# Patient Record
Sex: FEMALE | Race: WHITE | Employment: FULL TIME | URBAN - METROPOLITAN AREA
[De-identification: names, ages, dates, MRNs, and addresses within clinical notes are randomized per-mention and may not be internally consistent; named-entity substitution may affect disease eponyms.]

---

## 2021-12-23 ENCOUNTER — TELEPHONE (OUTPATIENT)
Dept: ONCOLOGY | Age: 21
End: 2021-12-23

## 2022-03-08 ENCOUNTER — INITIAL CONSULT (OUTPATIENT)
Dept: ONCOLOGY | Age: 22
End: 2022-03-08
Payer: COMMERCIAL

## 2022-03-08 DIAGNOSIS — Z80.3 FAMILY HISTORY OF BREAST CANCER: Primary | ICD-10-CM

## 2022-03-08 PROCEDURE — 96040 PR GENETIC COUNSELING, EACH 30 MIN: CPT | Performed by: GENETIC COUNSELOR, MS

## 2022-03-08 NOTE — PROGRESS NOTES
3 [facility] Program   Hereditary Cancer Risk Assessment     Name: Anna Anderson   YOB: 2000   Date of Consultation: 3/8/22     Ms. Moss was seen at the [facility] for genetic counseling on 3/8/22. Ms. David Whittaker was referred by Iglesia Carrasco MD due to her family history of cancer. PERSONAL HISTORY   Ms. David Whittaker is a 23 y.o.  female with no personal history of cancer. Ms. David Whittaker reports menarche at age 13, is nulliparous, and is premenopausal.     Ms. David Whittaker has never had a hysterectomy and both ovaries are intact. She has not yet required a mammogram, had a breast MRI or had a breast biopsy. FAMILY HISTORY  Ms. David Whittaker has no biological children. She has a full brother (19y). Ms. Kemi Gallagher mother was recently diagnosed with breast cancer at age 47. She underwent lumpectomy and radiation therapy. She did not have genetic testing offered to her. There are no other known cancers in her extended maternal family. Ms. Kemi Gallagher father was diagnosed with male breast cancer at age 52 in which he underwent mastectomy and chemotherapy. It is unclear if he had any genetic testing performed. She reports that her paternal grandmother also had breast cancer in her 62s. Ms. David Whittaker reports [facility] ancestry and denies any known Ashkenazi Scientologist heritage. RISK ASSESSMENT   We discussed that approximately 5-10% of cancers are due to a hereditary gene mutation which causes an increased risk for certain cancers. Hereditary cancers are typically diagnosed at younger ages (under age 46y) and occur in multiple generations of a family. Multiple individuals with the same type of cancer (example: breast or colorectal) or uncommon cancers (example: ovarian, pancreatic, male breast cancer) are also features of hereditary cancers.      In summary, Ms. David Whittaker meets the [address] [place] [address] (NCCN) guidelines for genetic testing of the BRCA1/2 genes based on having a first degree relative with male breast cancer. She relates that she is unable to confirm if her father had any genetic testing or to obtain a copy of those test results. For this reason, Ms. Jacky Cannon is an appropriate candidate for genetic testing despite not having a personal history of cancer herself. The NCCN guidelines also recognize that an individual's personal and/or family history may be explained by more than one inherited cancer syndrome. Thus, a multi-gene panel may be more efficient, more cost effecting, and increases the yield of detecting a hereditary mutation which would impact medical management. Given her personal and/or family history, we recommend testing for the following genes at minimum: VICTOR MANUEL, CHEK2, NBN, and PALB2. DISCUSSION  We discussed that the BRCA1/2 genes are the most common genes associated with hereditary breast and ovarian cancer. We also discussed that genetic testing is available for multiple other genes related to hereditary cancer. Some of these genes are known to carry a significant increased risk for several cancers including colon, breast, uterine, ovarian, stomach, and pancreatic cancer, while some of these genes are believed to have a moderate increased risk for breast and other cancers. We discussed the possibility of finding a mutation in genes with limited information to guide medical management, as well we as the possibility of identifying variants of uncertain significance (VUS). We discussed the risks, benefits, and limitations of genetic testing. Possible test results were discussed as well as potential screening and prevention strategies. Specifically, we discussed increased breast cancer surveillance by mammogram and breast MRI as well as the option of prophylactic mastectomy. We discussed the recommendation for prophylactic oophorectomy for results which suggest an increased risk for ovarian cancer. Lastly, we discussed that the results of Ms. Moss's genetic testing may be beneficial in defining her risk for cancer as well as for her family members. SUMMARY & PLAN  1) Ms. Vivek Delvalle meets the NCCN criteria for genetic testing based on her family history of breast cancer. 2) Genetic testing via a multi-gene panel was recommended and offered to Ms. Moss. 3) Ms. Vivek Delvalle elected to proceed with the [employer] Expanded + RNA Insight gene panel. 4) Ms. Vivek Delvalle is aware that she will receive a notification from Alexis Bittar if the out of pocket cost for testing exceeds $100 (based on individual insurance plan) and the option to proceed with the self pay price of $249.     5) Informed consent was obtained and a blood sample was sent to Alexis Bittar. We will call Ms. Moss with results as soon as they are available. A follow up appointment may be recommended. A summary letter with results and final medical management recommendations will be sent once available. A total of 35 minutes were spent face to face with Ms. Moss and 50% of the time was spent educating and counseling. The [address] [facility] National Program would be glad to offer our assistance should you have any questions or concerns about this information. Please feel free to contact us at [phone]. Candise Dance.  Kody Morales, MS, [facility]   Licensed Genetic Counselor

## 2022-04-06 ENCOUNTER — TELEPHONE (OUTPATIENT)
Dept: ONCOLOGY | Age: 22
End: 2022-04-06

## 2022-04-06 NOTE — TELEPHONE ENCOUNTER
3 [facility] Program   Hereditary Cancer Risk Assessment     Name: Leighton Apple  YOB: 2000  Date of Results Disclosure: 04/06/22      HISTORY   Ms. Radha Montenegro was seen for genetic counseling at the request of Brina Treviño MD due to her family history of breast cancer. At that time, Ms. Radha Montenegro chose to pursue genetic testing via the CancerNext Expanded + RNA gene panel. These results were discussed with Ms. Moss via telephone. A summary of Ms. Moss's results and recommendations are below. RESULTS  [employer] CancerNext-Expanded Panel + RNAinsight: NEGATIVE - NO CLINICALLY SIGNIFICANT MUTATIONS DETECTED   This panel included the analysis of 77 genes associated with hereditary cancer including: AIP, ALK, APC, VICTOR MANUEL, BAP1, BARD1, BLM, BMPR1A, BRCA1, BRCA2, BRIP1, CDC73, CDH1, CDK4, CDKN1B, CDKN2A, CHEK2, CTNNA1, DICER1, EGFR, EGLN1, EPCAM, FANCC, FH, FLCN, GALNT12, GREM1, HOXB13, KIF1B, KIT1, LZTR1, MAX, MEN1, MET, MITF, MLH1, MSH2, MSH3, MSH6, MUTYH, NBN, NF1, NF2, NTHL1, PALB2, PDGFRA, PHOX2B, PMS2, POLD1, POLE, POT1, UXVEI3M, PTCH1, PTEN, RAD51C, RAD51D, RB1, RECQL, RET, SDHA, SDHAF2, SDHB, SDHC, ,SDHD, SMAD4, SMARCA4, SMARCB1, SMARCE1, STK11, SUFU, XTWA915, TP53, TSC1, TSC2, VHL, and XRCC2. In addition, no clinically relevant aberrant RNA transcripts were detected in select genes. Please refer to genetic test report for technical details. We discussed that Ms. Moss's negative test result greatly reduces the likelihood that she carries a hereditary gene mutation. However, it is possible that her family history of cancer is due to a hereditary mutation which she did not inherit. It is also possible that her family history of cancer may be due to a gene for which testing was not performed or which has yet to be discovered. RECOMMENDATIONS  1) While Ms. Moss does not carry a known hereditary gene mutation, her risk for breast cancer may still be elevated due to her remaining family history of breast cancer. Based on Ms. Moss's personal risk factors and family history of breast cancer, her estimated lifetime risk for breast cancer is 33% according to the [employer] risk model. The [employer] (NCCN) recommends that women with a lifetime risk of breast cancer 20% or higher consider the following screening and risk reducing options:     NCCN Recommendation Age to Begin Frequency    Breast awareness - Women should be familiar with their breasts and promptly report changes to their healthcare provider. Periodic, consistent breast self-examination (BSE) may be beneficial  Individualized  N/A    Clinical Breast Examination  Individualized Every 6-12 months   Breast MRI with contrast  40years old  Annual   Mammogram (consider tomosynthesis)  40years old  Annual    Consider risk reducing agents (i.e. Tamoxifen)  Individualized  N/A    *age to begin screening is based on the onset of breast cancer in Ms. Moss's family    2) Ms. Shelli Hamlin should continue general population cancer screening guidelines as directed by her physicians. RECOMMENDATIONS FOR FAMILY MEMBERS   1) Genetic testing is not recommended for Ms. Moss's children based on her negative test results. However, this recommendation does not take into consideration any family history of cancer in their paternal family. 2) It is possible that the cancers in Ms. Moss's family are due to a hereditary gene mutation that she did not inherit. Therefore, her relatives (particularly those with a current or previous cancer diagnosis) may consider genetic counseling and testing to clarify this possibility. Relatives may contact the [facility] [facility] at [phone] or locate a genetic counselor at www. [employer]. [employer].     3) We encourage Ms. Moss's relatives to discuss their family history of cancer with their physicians to determine the most appropriate cancer screening recommendations. Ms. Jemma Green female relatives may benefit from a formal breast cancer risk assessment by the Tennie Anders or Nikita Cater risk models to determine if additional breast cancer screening is warranted. SUMMARY & PLAN   1) Ms. Jemma Green genetic test results are negative meaning there were no clinically significant mutations detected in the 77 genes analyzed. 2) Ms. Jemma Green lifetime risk for breast cancer is 33% according to the Nikita Cater risk model. She may consider the NCCN guidelines outlined above for breast cancer surveillance. This includes annual breast MRI screening staggered 6 months apart from annual mammograms beginning at age 40 (10 years earlier than the youngest diagnosis of breast cancer in her family). We also recommend that her lifetime risk be re-calculated at that time to account for any changes to her personal hormonal history or family history. 3) There are no other changes in medical management for Ms. Moss based on her negative genetic test results. 4) We encourage Ms. Moss to contact us every 1-2 years to determine if there are any new genetic testing or research options available. 5) We encourage Ms. Moss to contact us with updates to her personal and/or familys cancer history as this information may alter our assessment and/or recommendations. The [facility] National Program would be glad to offer our assistance should you have any questions or concerns about this information. Please feel free to contact us at [phone]. Melony Door.  Tommie Carpenter MS, [facility]   Licensed Genetic Counselor         CC:  MD Rico Riley MD

## 2022-04-18 ENCOUNTER — APPOINTMENT (OUTPATIENT)
Dept: GENERAL RADIOLOGY | Age: 22
DRG: 003 | End: 2022-04-18
Attending: INTERNAL MEDICINE
Payer: COMMERCIAL

## 2022-04-18 ENCOUNTER — HOSPITAL ENCOUNTER (INPATIENT)
Age: 22
LOS: 22 days | Discharge: HOME | DRG: 003 | End: 2022-05-10
Attending: INTERNAL MEDICINE | Admitting: INTERNAL MEDICINE
Payer: COMMERCIAL

## 2022-04-18 DIAGNOSIS — E66.01 MORBID OBESITY WITH BMI OF 50.0-59.9, ADULT (HCC): ICD-10-CM

## 2022-04-18 DIAGNOSIS — G47.33 OSA (OBSTRUCTIVE SLEEP APNEA): Primary | ICD-10-CM

## 2022-04-18 DIAGNOSIS — J45.52 SEVERE PERSISTENT ASTHMA WITH STATUS ASTHMATICUS: ICD-10-CM

## 2022-04-18 PROBLEM — I51.89 DIASTOLIC DYSFUNCTION: Status: ACTIVE | Noted: 2022-04-18

## 2022-04-18 PROBLEM — J45.901 ACUTE ASTHMA EXACERBATION: Status: ACTIVE | Noted: 2022-04-18

## 2022-04-18 PROBLEM — Z87.898 HISTORY OF SEIZURES: Status: ACTIVE | Noted: 2022-04-18

## 2022-04-18 PROBLEM — Z97.8 ENDOTRACHEALLY INTUBATED: Status: ACTIVE | Noted: 2022-04-18

## 2022-04-18 PROBLEM — J96.92 HYPERCAPNIC RESPIRATORY FAILURE (HCC): Status: ACTIVE | Noted: 2022-04-18

## 2022-04-18 PROBLEM — Z99.11 ON MECHANICALLY ASSISTED VENTILATION (HCC): Status: ACTIVE | Noted: 2022-04-18

## 2022-04-18 LAB
-: ABNORMAL
ACTION: NORMAL
ALBUMIN SERPL-MCNC: 3.3 G/DL (ref 3.5–5.2)
ALBUMIN/GLOBULIN RATIO: 1.2 (ref 1–2.5)
ALLEN TEST: ABNORMAL
ALLEN TEST: POSITIVE
ALP BLD-CCNC: 86 U/L (ref 35–104)
ALT SERPL-CCNC: 22 U/L (ref 5–33)
ANION GAP SERPL CALCULATED.3IONS-SCNC: 8 MMOL/L (ref 9–17)
AST SERPL-CCNC: 45 U/L
BACTERIA: ABNORMAL
BILIRUB SERPL-MCNC: 0.19 MG/DL (ref 0.3–1.2)
BILIRUBIN URINE: NEGATIVE
BUN BLDV-MCNC: 15 MG/DL (ref 6–20)
CALCIUM SERPL-MCNC: 7.6 MG/DL (ref 8.6–10.4)
CASTS UA: ABNORMAL /LPF (ref 0–2)
CASTS UA: ABNORMAL /LPF (ref 0–2)
CHLORIDE BLD-SCNC: 109 MMOL/L (ref 98–107)
CO2: 30 MMOL/L (ref 20–31)
COLOR: YELLOW
CREAT SERPL-MCNC: 0.97 MG/DL (ref 0.5–0.9)
DATE AND TIME: NORMAL
EPITHELIAL CELLS UA: ABNORMAL /HPF (ref 0–5)
FIO2: 60
GFR AFRICAN AMERICAN: >60 ML/MIN
GFR NON-AFRICAN AMERICAN: >60 ML/MIN
GFR SERPL CREATININE-BSD FRML MDRD: ABNORMAL ML/MIN/{1.73_M2}
GLUCOSE BLD-MCNC: 128 MG/DL (ref 65–105)
GLUCOSE BLD-MCNC: 140 MG/DL (ref 65–105)
GLUCOSE BLD-MCNC: 144 MG/DL (ref 70–99)
GLUCOSE BLD-MCNC: 149 MG/DL (ref 74–100)
GLUCOSE BLD-MCNC: 182 MG/DL (ref 74–100)
GLUCOSE BLD-MCNC: 234 MG/DL (ref 74–100)
GLUCOSE URINE: NEGATIVE
KETONES, URINE: NEGATIVE
LEUKOCYTE ESTERASE, URINE: NEGATIVE
LV EF: 50 %
LVEF MODALITY: NORMAL
NITRITE, URINE: NEGATIVE
NOTIFY: NORMAL
O2 DEVICE/FLOW/%: ABNORMAL
O2 DEVICE/FLOW/%: ABNORMAL
PARTIAL THROMBOPLASTIN TIME: 24.4 SEC (ref 20.5–30.5)
PATIENT TEMP: 37
PATIENT TEMP: 37
PH UA: 5.5 (ref 5–8)
POC HCO3: 32 MMOL/L (ref 21–28)
POC HCO3: 32.2 MMOL/L (ref 21–28)
POC HCO3: 32.7 MMOL/L (ref 21–28)
POC LACTIC ACID: 0.71 MMOL/L (ref 0.56–1.39)
POC LACTIC ACID: 0.9 MMOL/L (ref 0.56–1.39)
POC LACTIC ACID: 0.92 MMOL/L (ref 0.56–1.39)
POC O2 SATURATION: 83 % (ref 94–98)
POC O2 SATURATION: 91 % (ref 94–98)
POC O2 SATURATION: 92 % (ref 94–98)
POC PCO2: 65.8 MM HG (ref 35–48)
POC PCO2: 68.5 MM HG (ref 35–48)
POC PCO2: 87 MM HG (ref 35–48)
POC PH: 7.18 (ref 7.35–7.45)
POC PH: 7.28 (ref 7.35–7.45)
POC PH: 7.3 (ref 7.35–7.45)
POC PO2: 55.8 MM HG (ref 83–108)
POC PO2: 72.8 MM HG (ref 83–108)
POC PO2: 81.2 MM HG (ref 83–108)
POSITIVE BASE EXCESS, ART: 1 (ref 0–3)
POSITIVE BASE EXCESS, ART: 3 (ref 0–3)
POSITIVE BASE EXCESS, ART: 4 (ref 0–3)
POTASSIUM SERPL-SCNC: 4.7 MMOL/L (ref 3.7–5.3)
PROCALCITONIN: 0.94 NG/ML
PROTEIN UA: ABNORMAL
RBC UA: ABNORMAL /HPF (ref 0–2)
READ BACK: YES
SAMPLE SITE: ABNORMAL
SAMPLE SITE: ABNORMAL
SODIUM BLD-SCNC: 147 MMOL/L (ref 135–144)
SPECIFIC GRAVITY UA: 1.03 (ref 1–1.03)
TOTAL PROTEIN: 6 G/DL (ref 6.4–8.3)
TURBIDITY: ABNORMAL
URINE HGB: NEGATIVE
UROBILINOGEN, URINE: NORMAL
WBC UA: ABNORMAL /HPF (ref 0–5)

## 2022-04-18 PROCEDURE — 2500000003 HC RX 250 WO HCPCS: Performed by: STUDENT IN AN ORGANIZED HEALTH CARE EDUCATION/TRAINING PROGRAM

## 2022-04-18 PROCEDURE — 94761 N-INVAS EAR/PLS OXIMETRY MLT: CPT

## 2022-04-18 PROCEDURE — 51702 INSERT TEMP BLADDER CATH: CPT

## 2022-04-18 PROCEDURE — 85730 THROMBOPLASTIN TIME PARTIAL: CPT

## 2022-04-18 PROCEDURE — 6360000002 HC RX W HCPCS: Performed by: STUDENT IN AN ORGANIZED HEALTH CARE EDUCATION/TRAINING PROGRAM

## 2022-04-18 PROCEDURE — 2580000003 HC RX 258: Performed by: STUDENT IN AN ORGANIZED HEALTH CARE EDUCATION/TRAINING PROGRAM

## 2022-04-18 PROCEDURE — 36620 INSERTION CATHETER ARTERY: CPT

## 2022-04-18 PROCEDURE — 82803 BLOOD GASES ANY COMBINATION: CPT

## 2022-04-18 PROCEDURE — 87449 NOS EACH ORGANISM AG IA: CPT

## 2022-04-18 PROCEDURE — 85025 COMPLETE CBC W/AUTO DIFF WBC: CPT

## 2022-04-18 PROCEDURE — 93005 ELECTROCARDIOGRAM TRACING: CPT | Performed by: STUDENT IN AN ORGANIZED HEALTH CARE EDUCATION/TRAINING PROGRAM

## 2022-04-18 PROCEDURE — 99291 CRITICAL CARE FIRST HOUR: CPT | Performed by: INTERNAL MEDICINE

## 2022-04-18 PROCEDURE — 83605 ASSAY OF LACTIC ACID: CPT

## 2022-04-18 PROCEDURE — 6360000002 HC RX W HCPCS

## 2022-04-18 PROCEDURE — 99223 1ST HOSP IP/OBS HIGH 75: CPT | Performed by: INTERNAL MEDICINE

## 2022-04-18 PROCEDURE — 71045 X-RAY EXAM CHEST 1 VIEW: CPT

## 2022-04-18 PROCEDURE — 6370000000 HC RX 637 (ALT 250 FOR IP)

## 2022-04-18 PROCEDURE — 84145 PROCALCITONIN (PCT): CPT

## 2022-04-18 PROCEDURE — 2000000000 HC ICU R&B

## 2022-04-18 PROCEDURE — 94002 VENT MGMT INPAT INIT DAY: CPT

## 2022-04-18 PROCEDURE — 80053 COMPREHEN METABOLIC PANEL: CPT

## 2022-04-18 PROCEDURE — 5A1945Z RESPIRATORY VENTILATION, 24-96 CONSECUTIVE HOURS: ICD-10-PCS | Performed by: INTERNAL MEDICINE

## 2022-04-18 PROCEDURE — 93306 TTE W/DOPPLER COMPLETE: CPT

## 2022-04-18 PROCEDURE — 85652 RBC SED RATE AUTOMATED: CPT

## 2022-04-18 PROCEDURE — 81025 URINE PREGNANCY TEST: CPT

## 2022-04-18 PROCEDURE — 2700000000 HC OXYGEN THERAPY PER DAY

## 2022-04-18 PROCEDURE — 81001 URINALYSIS AUTO W/SCOPE: CPT

## 2022-04-18 PROCEDURE — 82947 ASSAY GLUCOSE BLOOD QUANT: CPT

## 2022-04-18 PROCEDURE — 87899 AGENT NOS ASSAY W/OPTIC: CPT

## 2022-04-18 RX ORDER — IPRATROPIUM BROMIDE AND ALBUTEROL SULFATE 2.5; .5 MG/3ML; MG/3ML
SOLUTION RESPIRATORY (INHALATION)
Status: COMPLETED
Start: 2022-04-18 | End: 2022-04-18

## 2022-04-18 RX ORDER — NOREPINEPHRINE BIT/0.9 % NACL 16MG/250ML
1-100 INFUSION BOTTLE (ML) INTRAVENOUS CONTINUOUS
Status: DISCONTINUED | OUTPATIENT
Start: 2022-04-18 | End: 2022-04-19

## 2022-04-18 RX ORDER — HEPARIN SODIUM 5000 [USP'U]/ML
5000 INJECTION, SOLUTION INTRAVENOUS; SUBCUTANEOUS EVERY 8 HOURS SCHEDULED
Status: DISCONTINUED | OUTPATIENT
Start: 2022-04-18 | End: 2022-04-22 | Stop reason: CLARIF

## 2022-04-18 RX ORDER — MIDODRINE HYDROCHLORIDE 5 MG/1
10 TABLET ORAL
Status: DISCONTINUED | OUTPATIENT
Start: 2022-04-18 | End: 2022-04-19

## 2022-04-18 RX ORDER — LANSOPRAZOLE
15 KIT
Status: DISCONTINUED | OUTPATIENT
Start: 2022-04-19 | End: 2022-04-19

## 2022-04-18 RX ORDER — IPRATROPIUM BROMIDE AND ALBUTEROL SULFATE 2.5; .5 MG/3ML; MG/3ML
1 SOLUTION RESPIRATORY (INHALATION) EVERY 4 HOURS PRN
Status: DISCONTINUED | OUTPATIENT
Start: 2022-04-18 | End: 2022-04-20

## 2022-04-18 RX ORDER — FUROSEMIDE 10 MG/ML
40 INJECTION INTRAMUSCULAR; INTRAVENOUS ONCE
Status: COMPLETED | OUTPATIENT
Start: 2022-04-18 | End: 2022-04-18

## 2022-04-18 RX ORDER — PROPOFOL 10 MG/ML
5-50 INJECTION, EMULSION INTRAVENOUS CONTINUOUS
Status: DISCONTINUED | OUTPATIENT
Start: 2022-04-18 | End: 2022-04-20

## 2022-04-18 RX ORDER — SODIUM CHLORIDE 0.9 % (FLUSH) 0.9 %
5-40 SYRINGE (ML) INJECTION PRN
Status: DISCONTINUED | OUTPATIENT
Start: 2022-04-18 | End: 2022-05-10 | Stop reason: HOSPADM

## 2022-04-18 RX ORDER — ALBUTEROL SULFATE 2.5 MG/3ML
5 SOLUTION RESPIRATORY (INHALATION) EVERY 6 HOURS PRN
Status: DISCONTINUED | OUTPATIENT
Start: 2022-04-18 | End: 2022-04-20

## 2022-04-18 RX ORDER — ALBUTEROL SULFATE 2.5 MG/3ML
SOLUTION RESPIRATORY (INHALATION)
Status: COMPLETED
Start: 2022-04-18 | End: 2022-04-18

## 2022-04-18 RX ORDER — SODIUM CHLORIDE 9 MG/ML
INJECTION, SOLUTION INTRAVENOUS PRN
Status: DISCONTINUED | OUTPATIENT
Start: 2022-04-18 | End: 2022-05-10 | Stop reason: HOSPADM

## 2022-04-18 RX ORDER — DEXTROSE MONOHYDRATE 50 MG/ML
100 INJECTION, SOLUTION INTRAVENOUS PRN
Status: DISCONTINUED | OUTPATIENT
Start: 2022-04-18 | End: 2022-05-10 | Stop reason: HOSPADM

## 2022-04-18 RX ORDER — DEXTROSE MONOHYDRATE 25 G/50ML
12.5 INJECTION, SOLUTION INTRAVENOUS PRN
Status: DISCONTINUED | OUTPATIENT
Start: 2022-04-18 | End: 2022-05-10 | Stop reason: HOSPADM

## 2022-04-18 RX ORDER — POLYETHYLENE GLYCOL 3350 17 G/17G
17 POWDER, FOR SOLUTION ORAL DAILY PRN
Status: DISCONTINUED | OUTPATIENT
Start: 2022-04-18 | End: 2022-04-24

## 2022-04-18 RX ORDER — SODIUM CHLORIDE 0.9 % (FLUSH) 0.9 %
5-40 SYRINGE (ML) INJECTION EVERY 12 HOURS SCHEDULED
Status: DISCONTINUED | OUTPATIENT
Start: 2022-04-18 | End: 2022-05-10 | Stop reason: HOSPADM

## 2022-04-18 RX ORDER — METHYLPREDNISOLONE SODIUM SUCCINATE 40 MG/ML
40 INJECTION, POWDER, LYOPHILIZED, FOR SOLUTION INTRAMUSCULAR; INTRAVENOUS DAILY
Status: DISCONTINUED | OUTPATIENT
Start: 2022-04-18 | End: 2022-04-19

## 2022-04-18 RX ORDER — ACETAMINOPHEN 325 MG/1
650 TABLET ORAL EVERY 6 HOURS PRN
Status: DISCONTINUED | OUTPATIENT
Start: 2022-04-18 | End: 2022-05-10 | Stop reason: HOSPADM

## 2022-04-18 RX ORDER — ONDANSETRON 4 MG/1
4 TABLET, ORALLY DISINTEGRATING ORAL EVERY 8 HOURS PRN
Status: DISCONTINUED | OUTPATIENT
Start: 2022-04-18 | End: 2022-05-10 | Stop reason: HOSPADM

## 2022-04-18 RX ORDER — ACETAMINOPHEN 650 MG/1
650 SUPPOSITORY RECTAL EVERY 6 HOURS PRN
Status: DISCONTINUED | OUTPATIENT
Start: 2022-04-18 | End: 2022-05-10 | Stop reason: HOSPADM

## 2022-04-18 RX ORDER — MAGNESIUM SULFATE IN WATER 40 MG/ML
2000 INJECTION, SOLUTION INTRAVENOUS ONCE
Status: COMPLETED | OUTPATIENT
Start: 2022-04-18 | End: 2022-04-18

## 2022-04-18 RX ORDER — ONDANSETRON 2 MG/ML
4 INJECTION INTRAMUSCULAR; INTRAVENOUS EVERY 6 HOURS PRN
Status: DISCONTINUED | OUTPATIENT
Start: 2022-04-18 | End: 2022-05-10 | Stop reason: HOSPADM

## 2022-04-18 RX ORDER — NICOTINE POLACRILEX 4 MG
15 LOZENGE BUCCAL PRN
Status: DISCONTINUED | OUTPATIENT
Start: 2022-04-18 | End: 2022-05-10 | Stop reason: HOSPADM

## 2022-04-18 RX ORDER — PROPOFOL 10 MG/ML
INJECTION, EMULSION INTRAVENOUS
Status: COMPLETED
Start: 2022-04-18 | End: 2022-04-18

## 2022-04-18 RX ADMIN — ALBUTEROL SULFATE 5 MG: 2.5 SOLUTION RESPIRATORY (INHALATION) at 14:57

## 2022-04-18 RX ADMIN — PROPOFOL 40 MCG/KG/MIN: 10 INJECTION, EMULSION INTRAVENOUS at 20:18

## 2022-04-18 RX ADMIN — Medication 150 MCG/HR: at 14:25

## 2022-04-18 RX ADMIN — PROPOFOL 40 MCG/KG/MIN: 10 INJECTION, EMULSION INTRAVENOUS at 14:49

## 2022-04-18 RX ADMIN — PROPOFOL 40 MCG/KG/MIN: 10 INJECTION, EMULSION INTRAVENOUS at 16:48

## 2022-04-18 RX ADMIN — HEPARIN SODIUM 5000 UNITS: 5000 INJECTION INTRAVENOUS; SUBCUTANEOUS at 23:58

## 2022-04-18 RX ADMIN — MAGNESIUM SULFATE 2000 MG: 2 INJECTION INTRAVENOUS at 17:11

## 2022-04-18 RX ADMIN — Medication 150 MCG/HR: at 21:00

## 2022-04-18 RX ADMIN — FUROSEMIDE 40 MG: 10 INJECTION, SOLUTION INTRAMUSCULAR; INTRAVENOUS at 20:47

## 2022-04-18 RX ADMIN — INSULIN LISPRO 2 UNITS: 100 INJECTION, SOLUTION INTRAVENOUS; SUBCUTANEOUS at 23:20

## 2022-04-18 RX ADMIN — METHYLPREDNISOLONE SODIUM SUCCINATE 40 MG: 40 INJECTION, POWDER, FOR SOLUTION INTRAMUSCULAR; INTRAVENOUS at 17:12

## 2022-04-18 RX ADMIN — PROPOFOL 40 MCG/KG/MIN: 10 INJECTION, EMULSION INTRAVENOUS at 22:43

## 2022-04-18 RX ADMIN — CISATRACURIUM BESYLATE 2 MCG/KG/MIN: 200 INJECTION, SOLUTION INTRAVENOUS at 14:30

## 2022-04-18 RX ADMIN — IPRATROPIUM BROMIDE AND ALBUTEROL SULFATE 1 AMPULE: .5; 2.5 SOLUTION RESPIRATORY (INHALATION) at 14:58

## 2022-04-18 RX ADMIN — MIDODRINE HYDROCHLORIDE 10 MG: 5 TABLET ORAL at 20:46

## 2022-04-18 RX ADMIN — IPRATROPIUM BROMIDE AND ALBUTEROL SULFATE 1 AMPULE: .5; 3 SOLUTION RESPIRATORY (INHALATION) at 14:58

## 2022-04-18 ASSESSMENT — PULMONARY FUNCTION TESTS
PIF_VALUE: 46
PIF_VALUE: 40
PIF_VALUE: 48
PIF_VALUE: 52
PIF_VALUE: 59
PIF_VALUE: 51
PIF_VALUE: 48

## 2022-04-18 NOTE — PROCEDURES
Patient Name: Jessica Gonsaels   Medical Record Number: [id number]  Date: 4/18/2022   Time: 3:50 PM   Room/Bed: [address] [place] [address]  Arterial Line Placement Procedure Note                   Indication: arterial blood gases    Consent: Unable to be obtained due to the emergent nature of this procedure. Sushil's Test: Normal    Procedure: The skin over the left radial artery was draped in a sterile fashion. Local anesthesia was not performed due to the emergent nature of this procedure. A 20 gauge arterial line catheter was then inserted, over a needle, into the vessel. The transducer set was then attached and securely fastened to the skin with an adhesive dressing. Waveforms on the monitor were observed and found to be adequate. The patient had good distal perfusion after the procedure. The site was then dressed in a sterile fashion. The patient tolerated the procedure well.      Complications: None    Electronically Signed by: @kin@

## 2022-04-18 NOTE — PLAN OF CARE
Problem: Gas Exchange - Impaired:  Goal: Levels of oxygenation will improve  Description: Levels of oxygenation will improve  Outcome: Ongoing     Problem:  Activity:  Goal: Ability to tolerate increased activity will improve  Description: Ability to tolerate increased activity will improve  Outcome: Ongoing     Problem: Cardiac:  Goal: Hemodynamic stability will improve  Description: Hemodynamic stability will improve  Outcome: Ongoing     Problem: Respiratory:  Goal: Ability to maintain a clear airway will improve  Description: Ability to maintain a clear airway will improve  Outcome: Ongoing  Goal: Ability to maintain adequate ventilation will improve  Description: Ability to maintain adequate ventilation will improve  Outcome: Ongoing  Goal: Complications related to the disease process, condition or treatment will be avoided or minimized  Description: Complications related to the disease process, condition or treatment will be avoided or minimized  Outcome: Ongoing     Problem: Skin Integrity:  Goal: Risk for impaired skin integrity will decrease  Description: Risk for impaired skin integrity will decrease  Outcome: Ongoing

## 2022-04-18 NOTE — CONSULTS
Pulmonary/Critical Care consultation    Patient's name:  Vero Roach  Medical Record Number: [id number]  Patient's account/billing number: [de-identified]  Patient's YOB: 2000  Age: 23 y. o. Date of Admission: 4/18/2022  2:00 PM  Date of Consult: 4/18/2022      Primary Care Physician: Chaya Gabriel MD    Consultation requested by Dr. Lorena Naranjo      Code Status: No Order    Reason for consult: Acute on chronic respiratory failure with hypoxemia and hypercarbia secondary to acute asthma      HISTORY OF PRESENT ILLNESS:   History was obtained from chart review and unobtainable from patient due to l sedated and being on the ventilator. Vero Roach is a 23 y. o. white woman with known history of bronchial asthma was admitted to [facility] with increasing shortness of breath and acute on chronic hypoxemic and hypercarbic respiratory failure requiring intubation mechanical ventilation and sedation and neuromuscular blockade. Patient was receiving bicarbonate secondary to acidosis though the acidosis was respiratory acidosis making the hypercarbia slightly worse and pH also slightly worse. Patient was transferred to [facility] for possibility of ECMO  Upon my evaluation patient was sedated and unresponsive  Having small tracheal secretions  Patient was hemodynamically stable and not not requiring any pressors  Oxygen saturation was 100% on 60% oxygen on the ventilator  ABG showed evidence of acute on chronic respiratory acidosis            Past Medical History: Bronchial asthma, morbid obesity    Past Surgical History:  No past surgical history on file.     Allergies:    Not on File      Home Meds:   Prior to Admission medications    Not on File       Family History:       Problem Relation Age of Onset    Cancer Mother         breast ca 47, lumpectomy, RT, no GT    Cancer Father         breast ca 52, mastectomy, chemo, GT?    Cancer Paternal Grandmother         breast ca, 60-70s    Cancer Maternal Aunt         poss colon ca, colostomy bag, passed away at age 58s    Other Paternal Aunt         cirrhosis         Social History:   TOBACCO:   has no history on file for tobacco use. ETOH:   has no history on file for alcohol use. DRUGS:  has no history on file for drug use. OCCUPATION: Noncontributory          REVIEW OF SYSTEMS:    Review of Systems -   Could not be done secondary to the patient's mentation and intubation          Physical Exam:    Vitals: Resp 24   Ht 5' 3\" (1.6 m)   Wt 293 lb 10.4 oz (133.2 kg)   BMI 52.02 kg/m²     Last Body weight:   Wt Readings from Last 3 Encounters:   04/18/22 293 lb 10.4 oz (133.2 kg)       Body Mass Index : Body mass index is 52.02 kg/m². Intake and Output summary: No intake or output data in the 24 hours ending 04/18/22 1627    Physical Examination:   PHYSICAL EXAMINATION:  Vitals:    04/18/22 1412 04/18/22 1505   Resp:  24   Weight: 293 lb 10.4 oz (133.2 kg)    Height: 5' 3\" (1.6 m)      Constitutional: This is a well developed, well nourished, Greater than 36 - Morbid Obesity / Extreme Obesity / Grade III 23y.o. year old female who is in no apparent distress. Head:normocephalic and atraumatic. EENT:   NAY. No conjunctival injections. Septum was midline, mucosa was without erythema, exudates or cobblestoning. No thrush was noted. Neck: Supple without thyromegaly. No elevated JVP. Trachea was midline. Respiratory: Chest was symmetrical without dullness to percussion. Breath sounds bilaterally were clear to auscultation. There were no wheezes, rhonchi or rales. There is no intercostal retraction or use of accessory muscles. No egophony noted. Cardiovascular: Regular without murmur, clicks, gallops or rubs. Abdomen: Slightly rounded and soft without organomegaly. No rebound tenderness, rigidity or guarding was appreciated.     Lymphatic: No lymphadenopathy. Musculoskeletal: Normal curvature of the spine. No gross muscle weakness. Extremities:  No lower extremity edema, ulcerations, tenderness, varicosities or erythema. Muscle size, tone and strength are normal.  No involuntary movements are noted. Skin:  Warm and dry. Good color, turgor and pigmentation. No lesions or scars. No cyanosis or clubbing  Neurological/Psychiatric: The patient's general behavior, level of consciousness, thought content and emotional status is normal.            Laboratory findings:-    CBC: No results for input(s): WBC, HGB, PLT in the last 72 hours. BMP:  No results for input(s): NA, K, CL, CO2, BUN, CREATININE, GLUCOSE in the last 72 hours. S. Calcium:No results for input(s): CALCIUM in the last 72 hours. S. Ionized Calcium:No results for input(s): IONCA in the last 72 hours. S. Magnesium:No results for input(s): MG in the last 72 hours. S. Phosphorus:No results for input(s): PHOS in the last 72 hours. S. Glucose:  Recent Labs     04/18/22  1419   POCGLU 234*     Glycosylated hemoglobin A1C: No results for input(s): LABA1C in the last 72 hours. INR: No results for input(s): INR in the last 72 hours. Hepatic functions: No results for input(s): ALKPHOS, ALT, AST, PROT, BILITOT, BILIDIR, LABALBU in the last 72 hours. Pancreatic functions:No results for input(s): LACTA, AMYLASE in the last 72 hours. S. Lactic Acid: No results for input(s): LACTA in the last 72 hours. Cardiac enzymes:No results for input(s): CKTOTAL, CKMB, CKMBINDEX, TROPONINI in the last 72 hours. BNP:No results for input(s): BNP in the last 72 hours. Lipid profile: No results for input(s): CHOL, TRIG, HDL, LDL, LDLCALC in the last 72 hours.   Blood Gases: No results found for: PH, PCO2, PO2, HCO3, O2SAT  Thyroid functions: No results found for: T4, TSH         Microbiology:    Cultures during this admission:     Blood cultures:      [x] None drawn      [] Negative             [] Positive (Details:  )  Urine Culture:        [x] None drawn      [] Negative             []  Positive (Details:  )  Sputum Culture:   [x] None drawn       [] Negative             []  Positive (Details:  )         Radiological reports:    XR CHEST (SINGLE VIEW FRONTAL)    Result Date: 4/18/2022  Diffuse interstitial opacities with alveolar/consolidative opacities at the bases favoring multifocal airspace disease. Assessment and Plan       IMPRESSION:   No diagnosis found. Principal Problem:    Acute asthma exacerbation  Resolved Problems:    * No resolved hospital problems. *  Patient with acute on chronic hypoxemic and hypercarbic respiratory failure secondary to acute asthma probably precipitated by right lower lobe pneumonia    PLAN:       Continue mechanical ventilatory support  Hold off on the bicarbonate infusion as it could be making the hypercarbia worse  As the patient's bronchospasm improves, we will continue to increase respiratory rate to help decrease the carbon dioxide  Chest x-ray and ABG as needed  Bronchodilators and corticosteroids along with antibiotics for community-acquired pneumonia with increased risk for pneumococcus and Legionella  Obtain urinary antigens for Legionella and pneumococcus  Sputum gram stain culture to be obtained  Recommend flu vaccination in the fall annually. Recommendations given regarding pneumococcal vaccinations. Maintain an active lifestyle. Chest x-ray was reviewed. Given the patient's body habitus, patient will need to be evaluated for sleep apnea as an outpatient  At this point, patient is not a candidate for ECMO  Discussed with Dr. Kurt Chiu  Thank you for having us involved in the care of your patient. Please call us if you have any questions or concerns.   Total critical care time caring for this patient with life threatening, unstable organ failure, including direct patient contact, management of life support systems, review of data including imaging and labs, discussions with other team members and physicians at least 27  Min so far today, excluding procedures.       Brett Calderon MD, MJATIN.            4/18/2022, 4:27 PM

## 2022-04-19 ENCOUNTER — APPOINTMENT (OUTPATIENT)
Dept: GENERAL RADIOLOGY | Age: 22
DRG: 003 | End: 2022-04-19
Attending: INTERNAL MEDICINE
Payer: COMMERCIAL

## 2022-04-19 LAB
ABSOLUTE EOS #: 0 K/UL (ref 0–0.4)
ABSOLUTE EOS #: 0 K/UL (ref 0–0.4)
ABSOLUTE IMMATURE GRANULOCYTE: 0 K/UL (ref 0–0.3)
ABSOLUTE IMMATURE GRANULOCYTE: 0 K/UL (ref 0–0.3)
ABSOLUTE LYMPH #: 0.94 K/UL (ref 1–4.8)
ABSOLUTE LYMPH #: 1.66 K/UL (ref 1–4.8)
ABSOLUTE MONO #: 0.31 K/UL (ref 0.1–0.8)
ABSOLUTE MONO #: 1.33 K/UL (ref 0.1–0.8)
ADENOVIRUS PCR: NOT DETECTED
ANION GAP SERPL CALCULATED.3IONS-SCNC: 15 MMOL/L (ref 9–17)
ANION GAP: 8 MMOL/L (ref 7–16)
BASOPHILS # BLD: 0 % (ref 0–2)
BASOPHILS # BLD: 0 % (ref 0–2)
BASOPHILS ABSOLUTE: 0 K/UL (ref 0–0.2)
BASOPHILS ABSOLUTE: 0 K/UL (ref 0–0.2)
BORDETELLA PARAPERTUSSIS: NOT DETECTED
BORDETELLA PERTUSSIS PCR: NOT DETECTED
BUN BLDV-MCNC: 19 MG/DL (ref 6–20)
C-REACTIVE PROTEIN: 79.7 MG/L (ref 0–5)
CALCIUM SERPL-MCNC: 7.8 MG/DL (ref 8.6–10.4)
CHLAMYDIA PNEUMONIAE BY PCR: NOT DETECTED
CHLORIDE BLD-SCNC: 106 MMOL/L (ref 98–107)
CO2: 24 MMOL/L (ref 20–31)
CORONAVIRUS 229E PCR: NOT DETECTED
CORONAVIRUS HKU1 PCR: NOT DETECTED
CORONAVIRUS NL63 PCR: NOT DETECTED
CORONAVIRUS OC43 PCR: NOT DETECTED
CREAT SERPL-MCNC: 0.86 MG/DL (ref 0.5–0.9)
EKG ATRIAL RATE: 96 BPM
EKG P AXIS: 74 DEGREES
EKG P-R INTERVAL: 144 MS
EKG Q-T INTERVAL: 358 MS
EKG QRS DURATION: 84 MS
EKG QTC CALCULATION (BAZETT): 452 MS
EKG R AXIS: 67 DEGREES
EKG T AXIS: 58 DEGREES
EKG VENTRICULAR RATE: 96 BPM
EOSINOPHILS RELATIVE PERCENT: 0 % (ref 1–4)
EOSINOPHILS RELATIVE PERCENT: 0 % (ref 1–4)
GFR AFRICAN AMERICAN: >60 ML/MIN
GFR NON-AFRICAN AMERICAN: 39 ML/MIN
GFR NON-AFRICAN AMERICAN: >60 ML/MIN
GFR SERPL CREATININE-BSD FRML MDRD: 48 ML/MIN
GFR SERPL CREATININE-BSD FRML MDRD: ABNORMAL ML/MIN/{1.73_M2}
GFR SERPL CREATININE-BSD FRML MDRD: ABNORMAL ML/MIN/{1.73_M2}
GLUCOSE BLD-MCNC: 135 MG/DL (ref 74–100)
GLUCOSE BLD-MCNC: 142 MG/DL (ref 70–99)
GLUCOSE BLD-MCNC: 148 MG/DL (ref 65–105)
GLUCOSE BLD-MCNC: 156 MG/DL (ref 65–105)
GLUCOSE BLD-MCNC: 168 MG/DL (ref 65–105)
GLUCOSE BLD-MCNC: 236 MG/DL (ref 65–105)
HCG QUALITATIVE: NEGATIVE
HCG(URINE) PREGNANCY TEST: NEGATIVE
HCT VFR BLD CALC: 37.5 % (ref 36.3–47.1)
HCT VFR BLD CALC: 38.1 % (ref 36.3–47.1)
HEMOGLOBIN: 11.6 G/DL (ref 11.9–15.1)
HEMOGLOBIN: 11.6 G/DL (ref 11.9–15.1)
HUMAN METAPNEUMOVIRUS PCR: NOT DETECTED
IMMATURE GRANULOCYTES: 0 %
IMMATURE GRANULOCYTES: 0 %
INFLUENZA A BY PCR: NOT DETECTED
INFLUENZA B BY PCR: NOT DETECTED
INR BLD: 1
LACTIC ACID, SEPSIS WHOLE BLOOD: 0.8 MMOL/L (ref 0.5–1.9)
LEGIONELLA PNEUMOPHILIA AG, URINE: NEGATIVE
LYMPHOCYTES # BLD: 3 % (ref 24–44)
LYMPHOCYTES # BLD: 5 % (ref 24–44)
MAGNESIUM: 3.3 MG/DL (ref 1.6–2.6)
MCH RBC QN AUTO: 27.4 PG (ref 25.2–33.5)
MCH RBC QN AUTO: 28.1 PG (ref 25.2–33.5)
MCHC RBC AUTO-ENTMCNC: 30.4 G/DL (ref 28.4–34.8)
MCHC RBC AUTO-ENTMCNC: 30.9 G/DL (ref 28.4–34.8)
MCV RBC AUTO: 90.1 FL (ref 82.6–102.9)
MCV RBC AUTO: 90.8 FL (ref 82.6–102.9)
MONOCYTES # BLD: 1 % (ref 1–7)
MONOCYTES # BLD: 4 % (ref 1–7)
MORPHOLOGY: NORMAL
MORPHOLOGY: NORMAL
MYCOPLASMA PNEUMONIAE PCR: NOT DETECTED
NRBC AUTOMATED: 0 PER 100 WBC
NRBC AUTOMATED: 0.1 PER 100 WBC
PARAINFLUENZA 1 PCR: NOT DETECTED
PARAINFLUENZA 2 PCR: NOT DETECTED
PARAINFLUENZA 3 PCR: NOT DETECTED
PARAINFLUENZA 4 PCR: NOT DETECTED
PARTIAL THROMBOPLASTIN TIME: 22.7 SEC (ref 20.5–30.5)
PDW BLD-RTO: 14 % (ref 11.8–14.4)
PDW BLD-RTO: 14.1 % (ref 11.8–14.4)
PLATELET # BLD: 257 K/UL (ref 138–453)
PLATELET # BLD: 283 K/UL (ref 138–453)
PMV BLD AUTO: 10.3 FL (ref 8.1–13.5)
PMV BLD AUTO: 10.4 FL (ref 8.1–13.5)
POC BUN: 18 MG/DL (ref 8–26)
POC CHLORIDE: 106 MMOL/L (ref 98–107)
POC CREATININE: 1.61 MG/DL (ref 0.51–1.19)
POC HCO3: 30.3 MMOL/L (ref 21–28)
POC HEMATOCRIT: 34 % (ref 36–46)
POC HEMOGLOBIN: 11.7 G/DL (ref 12–16)
POC IONIZED CALCIUM: 1.06 MMOL/L (ref 1.15–1.33)
POC LACTIC ACID: 0.87 MMOL/L (ref 0.56–1.39)
POC O2 SATURATION: 97 % (ref 94–98)
POC PCO2: 54.1 MM HG (ref 35–48)
POC PH: 7.36 (ref 7.35–7.45)
POC PO2: 92.5 MM HG (ref 83–108)
POC POTASSIUM: 4.4 MMOL/L (ref 3.5–4.5)
POC SODIUM: 144 MMOL/L (ref 138–146)
POC TCO2: 31 MMOL/L (ref 22–30)
POSITIVE BASE EXCESS, ART: 4 (ref 0–3)
POTASSIUM SERPL-SCNC: 4.5 MMOL/L (ref 3.7–5.3)
PROTHROMBIN TIME: 10.3 SEC (ref 9.1–12.3)
RBC # BLD: 4.13 M/UL (ref 3.95–5.11)
RBC # BLD: 4.23 M/UL (ref 3.95–5.11)
RESP SYNCYTIAL VIRUS PCR: NOT DETECTED
RHINO/ENTEROVIRUS PCR: DETECTED
SARS-COV-2, PCR: NOT DETECTED
SEDIMENTATION RATE, ERYTHROCYTE: 39 MM/HR (ref 0–20)
SEG NEUTROPHILS: 91 % (ref 36–66)
SEG NEUTROPHILS: 96 % (ref 36–66)
SEGMENTED NEUTROPHILS ABSOLUTE COUNT: 30.05 K/UL (ref 1.8–7.7)
SEGMENTED NEUTROPHILS ABSOLUTE COUNT: 30.21 K/UL (ref 1.8–7.7)
SODIUM BLD-SCNC: 145 MMOL/L (ref 135–144)
SOURCE: NORMAL
SPECIMEN DESCRIPTION: ABNORMAL
STREP PNEUMONIAE ANTIGEN: NEGATIVE
TROPONIN, HIGH SENSITIVITY: 142 NG/L (ref 0–14)
TROPONIN, HIGH SENSITIVITY: 148 NG/L (ref 0–14)
TROPONIN, HIGH SENSITIVITY: 182 NG/L (ref 0–14)
TROPONIN, HIGH SENSITIVITY: 183 NG/L (ref 0–14)
WBC # BLD: 31.3 K/UL (ref 3.5–11.3)
WBC # BLD: 33.2 K/UL (ref 3.5–11.3)

## 2022-04-19 PROCEDURE — 36620 INSERTION CATHETER ARTERY: CPT

## 2022-04-19 PROCEDURE — 37799 UNLISTED PX VASCULAR SURGERY: CPT

## 2022-04-19 PROCEDURE — 80051 ELECTROLYTE PANEL: CPT

## 2022-04-19 PROCEDURE — 94644 CONT INHLJ TX 1ST HOUR: CPT

## 2022-04-19 PROCEDURE — 86140 C-REACTIVE PROTEIN: CPT

## 2022-04-19 PROCEDURE — 84703 CHORIONIC GONADOTROPIN ASSAY: CPT

## 2022-04-19 PROCEDURE — 6370000000 HC RX 637 (ALT 250 FOR IP): Performed by: STUDENT IN AN ORGANIZED HEALTH CARE EDUCATION/TRAINING PROGRAM

## 2022-04-19 PROCEDURE — 2580000003 HC RX 258: Performed by: STUDENT IN AN ORGANIZED HEALTH CARE EDUCATION/TRAINING PROGRAM

## 2022-04-19 PROCEDURE — 85025 COMPLETE CBC W/AUTO DIFF WBC: CPT

## 2022-04-19 PROCEDURE — 6370000000 HC RX 637 (ALT 250 FOR IP): Performed by: INTERNAL MEDICINE

## 2022-04-19 PROCEDURE — 6360000002 HC RX W HCPCS: Performed by: STUDENT IN AN ORGANIZED HEALTH CARE EDUCATION/TRAINING PROGRAM

## 2022-04-19 PROCEDURE — 94640 AIRWAY INHALATION TREATMENT: CPT

## 2022-04-19 PROCEDURE — 2500000003 HC RX 250 WO HCPCS

## 2022-04-19 PROCEDURE — 85610 PROTHROMBIN TIME: CPT

## 2022-04-19 PROCEDURE — 82803 BLOOD GASES ANY COMBINATION: CPT

## 2022-04-19 PROCEDURE — 89220 SPUTUM SPECIMEN COLLECTION: CPT

## 2022-04-19 PROCEDURE — 6360000002 HC RX W HCPCS: Performed by: INTERNAL MEDICINE

## 2022-04-19 PROCEDURE — 83735 ASSAY OF MAGNESIUM: CPT

## 2022-04-19 PROCEDURE — 6360000002 HC RX W HCPCS

## 2022-04-19 PROCEDURE — 84520 ASSAY OF UREA NITROGEN: CPT

## 2022-04-19 PROCEDURE — 84484 ASSAY OF TROPONIN QUANT: CPT

## 2022-04-19 PROCEDURE — 2700000000 HC OXYGEN THERAPY PER DAY

## 2022-04-19 PROCEDURE — 83605 ASSAY OF LACTIC ACID: CPT

## 2022-04-19 PROCEDURE — 71045 X-RAY EXAM CHEST 1 VIEW: CPT

## 2022-04-19 PROCEDURE — 85730 THROMBOPLASTIN TIME PARTIAL: CPT

## 2022-04-19 PROCEDURE — 83036 HEMOGLOBIN GLYCOSYLATED A1C: CPT

## 2022-04-19 PROCEDURE — 82565 ASSAY OF CREATININE: CPT

## 2022-04-19 PROCEDURE — 99233 SBSQ HOSP IP/OBS HIGH 50: CPT | Performed by: INTERNAL MEDICINE

## 2022-04-19 PROCEDURE — 82330 ASSAY OF CALCIUM: CPT

## 2022-04-19 PROCEDURE — 93010 ELECTROCARDIOGRAM REPORT: CPT | Performed by: INTERNAL MEDICINE

## 2022-04-19 PROCEDURE — 2500000003 HC RX 250 WO HCPCS: Performed by: STUDENT IN AN ORGANIZED HEALTH CARE EDUCATION/TRAINING PROGRAM

## 2022-04-19 PROCEDURE — 87205 SMEAR GRAM STAIN: CPT

## 2022-04-19 PROCEDURE — 2500000003 HC RX 250 WO HCPCS: Performed by: INTERNAL MEDICINE

## 2022-04-19 PROCEDURE — 94761 N-INVAS EAR/PLS OXIMETRY MLT: CPT

## 2022-04-19 PROCEDURE — 99291 CRITICAL CARE FIRST HOUR: CPT | Performed by: INTERNAL MEDICINE

## 2022-04-19 PROCEDURE — 36415 COLL VENOUS BLD VENIPUNCTURE: CPT

## 2022-04-19 PROCEDURE — 85014 HEMATOCRIT: CPT

## 2022-04-19 PROCEDURE — 2000000000 HC ICU R&B

## 2022-04-19 PROCEDURE — 6370000000 HC RX 637 (ALT 250 FOR IP)

## 2022-04-19 PROCEDURE — 87040 BLOOD CULTURE FOR BACTERIA: CPT

## 2022-04-19 PROCEDURE — 87070 CULTURE OTHR SPECIMN AEROBIC: CPT

## 2022-04-19 PROCEDURE — 80048 BASIC METABOLIC PNL TOTAL CA: CPT

## 2022-04-19 PROCEDURE — 94003 VENT MGMT INPAT SUBQ DAY: CPT

## 2022-04-19 PROCEDURE — 0202U NFCT DS 22 TRGT SARS-COV-2: CPT

## 2022-04-19 RX ORDER — DIPHENHYDRAMINE HYDROCHLORIDE 50 MG/ML
10 INJECTION INTRAMUSCULAR; INTRAVENOUS ONCE
Status: COMPLETED | OUTPATIENT
Start: 2022-04-19 | End: 2022-04-19

## 2022-04-19 RX ORDER — MIDODRINE HYDROCHLORIDE 5 MG/1
5 TABLET ORAL 3 TIMES DAILY PRN
Status: DISCONTINUED | OUTPATIENT
Start: 2022-04-19 | End: 2022-04-19

## 2022-04-19 RX ORDER — METHYLPREDNISOLONE SODIUM SUCCINATE 40 MG/ML
40 INJECTION, POWDER, LYOPHILIZED, FOR SOLUTION INTRAMUSCULAR; INTRAVENOUS EVERY 8 HOURS
Status: DISCONTINUED | OUTPATIENT
Start: 2022-04-19 | End: 2022-04-28

## 2022-04-19 RX ORDER — IPRATROPIUM BROMIDE AND ALBUTEROL SULFATE 2.5; .5 MG/3ML; MG/3ML
1 SOLUTION RESPIRATORY (INHALATION) EVERY 4 HOURS
Status: DISCONTINUED | OUTPATIENT
Start: 2022-04-19 | End: 2022-05-05

## 2022-04-19 RX ORDER — DIPHENHYDRAMINE HYDROCHLORIDE 50 MG/ML
10 INJECTION INTRAMUSCULAR; INTRAVENOUS EVERY 6 HOURS PRN
Status: DISCONTINUED | OUTPATIENT
Start: 2022-04-19 | End: 2022-04-19

## 2022-04-19 RX ORDER — ALBUTEROL SULFATE 90 UG/1
2 AEROSOL, METERED RESPIRATORY (INHALATION) EVERY 6 HOURS PRN
Status: ON HOLD | COMMUNITY
End: 2022-04-22

## 2022-04-19 RX ORDER — LABETALOL HYDROCHLORIDE 5 MG/ML
5 INJECTION, SOLUTION INTRAVENOUS ONCE
Status: CANCELLED | OUTPATIENT
Start: 2022-04-19 | End: 2022-04-19

## 2022-04-19 RX ORDER — LANSOPRAZOLE
30 KIT
Status: DISCONTINUED | OUTPATIENT
Start: 2022-04-20 | End: 2022-04-20

## 2022-04-19 RX ORDER — MIDODRINE HYDROCHLORIDE 5 MG/1
10 TABLET ORAL 3 TIMES DAILY PRN
Status: DISCONTINUED | OUTPATIENT
Start: 2022-04-19 | End: 2022-04-19

## 2022-04-19 RX ORDER — POLYVINYL ALCOHOL 14 MG/ML
1 SOLUTION/ DROPS OPHTHALMIC EVERY 6 HOURS
Status: DISCONTINUED | OUTPATIENT
Start: 2022-04-19 | End: 2022-05-04

## 2022-04-19 RX ORDER — METOPROLOL TARTRATE 5 MG/5ML
5 INJECTION INTRAVENOUS ONCE
Status: COMPLETED | OUTPATIENT
Start: 2022-04-19 | End: 2022-04-19

## 2022-04-19 RX ORDER — METOPROLOL TARTRATE 5 MG/5ML
5 INJECTION INTRAVENOUS EVERY 6 HOURS PRN
Status: DISCONTINUED | OUTPATIENT
Start: 2022-04-19 | End: 2022-05-10 | Stop reason: HOSPADM

## 2022-04-19 RX ADMIN — METHYLPREDNISOLONE SODIUM SUCCINATE 40 MG: 40 INJECTION, POWDER, FOR SOLUTION INTRAMUSCULAR; INTRAVENOUS at 09:33

## 2022-04-19 RX ADMIN — PROPOFOL 50 MCG/KG/MIN: 10 INJECTION, EMULSION INTRAVENOUS at 16:19

## 2022-04-19 RX ADMIN — SODIUM CHLORIDE, PRESERVATIVE FREE 10 ML: 5 INJECTION INTRAVENOUS at 09:34

## 2022-04-19 RX ADMIN — IPRATROPIUM BROMIDE AND ALBUTEROL SULFATE 1 AMPULE: .5; 2.5 SOLUTION RESPIRATORY (INHALATION) at 20:03

## 2022-04-19 RX ADMIN — PROPOFOL 40 MCG/KG/MIN: 10 INJECTION, EMULSION INTRAVENOUS at 01:51

## 2022-04-19 RX ADMIN — POLYVINYL ALCOHOL 1 DROP: 14 SOLUTION/ DROPS OPHTHALMIC at 19:56

## 2022-04-19 RX ADMIN — PROPOFOL 40 MCG/KG/MIN: 10 INJECTION, EMULSION INTRAVENOUS at 05:10

## 2022-04-19 RX ADMIN — CISATRACURIUM BESYLATE 1.5 MCG/KG/MIN: 200 INJECTION, SOLUTION INTRAVENOUS at 19:54

## 2022-04-19 RX ADMIN — Medication 5 MCG/MIN: at 12:17

## 2022-04-19 RX ADMIN — Medication 15 MG: at 09:53

## 2022-04-19 RX ADMIN — AZITHROMYCIN MONOHYDRATE 500 MG: 500 INJECTION, POWDER, LYOPHILIZED, FOR SOLUTION INTRAVENOUS at 09:54

## 2022-04-19 RX ADMIN — PROPOFOL 40 MCG/KG/MIN: 10 INJECTION, EMULSION INTRAVENOUS at 10:54

## 2022-04-19 RX ADMIN — Medication 150 MCG/HR: at 17:11

## 2022-04-19 RX ADMIN — ALBUTEROL SULFATE 15 MG/HR: 5 SOLUTION RESPIRATORY (INHALATION) at 08:35

## 2022-04-19 RX ADMIN — POLYVINYL ALCOHOL 1 DROP: 14 SOLUTION/ DROPS OPHTHALMIC at 18:21

## 2022-04-19 RX ADMIN — INSULIN LISPRO 2 UNITS: 100 INJECTION, SOLUTION INTRAVENOUS; SUBCUTANEOUS at 18:56

## 2022-04-19 RX ADMIN — PROPOFOL 40 MCG/KG/MIN: 10 INJECTION, EMULSION INTRAVENOUS at 19:30

## 2022-04-19 RX ADMIN — INSULIN LISPRO 2 UNITS: 100 INJECTION, SOLUTION INTRAVENOUS; SUBCUTANEOUS at 23:42

## 2022-04-19 RX ADMIN — DIPHENHYDRAMINE HYDROCHLORIDE 10 MG: 50 INJECTION, SOLUTION INTRAMUSCULAR; INTRAVENOUS at 00:26

## 2022-04-19 RX ADMIN — PROPOFOL 40 MCG/KG/MIN: 10 INJECTION, EMULSION INTRAVENOUS at 21:55

## 2022-04-19 RX ADMIN — METOPROLOL TARTRATE 5 MG: 1 INJECTION, SOLUTION INTRAVENOUS at 09:52

## 2022-04-19 RX ADMIN — CISATRACURIUM BESYLATE 1.5 MCG/KG/MIN: 200 INJECTION, SOLUTION INTRAVENOUS at 03:39

## 2022-04-19 RX ADMIN — PROPOFOL 40 MCG/KG/MIN: 10 INJECTION, EMULSION INTRAVENOUS at 13:45

## 2022-04-19 RX ADMIN — PROPOFOL 40 MCG/KG/MIN: 10 INJECTION, EMULSION INTRAVENOUS at 08:10

## 2022-04-19 RX ADMIN — ALBUTEROL SULFATE 5 MG: 2.5 SOLUTION RESPIRATORY (INHALATION) at 07:59

## 2022-04-19 RX ADMIN — ACETAMINOPHEN 650 MG: 325 TABLET ORAL at 18:30

## 2022-04-19 RX ADMIN — DILTIAZEM HYDROCHLORIDE 30 MG: 30 TABLET ORAL at 16:51

## 2022-04-19 RX ADMIN — DILTIAZEM HYDROCHLORIDE 30 MG: 30 TABLET ORAL at 23:43

## 2022-04-19 RX ADMIN — Medication 150 MCG/HR: at 23:47

## 2022-04-19 RX ADMIN — METHYLPREDNISOLONE SODIUM SUCCINATE 40 MG: 40 INJECTION, POWDER, FOR SOLUTION INTRAMUSCULAR; INTRAVENOUS at 18:38

## 2022-04-19 RX ADMIN — Medication 10 ML: at 13:42

## 2022-04-19 RX ADMIN — CEFTRIAXONE SODIUM 1000 MG: 1 INJECTION, POWDER, FOR SOLUTION INTRAMUSCULAR; INTRAVENOUS at 01:46

## 2022-04-19 RX ADMIN — DILTIAZEM HYDROCHLORIDE 30 MG: 30 TABLET ORAL at 18:38

## 2022-04-19 RX ADMIN — INSULIN LISPRO 4 UNITS: 100 INJECTION, SOLUTION INTRAVENOUS; SUBCUTANEOUS at 12:20

## 2022-04-19 RX ADMIN — HEPARIN SODIUM 5000 UNITS: 5000 INJECTION INTRAVENOUS; SUBCUTANEOUS at 05:13

## 2022-04-19 RX ADMIN — METOPROLOL TARTRATE 5 MG: 1 INJECTION, SOLUTION INTRAVENOUS at 13:42

## 2022-04-19 RX ADMIN — IPRATROPIUM BROMIDE AND ALBUTEROL SULFATE 1 AMPULE: .5; 2.5 SOLUTION RESPIRATORY (INHALATION) at 16:50

## 2022-04-19 RX ADMIN — IPRATROPIUM BROMIDE AND ALBUTEROL SULFATE 1 AMPULE: .5; 3 SOLUTION RESPIRATORY (INHALATION) at 07:59

## 2022-04-19 RX ADMIN — Medication 150 MCG/HR: at 10:29

## 2022-04-19 RX ADMIN — HEPARIN SODIUM 5000 UNITS: 5000 INJECTION INTRAVENOUS; SUBCUTANEOUS at 21:02

## 2022-04-19 RX ADMIN — SODIUM CHLORIDE, PRESERVATIVE FREE 10 ML: 5 INJECTION INTRAVENOUS at 19:57

## 2022-04-19 RX ADMIN — HEPARIN SODIUM 5000 UNITS: 5000 INJECTION INTRAVENOUS; SUBCUTANEOUS at 15:07

## 2022-04-19 RX ADMIN — Medication 150 MCG/HR: at 03:40

## 2022-04-19 ASSESSMENT — PULMONARY FUNCTION TESTS
PIF_VALUE: 54
PIF_VALUE: 55
PIF_VALUE: 35
PIF_VALUE: 54
PIF_VALUE: 56
PIF_VALUE: 33
PIF_VALUE: 35
PIF_VALUE: 35
PIF_VALUE: 37
PIF_VALUE: 31
PIF_VALUE: 54
PIF_VALUE: 55
PIF_VALUE: 55
PIF_VALUE: 31
PIF_VALUE: 34
PIF_VALUE: 55
PIF_VALUE: 53
PIF_VALUE: 32
PIF_VALUE: 55

## 2022-04-19 ASSESSMENT — ENCOUNTER SYMPTOMS: TACHYPNEA: 1

## 2022-04-19 NOTE — PROGRESS NOTES
PULMONARY & CRITICAL CARE MEDICINE PROGRESS NOTE     Patient:  Diana Costa  MRN: [id number]  Admit date: 4/18/2022  Primary Care Physician: Kenya Byrne MD  Consulting Physician: Sherie Matos MD  CODE Status: Full Code  LOS: 1     SUBJECTIVE     CHIEF COMPLAINT/REASON FOR INITIAL CONSULT: Acute respiratory failure    BRIEF HOSPITAL COURSE:   The patient is a 23 y.o. female with past medical history of asthma was initially admitted to [facility] with acute exacerbation. She was initially put on nonrebreather, subsequently required intubation and initiation of mechanical ventilation due to worsening respiratory status. Patient was also on bicarb drip which was discontinued on arrival to [facility].  She is sedated, paralyzed and mechanically ventilated. ABG shows improvement in respiratory acidosis and oxygenation.     INTERVAL HISTORY:  04/19/22  Patient remains on sedation/mechanical ventilation  Tidal volume increased 370 mL (7 mL/kg IBW), respiratory 30, FiO2 down to 50%, PEEP is 8  She is sedated with propofol and fentanyl  Blood pressure/heart rate slightly high, ordered push dose of metoprolol  Respiratory panel positive for rhino/enterovirus  On ceftriaxone/azithromycin, white count 33.2 Tmax 98.1  On Solu-Medrol    REVIEW OF SYSTEMS:  Unobtainable from patient due to sedation/mechanical ventilation    OBJECTIVE     VENTILATOR SETTINGS:  Vent Information  $Ventilation: $Subsequent Day  Skin Assessment: Clean, dry, & intact  Suction Catheter Diameter: 14  Equipment ID: [id number]  Equipment Changed: (S) Vent Circuit (pt placed on heated circuit, RR set at 30 and pt paralyzed/s)  Vent Type: Servo i  Vent Mode: PRVC  Vt Ordered: 370 mL  Rate Set: 30 bmp  Pressure Support: 70 cmH20  FiO2 : 50 %  SpO2: 93 %  SpO2/FiO2 ratio: 188  PaO2/FiO2 ratio: (S) 79.7  Sensitivity: 5  PEEP/CPAP: 8  I Time/ I Time %: 0.7 s  Humidification Source: Heated wire  Humidification Temp: 37  Humidification Temp Measured: 36.8  Circuit Condensation: Drained  Nitric Oxide/Epoprostenol In Use?: No     PaO2/FiO2 RATIO:  Recent Labs     22  0452   POCPO2 92.5      FiO2 : 50 %     VITAL SIGNS:   LAST:  BP (!) 145/47   Pulse 141   Temp 98.1 °F (36.7 °C) (Bladder)   Resp 30   Ht 5' 3\" (1.6 m)   Wt (!) 314 lb (142.4 kg)   SpO2 93%   BMI 55.62 kg/m²   8-24 HR RANGE:  TEMP Temp  Av.1 °F (36.7 °C)  Min: 98.1 °F (36.7 °C)  Max: 98.1 °F (19.2 °C)   BP Systolic (61WCT), [facility]:453 , Min:96 , [facility]:857      Diastolic (40FQU), TJQ:93, Min:39, Max:75     PULSE Pulse  Av.2  Min: 89  Max: 145   RR Resp  Av  Min: 30  Max: 30   O2 SAT SpO2  Av.8 %  Min: 93 %  Max: 100 %   OXYGEN DELIVERY No data recorded        Physical Exam    DATA REVIEW     Medications:  Scheduled Meds:   cefTRIAXone (ROCEPHIN) IV  1,000 mg IntraVENous Q24H    azithromycin  500 mg IntraVENous Q24H    methylPREDNISolone  40 mg IntraVENous Q8H    [START ON 2022] lansoprazole  30 mg Per NG tube QAM AC    dilTIAZem  30 mg Oral 4 times per day    polyvinyl alcohol  1 drop Both Eyes Q6H    insulin lispro  0-12 Units SubCUTAneous Q6H    sodium chloride flush  5-40 mL IntraVENous 2 times per day    heparin (porcine)  5,000 Units SubCUTAneous 3 times per day     Continuous Infusions:   propofol 40 mcg/kg/min (22 1345)    cisatracurium (NIMBEX) infusion 1.5 mcg/kg/min (22 0600)    fentaNYL 150 mcg/hr (22 1029)    dextrose      sodium chloride         INPUT/OUTPUT:  In: 763.7 [I.V.:668.7; NG/GT:45]  Out: 880 [Urine:880]  Date 22 0000 - 22   Shift 6857-2795 6261-8893 6446-7701 24 Hour Total   INTAKE   I.V.(mL/kg) 343.6(2.4)   343.6(2.4)   NG/GT(mL/kg)  45(0.3)  45(0.3)   IV Piggyback(mL/kg) 0(0)   0(0)   Shift Total(mL/kg) 343.6(2.4) 45(0.3)  388.6(2.7)   OUTPUT   Urine(mL/kg/hr) 365(0.3) 295  660   Emesis/NG output(mL/kg) 0(0)   0(0)   Shift Total(mL/kg) 365(2.6) 295(2.1) 660(4.6)   Weight (kg) 142.4 142.4 142.4 142.4        LABS:  ABGs:   Recent Labs     04/18/22  1419 04/18/22  1738 04/18/22  2207 04/19/22  0452   POCPH 7.176* 7.278* 7.304* 7.356   POCPCO2 87.0* 68.5* 65.8* 54.1*   POCPO2 81.2* 55.8* 72.8* 92.5   POCHCO3 32.2* 32.0* 32.7* 30.3*   UQBB0BVV 91* 83* 92* 97     CBC:   Recent Labs     04/18/22 2311 04/19/22  1332   WBC 31.3* 33.2*   HGB 11.6* 11.6*   HCT 38.1 37.5   MCV 90.1 90.8    283   LYMPHOPCT 3* 5*   RBC 4.23 4.13   MCH 27.4 28.1   MCHC 30.4 30.9   RDW 14.0 14.1     CRP:   Recent Labs     04/19/22  0020   CRP 79.7*     LDH:   No results for input(s): LDH in the last 72 hours. BMP:   Recent Labs     04/18/22 2311 04/19/22 0452 04/19/22  0550   *  --  145*   K 4.7  --  4.5   *  --  106   CO2 30  --  24   BUN 15  --  19   CREATININE 0.97* 1.61* 0.86   GLUCOSE 144*  --  142*     Liver Function Test:   Recent Labs     04/18/22 2311   PROT 6.0*   LABALBU 3.3*   ALT 22   AST 45*   ALKPHOS 86   BILITOT 0.19*     Coagulation Profile:   Recent Labs     04/18/22 2311 04/19/22  1332   INR  --  1.0   PROTIME  --  10.3   APTT 24.4 22.7     D-Dimer:  No results for input(s): DDIMER in the last 72 hours. Lactic Acid:  No results for input(s): LACTA in the last 72 hours. Cardiac Enzymes:  No results for input(s): CKTOTAL, CKMB, CKMBINDEX, TROPONINI in the last 72 hours. Invalid input(s): TROPONIN, HSTROP  BNP/ProBNP:   No results for input(s): BNP, PROBNP in the last 72 hours. Triglycerides:  No results for input(s): TRIG in the last 72 hours. Microbiology:  Urine Culture:  No components found for: CURINE  Blood Culture:  No components found for: CBLOOD, CFUNGUSBL  Sputum Culture:  No components found for: [place] 11     04/19/22  0134 04/19/22  0134 04/19/22  0948   SPECDESC . BLOOD   < > .ENDOTRACHEAL   SPECIAL RT WRIST 2ML  --   --    CULTURE NO GROWTH 12 HOURS   < > PENDING    < > = values in this interval not displayed.      Recent Labs     04/19/22  0126 04/19/22  0134 04/19/22  0948   SPECDESC . BLOOD . BLOOD .ENDOTRACHEAL   SPECIAL RT FOREARM . 5ML RT WRIST 2ML  --    CULTURE NO GROWTH 12 HOURS NO GROWTH 12 HOURS PENDING        Pathology:    Radiology Reports:  XR CHEST PORTABLE   Final Result   1. Endotracheal and enteric tubes as above. 2. Diffuse interstitial opacities throughout both lungs, right greater than   left. No new focal airspace consolidation. Follow-up is recommended to   document resolution. XR CHEST (SINGLE VIEW FRONTAL)   Final Result   Diffuse interstitial opacities with alveolar/consolidative opacities at the   bases favoring multifocal airspace disease. Echocardiogram:   Results for orders placed during the hospital encounter of 04/18/22    ECHO Complete 2D W Doppler W Color      Summary  Left ventricle is normal in size, global left ventricular systolic function  is preserved, estimated ejection fraction is 50%. There appears to be some apical hypokinesis, in the apical views. Evidence of diastolic dysfunction. Trivial mitral regurgitation. Trivial pulmonic insufficiency. IVC dilated but unable to assess respiratory collapse due to patient on  ventilator. ASSESSMENT AND PLAN     Assessment:    // Acute hypoxic/hypercapnic respiratory failure, requiring use of mechanical ventilation  // Acute respiratory acidosis, improved  // Acute exacerbation of bronchial asthma  // Rhino/enterovirus infection  // Leukocytosis  // Hyperglycemia  // Elevated troponin  // Morbid obesity    Plan:    I personally interviewed/examined the patient; reviewed interval history, interpreted all available radiographic and laboratory data at the time of service.      Patient currently sedated with fentanyl and propofol and is paralyzed with Nimbex   Patient is hemodynamically stable   Continue lung protective mechanical ventilation   Appropriate changes made in ventilator settings   She is not a candidate for ECMO at this point  [place] as tolerated   Monitor endotracheal secretions    Obtain X-ray chest as needed    Continue pulmonary toilet, aspiration precautions and bronchodilators   Continue to monitor I/O with a goal of even/negative fluid balance   Will recommend initiation of tube feeds   Stress ulcer prophylaxis   Chemical DVT prophylaxis; heparin   Antimicrobials reviewed; continue ceftriaxone/azithromycin   Glycemic control appropriate; sliding scale insulin   Solu-Medrol increased to 40 mg 3 times daily   Skin/wound care reviewed with the nursing staff   Physical/occupational therapy    The patient remains critically ill with illness/injury that acutely impairs one or more vital organ systems, such that there is a high probability of imminent or life threatening deterioration in the patient's condition. Critical care time of 35 minutes was spent (excluding procedures), in coordination of care during bedside rounds and discussion of patient care in detail, and recommendations of the team were adopted in the plan. Necessity of all invasive devices was also confirmed. Anthony Thompson MD  Pulmonary and Critical Care Medicine           4/19/2022, 3:54 PM    Please note that this chart was generated using voice recognition Dragon dictation software. Although every effort was made to ensure the accuracy of this automated transcription, some errors in transcription may have occurred.

## 2022-04-19 NOTE — PROGRESS NOTES
RESPIRATORY assessment completed:    Patient is on vent sedated and paralyzed, so unable to score patient based on spontaneous breathing for bronchodilator protocol at this time. Initially, patient did present with faint exp. wheezes whic have not resolved. Patient has tested positive for rhinovirus and is being mechanically ventilated, Dr. Marlon Garner informed of ABG results and peak pressures and wanted no further changes per perfect serve. Writer did place patient on heated circuit due to RR of 30 and currently sedated and paralyzed. Sputum sample has not been obtained due to no secretions present, sputum trap remains at bedside with appropriate labels. Patient ABGs have shown significant improvement in PaO2 levels since initial admission.

## 2022-04-19 NOTE — PROGRESS NOTES
[facility]  Internal Medicine Teaching Residency Program  Inpatient Daily Progress Note  ______________________________________________________________________________    Patient: Peace Monique  YOB: 2000   JWS:[id number]    Acct: [de-identified]     Room: [address] [place] [address]  Admit date: 4/18/2022  Today's date: 04/19/22  Number of days in the hospital: 1    SUBJECTIVE   Admitting Diagnosis: Acute asthma exacerbation  CC: Acute asthma exacerbation  Pt examined at bedside. Chart & results reviewed. No acute episodes overnight  Patient is heme stable and afebrile  Awake and following commands while weaning sedation today  AM CBC and BMP reviewed  WBC increase to 31.3 but remains afebrile  Blood cultures x 2 pending  Started on Ceftriaxone and Azithromycin  Respiratory panel positive for rhinovirus  Trace bacteria on UA  Troponin elevated - Cardiology consulted     ROS:  Review of Systems   Unable to perform ROS: Intubated     BRIEF HISTORY     The patient is a pleasant 23 y.o. female with a past medical history of bronchial asthma who was admitted to [facility] with an acute exacerbation causing increasing shortness of breath. Patient was found to be hypoxemic with hypercarbic respiratory failure requiring intubation and mechanical ventilation. Patient was transferred to [facility] for further elevation with possibility of ECMO. On arrival patient was found to be on a bicarb drip due to presumed respiratory acidosis but this was discontinued due to to worsening of the hypercarbia. Additionally patient was started on Nimbex due to the necessity for paralyzation as she was breathing over the vent. Pulmonology and cardiothoracic surgery were consulted as well.     OBJECTIVE     Vital Signs:  BP (!) 169/75   Pulse 145   Temp 98.1 °F (36.7 °C) (Bladder)   Resp 30   Ht 5' 3\" (1.6 m)   Wt (!) 314 lb (142.4 kg)   SpO2 94%   BMI 55.62 kg/m² Temp (24hrs), Av.8 °F (36.6 °C), Min:97.3 °F (36.3 °C), Max:98.1 °F (36.7 °C)    In: 763.7   Out: 680 [Urine:680]    Physical Exam:  Constitutional: This is a well developed, well nourished, Greater than 40 - Morbid Obesity / Extreme Obesity / Grade III 23y.o. year old female    Head: normocephalic and atraumatic. EENT: Endotracheal Intubated  Neck: Supple without thyromegaly. No elevated JVP. Respiratory: Wheezing bilaterally  Cardiovascular: Regular without murmur, clicks, gallops or rubs. Abdomen: Slightly rounded and soft without organomegaly. No rebound, rigidity or guarding was appreciated. Lymphatic: No lymphadenopathy. Musculoskeletal: Normal curvature of the spine. No gross muscle weakness. Extremities:  1+ edema  Skin:  Warm and dry. Good color, turgor and pigmentation. No lesions or scars.  No cyanosis or clubbing    Medications:  Scheduled Medications:    cefTRIAXone (ROCEPHIN) IV  1,000 mg IntraVENous Q24H    azithromycin  500 mg IntraVENous Q24H    methylPREDNISolone  40 mg IntraVENous Q8H    [START ON 2022] lansoprazole  30 mg Per NG tube QAM AC    insulin lispro  0-12 Units SubCUTAneous Q6H    sodium chloride flush  5-40 mL IntraVENous 2 times per day    heparin (porcine)  5,000 Units SubCUTAneous 3 times per day     Continuous Infusions:    albuterol 15 mg/hr (22 0835)    propofol 40 mcg/kg/min (22 1054)    cisatracurium (NIMBEX) infusion 1.5 mcg/kg/min (22 0600)    fentaNYL 150 mcg/hr (22 1029)    dextrose      norepinephrine Stopped (22)    sodium chloride       PRN Medicationsalbuterol, 5 mg, Q6H PRN  ipratropium-albuterol, 1 ampule, Q4H PRN  glucose, 15 g, PRN  dextrose, 12.5 g, PRN  glucagon (rDNA), 1 mg, PRN  dextrose, 100 mL/hr, PRN  sodium chloride flush, 5-40 mL, PRN  sodium chloride, , PRN  ondansetron, 4 mg, Q8H PRN   Or  ondansetron, 4 mg, Q6H PRN  polyethylene glycol, 17 g, Daily PRN  acetaminophen, 650 mg, Q6H PRN   Or  acetaminophen, 650 mg, Q6H PRN      Diagnostic Labs:  CBC:   Recent Labs     04/18/22 2311   WBC 31.3*   RBC 4.23   HGB 11.6*   HCT 38.1   MCV 90.1   RDW 14.0        BMP:   Recent Labs     04/18/22  2311 04/19/22  0452 04/19/22  0550   *  --  145*   K 4.7  --  4.5   *  --  106   CO2 30  --  24   BUN 15  --  19   CREATININE 0.97* 1.61* 0.86     BNP: No results for input(s): BNP in the last 72 hours. PT/INR: No results for input(s): PROTIME, INR in the last 72 hours. APTT:   Recent Labs     04/18/22 2311   APTT 24.4     CARDIAC ENZYMES: No results for input(s): CKMB, CKMBINDEX, TROPONINI in the last 72 hours. Invalid input(s): CKTOTAL;3  FASTING LIPID PANEL:No results found for: CHOL, HDL, TRIG  LIVER PROFILE:   Recent Labs     04/18/22 2311   AST 45*   ALT 22   BILITOT 0.19*   ALKPHOS 86      MICROBIOLOGY:   Lab Results   Component Value Date/Time    CULTURE PENDING 04/19/2022 09:48 AM     Imaging:    XR CHEST (SINGLE VIEW FRONTAL)    Result Date: 4/18/2022  Diffuse interstitial opacities with alveolar/consolidative opacities at the bases favoring multifocal airspace disease. XR CHEST PORTABLE    Result Date: 4/19/2022  1. Endotracheal and enteric tubes as above. 2. Diffuse interstitial opacities throughout both lungs, right greater than left. No new focal airspace consolidation. Follow-up is recommended to document resolution. ASSESSMENT & PLAN     ASSESSMENT / PLAN:     Acute Hypercarbic Respiratory Failure due to Asthma Exacerbation  -Cause unknown   -Endotracheal Intubated with mechanical ventilation  -Paralyzed with Nimbex  -Sedated with Propofol at 40 mcg/kg/min  -Fentanyl 150 mcg/hr  -PRVC mode - FiO2 60 /  / PEEP 8  -ABG pH 7.356 / pHCO3 30.3 / pCO2 54.1  -Pulmonology and Cardiothoracic following. Recommendations appreciated.    -Proventil nebulizer 5 mg q6  -Duoneb nebulizer 1 ampule q4   -Received IV magnesium 2 grams  -IV Solumedrol 40 mg daily for 5 days  -Started on Ceftriaxone and Azthromycin. Will de-escalate as appropriate.      Elevated Troponin  -Troponin trending upwards - 148 to 182  -Repeat Trops x 3 ordered and pending  -Cardiology consulted. Recommendations appreciated. DVT ppx: Heparin  GI ppx: Lansoprazole suspension     PT/OT/SW: To be consulted pending improvement and extubation  Discharge Planning: Ongoing     Alvin Pardo MD  Internal Medicine Resident, PGY-2  [address], [place]  4/19/2022, 11:05 AM

## 2022-04-19 NOTE — PLAN OF CARE
Nutrition Problem #1: Inadequate oral intake  Intervention: Food and/or Nutrient Delivery: Continue NPO (Monitor need for nutrition support.)  Nutritional Goals: Meet % of estimated nutrition needs.

## 2022-04-19 NOTE — PLAN OF CARE
Problem: Activity:  Goal: Ability to tolerate increased activity will improve  Description: Ability to tolerate increased activity will improve  Outcome: Ongoing     Problem: Cardiac:  Goal: Hemodynamic stability will improve  Description: Hemodynamic stability will improve  Outcome: Ongoing     Problem: Respiratory:  Goal: Ability to maintain a clear airway will improve  Description: Ability to maintain a clear airway will improve  Outcome: Ongoing  Goal: Ability to maintain adequate ventilation will improve  Description: Ability to maintain adequate ventilation will improve  Outcome: Ongoing  Goal: Complications related to the disease process, condition or treatment will be avoided or minimized  Description: Complications related to the disease process, condition or treatment will be avoided or minimized  Outcome: Ongoing     Problem: Skin Integrity:  Goal: Risk for impaired skin integrity will decrease  Description: Risk for impaired skin integrity will decrease  Outcome: Ongoing     Problem: Nutrition  Goal: Optimal nutrition therapy  4/19/2022 1548 by Mayco Whitfield RD, LD  Outcome: Ongoing  Note: Nutrition Problem #1: Inadequate oral intake  Intervention: Food and/or Nutrient Delivery: Continue NPO (Monitor need for nutrition support.)  Nutritional Goals: Meet % of estimated nutrition needs.

## 2022-04-19 NOTE — FLOWSHEET NOTE
SPIRITUAL CARE DEPARTMENT - [address]  PROGRESS NOTE    Shift date: 4/19/2022  Shift day: Tuesday   Shift # 1    Room # 1010/1010-01   Name: Jeanie Welch            Age: 23 y.o. Gender: female          Mu-ism: [address] of [employer]: None    Referral: Routine Visit    Admit Date & Time: 4/18/2022  2:00 PM    PATIENT/EVENT DESCRIPTION:  Jeanie Welch is a 23 y.o. female   (Description of condition/event). SPIRITUAL ASSESSMENT/INTERVENTION:  Assessment: Patient's parents sitting on sofa. They engaged well in conversation. They have both experienced recent cancer diagnoses and treatments, as well as the loss of two family members in the last two years. They expressed fear around the patient's current condition. They stated that they have been praying a great deal.    Intervention:  provided a supportive presence through active listening and words of affirmation and sympathy. Outcome: Parents expressed their gratefulness for visit. SPIRITUAL CARE FOLLOW-UP PLAN:  Chaplains will remain available to offer spiritual and emotional support as needed. Electronically signed by Jaelyn Bennett on 4/19/2022 at 2:11 PM.  [facility]  [phone]           04/19/22 1410   Encounter Summary   Services provided to: Family   Referral/Consult From: [address] [facility] Parent   Place of [facility] None   Continue Visiting   (4/19/22)   Complexity of Encounter Moderate   Length of Encounter 15 minutes   Spiritual Assessment Completed Yes   Spiritual/Jain   Type Spiritual support   Assessment Calm; Approachable;Coping   Intervention Active listening;Explored feelings, thoughts, concerns; Discussed belief system/[facility] practices/анна   Outcome Expressed gratitude;Engaged in conversation;Expressed feelings/needs/concerns;Receptive

## 2022-04-19 NOTE — PROGRESS NOTES
Ventilator Bronchodilator assessment    Breath sounds: diminished, crackles, wheezes  Inspiratory Pressure: 40  Plateau Pressure: 29    Patient assessed at level 4          [x]    Bronchodilator Assessment    BRONCHODILATOR ASSESSMENT SCORE  Score 0 (Home) 1 2 3 4   Breath Sounds   []  Chronic Ventilator: Patient at baseline []  Mild Wheezes/ Clear []  Intermittent wheezes with good air entry []  Bilateral/unilateral wheezing with diminished air entry []  Insp/Exp wheeze and/or poor aeration   Ventilator Pressures   []  Chronic Ventilator []  Insp. Pressure less than 25 cm H20 []  Insp. Pressure less than 25 cm H20 []  Insp. Pressure exceeds 25 cm H20 [x]  Insp.  Pressure exceeds 30 cm H20   Plateau Pressure []  NA   []  Plateau Pressure less than 4  []  Plateau Pressure less than or equal to 5 []  Plateau Pressure greater than or equal to 6 [x]  Plateau Pressure greater than or equal to 8       NIKKI ANGEL RCP  8:18 AM

## 2022-04-19 NOTE — PROGRESS NOTES
Comprehensive Nutrition Assessment    Type and Reason for Visit:  Initial (Vent Check)    Nutrition Recommendations/Plan: Continue NPO. Monitor need for nutrition support. If Tube Feedings requested, suggest Peptide Based High Protein at goal rate 30 mL/hr + 1 Protein modular per day with current rate of propofol = 824 kcals (+propofol kcals), 89 gm pro/day. Monitor plan of care. Nutrition Assessment:  Pt currently intubated, sedated, and paralyzed. Admitted for acute asthma exacerbation. Propofol running at 32 mL/hr. Remains NPO - will monitor plans for nutrition support. Labs reviewed: Na 145 mmol/L, Glucose 142-236 mg/dL. Meds include: Solu-Medrol. Malnutrition Assessment:  Malnutrition Status:  Insufficient data    Context:  Acute Illness     Findings of the 6 clinical characteristics of malnutrition:  Energy Intake:  Mild decrease in energy intake   Weight Loss:  Unable to assess     Body Fat Loss:  Unable to assess   Muscle Mass Loss:  Unable to assess  Fluid Accumulation:  No significant fluid accumulation   Strength:  Not Performed    Estimated Daily Nutrient Needs:  Energy (kcal):  10-11 kcal/kg = 7849-8808 kcal/day; Weight Used for Energy Requirements:  Current     Protein (g):  2 gm/kg = 105 gm pro/day; Weight Used for Protein Requirements:  Ideal        Fluid (ml/day):  per MD    Nutrition Related Findings:  Labs/Meds reviewed.       Wounds:  None       Current Nutrition Therapies:    Diet NPO  Additional Calorie Sources:   Propofol running at 32 mL/hr = 845 kcals/day    Anthropometric Measures:  · Height: 5' 3\" (160 cm)  · Current Body Weight: 314 lb (142.4 kg)   · Admission Body Weight: 293 lb 10.4 oz (133.2 kg)    · Ideal Body Weight: 115 lbs; % Ideal Body Weight 273 %   · BMI: 55.6  · BMI Categories: Obese Class 3 (BMI 40.0 or greater)       Nutrition Diagnosis:   · Inadequate oral intake related to impaired respiratory function as evidenced by NPO or clear liquid status due to medical condition    Nutrition Interventions:   Food and/or Nutrient Delivery:  Continue NPO (Monitor need for nutrition support.)  Nutrition Education/Counseling:  No recommendation at this time   Coordination of Nutrition Care:  Continue to monitor while inpatient    Goals:  Meet % of estimated nutrition needs. Nutrition Monitoring and Evaluation:   Behavioral-Environmental Outcomes:  None Identified   Food/Nutrient Intake Outcomes:  Diet Advancement/Tolerance  Physical Signs/Symptoms Outcomes:  Biochemical Data,GI Status,Fluid Status or Edema,Hemodynamic Status,Nutrition Focused Physical Findings,Skin,Weight     Discharge Planning:     Too soon to determine     Electronically signed by Kimberly Patterson RD, LD on 4/19/22 at 3:40 PM EDT    Contact: [phone]

## 2022-04-19 NOTE — CARE COORDINATION
Case Management Initial Discharge Plan  Malcolm Caba call to pt's mother to discuss discharge plans. Information verified: address, contacts, phone number, , insurance Yes  Insurance Provider: [employer]    Emergency Contact/Next of Kin name & number: Jak Jacob, mother, [phone]. Meeta Perez, father, [phone]. Giovany Knight, boyfriend. Who are involved in patient's support system? Parents, boyfriend    PCP: Amol Bland MD  Date of last visit: Unknown      Discharge Planning    Living Arrangements:    Pt lives with boyfriend. Home has 2 stories  3 steep stairs to climb to get into front door, flight of stairs to climb to reach second floor  Location of bedroom/bathroom in home Upper level    Patient able to perform ADL's:Independent at baseline    Current Services (outpatient & in home) None  DME equipment: None  DME provider: n/a    Is patient receiving oral anticoagulation therapy? No    Does patient have any issues/concerns obtaining medications? No  If yes, what are patient's concerns? Is there a preferred Pharmacy after hours or on weekends? Yes    If yes, which pharmacy? [facility]. Potential Assistance Needed:   Dependent upon clinical course. Patient agreeable to home care: Unknown  [place] of choice provided:  n/a    Prior SNF/Rehab Placement and Facility: No  Agreeable to SNF/Rehab: Unknown  [place] of choice provided: n/a     Evaluation: no    Patient expects to be discharged to:   Dependent upon clinical course. Transportation provider: Parents  Transportation arrangements needed for discharge: Dependent upon clinical course. Readmission Risk              Risk of Unplanned Readmission:  11             Does patient have a readmission risk score greater than 14?: No  If yes, follow-up appointment must be made within 7 days of discharge.      Goals of Care: Breath easier    Educated pt's mother on transitional options, provided freedom of choice and are agreeable with plan      Discharge Plan: Dependent upon clinical course, follow for needs. May need SNF/LTACH.           Electronically signed by Beth Aviles RN on 4/19/22 at 5:22 PM EDT

## 2022-04-19 NOTE — PROGRESS NOTES
Ventilator Bronchodilator assessment    Breath sounds: diminished, expiratory wheezes   Inspiratory Pressure: 29  Plateau Pressure: 22      Patient assessed at level 3          [x]    Bronchodilator Assessment    BRONCHODILATOR ASSESSMENT SCORE  Score 0 (Home) 1 2 3 4   Breath Sounds   []  Chronic Ventilator: Patient at baseline []  Mild Wheezes/ Clear []  Intermittent wheezes with good air entry [x]  Bilateral/unilateral wheezing with diminished air entry []  Insp/Exp wheeze and/or poor aeration   Ventilator Pressures   []  Chronic Ventilator []  Insp. Pressure less than 25 cm H20 []  Insp. Pressure less than 25 cm H20 [x]  Insp. Pressure exceeds 25 cm H20 []  Insp.  Pressure exceeds 30 cm H20   Plateau Pressure []  NA   []  Plateau Pressure less than 4  []  Plateau Pressure less than or equal to 5 []  Plateau Pressure greater than or equal to 6 [x]  Plateau Pressure greater than or equal to 8       [employer], [facility]  4:41 PM

## 2022-04-19 NOTE — PLAN OF CARE
Problem: Gas Exchange - Impaired:  Goal: Levels of oxygenation will improve  Description: Levels of oxygenation will improve  4/19/2022 0257 by Karla Hernandez RN  Outcome: Ongoing  4/18/2022 1929 by Jessica Calderon RN  Outcome: Ongoing     Problem:  Activity:  Goal: Ability to tolerate increased activity will improve  Description: Ability to tolerate increased activity will improve  4/19/2022 0257 by Karla Hernandez RN  Outcome: Ongoing  4/18/2022 1929 by Jessica Calderon RN  Outcome: Ongoing     Problem: Cardiac:  Goal: Hemodynamic stability will improve  Description: Hemodynamic stability will improve  4/19/2022 0257 by Karla Hernandez RN  Outcome: Ongoing  4/18/2022 1929 by Jessica Calderon RN  Outcome: Ongoing     Problem: Respiratory:  Goal: Ability to maintain a clear airway will improve  Description: Ability to maintain a clear airway will improve  4/19/2022 0257 by Karla Hernandez RN  Outcome: Ongoing  4/18/2022 1929 by Jessica Calderon RN  Outcome: Ongoing  Goal: Ability to maintain adequate ventilation will improve  Description: Ability to maintain adequate ventilation will improve  4/19/2022 0257 by Karla Hernandez RN  Outcome: Ongoing  4/18/2022 1929 by Jessica Calderon RN  Outcome: Ongoing  Goal: Complications related to the disease process, condition or treatment will be avoided or minimized  Description: Complications related to the disease process, condition or treatment will be avoided or minimized  4/19/2022 0257 by Karla Hernandez RN  Outcome: Ongoing  4/18/2022 1929 by Jessica Calderon RN  Outcome: Ongoing     Problem: Skin Integrity:  Goal: Risk for impaired skin integrity will decrease  Description: Risk for impaired skin integrity will decrease  4/19/2022 0257 by Karla Hernandez RN  Outcome: Ongoing  4/18/2022 1929 by Jessica Calderon RN  Outcome: Ongoing     Problem: OXYGENATION/RESPIRATORY FUNCTION  Goal: Patient will maintain patent airway  4/19/2022 0257 by Yari Parry HALIE Palacios  Outcome: Ongoing  4/18/2022 2023 by Kayla Phan RCP  Outcome: Ongoing  Goal: Patient will achieve/maintain normal respiratory rate/effort  Description: Respiratory rate and effort will be within normal limits for the patient  4/19/2022 0257 by Erin Perez RN  Outcome: Ongoing  4/18/2022 2023 by Kayla Phan RCP  Outcome: Ongoing     Problem: MECHANICAL VENTILATION  Goal: Patient will maintain patent airway  4/19/2022 0257 by Erin Perez RN  Outcome: Ongoing  4/18/2022 2023 by Kayla Phan RCP  Outcome: Ongoing  Goal: Oral health is maintained or improved  4/19/2022 0257 by Erin Perez RN  Outcome: Ongoing  4/18/2022 2023 by Kayla Phan RCP  Outcome: Ongoing  Goal: ET tube will be managed safely  4/19/2022 0257 by Erin Perez RN  Outcome: Ongoing  4/18/2022 2023 by Kayla Phan RCP  Outcome: Ongoing

## 2022-04-19 NOTE — CONSULTS
[facility]    Inpatient Consultation Note               Today's Date: 4/19/2022  Patient Name: Chiara Ramso  Date of admission: 4/18/2022  2:00 PM  Patient's age: 23 y.o., 2000  Admission Dx: Acute asthma exacerbation [J45.901]    Reason for  Consult:  Troponin elevation    Requesting Physician: Anisa Long MD    CHIEF COMPLAINT:     No chief complaint on file. History Obtained From:  patient, electronic medical record    HISTORY OF PRESENT ILLNESS:    23year old female presented to [facility] from an outside hospital due to asthma exacerbation. Presented to [facility] due to sob and was found to be in an acute asthma exacerbation. Was initially put on a NRB but was subsequently intubated due to worsening blood gases. Patient was intubated and sedated and transferred to [facility] for possible ECMO. While at [facility] was noted to have elevated troponin but flat. EKG shows SR. Cardiology consulted in setting of elevated troponin. Past Medical History:   has no past medical history on file. Past Surgical History:   has no past surgical history on file.      Home Medications:    Prior to Admission medications    Not on File        Current Facility-Administered Medications: cefTRIAXone (ROCEPHIN) 1,000 mg in sterile water 10 mL IV syringe, 1,000 mg, IntraVENous, Q24H  albuterol (PROVENTIL) nebulizer solution, 15 mg/hr, Nebulization, Continuous  azithromycin (ZITHROMAX) 500 mg in dextrose 5% 250 mL IVPB, 500 mg, IntraVENous, Q24H  methylPREDNISolone sodium (SOLU-MEDROL) injection 40 mg, 40 mg, IntraVENous, Q8H  [START ON 4/20/2022] lansoprazole suspension SUSP 30 mg, 30 mg, Per NG tube, QAM AC  propofol injection, 5-50 mcg/kg/min, IntraVENous, Continuous  cisatracurium besylate (NIMBEX) 200 mg in sodium chloride 0.9 % 100 mL infusion, 0.5-10 mcg/kg/min, IntraVENous, Continuous  fentaNYL 20 mcg/mL Infusion,  mcg/hr, IntraVENous, Continuous  albuterol (PROVENTIL) nebulizer solution 5 mg, 5 mg, Nebulization, Q6H PRN  ipratropium-albuterol (DUONEB) nebulizer solution 1 ampule, 1 ampule, Inhalation, Q4H PRN  insulin lispro (HUMALOG) injection vial 0-12 Units, 0-12 Units, SubCUTAneous, Q6H  glucose (GLUTOSE) 40 % oral gel 15 g, 15 g, Oral, PRN  dextrose 50 % IV solution, 12.5 g, IntraVENous, PRN  glucagon (rDNA) injection 1 mg, 1 mg, IntraMUSCular, PRN  dextrose 5 % solution, 100 mL/hr, IntraVENous, PRN  norepinephrine (LEVOPHED) 16 mg in sodium chloride 0.9 % 250 mL infusion (non-weight-based), 1-100 mcg/min, IntraVENous, Continuous  sodium chloride flush 0.9 % injection 5-40 mL, 5-40 mL, IntraVENous, 2 times per day  sodium chloride flush 0.9 % injection 5-40 mL, 5-40 mL, IntraVENous, PRN  0.9 % sodium chloride infusion, , IntraVENous, PRN  ondansetron (ZOFRAN-ODT) disintegrating tablet 4 mg, 4 mg, Oral, Q8H PRN **OR** ondansetron (ZOFRAN) injection 4 mg, 4 mg, IntraVENous, Q6H PRN  polyethylene glycol (GLYCOLAX) packet 17 g, 17 g, Oral, Daily PRN  acetaminophen (TYLENOL) tablet 650 mg, 650 mg, Oral, Q6H PRN **OR** acetaminophen (TYLENOL) suppository 650 mg, 650 mg, Rectal, Q6H PRN  heparin (porcine) injection 5,000 Units, 5,000 Units, SubCUTAneous, 3 times per day    Allergies:  Patient has no known allergies. Social History:        Family History: family history includes Cancer in her father, maternal aunt, mother, and paternal grandmother; Other in her paternal aunt. REVIEW OF SYSTEMS:      · Constitutional: there has been no unanticipated weight loss. · Eyes: No visual changes or diplopia. · ENT: No Headaches  · Cardiovascular:  Remaining as above  · Respiratory: No cough  · Gastrointestinal: No abdominal pain. No change in bowel or bladder habits. · Genitourinary: No dysuria, trouble voiding, or hematuria. · Musculoskeletal: No joint complaints.   · Neurological: No headache  · Hematologic/Lymphatic: No abnormal bruising or bleeding      PHYSICAL EXAM:      BP (!) 145/47   Pulse 141   Temp 98.1 °F (36.7 °C) (Bladder)   Resp 30   Ht 5' 3\" (1.6 m)   Wt (!) 314 lb (142.4 kg)   SpO2 93%   BMI 55.62 kg/m²      Intake/Output Summary (Last 24 hours) at 2022 1240  Last data filed at 2022 1200  Gross per 24 hour   Intake 763.71 ml   Output 780 ml   Net -16.29 ml         Constitutional and General Appearance:    Intubated and sedated  Respiratory:  · No for increased work of breathing. · On auscultation: diminished breath sounds with slight wheezing  · ETT in place  Cardiovascular:  · Regular S1 and S2.   · No murmurs  Abdomen:   · No masses or tenderness  · Bowel sounds present  Extremities:  ·  No Cyanosis or Clubbing  ·  Lower extremity edema: No  Neurological:  · Intubated and sedated    DATA:    Diagnostics:    EK22   NSR     ECHO:  22  Summary  Left ventricle is normal in size, global left ventricular systolic function  is preserved, estimated ejection fraction is 50%. There appears to be some apical hypokinesis, in the apical views. Evidence of diastolic dysfunction. Trivial mitral regurgitation. Trivial pulmonic insufficiency. IVC dilated but unable to assess respiratory collapse due to patient on  ventilator. Labs:     CBC:   Recent Labs     22   WBC 31.3*   HGB 11.6*   HCT 38.1        BMP:   Recent Labs     221 22  0452 22  0550   *  --   --  145*   K 4.7  --   --  4.5   CO2 30  --   --  24   BUN 15  --   --  19   CREATININE 0.97*  --  1.61* 0.86   LABGLOM >60   < > 39* >60   GLUCOSE 144*  --   --  142*    < > = values in this interval not displayed. Pro-BNP:  No results for input(s): PROBNP in the last 72 hours. BNP: No results for input(s): BNP in the last 72 hours. PT/INR: No results for input(s): PROTIME, INR in the last 72 hours.   APTT:  Recent Labs     22   APTT 24.4     CARDIAC ENZYMES:No results for input(s): CKTOTAL, CKMB, CKMBINDEX, TROPONINI in the last 72 hours. Invalid input(s):  TROPONINT  No results for input(s): TROPONINT in the last 72 hours. FASTING LIPID PANEL:No results found for: HDL, LDLDIRECT, LDLCALC, TRIG  LIVER PROFILE:  Recent Labs     04/18/22  2311   AST 45*   ALT 22   LABALBU 3.3*         Patient's Active Problem List  Principal Problem:    Acute asthma exacerbation  Active Problems:    History of seizures    Hypercapnic respiratory failure (HCC)    Endotracheally intubated    On mechanically assisted ventilation (HCC)    Diastolic dysfunction  Resolved Problems:    * No resolved hospital problems. *        IMPRESSION:    1. Troponin elevation type II demand ischemia  2. Acute hypoxic respiratory failure s/p intubated  3. Asthma exacerbation  4. Tachycardia  5. Hx of seizures    RECOMMENDATIONS:  1. Trend HsTrop  2. 2D ECHO reviewed  3. Start diltiazem 30 mg q6hr  4. Will hold off on heparin gtt for now  5. Continue asthma exacerbation and PNA treatment per primary team  6. K>4, Mg>2    Thank you for allowing us to participate in the care of [facility]. If you have any questions or concerns, please do not hesitate to contact us. Discussed with Nurse. Tyshawn Cortez MD  Fellow, [address] [place] [address]        Please note that part of this chart were generated using voice recognition  dictation software. Although every effort was made to ensure the accuracy of this automated transcription, some errors in transcription may have occurred. Attestation signed by      Attending Physician Statement:    I have discussed the care of  [facility] , including pertinent history and exam findings, with the Cardiology fellow/resident. I have seen and examined the patient and the key elements of all parts of the encounter have been performed by me.  I agree with the assessment, plan and orders as documented by the fellow/resident, after I modified exam findings and plan of treatments, and the final version is my approved version of the assessment. Additional Comments:     Acute hypoxic hypercapnic resp failure secondary to status asthmaticus on mechnical ventilation, rhino/enetrovirus infection and sepsis, VV ECMO deffered give improvement in blood gases. Currently on 50% Fio2 and PEEP of 8. Febrile on examination. Hypertensive and tachycardic while on propofol.    Will start low dose short acting cardizem through NG  Echo reviewed, overall normal LVEF with no pericardial effusion   Sepsis management per CC

## 2022-04-19 NOTE — H&P
[facility]     Department of Internal Medicine - Staff Internal Medicine Teaching Service          ADMISSION NOTE/HISTORY AND PHYSICAL EXAMINATION   Date: 4/18/2022  Patient Name: Irene Lebron  Date of admission: 4/18/2022  2:00 PM  YOB: 2000  PCP: Miriam Hill MD  History Obtained From:  electronic medical record    CHIEF COMPLAINT     Chief complaint: Acute Asthma Exacerbation    HISTORY OF PRESENTING ILLNESS     The patient is a pleasant 23 y.o. female with a past medical history of bronchial asthma who was admitted to [facility] with an acute exacerbation causing increasing shortness of breath. Patient was found to be hypoxemic with hypercarbic respiratory failure requiring intubation and mechanical ventilation. Patient was transferred to [address] [place] [address] for further elevation with possibility of ECMO. On arrival patient was found to be on a bicarb drip due to presumed respiratory acidosis but this was discontinued due to to worsening of the hypercarbia. Additionally patient was started on Nimbex due to the necessity for paralyzation as she was breathing over the vent. Pulmonology and cardiothoracic surgery were consulted as well. Review of Systems:  Review of Systems   Unable to perform ROS: Intubated     PAST MEDICAL HISTORY     No past medical history on file. PAST SURGICAL HISTORY     No past surgical history on file. ALLERGIES     Patient has no known allergies.     MEDICATIONS PRIOR TO ADMISSION     Prior to Admission medications    Not on File     SOCIAL HISTORY     Tobacco: Unknown  Alcohol: Unknown  Illicits: Unknown    FAMILY HISTORY     Family History   Problem Relation Age of Onset    Cancer Mother         breast ca 47, lumpectomy, RT, no GT    Cancer Father         breast ca 52, mastectomy, chemo, GT?    Cancer Paternal Grandmother         breast ca, 60-70s    Cancer Maternal Aunt         poss colon ca, colostomy bag, passed away at age 62s    Other Paternal Aunt         cirrhosis     PHYSICAL EXAM     Vitals: BP (!) 118/50   Pulse 98   Temp 98.1 °F (36.7 °C) (Bladder)   Resp 30   Ht 5' 3\" (1.6 m)   Wt 293 lb 10.4 oz (133.2 kg)   SpO2 98%   BMI 52.02 kg/m²   Tmax: Temp (24hrs), Av.8 °F (36.6 °C), Min:97.3 °F (36.3 °C), Max:98.1 °F (36.7 °C)    Last Body weight:   Wt Readings from Last 3 Encounters:   22 293 lb 10.4 oz (133.2 kg)     Body Mass Index : Body mass index is 52.02 kg/m². PHYSICAL EXAMINATION:  Constitutional: This is a well developed, well nourished, Greater than 36 - Morbid Obesity / Extreme Obesity / Grade III 23y.o. year old female    Head: normocephalic and atraumatic. EENT: Endotracheal Intubated  Neck: Supple without thyromegaly. No elevated JVP. Respiratory: Wheezing bilaterally  Cardiovascular: Regular without murmur, clicks, gallops or rubs. Abdomen: Slightly rounded and soft without organomegaly. No rebound, rigidity or guarding was appreciated. Lymphatic: No lymphadenopathy. Musculoskeletal: Normal curvature of the spine. No gross muscle weakness. Extremities:  1+ edema  Skin:  Warm and dry. Good color, turgor and pigmentation. No lesions or scars.   No cyanosis or clubbing    INVESTIGATIONS     Laboratory Testing:     Recent Results (from the past 24 hour(s))   Arterial Blood Gas, POC    Collection Time: 22  2:19 PM   Result Value Ref Range    POC pH 7.176 (LL) 7.350 - 7.450    POC pCO2 87.0 (HH) 35.0 - 48.0 mm Hg    POC PO2 81.2 (L) 83.0 - 108.0 mm Hg    POC HCO3 32.2 (H) 21.0 - 28.0 mmol/L    Positive Base Excess, Art 1 0.0 - 3.0    POC O2 SAT 91 (L) 94.0 - 98.0 %    O2 Device/Flow/% Adult Ventilator     Sushil Test POSITIVE     Sample Site Right Radial Artery     Pt Temp 37.0    Lactic Acid, POC    Collection Time: 22  2:19 PM   Result Value Ref Range    POC Lactic Acid 0.90 0.56 - 1.39 mmol/L   POCT Glucose    Collection Time: 22  2:19 PM Result Value Ref Range    POC Glucose 234 (H) 74 - 100 mg/dL   Notification Panel, POC    Collection Time: 04/18/22  2:19 PM   Result Value Ref Range    Action Notifyphysician     NOTIFY gwen     READ BACK Yes     Date/Time 04/18/202214:21:00    Urinalysis with Reflex to Culture    Collection Time: 04/18/22  4:08 PM    Specimen: Urine   Result Value Ref Range    Color, UA Yellow Yellow    Turbidity UA Turbid (A) Clear    Glucose, Ur NEGATIVE NEGATIVE    Bilirubin Urine NEGATIVE NEGATIVE    Ketones, Urine NEGATIVE NEGATIVE    Specific Gravity, UA 1.026 1.005 - 1.030    Urine Hgb NEGATIVE NEGATIVE    pH, UA 5.5 5.0 - 8.0    Protein, UA 2+ (A) NEGATIVE    Urobilinogen, Urine Normal Normal    Nitrite, Urine NEGATIVE NEGATIVE    Leukocyte Esterase, Urine NEGATIVE NEGATIVE   Microscopic Urinalysis    Collection Time: 04/18/22  4:08 PM   Result Value Ref Range    -          WBC, UA 5 TO 10 0 - 5 /HPF    RBC, UA 5 TO 10 0 - 2 /HPF    Casts UA 2 TO 5 0 - 2 /LPF    Casts UA HYALINE 0 - 2 /LPF    Epithelial Cells UA 20 TO 50 0 - 5 /HPF    Bacteria, UA MODERATE (A) None   Arterial Blood Gas, POC    Collection Time: 04/18/22  5:38 PM   Result Value Ref Range    POC pH 7.278 (L) 7.350 - 7.450    POC pCO2 68.5 (H) 35.0 - 48.0 mm Hg    POC PO2 55.8 (L) 83.0 - 108.0 mm Hg    POC HCO3 32.0 (H) 21.0 - 28.0 mmol/L    Positive Base Excess, Art 3 0.0 - 3.0    POC O2 SAT 83 (L) 94.0 - 98.0 %    O2 Device/Flow/% Adult Ventilator     Sushil Test NOT APPLICABLE     Sample Site Arterial Line     FIO2 60.0     Pt Temp 37.0    Lactic Acid, POC    Collection Time: 04/18/22  5:38 PM   Result Value Ref Range    POC Lactic Acid 0.92 0.56 - 1.39 mmol/L   POCT Glucose    Collection Time: 04/18/22  5:38 PM   Result Value Ref Range    POC Glucose 182 (H) 74 - 100 mg/dL   POC Glucose Fingerstick    Collection Time: 04/18/22  5:56 PM   Result Value Ref Range    POC Glucose 140 (H) 65 - 105 mg/dL   POC Glucose Fingerstick    Collection Time: 04/18/22  8:53 PM   Result Value Ref Range    POC Glucose 128 (H) 65 - 105 mg/dL   Arterial Blood Gas, POC    Collection Time: 04/18/22 10:07 PM   Result Value Ref Range    POC pH 7.304 (L) 7.350 - 7.450    POC pCO2 65.8 (H) 35.0 - 48.0 mm Hg    POC PO2 72.8 (L) 83.0 - 108.0 mm Hg    POC HCO3 32.7 (H) 21.0 - 28.0 mmol/L    Positive Base Excess, Art 4 (H) 0.0 - 3.0    POC O2 SAT 92 (L) 94.0 - 98.0 %   Lactic Acid, POC    Collection Time: 04/18/22 10:07 PM   Result Value Ref Range    POC Lactic Acid 0.71 0.56 - 1.39 mmol/L   POCT Glucose    Collection Time: 04/18/22 10:07 PM   Result Value Ref Range    POC Glucose 149 (H) 74 - 100 mg/dL     Imaging:   XR CHEST (SINGLE VIEW FRONTAL)    Result Date: 4/18/2022  Diffuse interstitial opacities with alveolar/consolidative opacities at the bases favoring multifocal airspace disease. ASSESSMENT & PLAN     ASSESSMENT / PLAN:     IMPRESSION  This is a 23 y.o. female who presented with shortness of breath to an outlying facility requiring endotracheal intubation and transfer to [facility] for further evaluation and possible Ecmo. Acute Hypercarbic Respiratory Failure due to Asthma Exacerbation  -Cause unknown   -Endotracheal Intubated with mechanical ventilation  -Paralyzed with Nimbex  -Sedated with Propofol at 40 mcg/kg/min  -Fentanyl 150 mcg/hr  -PRVC mode - FiO2 70 /  / PEEP 10  -ABG pH 7.304 / pHCO3 32.7 / pCO2 65.8  -Pulmonology and Cardiothoracic following. Recommendations appreciated. -Proventil nebulizer 5 mg q6  -Duoneb nebulizer 1 ampule q4   -Received IV magnesium 2 grams  -IV Solumedrol 40 mg daily for 5 days  -Pro-calcitonin, Respiratory Culture, Legionella and S.  Pneumoniae ordered and pending  -EKG for QT ordered and pending  -Antibiotics to be started pending results    DVT ppx: Heparin  GI ppx: Lansoprazole suspension    PT/OT/SW: To be consulted pending improvement  Discharge Planning: Ongoing     Rox Obrien MD  Internal Medicine Resident, PGY-2  [facility];  [place], [place]  4/18/2022, 10:18 PM

## 2022-04-20 LAB
ABSOLUTE EOS #: 0 K/UL (ref 0–0.44)
ABSOLUTE IMMATURE GRANULOCYTE: 0.5 K/UL (ref 0–0.3)
ABSOLUTE LYMPH #: 0.75 K/UL (ref 1.1–3.7)
ABSOLUTE MONO #: 0.75 K/UL (ref 0.1–1.2)
ALLEN TEST: NEGATIVE
ANION GAP SERPL CALCULATED.3IONS-SCNC: 9 MMOL/L (ref 9–17)
BASOPHILS # BLD: 0 % (ref 0–2)
BASOPHILS ABSOLUTE: 0 K/UL (ref 0–0.2)
BUN BLDV-MCNC: 31 MG/DL (ref 6–20)
CALCIUM SERPL-MCNC: 8.2 MG/DL (ref 8.6–10.4)
CHLORIDE BLD-SCNC: 104 MMOL/L (ref 98–107)
CO2: 28 MMOL/L (ref 20–31)
CREAT SERPL-MCNC: 0.75 MG/DL (ref 0.5–0.9)
CULTURE: NORMAL
DIRECT EXAM: NORMAL
EOSINOPHILS RELATIVE PERCENT: 0 % (ref 1–4)
ESTIMATED AVERAGE GLUCOSE: 100 MG/DL
FIO2: 50
GFR AFRICAN AMERICAN: >60 ML/MIN
GFR NON-AFRICAN AMERICAN: >60 ML/MIN
GFR SERPL CREATININE-BSD FRML MDRD: ABNORMAL ML/MIN/{1.73_M2}
GLUCOSE BLD-MCNC: 141 MG/DL (ref 65–105)
GLUCOSE BLD-MCNC: 149 MG/DL (ref 65–105)
GLUCOSE BLD-MCNC: 161 MG/DL (ref 74–100)
GLUCOSE BLD-MCNC: 183 MG/DL (ref 70–99)
HBA1C MFR BLD: 5.1 % (ref 4–6)
HCT VFR BLD CALC: 35.2 % (ref 36.3–47.1)
HEMOGLOBIN: 11.1 G/DL (ref 11.9–15.1)
IMMATURE GRANULOCYTES: 2 %
INR BLD: 0.9
LYMPHOCYTES # BLD: 3 % (ref 24–43)
MCH RBC QN AUTO: 27.8 PG (ref 25.2–33.5)
MCHC RBC AUTO-ENTMCNC: 31.5 G/DL (ref 28.4–34.8)
MCV RBC AUTO: 88.2 FL (ref 82.6–102.9)
MONOCYTES # BLD: 3 % (ref 3–12)
MORPHOLOGY: NORMAL
NRBC AUTOMATED: 0 PER 100 WBC
O2 DEVICE/FLOW/%: ABNORMAL
PARTIAL THROMBOPLASTIN TIME: 22 SEC (ref 20.5–30.5)
PDW BLD-RTO: 14.2 % (ref 11.8–14.4)
PLATELET # BLD: 274 K/UL (ref 138–453)
PMV BLD AUTO: 10 FL (ref 8.1–13.5)
POC HCO3: 31 MMOL/L (ref 21–28)
POC O2 SATURATION: 91 % (ref 94–98)
POC PCO2: 46.4 MM HG (ref 35–48)
POC PH: 7.43 (ref 7.35–7.45)
POC PO2: 60 MM HG (ref 83–108)
POSITIVE BASE EXCESS, ART: 6 (ref 0–3)
POTASSIUM SERPL-SCNC: 4 MMOL/L (ref 3.7–5.3)
PROTHROMBIN TIME: 10.1 SEC (ref 9.1–12.3)
RBC # BLD: 3.99 M/UL (ref 3.95–5.11)
SAMPLE SITE: ABNORMAL
SEG NEUTROPHILS: 92 % (ref 36–65)
SEGMENTED NEUTROPHILS ABSOLUTE COUNT: 23.1 K/UL (ref 1.5–8.1)
SODIUM BLD-SCNC: 141 MMOL/L (ref 135–144)
SPECIMEN DESCRIPTION: NORMAL
WBC # BLD: 25.1 K/UL (ref 3.5–11.3)

## 2022-04-20 PROCEDURE — 85610 PROTHROMBIN TIME: CPT

## 2022-04-20 PROCEDURE — 6360000002 HC RX W HCPCS: Performed by: STUDENT IN AN ORGANIZED HEALTH CARE EDUCATION/TRAINING PROGRAM

## 2022-04-20 PROCEDURE — 2580000003 HC RX 258: Performed by: STUDENT IN AN ORGANIZED HEALTH CARE EDUCATION/TRAINING PROGRAM

## 2022-04-20 PROCEDURE — 6370000000 HC RX 637 (ALT 250 FOR IP): Performed by: INTERNAL MEDICINE

## 2022-04-20 PROCEDURE — 6370000000 HC RX 637 (ALT 250 FOR IP)

## 2022-04-20 PROCEDURE — C9113 INJ PANTOPRAZOLE SODIUM, VIA: HCPCS | Performed by: STUDENT IN AN ORGANIZED HEALTH CARE EDUCATION/TRAINING PROGRAM

## 2022-04-20 PROCEDURE — 94640 AIRWAY INHALATION TREATMENT: CPT

## 2022-04-20 PROCEDURE — 2500000003 HC RX 250 WO HCPCS

## 2022-04-20 PROCEDURE — 85025 COMPLETE CBC W/AUTO DIFF WBC: CPT

## 2022-04-20 PROCEDURE — 82803 BLOOD GASES ANY COMBINATION: CPT

## 2022-04-20 PROCEDURE — 2700000000 HC OXYGEN THERAPY PER DAY

## 2022-04-20 PROCEDURE — 94761 N-INVAS EAR/PLS OXIMETRY MLT: CPT

## 2022-04-20 PROCEDURE — 2000000000 HC ICU R&B

## 2022-04-20 PROCEDURE — 80048 BASIC METABOLIC PNL TOTAL CA: CPT

## 2022-04-20 PROCEDURE — 82947 ASSAY GLUCOSE BLOOD QUANT: CPT

## 2022-04-20 PROCEDURE — 85730 THROMBOPLASTIN TIME PARTIAL: CPT

## 2022-04-20 PROCEDURE — 6360000002 HC RX W HCPCS: Performed by: INTERNAL MEDICINE

## 2022-04-20 PROCEDURE — 6370000000 HC RX 637 (ALT 250 FOR IP): Performed by: STUDENT IN AN ORGANIZED HEALTH CARE EDUCATION/TRAINING PROGRAM

## 2022-04-20 PROCEDURE — 94003 VENT MGMT INPAT SUBQ DAY: CPT

## 2022-04-20 PROCEDURE — 6360000002 HC RX W HCPCS

## 2022-04-20 PROCEDURE — 99291 CRITICAL CARE FIRST HOUR: CPT | Performed by: INTERNAL MEDICINE

## 2022-04-20 PROCEDURE — 37799 UNLISTED PX VASCULAR SURGERY: CPT

## 2022-04-20 PROCEDURE — 99233 SBSQ HOSP IP/OBS HIGH 50: CPT | Performed by: INTERNAL MEDICINE

## 2022-04-20 RX ORDER — MIDAZOLAM HYDROCHLORIDE 2 MG/2ML
2 INJECTION, SOLUTION INTRAMUSCULAR; INTRAVENOUS
Status: DISCONTINUED | OUTPATIENT
Start: 2022-04-20 | End: 2022-05-10 | Stop reason: HOSPADM

## 2022-04-20 RX ORDER — FUROSEMIDE 10 MG/ML
40 INJECTION INTRAMUSCULAR; INTRAVENOUS ONCE
Status: COMPLETED | OUTPATIENT
Start: 2022-04-20 | End: 2022-04-20

## 2022-04-20 RX ORDER — PROPOFOL 10 MG/ML
5-60 INJECTION, EMULSION INTRAVENOUS CONTINUOUS
Status: DISCONTINUED | OUTPATIENT
Start: 2022-04-20 | End: 2022-04-23

## 2022-04-20 RX ORDER — LEVALBUTEROL TARTRATE 45 UG/1
1 AEROSOL, METERED ORAL EVERY 6 HOURS PRN
Status: DISCONTINUED | OUTPATIENT
Start: 2022-04-20 | End: 2022-04-20 | Stop reason: RX

## 2022-04-20 RX ORDER — LEVALBUTEROL INHALATION SOLUTION 0.63 MG/3ML
0.63 SOLUTION RESPIRATORY (INHALATION) EVERY 6 HOURS PRN
Status: DISCONTINUED | OUTPATIENT
Start: 2022-04-20 | End: 2022-04-21

## 2022-04-20 RX ADMIN — ACETAMINOPHEN 650 MG: 325 TABLET ORAL at 12:01

## 2022-04-20 RX ADMIN — PROPOFOL 50 MCG/KG/MIN: 10 INJECTION, EMULSION INTRAVENOUS at 12:32

## 2022-04-20 RX ADMIN — IPRATROPIUM BROMIDE AND ALBUTEROL SULFATE 1 AMPULE: .5; 2.5 SOLUTION RESPIRATORY (INHALATION) at 11:54

## 2022-04-20 RX ADMIN — IPRATROPIUM BROMIDE AND ALBUTEROL SULFATE 1 AMPULE: .5; 2.5 SOLUTION RESPIRATORY (INHALATION) at 04:14

## 2022-04-20 RX ADMIN — MIDAZOLAM 2 MG: 1 INJECTION INTRAMUSCULAR; INTRAVENOUS at 13:35

## 2022-04-20 RX ADMIN — PROPOFOL 40 MCG/KG/MIN: 10 INJECTION, EMULSION INTRAVENOUS at 01:07

## 2022-04-20 RX ADMIN — PROPOFOL 50 MCG/KG/MIN: 10 INJECTION, EMULSION INTRAVENOUS at 22:06

## 2022-04-20 RX ADMIN — PROPOFOL 40 MCG/KG/MIN: 10 INJECTION, EMULSION INTRAVENOUS at 07:12

## 2022-04-20 RX ADMIN — Medication 200 MCG/HR: at 12:03

## 2022-04-20 RX ADMIN — POLYVINYL ALCOHOL 1 DROP: 14 SOLUTION/ DROPS OPHTHALMIC at 15:50

## 2022-04-20 RX ADMIN — IPRATROPIUM BROMIDE AND ALBUTEROL SULFATE 1 AMPULE: .5; 2.5 SOLUTION RESPIRATORY (INHALATION) at 23:30

## 2022-04-20 RX ADMIN — Medication 150 MCG/HR: at 05:46

## 2022-04-20 RX ADMIN — METHYLPREDNISOLONE SODIUM SUCCINATE 40 MG: 40 INJECTION, POWDER, FOR SOLUTION INTRAMUSCULAR; INTRAVENOUS at 01:10

## 2022-04-20 RX ADMIN — POLYVINYL ALCOHOL 1 DROP: 14 SOLUTION/ DROPS OPHTHALMIC at 08:21

## 2022-04-20 RX ADMIN — ACETAMINOPHEN 650 MG: 325 TABLET ORAL at 05:27

## 2022-04-20 RX ADMIN — INSULIN LISPRO 2 UNITS: 100 INJECTION, SOLUTION INTRAVENOUS; SUBCUTANEOUS at 12:03

## 2022-04-20 RX ADMIN — POLYVINYL ALCOHOL 1 DROP: 14 SOLUTION/ DROPS OPHTHALMIC at 03:18

## 2022-04-20 RX ADMIN — Medication 200 MCG/HR: at 22:07

## 2022-04-20 RX ADMIN — INSULIN LISPRO 2 UNITS: 100 INJECTION, SOLUTION INTRAVENOUS; SUBCUTANEOUS at 05:07

## 2022-04-20 RX ADMIN — SODIUM CHLORIDE, PRESERVATIVE FREE 10 ML: 5 INJECTION INTRAVENOUS at 20:15

## 2022-04-20 RX ADMIN — IPRATROPIUM BROMIDE AND ALBUTEROL SULFATE 1 AMPULE: .5; 2.5 SOLUTION RESPIRATORY (INHALATION) at 00:33

## 2022-04-20 RX ADMIN — HEPARIN SODIUM 5000 UNITS: 5000 INJECTION INTRAVENOUS; SUBCUTANEOUS at 21:50

## 2022-04-20 RX ADMIN — DILTIAZEM HYDROCHLORIDE 30 MG: 30 TABLET ORAL at 12:01

## 2022-04-20 RX ADMIN — INSULIN LISPRO 2 UNITS: 100 INJECTION, SOLUTION INTRAVENOUS; SUBCUTANEOUS at 18:23

## 2022-04-20 RX ADMIN — DILTIAZEM HYDROCHLORIDE 30 MG: 30 TABLET ORAL at 18:23

## 2022-04-20 RX ADMIN — Medication 200 MCG/HR: at 17:07

## 2022-04-20 RX ADMIN — HEPARIN SODIUM 5000 UNITS: 5000 INJECTION INTRAVENOUS; SUBCUTANEOUS at 13:35

## 2022-04-20 RX ADMIN — HEPARIN SODIUM 5000 UNITS: 5000 INJECTION INTRAVENOUS; SUBCUTANEOUS at 05:28

## 2022-04-20 RX ADMIN — METHYLPREDNISOLONE SODIUM SUCCINATE 40 MG: 40 INJECTION, POWDER, FOR SOLUTION INTRAMUSCULAR; INTRAVENOUS at 08:20

## 2022-04-20 RX ADMIN — MIDAZOLAM 2 MG: 1 INJECTION INTRAMUSCULAR; INTRAVENOUS at 18:23

## 2022-04-20 RX ADMIN — CEFTRIAXONE SODIUM 1000 MG: 1 INJECTION, POWDER, FOR SOLUTION INTRAMUSCULAR; INTRAVENOUS at 00:32

## 2022-04-20 RX ADMIN — DILTIAZEM HYDROCHLORIDE 30 MG: 30 TABLET ORAL at 05:27

## 2022-04-20 RX ADMIN — AZITHROMYCIN MONOHYDRATE 500 MG: 500 INJECTION, POWDER, LYOPHILIZED, FOR SOLUTION INTRAVENOUS at 08:27

## 2022-04-20 RX ADMIN — Medication 30 MG: at 08:20

## 2022-04-20 RX ADMIN — SODIUM CHLORIDE: 9 INJECTION, SOLUTION INTRAVENOUS at 21:44

## 2022-04-20 RX ADMIN — POLYVINYL ALCOHOL 1 DROP: 14 SOLUTION/ DROPS OPHTHALMIC at 20:15

## 2022-04-20 RX ADMIN — PROPOFOL 40 MCG/KG/MIN: 10 INJECTION, EMULSION INTRAVENOUS at 09:37

## 2022-04-20 RX ADMIN — ACETAMINOPHEN 650 MG: 325 TABLET ORAL at 18:23

## 2022-04-20 RX ADMIN — PROPOFOL 50 MCG/KG/MIN: 10 INJECTION, EMULSION INTRAVENOUS at 17:25

## 2022-04-20 RX ADMIN — METHYLPREDNISOLONE SODIUM SUCCINATE 40 MG: 40 INJECTION, POWDER, FOR SOLUTION INTRAMUSCULAR; INTRAVENOUS at 19:24

## 2022-04-20 RX ADMIN — PROPOFOL 50 MCG/KG/MIN: 10 INJECTION, EMULSION INTRAVENOUS at 19:34

## 2022-04-20 RX ADMIN — PROPOFOL 40 MCG/KG/MIN: 10 INJECTION, EMULSION INTRAVENOUS at 04:02

## 2022-04-20 RX ADMIN — IPRATROPIUM BROMIDE AND ALBUTEROL SULFATE 1 AMPULE: .5; 2.5 SOLUTION RESPIRATORY (INHALATION) at 20:01

## 2022-04-20 RX ADMIN — FUROSEMIDE 40 MG: 10 INJECTION, SOLUTION INTRAMUSCULAR; INTRAVENOUS at 15:49

## 2022-04-20 RX ADMIN — SODIUM CHLORIDE, PRESERVATIVE FREE 40 MG: 5 INJECTION INTRAVENOUS at 18:22

## 2022-04-20 RX ADMIN — IPRATROPIUM BROMIDE AND ALBUTEROL SULFATE 1 AMPULE: .5; 2.5 SOLUTION RESPIRATORY (INHALATION) at 15:41

## 2022-04-20 RX ADMIN — MIDAZOLAM 2 MG: 1 INJECTION INTRAMUSCULAR; INTRAVENOUS at 15:49

## 2022-04-20 RX ADMIN — MIDAZOLAM 2 MG: 1 INJECTION INTRAMUSCULAR; INTRAVENOUS at 21:53

## 2022-04-20 RX ADMIN — IPRATROPIUM BROMIDE AND ALBUTEROL SULFATE 1 AMPULE: .5; 2.5 SOLUTION RESPIRATORY (INHALATION) at 07:31

## 2022-04-20 ASSESSMENT — PULMONARY FUNCTION TESTS
PIF_VALUE: 29
PIF_VALUE: 31
PIF_VALUE: 28
PIF_VALUE: 32
PIF_VALUE: 32
PIF_VALUE: 29
PIF_VALUE: 31
PIF_VALUE: 31
PIF_VALUE: 29
PIF_VALUE: 33
PIF_VALUE: 31
PIF_VALUE: 30
PIF_VALUE: 34
PIF_VALUE: 32
PIF_VALUE: 31

## 2022-04-20 NOTE — PROGRESS NOTES
PULMONARY & CRITICAL CARE MEDICINE PROGRESS NOTE     Patient:  Star Green  MRN: [id number]  Admit date: 2022  Primary Care Physician: Jena Mitchell MD  Consulting Physician: Pato Hernández MD  CODE Status: Full Code  LOS: 2     SUBJECTIVE     CHIEF COMPLAINT/REASON FOR INITIAL CONSULT: Acute respiratory failure    BRIEF HOSPITAL COURSE:   The patient is a 23 y.o. female with past medical history of asthma was initially admitted to [facility] with acute exacerbation. She was initially put on nonrebreather, subsequently required intubation and initiation of mechanical ventilation due to worsening respiratory status. Patient was also on bicarb drip which was discontinued on arrival to [place].  She is sedated, paralyzed and mechanically ventilated. ABG shows improvement in respiratory acidosis and oxygenation.     INTERVAL HISTORY:  22  Patient remains on sedation/mechanical ventilation  She is sedated with propofol and fentanyl and paralyzed with Bumex  Respiratory panel was positive for rhino/enterovirus  On ceftriaxone/azithromycin, white count improved  On IV Solu-Medrol    REVIEW OF SYSTEMS:  Unobtainable from patient due to sedation/mechanical ventilation    OBJECTIVE     VENTILATOR SETTINGS:  Vent Information  Ventilator ID: [id number]  Vent Mode: PRVC     PaO2/FiO2 RATIO:  Recent Labs     22  0501   POCPO2 60.0*      FiO2 : 50 %     VITAL SIGNS:   LAST:  /62   Pulse 112   Temp 98.1 °F (36.7 °C) (Bladder)   Resp 30   Ht 5' 3\" (1.6 m)   Wt (!) 309 lb (140.2 kg)   SpO2 94%   BMI 54.74 kg/m²   8-24 HR RANGE:  TEMP No data recorded   BP Systolic (07IRZ), TJE:312 , Min:103 , TDV:890      Diastolic (72EKN), HKC:07, Min:39, Max:93     PULSE Pulse  Av.9  Min: 112  Max: 136   RR Resp  Av  Min: 30  Max: 30   O2 SAT SpO2  Av %  Min: 94 %  Max: 94 %   OXYGEN DELIVERY No data recorded        Physical Exam  Constitutional:       General: She is not in acute distress. Appearance: She is morbidly obese. She is ill-appearing. Interventions: She is sedated, chemically paralyzed and intubated. HENT:      Head: Normocephalic and atraumatic. Eyes:      Conjunctiva/sclera: Conjunctivae normal.      Pupils: Pupils are equal, round, and reactive to light. Neck:      Thyroid: No thyromegaly. Vascular: No JVD. Cardiovascular:      Rate and Rhythm: Normal rate and regular rhythm. Pulses: Normal pulses. Heart sounds: Normal heart sounds, S1 normal and S2 normal. No murmur heard. Pulmonary:      Effort: Prolonged expiration present. She is intubated. Breath sounds: Rhonchi present. Abdominal:      General: Bowel sounds are normal. There is no distension. Palpations: Abdomen is soft. There is no mass. Musculoskeletal:      Cervical back: Neck supple. Right lower leg: No edema. Left lower leg: No edema. Lymphadenopathy:      Cervical: No cervical adenopathy. Skin:     Coloration: Skin is not jaundiced or pale. Findings: No rash. Nails: There is no clubbing. Neurological:      General: No focal deficit present.          DATA REVIEW     Medications:  Scheduled Meds:   cefTRIAXone (ROCEPHIN) IV  1,000 mg IntraVENous Q24H    azithromycin  500 mg IntraVENous Q24H    methylPREDNISolone  40 mg IntraVENous Q8H    lansoprazole  30 mg Per NG tube QAM AC    dilTIAZem  30 mg Oral 4 times per day    polyvinyl alcohol  1 drop Both Eyes Q6H    ipratropium-albuterol  1 ampule Inhalation Q4H    insulin lispro  0-12 Units SubCUTAneous Q6H    sodium chloride flush  5-40 mL IntraVENous 2 times per day    heparin (porcine)  5,000 Units SubCUTAneous 3 times per day     Continuous Infusions:   cisatracurium (NIMBEX) infusion 1 mcg/kg/min (04/20/22 1232)    propofol 50 mcg/kg/min (04/20/22 1232)    fentaNYL 200 mcg/hr (04/20/22 1203)    dextrose      sodium chloride         INPUT/OUTPUT:  In: 2173.4 [I.V.:1390.3; NG/GT:285]  Out: 7437 [Urine:1675]  Date 04/20/22 0000 - 04/20/22 2359   Shift 7455-5766 5221-1276 1028-3267 24 Hour Total   INTAKE   I.V.(mL/kg) 367.8(2.6) 284.4(2)  652. 3(4.7)   NG/GT(mL/kg)  100(0.7)  100(0.7)   IV Piggyback(mL/kg)  250(1.8)  250(1.8)   Shift Total(mL/kg) 367.8(2.6) 634. 4(4.5)  1002. 3(7.2)   OUTPUT   Urine(mL/kg/hr) 720(0.6) 400(0.4)  1120   Emesis/NG output(mL/kg) 150(1.1)   150(1.1)   Shift Total(mL/kg) 870(6.2) 400(2.9)  1270(9.1)   Weight (kg) 140.2 140.2 140.2 140.2        LABS:  ABGs:   Recent Labs     04/18/22  1419 04/18/22  1738 04/18/22  2207 04/19/22  0452 04/20/22  0501   POCPH 7.176* 7.278* 7.304* 7.356 7.433   POCPCO2 87.0* 68.5* 65.8* 54.1* 46.4   POCPO2 81.2* 55.8* 72.8* 92.5 60.0*   POCHCO3 32.2* 32.0* 32.7* 30.3* 31.0*   VZBC5ICO 91* 83* 92* 97 91*     CBC:   Recent Labs     04/18/22  2311 04/19/22  1332 04/20/22  0504   WBC 31.3* 33.2* 25.1*   HGB 11.6* 11.6* 11.1*   HCT 38.1 37.5 35.2*   MCV 90.1 90.8 88.2    283 274   LYMPHOPCT 3* 5* 3*   RBC 4.23 4.13 3.99   MCH 27.4 28.1 27.8   MCHC 30.4 30.9 31.5   RDW 14.0 14.1 14.2     CRP:   Recent Labs     04/19/22  0020   CRP 79.7*     LDH:   No results for input(s): LDH in the last 72 hours. BMP:   Recent Labs     04/18/22  2311 04/19/22  0452 04/19/22  0550 04/20/22  0504   *  --  145* 141   K 4.7  --  4.5 4.0   *  --  106 104   CO2 30  --  24 28   BUN 15  --  19 31*   CREATININE 0.97* 1.61* 0.86 0.75   GLUCOSE 144*  --  142* 183*     Liver Function Test:   Recent Labs     04/18/22 2311   PROT 6.0*   LABALBU 3.3*   ALT 22   AST 45*   ALKPHOS 86   BILITOT 0.19*     Coagulation Profile:   Recent Labs     04/18/22  2311 04/19/22  1332 04/20/22  0504   INR  --  1.0 0.9   PROTIME  --  10.3 10.1   APTT 24.4 22.7 22.0     D-Dimer:  No results for input(s): DDIMER in the last 72 hours. Lactic Acid:  No results for input(s): LACTA in the last 72 hours.   Cardiac Enzymes:  No results for input(s): CKTOTAL, CKMB, CKMBINDEX, TROPONINI in the last 72 hours. Invalid input(s): TROPONIN, HSTROP  BNP/ProBNP:   No results for input(s): BNP, PROBNP in the last 72 hours. Triglycerides:  No results for input(s): TRIG in the last 72 hours. Microbiology:  Urine Culture:  No components found for: CURINE  Blood Culture:  No components found for: CBLOOD, CFUNGUSBL  Sputum Culture:  No components found for: [place] 11     04/19/22  0134 04/19/22  0134 04/19/22  0948   SPECDESC . BLOOD   < > .ENDOTRACHEAL   SPECIAL RT WRIST 2ML  --   --    CULTURE NO GROWTH 1 DAY   < > NORMAL RESPIRATORY SARAN LIGHT GROWTH    < > = values in this interval not displayed. Recent Labs     04/19/22  0126 04/19/22  0134 04/19/22  0948   SPECDESC . BLOOD . BLOOD .ENDOTRACHEAL   SPECIAL RT FOREARM . 5ML RT WRIST 2ML  --    CULTURE NO GROWTH 1 DAY NO GROWTH 1 DAY NORMAL RESPIRATORY SARAN LIGHT GROWTH        Pathology:    Radiology Reports:  XR CHEST PORTABLE   Final Result   1. Endotracheal and enteric tubes as above. 2. Diffuse interstitial opacities throughout both lungs, right greater than   left. No new focal airspace consolidation. Follow-up is recommended to   document resolution. XR CHEST (SINGLE VIEW FRONTAL)   Final Result   Diffuse interstitial opacities with alveolar/consolidative opacities at the   bases favoring multifocal airspace disease. Echocardiogram:   Results for orders placed during the hospital encounter of 04/18/22    ECHO Complete 2D W Doppler W Color      Summary  Left ventricle is normal in size, global left ventricular systolic function  is preserved, estimated ejection fraction is 50%. There appears to be some apical hypokinesis, in the apical views. Evidence of diastolic dysfunction. Trivial mitral regurgitation. Trivial pulmonic insufficiency. IVC dilated but unable to assess respiratory collapse due to patient on  ventilator.        ASSESSMENT AND PLAN     Assessment:    // Acute hypoxic/hypercapnic respiratory failure, requiring use of mechanical ventilation  // Acute respiratory acidosis, improved  // Acute exacerbation of bronchial asthma  // Rhino/enterovirus infection  // Leukocytosis  // Hyperglycemia  // Elevated troponin  // Morbid obesity    Plan:    I personally interviewed/examined the patient; reviewed interval history, interpreted all available radiographic and laboratory data at the time of service.  Patient currently sedated with fentanyl and propofol   Recommended weaning off Nimbex as tolerated, okay to use IV Versed pushes if needed   Remains hemodynamically stable   Continue lung protective mechanical ventilation   Wean FiO2/PEEP as tolerated   Monitor endotracheal secretions    Obtain X-ray chest as needed    Continue pulmonary toilet, aspiration precautions and bronchodilators   Continue to monitor I/O with a goal of even/negative fluid balance   Recommended initiation of tube feeds   Stress ulcer prophylaxis   Chemical DVT prophylaxis; heparin   Antimicrobials reviewed; continue ceftriaxone/azithromycin   Glycemic control appropriate; sliding scale insulin   Continue IV Solu-Medrol at current dose   Skin/wound care reviewed with the nursing staff   Physical/occupational therapy    The patient remains critically ill with illness/injury that acutely impairs one or more vital organ systems, such that there is a high probability of imminent or life threatening deterioration in the patient's condition. Critical care time of 35 minutes was spent (excluding procedures), in coordination of care during bedside rounds and discussion of patient care in detail, and recommendations of the team were adopted in the plan. Necessity of all invasive devices was also confirmed. Himanshu Mason MD  Pulmonary and Critical Care Medicine           4/20/2022, 4:05 PM    Please note that this chart was generated using voice recognition Dragon dictation software.   Although every effort was made to ensure the accuracy of this automated transcription, some errors in transcription may have occurred.

## 2022-04-20 NOTE — PLAN OF CARE
Problem: Gas Exchange - Impaired:  Goal: Levels of oxygenation will improve  Description: Levels of oxygenation will improve  4/19/2022 2200 by Gemma Meyers, RN  Outcome: Ongoing  4/19/2022 2200 by Gemma Meyers, RN  Outcome: Ongoing     Problem:  Activity:  Goal: Ability to tolerate increased activity will improve  Description: Ability to tolerate increased activity will improve  4/19/2022 2200 by Gemma Meyers, RN  Outcome: Ongoing  4/19/2022 2200 by Gemma Meyers, RN  Outcome: Ongoing  4/19/2022 1901 by Avtar Bates RN  Outcome: Ongoing     Problem: Cardiac:  Goal: Hemodynamic stability will improve  Description: Hemodynamic stability will improve  4/19/2022 2200 by Gemma Meyers, RN  Outcome: Ongoing  4/19/2022 2200 by Gemma Meyers, RN  Outcome: Ongoing  4/19/2022 1901 by Avtar Bates RN  Outcome: Ongoing     Problem: Respiratory:  Goal: Ability to maintain a clear airway will improve  Description: Ability to maintain a clear airway will improve  4/20/2022 0222 by TYLOR LainezP  Outcome: Ongoing  4/19/2022 2200 by Gemma Meyers, RN  Outcome: Ongoing  4/19/2022 2200 by Gemma Meyers, RN  Outcome: Ongoing  4/19/2022 1901 by Avtar Bates RN  Outcome: Ongoing  Goal: Ability to maintain adequate ventilation will improve  Description: Ability to maintain adequate ventilation will improve  4/20/2022 0222 by TYLOR LainezP  Outcome: Ongoing  4/19/2022 2200 by Gemma Meyers, RN  Outcome: Ongoing  4/19/2022 2200 by Gemma Meyers, RN  Outcome: Ongoing  4/19/2022 1901 by Avtar Bates RN  Outcome: Ongoing  Goal: Complications related to the disease process, condition or treatment will be avoided or minimized  Description: Complications related to the disease process, condition or treatment will be avoided or minimized  4/20/2022 0222 by TYLOR LainezP  Outcome: Ongoing  4/19/2022 2200 by Gemma Meyers, RN  Outcome: Ongoing  4/19/2022 2200 by Terrance Gonzalez HALIE Palacios  Outcome: Ongoing  4/19/2022 1901 by Debra Ayoub RN  Outcome: Ongoing     Problem: Skin Integrity:  Goal: Risk for impaired skin integrity will decrease  Description: Risk for impaired skin integrity will decrease  4/20/2022 0222 by Jose Mejía RCP  Outcome: Ongoing  4/19/2022 2200 by Jess Barajas RN  Outcome: Ongoing  4/19/2022 2200 by Jess Barajas RN  Outcome: Ongoing  4/19/2022 1901 by Debra Ayoub RN  Outcome: Ongoing     Problem: OXYGENATION/RESPIRATORY FUNCTION  Goal: Patient will maintain patent airway  4/20/2022 0222 by Jose Mejía RCP  Outcome: Ongoing  4/19/2022 2200 by Jess Barajas RN  Outcome: Ongoing  4/19/2022 2200 by Jess Barajas RN  Outcome: Ongoing  Goal: Patient will achieve/maintain normal respiratory rate/effort  Description: Respiratory rate and effort will be within normal limits for the patient  4/19/2022 2200 by Jess Barajas RN  Outcome: Ongoing  4/19/2022 2200 by Jess Barajas RN  Outcome: Ongoing     Problem: MECHANICAL VENTILATION  Goal: Patient will maintain patent airway  4/20/2022 0222 by Jose Mejía RCP  Outcome: Ongoing  4/19/2022 2200 by Jess Barajas RN  Outcome: Ongoing  4/19/2022 2200 by Jess Barajas RN  Outcome: Ongoing  Goal: Oral health is maintained or improved  4/20/2022 0222 by Jose Mejía RCP  Outcome: Ongoing  4/19/2022 2200 by Jess Barajas RN  Outcome: Ongoing  4/19/2022 2200 by Jess Barajas RN  Outcome: Ongoing  Goal: ET tube will be managed safely  4/20/2022 0222 by Jose Mejía RCP  Outcome: Ongoing  4/19/2022 2200 by Jess Barajas RN  Outcome: Ongoing  4/19/2022 2200 by Jess Barajas RN  Outcome: Ongoing     Problem: Nutrition  Goal: Optimal nutrition therapy  4/19/2022 2200 by Jess Barajas RN  Outcome: Ongoing  4/19/2022 2200 by Jess Barajas RN  Outcome: Ongoing  4/19/2022 1548 by Lazara Link RD, LD  Outcome: Ongoing  Note: Nutrition Problem #1: Inadequate oral intake  Intervention: Food and/or Nutrient Delivery: Continue NPO (Monitor need for nutrition support.)  Nutritional Goals: Meet % of estimated nutrition needs.

## 2022-04-20 NOTE — PLAN OF CARE
Problem: Skin Integrity:  Goal: Risk for impaired skin integrity will decrease  Description: Risk for impaired skin integrity will decrease  4/19/2022 2200 by Paulette Foy RN  Outcome: Ongoing  4/19/2022 1901 by Jersey Wade RN  Outcome: Ongoing

## 2022-04-20 NOTE — PLAN OF CARE
Problem: Gas Exchange - Impaired:  Goal: Levels of oxygenation will improve  Description: Levels of oxygenation will improve  4/19/2022 2200 by Wilmer Blair RN  Outcome: Ongoing  4/19/2022 2200 by Wilmer Blair RN  Outcome: Ongoing     Problem:  Activity:  Goal: Ability to tolerate increased activity will improve  Description: Ability to tolerate increased activity will improve  4/19/2022 2200 by Wilmer Blair RN  Outcome: Ongoing  4/19/2022 2200 by Wilmer Blair RN  Outcome: Ongoing  4/19/2022 1901 by Luis Levin RN  Outcome: Ongoing     Problem: Cardiac:  Goal: Hemodynamic stability will improve  Description: Hemodynamic stability will improve  4/19/2022 2200 by Wilmer Blair RN  Outcome: Ongoing  4/19/2022 2200 by Wilmer Blair RN  Outcome: Ongoing  4/19/2022 1901 by Luis Levin RN  Outcome: Ongoing     Problem: Respiratory:  Goal: Ability to maintain a clear airway will improve  Description: Ability to maintain a clear airway will improve  4/19/2022 2200 by Wilmer Blair RN  Outcome: Ongoing  4/19/2022 2200 by Wilmer Blair RN  Outcome: Ongoing  4/19/2022 1901 by Luis Levin RN  Outcome: Ongoing  Goal: Ability to maintain adequate ventilation will improve  Description: Ability to maintain adequate ventilation will improve  4/19/2022 2200 by Wilmer Blair RN  Outcome: Ongoing  4/19/2022 2200 by Wilmer Blair RN  Outcome: Ongoing  4/19/2022 1901 by Luis Levin RN  Outcome: Ongoing  Goal: Complications related to the disease process, condition or treatment will be avoided or minimized  Description: Complications related to the disease process, condition or treatment will be avoided or minimized  4/19/2022 2200 by Wilmer Blair RN  Outcome: Ongoing  4/19/2022 2200 by Wilmer Blair RN  Outcome: Ongoing  4/19/2022 1901 by Luis Levin RN  Outcome: Ongoing     Problem: Skin Integrity:  Goal: Risk for impaired skin integrity will decrease  Description: Risk for impaired skin integrity will decrease  4/19/2022 2200 by Brandie Gómez RN  Outcome: Ongoing  4/19/2022 2200 by Brandie Gómez RN  Outcome: Ongoing  4/19/2022 1901 by James Vicente RN  Outcome: Ongoing     Problem: OXYGENATION/RESPIRATORY FUNCTION  Goal: Patient will maintain patent airway  4/19/2022 2200 by Brandie Gómez RN  Outcome: Ongoing  4/19/2022 2200 by Brandie Gómez RN  Outcome: Ongoing  Goal: Patient will achieve/maintain normal respiratory rate/effort  Description: Respiratory rate and effort will be within normal limits for the patient  4/19/2022 2200 by Brandie Gómez RN  Outcome: Ongoing  4/19/2022 2200 by Brandie Gómez RN  Outcome: Ongoing     Problem: MECHANICAL VENTILATION  Goal: Patient will maintain patent airway  4/19/2022 2200 by Brandie Gómez RN  Outcome: Ongoing  4/19/2022 2200 by Brandie Gómez RN  Outcome: Ongoing  Goal: Oral health is maintained or improved  4/19/2022 2200 by Brandie Gómez RN  Outcome: Ongoing  4/19/2022 2200 by Brandie Gómez RN  Outcome: Ongoing  Goal: ET tube will be managed safely  4/19/2022 2200 by Brandie Gómez RN  Outcome: Ongoing  4/19/2022 2200 by Brandie Gómez RN  Outcome: Ongoing     Problem: Nutrition  Goal: Optimal nutrition therapy  4/19/2022 2200 by Brandie Gómez RN  Outcome: Ongoing  4/19/2022 2200 by Brandie Gómez RN  Outcome: Ongoing  4/19/2022 1548 by Kobe Heredia RD, LD  Outcome: Ongoing  Note: Nutrition Problem #1: Inadequate oral intake  Intervention: Food and/or Nutrient Delivery: Continue NPO (Monitor need for nutrition support.)  Nutritional Goals: Meet % of estimated nutrition needs.

## 2022-04-20 NOTE — PLAN OF CARE
Problem: Respiratory:  Goal: Ability to maintain a clear airway will improve  Description: Ability to maintain a clear airway will improve  4/20/2022 1957 by Virgie Crenshaw RCP  Outcome: Progressing     Problem: Respiratory:  Goal: Ability to maintain adequate ventilation will improve  Description: Ability to maintain adequate ventilation will improve  4/20/2022 1957 by Virgie Crenshaw RCP  Outcome: Progressing     Problem: Respiratory:  Goal: Complications related to the disease process, condition or treatment will be avoided or minimized  Description: Complications related to the disease process, condition or treatment will be avoided or minimized  4/20/2022 1957 by Reji Crenshaw RCP  Outcome: Progressing     Problem: OXYGENATION/RESPIRATORY FUNCTION  Goal: Patient will maintain patent airway  4/20/2022 1956 by Virgie Crenshaw RCP  Outcome: Progressing     Problem: OXYGENATION/RESPIRATORY FUNCTION  Goal: Patient will achieve/maintain normal respiratory rate/effort  Description: Respiratory rate and effort will be within normal limits for the patient  4/20/2022 1956 by Reji Crenshaw RCP  Outcome: Progressing     Problem: MECHANICAL VENTILATION  Goal: Patient will maintain patent airway  4/20/2022 1956 by Virgie Crenshaw RCP  Outcome: Progressing     Problem: MECHANICAL VENTILATION  Goal: Oral health is maintained or improved  4/20/2022 1956 by Reji Crenshaw RCP  Outcome: Progressing     Problem: MECHANICAL VENTILATION  Goal: ET tube will be managed safely  4/20/2022 1956 by Virgie Crenshaw RCP  Outcome: Progressing

## 2022-04-20 NOTE — PROGRESS NOTES
[facility]   Progress Note                    Date:   4/20/2022  Patient name:  Jeanie De León  Date of admission:  4/18/2022  2:00 PM  MRN:   [id number]  YOB: 2000  PCP:    Lisbeth Calle MD    Reason for Admission:  Acute asthma exacerbation [J45.901]    Subjective:      Clinical Changes / Abnormalities:    Patient seen and examined at bedside. No acute events overnight. Remains intubated and sedated. Continues to be tachycardiac but HR not in 110-120 range compared to previously in 140's.     Urine output in the last 24 hours:     Intake/Output Summary (Last 24 hours) at 4/20/2022 0752  Last data filed at 4/20/2022 0600  Gross per 24 hour   Intake 1538.95 ml   Output 1300 ml   Net 238.95 ml     I/O since admission: +372.7 cc    Medications:   Scheduled Meds:   cefTRIAXone (ROCEPHIN) IV  1,000 mg IntraVENous Q24H    azithromycin  500 mg IntraVENous Q24H    methylPREDNISolone  40 mg IntraVENous Q8H    lansoprazole  30 mg Per NG tube QAM AC    dilTIAZem  30 mg Oral 4 times per day    polyvinyl alcohol  1 drop Both Eyes Q6H    ipratropium-albuterol  1 ampule Inhalation Q4H    insulin lispro  0-12 Units SubCUTAneous Q6H    sodium chloride flush  5-40 mL IntraVENous 2 times per day    heparin (porcine)  5,000 Units SubCUTAneous 3 times per day     Continuous Infusions:   propofol 40 mcg/kg/min (04/20/22 0712)    cisatracurium (NIMBEX) infusion 1.5 mcg/kg/min (04/20/22 0600)    fentaNYL 150 mcg/hr (04/20/22 0546)    dextrose      sodium chloride       CBC:   Recent Labs     04/18/22  2311 04/19/22  1332 04/20/22  0504   WBC 31.3* 33.2* 25.1*   HGB 11.6* 11.6* 11.1*    283 274     BMP:    Recent Labs     04/18/22  2311 04/19/22  0452 04/19/22  0550 04/20/22  0504   *  --  145* 141   K 4.7  --  4.5 4.0   *  --  106 104   CO2 30  --  24 28   BUN 15  --  19 31*   CREATININE 0.97* 1.61* 0.86 0.75   GLUCOSE 144*  --  142* 183*     Hepatic:   Recent Labs 22  2311   AST 45*   ALT 22   BILITOT 0.19*   ALKPHOS 86     Troponin: No results for input(s): TROPONINI in the last 72 hours. No results for input(s): TROPONINT in the last 72 hours. BNP: No results for input(s): PROBNP in the last 72 hours. No results for input(s): BNP in the last 72 hours. Lipids: No results for input(s): CHOL, HDL in the last 72 hours. Invalid input(s): LDLCALCU  INR:   Recent Labs     22  1332 22  0504   INR 1.0 0.9       Objective:   Vitals: BP (!) 142/61   Pulse 119   Temp 98.1 °F (36.7 °C) (Bladder)   Resp 30   Ht 5' 3\" (1.6 m)   Wt (!) 309 lb (140.2 kg)   SpO2 96%   BMI 54.74 kg/m²    Constitutional and General Appearance:   · Intubated and sedated  Respiratory:  · No for increased work of breathing. · On auscultation: diminished breath sounds with slight wheezing  · ETT in place  Cardiovascular:  · Regular S1 and S2.   · No murmurs  Abdomen:   · No masses or tenderness  · Bowel sounds present  Extremities:  ·  No Cyanosis or Clubbing  ·  Lower extremity edema: No  Neurological:  · Intubated and sedated       Diagnostic Studies:     EK22   NSR      ECHO:  22  Summary  Left ventricle is normal in size, global left ventricular systolic function  is preserved, estimated ejection fraction is 50%. There appears to be some apical hypokinesis, in the apical views. Evidence of diastolic dysfunction. Trivial mitral regurgitation. Trivial pulmonic insufficiency. IVC dilated but unable to assess respiratory collapse due to patient on  ventilator. Assessment / Acute Cardiac Problems:   1. Troponin elevation type II demand ischemia  2. Acute hypoxic respiratory failure s/p mechanical ventilation  3. Asthma exacerbation  4. Tachycardia  5. Hx of seizures    Plan of Treatment:   1. Continue diltiazem 30 mg q6hr  2.  Continue treatment of asthma exacerbation and PNA per primary team and Pulmonary/Critical care team  3. K>4, Mg>2    Idalia Rodriguez MD  Fellow, [id number] [facility]        Attending Physician Statement  I have discussed the care of [facility], including pertinent history and exam findings,  with the cardiology fellow/resident. I have seen and examined the patient and the key elements of all parts of the encounter have been performed by me. I agree with the assessment, plan and orders as documented by the fellow.        Stacy Wells MD, [facility]

## 2022-04-20 NOTE — PROGRESS NOTES
[facility]  Internal Medicine Teaching Residency Program  Inpatient Daily Progress Note  ______________________________________________________________________________    Patient: Shamika Last  YOB: 2000   KSR:[id number]    Acct: [de-identified]     Room: [address] [place] [address]  Admit date: 4/18/2022  Today's date: 04/20/22  Number of days in the hospital: 2    SUBJECTIVE   Admitting Diagnosis: Acute asthma exacerbation  CC: Acute asthma exacerbation  Pt examined at bedside. Chart & results reviewed. No acute episodes overnight  Patient is heme stable and afebrile  Remains tachycardic but improved with Cardizem per Cardiology  Echo reviewed by Cardiology attending  AM CBC and BMP reviewed  Increasing BUN and Leukocytosis most likely secondary to steroids  Glucose controlled on sliding scale - Hemoglobin A1c 5.1   Urine output 1.2 liters over last 24 hours    ROS:  Review of Systems   Unable to perform ROS: Intubated     BRIEF HISTORY     The patient is a pleasant 23 y.o. female with a past medical history of bronchial asthma who was admitted to [facility] with an acute exacerbation causing increasing shortness of breath. Patient was found to be hypoxemic with hypercarbic respiratory failure requiring intubation and mechanical ventilation. Patient was transferred to [facility] for further elevation with possibility of ECMO. On arrival patient was found to be on a bicarb drip due to presumed respiratory acidosis but this was discontinued due to to worsening of the hypercarbia. Additionally patient was started on Nimbex due to the necessity for paralyzation as she was breathing over the vent. Pulmonology and cardiothoracic surgery were consulted as well.     OBJECTIVE     Vital Signs:  /62   Pulse 119   Temp 98.1 °F (36.7 °C) (Bladder)   Resp 30   Ht 5' 3\" (1.6 m)   Wt (!) 309 lb (140.2 kg)   SpO2 96%   BMI 54.74 kg/m²     No data recorded. In: 8690   Out: 1610 [Urine:1460]    Physical Exam:  Constitutional: This is a well developed, well nourished, Greater than 36 - Morbid Obesity / Extreme Obesity / Grade III 23y.o. year old female    Head: normocephalic and atraumatic. EENT: Endotracheal Intubated  Neck: Supple without thyromegaly. No elevated JVP. Respiratory: Wheezing bilaterally  Cardiovascular: Regular without murmur, clicks, gallops or rubs. Abdomen: Slightly rounded and soft without organomegaly. No rebound, rigidity or guarding was appreciated. Lymphatic: No lymphadenopathy. Musculoskeletal: Normal curvature of the spine. No gross muscle weakness. Extremities:  1+ edema  Skin:  Warm and dry. Good color, turgor and pigmentation. No lesions or scars.  No cyanosis or clubbing    Medications:  Scheduled Medications:    cefTRIAXone (ROCEPHIN) IV  1,000 mg IntraVENous Q24H    azithromycin  500 mg IntraVENous Q24H    methylPREDNISolone  40 mg IntraVENous Q8H    lansoprazole  30 mg Per NG tube QAM AC    dilTIAZem  30 mg Oral 4 times per day    polyvinyl alcohol  1 drop Both Eyes Q6H    ipratropium-albuterol  1 ampule Inhalation Q4H    insulin lispro  0-12 Units SubCUTAneous Q6H    sodium chloride flush  5-40 mL IntraVENous 2 times per day    heparin (porcine)  5,000 Units SubCUTAneous 3 times per day     Continuous Infusions:    cisatracurium (NIMBEX) infusion 1.5 mcg/kg/min (04/20/22 1003)    propofol      fentaNYL 150 mcg/hr (04/20/22 0546)    dextrose      sodium chloride       PRN Medicationslevalbuterol, 0.63 mg, Q6H PRN  metoprolol, 5 mg, Q6H PRN  glucose, 15 g, PRN  dextrose, 12.5 g, PRN  glucagon (rDNA), 1 mg, PRN  dextrose, 100 mL/hr, PRN  sodium chloride flush, 5-40 mL, PRN  sodium chloride, , PRN  ondansetron, 4 mg, Q8H PRN   Or  ondansetron, 4 mg, Q6H PRN  polyethylene glycol, 17 g, Daily PRN  acetaminophen, 650 mg, Q6H PRN   Or  acetaminophen, 650 mg, Q6H PRN      Diagnostic Labs:  CBC: Recent Labs     04/18/22  2311 04/19/22  1332 04/20/22  0504   WBC 31.3* 33.2* 25.1*   RBC 4.23 4.13 3.99   HGB 11.6* 11.6* 11.1*   HCT 38.1 37.5 35.2*   MCV 90.1 90.8 88.2   RDW 14.0 14.1 14.2    283 274     BMP:   Recent Labs     04/18/22  2311 04/19/22  0452 04/19/22  0550 04/20/22  0504   *  --  145* 141   K 4.7  --  4.5 4.0   *  --  106 104   CO2 30  --  24 28   BUN 15  --  19 31*   CREATININE 0.97* 1.61* 0.86 0.75     BNP: No results for input(s): BNP in the last 72 hours. PT/INR:   Recent Labs     04/19/22  1332 04/20/22  0504   PROTIME 10.3 10.1   INR 1.0 0.9     APTT:   Recent Labs     04/18/22  2311 04/19/22  1332 04/20/22  0504   APTT 24.4 22.7 22.0     CARDIAC ENZYMES: No results for input(s): CKMB, CKMBINDEX, TROPONINI in the last 72 hours. Invalid input(s): CKTOTAL;3  FASTING LIPID PANEL:No results found for: CHOL, HDL, TRIG  LIVER PROFILE:   Recent Labs     04/18/22  2311   AST 45*   ALT 22   BILITOT 0.19*   ALKPHOS 86      MICROBIOLOGY:   Lab Results   Component Value Date/Time    CULTURE PENDING 04/19/2022 09:48 AM     Imaging:    XR CHEST (SINGLE VIEW FRONTAL)    Result Date: 4/18/2022  Diffuse interstitial opacities with alveolar/consolidative opacities at the bases favoring multifocal airspace disease. XR CHEST PORTABLE    Result Date: 4/19/2022  1. Endotracheal and enteric tubes as above. 2. Diffuse interstitial opacities throughout both lungs, right greater than left. No new focal airspace consolidation. Follow-up is recommended to document resolution.      ASSESSMENT & PLAN     ASSESSMENT / PLAN:     Acute Hypercarbic Respiratory Failure due to Asthma Exacerbation  -Cause unknown   -Endotracheal Intubated with mechanical ventilation  -Paralyzed with Nimbex and weaning down  -Propofol at 40 mcg/kg/min  -Fentanyl 150 mcg/hr  -PRVC mode - FiO2 60 /  / PEEP 8  -ABG pH 7.433 / pHCO3 31 / pCO2 46.4  -Levalbuterol nebulization q6 PRN   -Duoneb nebulization 1 ampule q 4 hours  -IV Solumedrol 40 mg three times a day. Pulmonology recommendations appreciated. -Continue Ceftriaxone and Azthromycin  -Dietitian consulted for initiation of tube feeds. NG Tube in place. DVT ppx: Heparin  GI ppx: Lansoprazole suspension     PT/OT/SW: To be consulted pending improvement and extubation  Discharge Planning: Ongoing. Possible transfer to Medical ICU today. Marco Antonio Mayer MD  Internal Medicine Resident, PGY-2  [facility];  [place], [place]  4/20/2022, 11:12 AM

## 2022-04-20 NOTE — PROGRESS NOTES
Pt having dark brownish/red output with coffee ground substance in NG tube - Shekhar Miller notified.

## 2022-04-20 NOTE — PROGRESS NOTES
Decreased Nimbex at 1135 within 10 mn pt was fighting vent - increased propofol to 50mcg/kg/mn - Increased Fentanyl to 200mcg/hr- pt still agitated- Put Nimbex back to previous and notified . Verbal orders via telephone for 2mg IVP Versed q2 PRN for agitation.

## 2022-04-20 NOTE — PLAN OF CARE
Problem: OXYGENATION/RESPIRATORY FUNCTION  Goal: Patient will maintain patent airway  4/20/2022 0745 by Vidal Kanner, RCP  Outcome: Progressing  4/20/2022 0744 by Vidal Kanner, RCP  Outcome: Not Progressing  4/20/2022 0222 by Meghan Arredondo RCP  Outcome: Ongoing  4/19/2022 2200 by Maury Macedo RN  Outcome: Ongoing  4/19/2022 2200 by Maury Macedo RN  Outcome: Ongoing  Goal: Patient will achieve/maintain normal respiratory rate/effort  Description: Respiratory rate and effort will be within normal limits for the patient  4/20/2022 0745 by Vidal Kanner, RCP  Outcome: Progressing  4/20/2022 0744 by Vidal Kanner, RCP  Outcome: Not Progressing  4/19/2022 2200 by Maury Macedo RN  Outcome: Ongoing  4/19/2022 2200 by Maury Macedo RN  Outcome: Ongoing     Problem: MECHANICAL VENTILATION  Goal: Patient will maintain patent airway  4/20/2022 0745 by Vidal Kanner, RCP  Outcome: Progressing  4/20/2022 0744 by Vidal Kanner, RCP  Outcome: Not Progressing  4/20/2022 0222 by Meghan Arredondo RCP  Outcome: Ongoing  4/19/2022 2200 by Maury Macedo RN  Outcome: Ongoing  4/19/2022 2200 by Maury Macedo RN  Outcome: Ongoing  Goal: Oral health is maintained or improved  4/20/2022 0745 by Vidal Kanner, RCP  Outcome: Progressing  4/20/2022 0744 by Vidal Kanner, RCP  Outcome: Not Progressing  4/20/2022 0222 by Meghan Arredondo RCP  Outcome: Ongoing  4/19/2022 2200 by Maury Macedo RN  Outcome: Ongoing  4/19/2022 2200 by Maury Macedo RN  Outcome: Ongoing  Goal: ET tube will be managed safely  4/20/2022 0745 by Vidal Kanner, RCP  Outcome: Progressing  4/20/2022 0744 by Vidal Kanner, RCP  Outcome: Not Progressing  4/20/2022 0222 by Meghan Arredondo RCP  Outcome: Ongoing  4/19/2022 2200 by Maury Macedo RN  Outcome: Ongoing  4/19/2022 2200 by Maury Macedo RN  Outcome: Ongoing     Problem: Skin Integrity:  Goal: Risk for impaired skin integrity will decrease  Description: Risk for impaired skin integrity will decrease  4/20/2022 0745 by Blanca Tejeda RCP  Outcome: Progressing  4/20/2022 0744 by Blanca Tejeda RCP  Outcome: Not Progressing  4/20/2022 0222 by Jaspal Jenkins RCP  Outcome: Ongoing  4/19/2022 2200 by Irvin Kenny RN  Outcome: Ongoing  4/19/2022 2200 by Irvin Kenny RN  Outcome: Ongoing  4/19/2022 1901 by Henry Lazaro RN  Outcome: Ongoing

## 2022-04-20 NOTE — PROCEDURES
Insert Arterial Line  Date/Time:  04/19/22, 10:04 PM  Performed by: Luis Phelps RCP    Patient identity confirmed: arm band and provided demographic data   Time out: Immediately prior to procedure a \"time out\" was called to verify the correct patient, procedure, equipment, support staff. Preparation: Patient was prepped and draped in the usual sterile fashion.     Location:right radial    Sushil's test normal: yes  Needle gauge: 20     Number of attempts: 2  Post-procedure: transparent dressing applied and line secured    Patient tolerance: well

## 2022-04-20 NOTE — PLAN OF CARE
Nutrition Problem #1: Inadequate oral intake  Intervention: Food and/or Nutrient Delivery: Continue NPO,Start Tube Feeding (Suggest TF of Peptide Based High Protein formula at goal 30 mL/hr + 1 protein modular per day with current rate of propofol.)  Nutritional Goals: Meet % of estimated nutrition needs.

## 2022-04-20 NOTE — PROGRESS NOTES
Comprehensive Nutrition Assessment    Type and Reason for Visit:  Consult (TF Order and Management)    Nutrition Recommendations/Plan:   1. As able, start Tube Feedings of Peptide Based High Protein at goal rate 30 mL/hr + 1 Protein modular per day with current rate of propofol = 824 kcals (+propofol kcals), 89 gm pro/day. 2. Will monitor TF tolerance/adequacy of intakes - modify as needed. 3. Monitor plan of care. Malnutrition Assessment:  Malnutrition Status:  Insufficient data    Context:  Acute Illness       Nutrition Assessment:    Consulted for Tube Feeding Order and Management. Pt remains intubated and sedated. Propofol continues at 32 mL/hr. Nutrition Related Findings:    Labs/Meds reviewed. Hypoactive bowel sounds. Wound Type: None       Current Nutrition Intake & Therapies:    Average Meal Intake: NPO  Average Supplements Intake: NPO  ADULT TUBE FEEDING; Nasogastric; Peptide Based High Protein; Continuous; 10; Yes; 10; Q 4 hours; 30; 30; Q 4 hours; Protein; Provide 1 Protein modular per day. Additional Calorie Sources:  · Propofol running at 32 mL/hr = 845 kcals/day      Anthropometric Measures:  Height: 5' 3\" (160 cm)  Ideal Body Weight (IBW): 115 lbs (52 kg)    Admission Body Weight: 293 lb 10.4 oz (133.2 kg)  Current Body Weight: 309 lb (140.2 kg), 268.7 % IBW.  Weight Source: Bed Scale  Current BMI (kg/m2): 54.8  BMI Categories: Obese Class 3 (BMI 40.0 or greater)    Estimated Daily Nutrient Needs:  Energy Requirements Based On: Kcal/kg  Weight Used for Energy Requirements: Current  Energy (kcal/day): 10-11 kcal/kg = 7467-5685 kcal/day  Weight Used for Protein Requirements: Ideal  Protein (g/day): 2 gm/kg = 105 gm pro/day  Method Used for Fluid Requirements:  Fluid (ml/day): per MD    Nutrition Diagnosis:   · Inadequate oral intake related to impaired respiratory function as evidenced by NPO or clear liquid status due to medical condition (need for enteral nutrition support)    Nutrition Interventions:   Food and/or Nutrient Delivery: Continue NPO,Start Tube Feeding (Suggest TF of Peptide Based High Protein formula at goal 30 mL/hr + 1 protein modular per day with current rate of propofol.)  Nutrition Education/Counseling: No recommendation at this time  Coordination of Nutrition Care: Continue to monitor while inpatient    Goals:  Previous Goal Met: Progressing toward Goal(s)  Goals: Meet at least 75% of estimated needs       Nutrition Monitoring and Evaluation:   Behavioral-Environmental Outcomes: None Identified  Food/Nutrient Intake Outcomes: Enteral Nutrition Intake/Tolerance,Diet Advancement/Tolerance  Physical Signs/Symptoms Outcomes: Biochemical Data,GI Status,Fluid Status or Edema,Hemodynamic Status,Nutrition Focused Physical Findings,Skin,Weight    Discharge Planning:     Too soon to determine     Afia Filter, [address], [place]  Contact: [phone]

## 2022-04-21 ENCOUNTER — APPOINTMENT (OUTPATIENT)
Dept: GENERAL RADIOLOGY | Age: 22
DRG: 003 | End: 2022-04-21
Attending: INTERNAL MEDICINE
Payer: COMMERCIAL

## 2022-04-21 ENCOUNTER — ANESTHESIA EVENT (OUTPATIENT)
Dept: OPERATING ROOM | Age: 22
DRG: 003 | End: 2022-04-21
Payer: COMMERCIAL

## 2022-04-21 LAB
ABSOLUTE EOS #: <0.03 K/UL (ref 0–0.44)
ABSOLUTE IMMATURE GRANULOCYTE: 0.16 K/UL (ref 0–0.3)
ABSOLUTE LYMPH #: 1.85 K/UL (ref 1.1–3.7)
ABSOLUTE MONO #: 0.59 K/UL (ref 0.1–1.2)
ALLEN TEST: ABNORMAL
ANION GAP SERPL CALCULATED.3IONS-SCNC: 10 MMOL/L (ref 9–17)
BASOPHILS # BLD: 0 % (ref 0–2)
BASOPHILS ABSOLUTE: <0.03 K/UL (ref 0–0.2)
BUN BLDV-MCNC: 30 MG/DL (ref 6–20)
CALCIUM SERPL-MCNC: 8.1 MG/DL (ref 8.6–10.4)
CHLORIDE BLD-SCNC: 102 MMOL/L (ref 98–107)
CO2: 29 MMOL/L (ref 20–31)
CREAT SERPL-MCNC: 0.63 MG/DL (ref 0.5–0.9)
EOSINOPHILS RELATIVE PERCENT: 0 % (ref 1–4)
FIO2: 60
GFR AFRICAN AMERICAN: >60 ML/MIN
GFR NON-AFRICAN AMERICAN: >60 ML/MIN
GFR SERPL CREATININE-BSD FRML MDRD: ABNORMAL ML/MIN/{1.73_M2}
GLUCOSE BLD-MCNC: 144 MG/DL (ref 65–105)
GLUCOSE BLD-MCNC: 149 MG/DL (ref 65–105)
GLUCOSE BLD-MCNC: 150 MG/DL (ref 65–105)
GLUCOSE BLD-MCNC: 152 MG/DL (ref 65–105)
GLUCOSE BLD-MCNC: 154 MG/DL (ref 70–99)
GLUCOSE BLD-MCNC: 155 MG/DL (ref 74–100)
HCT VFR BLD CALC: 34 % (ref 36.3–47.1)
HCT VFR BLD CALC: 34.6 % (ref 36.3–47.1)
HEMOGLOBIN: 10.6 G/DL (ref 11.9–15.1)
HEMOGLOBIN: 10.8 G/DL (ref 11.9–15.1)
IMMATURE GRANULOCYTES: 1 %
INR BLD: 1
LYMPHOCYTES # BLD: 12 % (ref 24–43)
MCH RBC QN AUTO: 26.8 PG (ref 25.2–33.5)
MCHC RBC AUTO-ENTMCNC: 30.6 G/DL (ref 28.4–34.8)
MCV RBC AUTO: 87.4 FL (ref 82.6–102.9)
MODE: ABNORMAL
MONOCYTES # BLD: 4 % (ref 3–12)
NRBC AUTOMATED: 0 PER 100 WBC
O2 DEVICE/FLOW/%: ABNORMAL
PARTIAL THROMBOPLASTIN TIME: 22.1 SEC (ref 20.5–30.5)
PDW BLD-RTO: 14 % (ref 11.8–14.4)
PLATELET # BLD: 269 K/UL (ref 138–453)
PMV BLD AUTO: 9.9 FL (ref 8.1–13.5)
POC HCO3: 31.8 MMOL/L (ref 21–28)
POC O2 SATURATION: 90 % (ref 94–98)
POC PCO2: 37.4 MM HG (ref 35–48)
POC PH: 7.54 (ref 7.35–7.45)
POC PO2: 52.2 MM HG (ref 83–108)
POSITIVE BASE EXCESS, ART: 9 (ref 0–3)
POTASSIUM SERPL-SCNC: 4.5 MMOL/L (ref 3.7–5.3)
PROTHROMBIN TIME: 10.6 SEC (ref 9.1–12.3)
RBC # BLD: 3.96 M/UL (ref 3.95–5.11)
SAMPLE SITE: ABNORMAL
SEG NEUTROPHILS: 83 % (ref 36–65)
SEGMENTED NEUTROPHILS ABSOLUTE COUNT: 13.1 K/UL (ref 1.5–8.1)
SODIUM BLD-SCNC: 141 MMOL/L (ref 135–144)
WBC # BLD: 15.7 K/UL (ref 3.5–11.3)

## 2022-04-21 PROCEDURE — 37799 UNLISTED PX VASCULAR SURGERY: CPT

## 2022-04-21 PROCEDURE — 2000000000 HC ICU R&B

## 2022-04-21 PROCEDURE — 87040 BLOOD CULTURE FOR BACTERIA: CPT

## 2022-04-21 PROCEDURE — 6360000002 HC RX W HCPCS: Performed by: STUDENT IN AN ORGANIZED HEALTH CARE EDUCATION/TRAINING PROGRAM

## 2022-04-21 PROCEDURE — 6370000000 HC RX 637 (ALT 250 FOR IP): Performed by: STUDENT IN AN ORGANIZED HEALTH CARE EDUCATION/TRAINING PROGRAM

## 2022-04-21 PROCEDURE — 82947 ASSAY GLUCOSE BLOOD QUANT: CPT

## 2022-04-21 PROCEDURE — 85730 THROMBOPLASTIN TIME PARTIAL: CPT

## 2022-04-21 PROCEDURE — 2580000003 HC RX 258: Performed by: STUDENT IN AN ORGANIZED HEALTH CARE EDUCATION/TRAINING PROGRAM

## 2022-04-21 PROCEDURE — 99291 CRITICAL CARE FIRST HOUR: CPT | Performed by: INTERNAL MEDICINE

## 2022-04-21 PROCEDURE — 6370000000 HC RX 637 (ALT 250 FOR IP)

## 2022-04-21 PROCEDURE — 94003 VENT MGMT INPAT SUBQ DAY: CPT

## 2022-04-21 PROCEDURE — 85610 PROTHROMBIN TIME: CPT

## 2022-04-21 PROCEDURE — 2500000003 HC RX 250 WO HCPCS

## 2022-04-21 PROCEDURE — 6360000002 HC RX W HCPCS

## 2022-04-21 PROCEDURE — 85018 HEMOGLOBIN: CPT

## 2022-04-21 PROCEDURE — 2500000003 HC RX 250 WO HCPCS: Performed by: STUDENT IN AN ORGANIZED HEALTH CARE EDUCATION/TRAINING PROGRAM

## 2022-04-21 PROCEDURE — 6360000002 HC RX W HCPCS: Performed by: INTERNAL MEDICINE

## 2022-04-21 PROCEDURE — 71045 X-RAY EXAM CHEST 1 VIEW: CPT

## 2022-04-21 PROCEDURE — 82803 BLOOD GASES ANY COMBINATION: CPT

## 2022-04-21 PROCEDURE — 85025 COMPLETE CBC W/AUTO DIFF WBC: CPT

## 2022-04-21 PROCEDURE — C9113 INJ PANTOPRAZOLE SODIUM, VIA: HCPCS | Performed by: STUDENT IN AN ORGANIZED HEALTH CARE EDUCATION/TRAINING PROGRAM

## 2022-04-21 PROCEDURE — 36415 COLL VENOUS BLD VENIPUNCTURE: CPT

## 2022-04-21 PROCEDURE — 80048 BASIC METABOLIC PNL TOTAL CA: CPT

## 2022-04-21 PROCEDURE — 94761 N-INVAS EAR/PLS OXIMETRY MLT: CPT

## 2022-04-21 PROCEDURE — 94640 AIRWAY INHALATION TREATMENT: CPT

## 2022-04-21 PROCEDURE — 85014 HEMATOCRIT: CPT

## 2022-04-21 PROCEDURE — 06HY33Z INSERTION OF INFUSION DEVICE INTO LOWER VEIN, PERCUTANEOUS APPROACH: ICD-10-PCS | Performed by: INTERNAL MEDICINE

## 2022-04-21 PROCEDURE — 6370000000 HC RX 637 (ALT 250 FOR IP): Performed by: INTERNAL MEDICINE

## 2022-04-21 PROCEDURE — 2700000000 HC OXYGEN THERAPY PER DAY

## 2022-04-21 RX ORDER — FENTANYL CITRATE 50 UG/ML
25 INJECTION, SOLUTION INTRAMUSCULAR; INTRAVENOUS
Status: DISCONTINUED | OUTPATIENT
Start: 2022-04-21 | End: 2022-05-01

## 2022-04-21 RX ORDER — LABETALOL HYDROCHLORIDE 5 MG/ML
10 INJECTION, SOLUTION INTRAVENOUS ONCE
Status: COMPLETED | OUTPATIENT
Start: 2022-04-21 | End: 2022-04-21

## 2022-04-21 RX ADMIN — SODIUM CHLORIDE, PRESERVATIVE FREE 10 ML: 5 INJECTION INTRAVENOUS at 20:33

## 2022-04-21 RX ADMIN — AZITHROMYCIN MONOHYDRATE 500 MG: 500 INJECTION, POWDER, LYOPHILIZED, FOR SOLUTION INTRAVENOUS at 14:31

## 2022-04-21 RX ADMIN — METOPROLOL TARTRATE 5 MG: 1 INJECTION, SOLUTION INTRAVENOUS at 08:52

## 2022-04-21 RX ADMIN — DILTIAZEM HYDROCHLORIDE 30 MG: 30 TABLET ORAL at 06:10

## 2022-04-21 RX ADMIN — Medication 200 MCG/HR: at 20:00

## 2022-04-21 RX ADMIN — Medication 10 MG: at 13:20

## 2022-04-21 RX ADMIN — IPRATROPIUM BROMIDE AND ALBUTEROL SULFATE 1 AMPULE: .5; 2.5 SOLUTION RESPIRATORY (INHALATION) at 19:52

## 2022-04-21 RX ADMIN — AZITHROMYCIN MONOHYDRATE 500 MG: 500 INJECTION, POWDER, LYOPHILIZED, FOR SOLUTION INTRAVENOUS at 08:21

## 2022-04-21 RX ADMIN — Medication 200 MCG/HR: at 06:19

## 2022-04-21 RX ADMIN — INSULIN LISPRO 2 UNITS: 100 INJECTION, SOLUTION INTRAVENOUS; SUBCUTANEOUS at 17:34

## 2022-04-21 RX ADMIN — PROPOFOL 40 MCG/KG/MIN: 10 INJECTION, EMULSION INTRAVENOUS at 20:50

## 2022-04-21 RX ADMIN — METHYLPREDNISOLONE SODIUM SUCCINATE 40 MG: 40 INJECTION, POWDER, FOR SOLUTION INTRAMUSCULAR; INTRAVENOUS at 17:21

## 2022-04-21 RX ADMIN — INSULIN LISPRO 2 UNITS: 100 INJECTION, SOLUTION INTRAVENOUS; SUBCUTANEOUS at 00:43

## 2022-04-21 RX ADMIN — POLYVINYL ALCOHOL 1 DROP: 14 SOLUTION/ DROPS OPHTHALMIC at 15:24

## 2022-04-21 RX ADMIN — METHYLPREDNISOLONE SODIUM SUCCINATE 40 MG: 40 INJECTION, POWDER, FOR SOLUTION INTRAMUSCULAR; INTRAVENOUS at 07:58

## 2022-04-21 RX ADMIN — PROPOFOL 50 MCG/KG/MIN: 10 INJECTION, EMULSION INTRAVENOUS at 00:33

## 2022-04-21 RX ADMIN — POLYVINYL ALCOHOL 1 DROP: 14 SOLUTION/ DROPS OPHTHALMIC at 20:35

## 2022-04-21 RX ADMIN — SODIUM CHLORIDE, PRESERVATIVE FREE 10 ML: 5 INJECTION INTRAVENOUS at 08:14

## 2022-04-21 RX ADMIN — PROPOFOL 25 MCG/KG/MIN: 10 INJECTION, EMULSION INTRAVENOUS at 08:16

## 2022-04-21 RX ADMIN — PROPOFOL 40 MCG/KG/MIN: 10 INJECTION, EMULSION INTRAVENOUS at 17:30

## 2022-04-21 RX ADMIN — SODIUM CHLORIDE, PRESERVATIVE FREE 40 MG: 5 INJECTION INTRAVENOUS at 07:58

## 2022-04-21 RX ADMIN — FENTANYL CITRATE 25 MCG: 50 INJECTION INTRAMUSCULAR; INTRAVENOUS at 00:31

## 2022-04-21 RX ADMIN — POLYVINYL ALCOHOL 1 DROP: 14 SOLUTION/ DROPS OPHTHALMIC at 02:59

## 2022-04-21 RX ADMIN — PROPOFOL 50 MCG/KG/MIN: 10 INJECTION, EMULSION INTRAVENOUS at 05:45

## 2022-04-21 RX ADMIN — HEPARIN SODIUM 5000 UNITS: 5000 INJECTION INTRAVENOUS; SUBCUTANEOUS at 06:11

## 2022-04-21 RX ADMIN — DILTIAZEM HYDROCHLORIDE 30 MG: 30 TABLET ORAL at 17:21

## 2022-04-21 RX ADMIN — Medication 200 MCG/HR: at 00:47

## 2022-04-21 RX ADMIN — IPRATROPIUM BROMIDE AND ALBUTEROL SULFATE 1 AMPULE: .5; 2.5 SOLUTION RESPIRATORY (INHALATION) at 11:22

## 2022-04-21 RX ADMIN — METHYLPREDNISOLONE SODIUM SUCCINATE 40 MG: 40 INJECTION, POWDER, FOR SOLUTION INTRAMUSCULAR; INTRAVENOUS at 02:04

## 2022-04-21 RX ADMIN — IPRATROPIUM BROMIDE AND ALBUTEROL SULFATE 1 AMPULE: .5; 2.5 SOLUTION RESPIRATORY (INHALATION) at 23:24

## 2022-04-21 RX ADMIN — POLYVINYL ALCOHOL 1 DROP: 14 SOLUTION/ DROPS OPHTHALMIC at 07:59

## 2022-04-21 RX ADMIN — Medication 2 MG/HR: at 00:28

## 2022-04-21 RX ADMIN — IPRATROPIUM BROMIDE AND ALBUTEROL SULFATE 1 AMPULE: .5; 2.5 SOLUTION RESPIRATORY (INHALATION) at 03:13

## 2022-04-21 RX ADMIN — INSULIN LISPRO 2 UNITS: 100 INJECTION, SOLUTION INTRAVENOUS; SUBCUTANEOUS at 11:54

## 2022-04-21 RX ADMIN — IPRATROPIUM BROMIDE AND ALBUTEROL SULFATE 1 AMPULE: .5; 2.5 SOLUTION RESPIRATORY (INHALATION) at 07:30

## 2022-04-21 RX ADMIN — HEPARIN SODIUM 5000 UNITS: 5000 INJECTION INTRAVENOUS; SUBCUTANEOUS at 23:29

## 2022-04-21 RX ADMIN — PROPOFOL 40 MCG/KG/MIN: 10 INJECTION, EMULSION INTRAVENOUS at 14:18

## 2022-04-21 RX ADMIN — DILTIAZEM HYDROCHLORIDE 30 MG: 30 TABLET ORAL at 00:52

## 2022-04-21 RX ADMIN — SODIUM CHLORIDE, PRESERVATIVE FREE 40 MG: 5 INJECTION INTRAVENOUS at 13:41

## 2022-04-21 RX ADMIN — HEPARIN SODIUM 5000 UNITS: 5000 INJECTION INTRAVENOUS; SUBCUTANEOUS at 15:24

## 2022-04-21 RX ADMIN — IPRATROPIUM BROMIDE AND ALBUTEROL SULFATE 1 AMPULE: .5; 2.5 SOLUTION RESPIRATORY (INHALATION) at 15:37

## 2022-04-21 RX ADMIN — INSULIN LISPRO 2 UNITS: 100 INJECTION, SOLUTION INTRAVENOUS; SUBCUTANEOUS at 06:10

## 2022-04-21 RX ADMIN — Medication 200 MCG/HR: at 13:19

## 2022-04-21 RX ADMIN — PROPOFOL 40 MCG/KG/MIN: 10 INJECTION, EMULSION INTRAVENOUS at 23:25

## 2022-04-21 RX ADMIN — PROPOFOL 40 MCG/KG/MIN: 10 INJECTION, EMULSION INTRAVENOUS at 11:34

## 2022-04-21 RX ADMIN — DILTIAZEM HYDROCHLORIDE 30 MG: 30 TABLET ORAL at 11:43

## 2022-04-21 RX ADMIN — PROPOFOL 50 MCG/KG/MIN: 10 INJECTION, EMULSION INTRAVENOUS at 03:00

## 2022-04-21 RX ADMIN — CEFTRIAXONE SODIUM 1000 MG: 1 INJECTION, POWDER, FOR SOLUTION INTRAMUSCULAR; INTRAVENOUS at 00:48

## 2022-04-21 ASSESSMENT — PULMONARY FUNCTION TESTS
PIF_VALUE: 33
PIF_VALUE: 31
PIF_VALUE: 32
PIF_VALUE: 33
PIF_VALUE: 34
PIF_VALUE: 32
PIF_VALUE: 30
PIF_VALUE: 36
PIF_VALUE: 35
PIF_VALUE: 36
PIF_VALUE: 38
PIF_VALUE: 0
PIF_VALUE: 36
PIF_VALUE: 34
PIF_VALUE: 33
PIF_VALUE: 40
PIF_VALUE: 32
PIF_VALUE: 36
PIF_VALUE: 35
PIF_VALUE: 32
PIF_VALUE: 38
PIF_VALUE: 32
PIF_VALUE: 39
PIF_VALUE: 34
PIF_VALUE: 38
PIF_VALUE: 37

## 2022-04-21 NOTE — PROGRESS NOTES
Ventilator Bronchodilator assessment    Breath sounds: diminished, expiratory wheezing  Inspiratory Pressure: 32  Plateau Pressure: 29    Patient assessed at level 3          []    Bronchodilator Assessment    BRONCHODILATOR ASSESSMENT SCORE  Score 0 (Home) 1 2 3 4   Breath Sounds   []  Chronic Ventilator: Patient at baseline []  Mild Wheezes/ Clear []  Intermittent wheezes with good air entry [x]  Bilateral/unilateral wheezing with diminished air entry []  Insp/Exp wheeze and/or poor aeration   Ventilator Pressures   []  Chronic Ventilator []  Insp. Pressure less than 25 cm H20 []  Insp. Pressure less than 25 cm H20 [x]  Insp. Pressure exceeds 25 cm H20 [x]  Insp.  Pressure exceeds 30 cm H20   Plateau Pressure []  NA   [x]  Plateau Pressure less than 4  []  Plateau Pressure less than or equal to 5 []  Plateau Pressure greater than or equal to 6 []  Plateau Pressure greater than or equal to 8       TYE HUANG RCP  12:29 PM

## 2022-04-21 NOTE — PROGRESS NOTES
[facility]   Progress Note                    Date:   4/21/2022  Patient name:  Peace Monique  Date of admission:  4/18/2022  2:00 PM  MRN:   [id number]  YOB: 2000  PCP:    Davie Garcia MD    Reason for Admission:  Acute asthma exacerbation [J45.901]    Subjective:      Clinical Changes / Abnormalities:    Patient seen and examined   No acute issues overnight   Intubated and sedated     Urine output in the last 24 hours:     Intake/Output Summary (Last 24 hours) at 4/21/2022 0810  Last data filed at 4/21/2022 0646  Gross per 24 hour   Intake 2489.53 ml   Output 2225 ml   Net 264.53 ml     I/O since admission: +372.7 cc    Medications:   Scheduled Meds:   pantoprazole (PROTONIX) 40 mg injection  40 mg IntraVENous Daily    cefTRIAXone (ROCEPHIN) IV  1,000 mg IntraVENous Q24H    azithromycin  500 mg IntraVENous Q24H    methylPREDNISolone  40 mg IntraVENous Q8H    dilTIAZem  30 mg Oral 4 times per day    polyvinyl alcohol  1 drop Both Eyes Q6H    ipratropium-albuterol  1 ampule Inhalation Q4H    insulin lispro  0-12 Units SubCUTAneous Q6H    sodium chloride flush  5-40 mL IntraVENous 2 times per day    heparin (porcine)  5,000 Units SubCUTAneous 3 times per day     Continuous Infusions:   midazolam Stopped (04/21/22 0755)    cisatracurium (NIMBEX) infusion Stopped (04/20/22 1330)    propofol 25 mcg/kg/min (04/21/22 0756)    fentaNYL 200 mcg/hr (04/21/22 0619)    dextrose      sodium chloride 10 mL/hr at 04/21/22 0645     CBC:   Recent Labs     04/19/22  1332 04/20/22  0504 04/21/22  0526   WBC 33.2* 25.1* 15.7*   HGB 11.6* 11.1* 10.6*    274 269     BMP:    Recent Labs     04/19/22  0550 04/20/22  0504 04/21/22  0526   * 141 141   K 4.5 4.0 4.5    104 102   CO2 24 28 29   BUN 19 31* 30*   CREATININE 0.86 0.75 0.63   GLUCOSE 142* 183* 154*     Hepatic:   Recent Labs     04/18/22  2311   AST 45*   ALT 22   BILITOT 0.19*   ALKPHOS 86 Troponin: No results for input(s): TROPONINI in the last 72 hours. No results for input(s): TROPONINT in the last 72 hours. BNP: No results for input(s): PROBNP in the last 72 hours. No results for input(s): BNP in the last 72 hours. Lipids: No results for input(s): CHOL, HDL in the last 72 hours. Invalid input(s): LDLCALCU  INR:   Recent Labs     22  1332 22  0504 22  0526   INR 1.0 0.9 1.0       Objective:   Vitals: BP (!) 154/66   Pulse 75   Temp 99.5 °F (37.5 °C)   Resp 29   Ht 5' 3\" (1.6 m)   Wt (!) 307 lb 14.4 oz (139.7 kg)   SpO2 94%   BMI 54.54 kg/m²    Constitutional and General Appearance:   · Intubated and sedated  Respiratory:  · No for increased work of breathing. · On auscultation: diminished breath sounds with slight wheezing  · ETT in place  Cardiovascular:  · Regular S1 and S2.   · No murmurs  Abdomen:   · No masses or tenderness  · Bowel sounds present  Extremities:  ·  No Cyanosis or Clubbing  ·  Lower extremity edema: No  Neurological:  · Intubated and sedated       Diagnostic Studies:     EK22   NSR      ECHO:  22  Summary  Left ventricle is normal in size, global left ventricular systolic function  is preserved, estimated ejection fraction is 50%. There appears to be some apical hypokinesis, in the apical views. Evidence of diastolic dysfunction. Trivial mitral regurgitation. Trivial pulmonic insufficiency. IVC dilated but unable to assess respiratory collapse due to patient on  ventilator. Assessment / Acute Cardiac Problems:   1. Troponin elevation type II demand ischemia  2. Acute hypoxic respiratory failure s/p mechanical ventilation  3. Asthma exacerbation  4. Tachycardia  5. Hx of seizures    Plan of Treatment:   1. Continue diltiazem 30 mg q6hr  2. We will get stress test before discharge due to abnormal echocardiogram which showed apical hypokinesis. 3. We will follow from distance.     Devora Scott MD  Cardiovascular [address] [facility]      Attending Cardiologist Addendum: I have reviewed and performed the history, physical, subjective, objective, assessment, and plan with the student/resident/fellow/APN and agree with the note. I performed the history and physical personally. I have made changes to the note above as needed. Will follow from a distance  Once extubated call us for stress test prior to d/c (due to apical hypokinesis on echo)    Thank you for allowing me to participate in the care of this patient, please do not hesitate to call if you have any questions. Bhumi Miller DO, [facility], [address], [address], [place] [address] [facility]  [email or web address]  [phone]

## 2022-04-21 NOTE — PROCEDURES
PROCEDURE NOTE - CENTRAL VENOUS LINE PLACEMENT    [facility] REC[facility] NO. [id number]  DATE: 4/21/2022  ATTENDING PHYSICIAN: Dr. Charlott Osgood DIAGNOSIS:  vascular access, poor peripheral access and centrally administered medications  POSTOPERATIVE DIAGNOSIS:  Same  PROCEDURE PERFORMED:  Left Internal Jugular Vein Central Line Insertion  PERFORMING PHYSICIAN: Daryl Jones MD  ANESTHESIA:  Local utilizing 1% lidocaine  ESTIMATED BLOOD LOSS:  Less than 25 ml  COMPLICATIONS:  None immediately appreciated. DISCUSSION:  Arabella Gilbert is a 23y.o.-year-old female who requires central IV access vascular access, poor peripheral access and centrally administered medications. The history and physical examination were reviewed and confirmed. CONSENT: The patient's mother was and patient's father was counseled regarding the procedure, its indications, risks, potential complications and alternatives, and any questions were answered. Consent was obtained to proceed. PROCEDURE:  A timeout was initiated by the bedside nurse and was confirmed by those present. The patient was placed in a supine position. The skin overlying the Left Internal Jugular Vein was prepped with chlorhexadine and draped in sterile fashion. The skin was infiltrated with local anesthetic. The vessel and surrounding anatomy was visualized using ultrasound. Through the anesthetized region, the introducer needle was inserted into the internal jugular vein returning dark red non pulsatile blood. A guidewire was placed through the center of the needle with no resistance. Ultrasound confirmed presence of wire in the vein. A small incision made in the skin with a #11 scalpel blade. The dilator was inserted into the skin and vein over guidewire using Seldinger technique. The dilator was then removed and the  catheter was placed in the vein over the guidewire using Seldinger technique.  The guidewire was then removed and all ports aspirated and flushed appropriately. The catheter then secured using silk suture and a temporary sterile dressing was applied. No immediate complication was evident. All sponge, instrument and needle counts were correct at the completion of the procedure. Postprocedural chest x-ray is still pending. The patient tolerated the procedure well with no immediate complication evident.      Jay Junior MD  5:33 PM, 4/21/22

## 2022-04-21 NOTE — PLAN OF CARE
Problem: Gas Exchange - Impaired:  Goal: Levels of oxygenation will improve  Description: Levels of oxygenation will improve  4/21/2022 1714 by Tracy Delacruz RN  Outcome: Progressing  4/21/2022 0648 by Melisa Kimble RN  Outcome: Progressing     Problem:  Activity:  Goal: Ability to tolerate increased activity will improve  Description: Ability to tolerate increased activity will improve  4/21/2022 1714 by Tracy Delacruz RN  Outcome: Progressing  4/21/2022 0648 by Melisa Kimble RN  Outcome: Progressing     Problem: Cardiac:  Goal: Hemodynamic stability will improve  Description: Hemodynamic stability will improve  4/21/2022 1714 by Tracy Delacruz RN  Outcome: Progressing  4/21/2022 0648 by Melisa Kimble RN  Outcome: Progressing     Problem: Respiratory:  Goal: Ability to maintain a clear airway will improve  Description: Ability to maintain a clear airway will improve  4/21/2022 1714 by Tracy Delacruz RN  Outcome: Progressing  4/21/2022 0648 by Melisa Kimble RN  Outcome: Progressing  Goal: Ability to maintain adequate ventilation will improve  Description: Ability to maintain adequate ventilation will improve  4/21/2022 1714 by Tracy Delacruz RN  Outcome: Progressing  4/21/2022 0648 by Melisa Kimble RN  Outcome: Progressing  Goal: Complications related to the disease process, condition or treatment will be avoided or minimized  Description: Complications related to the disease process, condition or treatment will be avoided or minimized  4/21/2022 1714 by Tracy Delacruz RN  Outcome: Progressing  4/21/2022 0648 by Melisa Kimble RN  Outcome: Progressing     Problem: Skin Integrity:  Goal: Risk for impaired skin integrity will decrease  Description: Risk for impaired skin integrity will decrease  4/21/2022 1714 by Tracy Delacruz RN  Outcome: Progressing  4/21/2022 0648 by Melisa Kimble RN  Outcome: Progressing     Problem: OXYGENATION/RESPIRATORY FUNCTION  Goal: Patient will maintain patent airway  4/21/2022 1714 by Day Sandhu RN  Outcome: Progressing  4/21/2022 0900 by Mario Alberto [place] RCP  Outcome: Progressing  4/21/2022 0648 by Melo Izaguirre RN  Outcome: Progressing  Goal: Patient will achieve/maintain normal respiratory rate/effort  Description: Respiratory rate and effort will be within normal limits for the patient  4/21/2022 1714 by Day Sandhu RN  Outcome: Progressing  4/21/2022 0900 by Mario Alberto [place] RCP  Outcome: Progressing  4/21/2022 0648 by Melo Izaguirre RN  Outcome: Progressing     Problem: MECHANICAL VENTILATION  Goal: Patient will maintain patent airway  4/21/2022 1714 by Day Sandhu RN  Outcome: Progressing  4/21/2022 0900 by Mario Alberto [place] RCP  Outcome: Progressing  4/21/2022 0648 by Melo Izaguirre RN  Outcome: Progressing  Goal: Oral health is maintained or improved  4/21/2022 1714 by Day Sandhu RN  Outcome: Progressing  4/21/2022 0900 by Mario Alberto [place] RCP  Outcome: Progressing  4/21/2022 0648 by Melo Izaguirre RN  Outcome: Progressing  Goal: ET tube will be managed safely  4/21/2022 1714 by Day Sandhu RN  Outcome: Progressing  4/21/2022 0900 by Mario Alberto [place] RCP  Outcome: Progressing  4/21/2022 0648 by Melo Izaguirre RN  Outcome: Progressing     Problem: Nutrition  Goal: Optimal nutrition therapy  4/21/2022 1714 by Day Sandhu RN  Outcome: Progressing  4/21/2022 0648 by Melo Izaguirre RN  Outcome: Progressing     Problem: Discharge Planning  Goal: Discharge to home or other facility with appropriate resources  4/21/2022 1714 by Day Sandhu RN  Outcome: Progressing  4/21/2022 0648 by Melo Izaguirre RN  Outcome: Progressing     Problem: Genitourinary - Adult  Goal: Urinary catheter remains patent  4/21/2022 1714 by Day Sandhu RN  Outcome: Progressing  4/21/2022 0648 by Melo Izaguirre RN  Outcome: Progressing     Problem: Neurosensory - Adult  Goal: Achieves stable or improved neurological status  4/21/2022 1714 by Berry Rodríguez RN  Outcome: Progressing  4/21/2022 0648 by Luke Delgado RN  Outcome: Progressing  Goal: Achieves maximal functionality and self care  4/21/2022 1714 by Berry Rodríguez RN  Outcome: Progressing  4/21/2022 0648 by Luke Delgado RN  Outcome: Progressing     Problem: Safety - Medical Restraint  Goal: Remains free of injury from restraints (Restraint for Interference with Medical Device)  Description: INTERVENTIONS:  1. Determine that other, less restrictive measures have been tried or would not be effective before applying the restraint  2. Evaluate the patient's condition at the time of restraint application  3. Inform patient/family regarding the reason for restraint  4.  Q2H: Monitor safety, psychosocial status, comfort, nutrition and hydration  4/21/2022 1714 by Berry Rodríguez RN  Outcome: Progressing  4/21/2022 0648 by Luke Delgado RN  Outcome: Progressing     Problem: Pain  Goal: Verbalizes/displays adequate comfort level or baseline comfort level  4/21/2022 1714 by Berry Rodríguez RN  Outcome: Progressing  4/21/2022 0648 by Luke Delgado RN  Outcome: Progressing

## 2022-04-21 NOTE — ANESTHESIA PRE PROCEDURE
Department of Anesthesiology  Preprocedure Note       Name:  Ashley Wasserman   Age:  23 y. o.  :  2000                                          MRN:  [id number]         Date:  2022      Surgeon: Sharon Alcaraz):  Sukhdev Chaney MD    Procedure: Procedure(s):  EXTRA CORPOREAL MEMBRANE OXYGENATION    Medications prior to admission:   Prior to Admission medications    Medication Sig Start Date End Date Taking?  Authorizing Provider   albuterol sulfate HFA (VENTOLIN HFA) 108 (90 Base) MCG/ACT inhaler Inhale 2 puffs into the lungs every 6 hours as needed for Wheezing   Yes Historical Provider, MD       Current medications:    Current Facility-Administered Medications   Medication Dose Route Frequency Provider Last Rate Last Admin    midazolam (VERSED) 1 mg/mL in D5W infusion  1-10 mg/hr IntraVENous Continuous Ban Goff MD   Paused at 22 0755    fentaNYL (SUBLIMAZE) injection 25 mcg  25 mcg IntraVENous Q1H PRN Ban Goff MD   25 mcg at 22 0031    pantoprazole (PROTONIX) 40 mg in sodium chloride (PF) 10 mL injection  40 mg IntraVENous BID Reena Taylor MD        fentaNYL 50 mcg/mL 50 mL infusion   mcg/hr IntraVENous Continuous Bernestine Duty, DO        labetalol (NORMODYNE;TRANDATE) injection 10 mg  10 mg IntraVENous Once Reena Taylor MD        cisatracurium besylate (NIMBEX) 200 mg in sodium chloride 0.9 % 100 mL infusion  0.5-10 mcg/kg/min IntraVENous Continuous Ralph Lorenzo MD   Stopped at 22 1330    propofol injection  5-50 mcg/kg/min IntraVENous Continuous Tiffanie Oscar MD 32 mL/hr at 22 1134 40 mcg/kg/min at 22 1134    midazolam PF (VERSED) injection 2 mg  2 mg IntraVENous Q2H PRN Tiffanie Oscar MD   2 mg at 22 2153    cefTRIAXone (ROCEPHIN) 1,000 mg in sterile water 10 mL IV syringe  1,000 mg IntraVENous Q24H Bibiana Munoz MD   1,000 mg at 22 0048    methylPREDNISolone sodium (SOLU-MEDROL) injection 40 mg  40 mg IntraVENous Q8H Andrew Swenson MD   40 mg at 04/21/22 0758    metoprolol (LOPRESSOR) injection 5 mg  5 mg IntraVENous Q6H PRN Keagan Aragon MD   5 mg at 04/21/22 2909    dilTIAZem (CARDIZEM) tablet 30 mg  30 mg Oral 4 times per day Fern Rea MD   30 mg at 04/21/22 1143    polyvinyl alcohol (LIQUIFILM TEARS) 1.4 % ophthalmic solution 1 drop  1 drop Both Eyes Q6H Jaidenol Scarlet Lockhart MD   1 drop at 04/21/22 0759    ipratropium-albuterol (DUONEB) nebulizer solution 1 ampule  1 ampule Inhalation Q4H Andrew Swenson MD   1 ampule at 04/21/22 1122    insulin lispro (HUMALOG) injection vial 0-12 Units  0-12 Units SubCUTAneous Q6H James Martin MD   2 Units at 04/21/22 1154    glucose (GLUTOSE) 40 % oral gel 15 g  15 g Oral PRN James Martin MD        dextrose 50 % IV solution  12.5 g IntraVENous PRN James Martin MD        glucagon (rDNA) injection 1 mg  1 mg IntraMUSCular PRN James Martin MD        dextrose 5 % solution  100 mL/hr IntraVENous PRN James Martin MD        sodium chloride flush 0.9 % injection 5-40 mL  5-40 mL IntraVENous 2 times per day Kaitlin James MD   10 mL at 04/21/22 0814    sodium chloride flush 0.9 % injection 5-40 mL  5-40 mL IntraVENous PRN Kaitlin James MD   10 mL at 04/19/22 1342    0.9 % sodium chloride infusion   IntraVENous PRN Kaitlin James MD 10 mL/hr at 04/21/22 0645 Rate Verify at 04/21/22 0645    ondansetron (ZOFRAN-ODT) disintegrating tablet 4 mg  4 mg Oral Q8H PRN Kaitlin James MD        Or    ondansetron (ZOFRAN) injection 4 mg  4 mg IntraVENous Q6H PRN Kaitlin James MD        polyethylene glycol (GLYCOLAX) packet 17 g  17 g Oral Daily PRN Kailtin James MD        acetaminophen (TYLENOL) tablet 650 mg  650 mg Oral Q6H PRN Kaitlin James MD   650 mg at 04/20/22 1823    Or    acetaminophen (TYLENOL) suppository 650 mg  650 mg Rectal Q6H PRN Kaitlin James MD        [Held by provider] heparin (porcine) injection 5,000 Units  5,000 Units SubCUTAneous 3 times per day Roseanne Montero MD   5,000 Units at 04/21/22 1725       Allergies:  No Known Allergies    Problem List:    Patient Active Problem List   Diagnosis Code    Acute asthma exacerbation J45. 0    History of seizures Z87.898    Hypercapnic respiratory failure (HCC) J96.92    Endotracheally intubated Z97.8    On mechanically assisted ventilation (HCC) F24.99    Diastolic dysfunction B59.98       Past Medical History:  No past medical history on file. Past Surgical History:  No past surgical history on file. Social History:    Social History     Tobacco Use    Smoking status: Not on file    Smokeless tobacco: Not on file   Substance Use Topics    Alcohol use: Not on file                                Counseling given: Not Answered      Vital Signs (Current):   Vitals:    04/21/22 1000 04/21/22 1100 04/21/22 1122 04/21/22 1200   BP:       Pulse: 80 76 71 100   Resp: 29 30 30 25   Temp:    99.7 °F (37.6 °C)   TempSrc:    Bladder   SpO2: 93% 93% 94% 95%   Weight:       Height:                                                  BP Readings from Last 3 Encounters:   04/21/22 (!) 154/66       NPO Status:                                                                                 BMI:   Wt Readings from Last 3 Encounters:   04/21/22 (!) 307 lb 14.4 oz (139.7 kg)     Body mass index is 54.54 kg/m².     CBC:   Lab Results   Component Value Date    WBC 15.7 04/21/2022    RBC 3.96 04/21/2022    HGB 10.6 04/21/2022    HCT 34.6 04/21/2022    MCV 87.4 04/21/2022    RDW 14.0 04/21/2022     04/21/2022       CMP:   Lab Results   Component Value Date     04/21/2022    K 4.5 04/21/2022     04/21/2022    CO2 29 04/21/2022    BUN 30 04/21/2022    CREATININE 0.63 04/21/2022    GFRAA >60 04/21/2022    LABGLOM >60 04/21/2022    GLUCOSE 154 04/21/2022    PROT 6.0 04/18/2022    CALCIUM 8.1 04/21/2022    BILITOT 0.19 04/18/2022    ALKPHOS 86 04/18/2022    AST 45 2022    ALT 22 2022       POC Tests:   Recent Labs     22  0452 22  1148 22  1151   POCGLU 135*   < > 152*   POCNA 144  --   --    POCK 4.4  --   --    POCCL 106  --   --    POCBUN 18  --   --    POCHEMO 11.7*  --   --    POCHCT 34*  --   --     < > = values in this interval not displayed. Coags:   Lab Results   Component Value Date    PROTIME 10.6 2022    INR 1.0 2022    APTT 22.1 2022       HCG (If Applicable):   Lab Results   Component Value Date    PREGTESTUR NEGATIVE 2022        ABGs: No results found for: PHART, PO2ART, GMK8QWZ, JJK8GXG, BEART, R5JKUOLU     Type & Screen (If Applicable):  No results found for: LABABO, LABRH    Drug/Infectious Status (If Applicable):  No results found for: HIV, HEPCAB    COVID-19 Screening (If Applicable):   Lab Results   Component Value Date    COVID19 Not Detected 2022           Anesthesia Evaluation  Patient summary reviewed no history of anesthetic complications:   Airway: Mallampati: Unable to assess / NA       Comment: 7.5 ETT in situ    Dental:          Pulmonary:   (+) decreased breath sounds,  asthma:                           ROS comment: Hypercapnic respiratory failure ([address]  Endotracheally intubated  On mechanically assisted ventilation        Cardiovascular:Negative CV ROS            Rhythm: regular  Rate: normal                    Neuro/Psych:   (+) seizures:,             GI/Hepatic/Renal: Neg GI/Hepatic/Renal ROS            Endo/Other: Negative Endo/Other ROS                    Abdominal:             Vascular: negative vascular ROS. Other Findings:      per Cards  EK22   NSR      ECHO:  22  Summary  Left ventricle is normal in size, global left ventricular systolic function  is preserved, estimated ejection fraction is 50%. There appears to be some apical hypokinesis, in the apical views. Evidence of diastolic dysfunction. Trivial mitral regurgitation.   Trivial pulmonic insufficiency. IVC dilated but unable to assess respiratory collapse due to patient on  ventilator.     Assessment / Acute Cardiac Problems:   1. Troponin elevation type II demand ischemia  2. Acute hypoxic respiratory failure s/p mechanical ventilation  3. Asthma exacerbation  4. Tachycardia  5. Hx of seizures     Plan of Treatment:   1. Continue diltiazem 30 mg q6hr  2. We will get stress test before discharge due to abnormal echocardiogram which showed apical hypokinesis. 3. We will follow from distance. Anesthesia Plan      general     ASA 4           MIPS: Postoperative ventilation. Plan discussed with CRNA.                 Chart review  Clare Back MD   4/21/2022

## 2022-04-21 NOTE — PLAN OF CARE
Problem: OXYGENATION/RESPIRATORY FUNCTION  Goal: Patient will maintain patent airway  4/21/2022 1950 by Winston Crenshaw RCP  Outcome: Progressing     Problem: OXYGENATION/RESPIRATORY FUNCTION  Goal: Patient will achieve/maintain normal respiratory rate/effort  Description: Respiratory rate and effort will be within normal limits for the patient  4/21/2022 1950 by Diane Crenshaw RCP  Outcome: Progressing     Problem: MECHANICAL VENTILATION  Goal: Patient will maintain patent airway  4/21/2022 1950 by Winston Crenshaw RCP  Outcome: Progressing     Problem: MECHANICAL VENTILATION  Goal: Oral health is maintained or improved  4/21/2022 1950 by Diane Crenshaw RCP  Outcome: Progressing     Problem: MECHANICAL VENTILATION  Goal: ET tube will be managed safely  4/21/2022 1950 by Yuridia Gloden RCP  Outcome: Progressing

## 2022-04-21 NOTE — PROGRESS NOTES
Critical Care Team - Daily Progress Note      Date and time: 4/21/2022 1:13 PM  Patient's name:  Peace Monique  Medical Record Number: [id number]  Patient's account/billing number: [de-identified]  Patient's YOB: 2000  Age: 23 y. o. Date of Admission: 4/18/2022  2:00 PM  Length of stay during current admission: 3      Primary Care Physician: Davie Garcia MD  ICU Attending Physician: Dr. Coleman Henriquez Status: Full Code    Reason for ICU admission: No chief complaint on file. Subjective:     OVERNIGHT EVENTS:     No acute events overnight. Patient was transferred to medical ICU, as plan for ECMO was discontinued. She continues to be on vent, PRVC mode RR 30. , PEEP 12, FiO2 70%  On propofol for sedation. Versed switched off.  responds to verbal stimuli  Hypertensive, started on IV lopressor 5 mg every 6 hrs as needed  Alkalotic on ABG, pH 7.53, PCO2 37.4, PO2 52.2, bicarb 31.8    WBC count 15.7  Plan for possible cannulation for ECMO tomorrow.   Discussed with CT surgery    Had bilious output through NG tube      AWAKE & FOLLOWING COMMANDS:  [x] No   [] Yes    CURRENT VENTILATION STATUS:     [x] Ventilator  [] BIPAP  [] Nasal Cannula [] Room Air      IF INTUBATED, ET TUBE MARKING AT LOWER LIP:       cms    SECRETIONS Amount:  [] Small [x] Moderate  [] Large  [] None  Color:     [] White [] Colored  [] Bloody    SEDATION:  RAAS Score:  [x] Propofol gtt  [] Versed gtt  [] Ativan gtt   [] No Sedation    PARALYZED:  [x] No    [] Yes    DIARRHEA:                [x] No                [] Yes  (C. Difficile status: [] positive                                                                                                                       [] negative                                                                                                                     [] pending)    VASOPRESSORS:  [x] No    [] Yes    If yes -   [] Levophed       [] Dopamine     [] Vasopressin       [] Dobutamine  [] Phenylephrine         [] Epinephrine    CENTRAL LINES:     [x] No   [] Yes   (Date of Insertion:   )           If yes -     [] Right IJ     [] Left IJ [] Right Femoral [] Left Femoral                   [] Right Subclavian [] Left Subclavian       OROZCO'S CATHETER:   [] No   [x] Yes  (Date of Insertion:   )     URINE OUTPUT:            [x] Good   [] Low              [] Anuric    REVIEW OF SYSTEMS:  · Could not be obtained as patient is intubated and sedated      OBJECTIVE:     VITAL SIGNS:  BP (!) 154/66   Pulse 100   Temp 99.7 °F (37.6 °C) (Bladder)   Resp 25   Ht 5' 3\" (1.6 m)   Wt (!) 307 lb 14.4 oz (139.7 kg)   SpO2 95%   BMI 54.54 kg/m²   Tmax over 24 hours:  Temp (24hrs), Av.5 °F (38.1 °C), Min:99.5 °F (37.5 °C), Max:103 °F (39.4 °C)      Patient Vitals for the past 8 hrs:   Temp Temp src Pulse Resp SpO2 Weight   22 1200 99.7 °F (37.6 °C) Bladder 100 25 95 % --   22 1122 -- -- 71 30 94 % --   22 1100 -- -- 76 30 93 % --   22 1000 -- -- 80 29 93 % --   22 0741 -- -- -- 29 94 % --   22 0736 -- -- 75 30 93 % --   22 0600 99.5 °F (37.5 °C) -- 80 30 93 % (!) 307 lb 14.4 oz (139.7 kg)         Intake/Output Summary (Last 24 hours) at 2022 1313  Last data filed at 2022 1200  Gross per 24 hour   Intake 1955.1 ml   Output 2170 ml   Net -214.9 ml     Date 22 0000 - 22 2359   Shift 0919-5150 3965-8291 4048-9638 24 Hour Total   INTAKE   I.V.(mL/kg) 472.1(3.4)   472.1(3.4)   NG/GT(mL/kg) 30(0.2) 100(0.7)  130(0.9)   Shift Total(mL/kg) 502. 1(3.6) 100(0.7)  602. 1(4.3)   OUTPUT   Urine(mL/kg/hr) 265(0.2) 255  520   Emesis/NG output(mL/kg) 75(0.5)   75(0.5)   Shift Total(mL/kg) 340(2.4) 255(1.8)  595(4.3)   Weight (kg) 139.7 139.7 139.7 139.7     Wt Readings from Last 3 Encounters:   22 (!) 307 lb 14.4 oz (139.7 kg)     Body mass index is 54.54 kg/m².         PHYSICAL EXAM:  Constitutional: intubated, sedated  HEENT: SULMA WAN, sclera clear, anicteric  Respiratory: clear to auscultation, no wheezes or rales and unlabored breathing. Cardiovascular: regular rate and rhythm, normal S1, S2, no murmur noted and 2+ pulses throughout  Abdomen: soft, nontender, nondistended, no masses or organomegaly  NEUROLOGIC: Responsive on sedation  Extremities:  peripheral pulses normal, no pedal edema,.       Any additional physical findings:      MEDICATIONS:  Scheduled Meds:   pantoprazole (PROTONIX) 40 mg injection  40 mg IntraVENous BID    labetalol  10 mg IntraVENous Once    cefTRIAXone (ROCEPHIN) IV  1,000 mg IntraVENous Q24H    methylPREDNISolone  40 mg IntraVENous Q8H    dilTIAZem  30 mg Oral 4 times per day    polyvinyl alcohol  1 drop Both Eyes Q6H    ipratropium-albuterol  1 ampule Inhalation Q4H    insulin lispro  0-12 Units SubCUTAneous Q6H    sodium chloride flush  5-40 mL IntraVENous 2 times per day    [Held by provider] heparin (porcine)  5,000 Units SubCUTAneous 3 times per day     Continuous Infusions:   midazolam Stopped (04/21/22 0755)    fentaNYL 50 mcg/mL      cisatracurium (NIMBEX) infusion Stopped (04/20/22 1330)    propofol 40 mcg/kg/min (04/21/22 1134)    dextrose      sodium chloride 10 mL/hr at 04/21/22 0645     PRN Meds:   fentanNYL, 25 mcg, Q1H PRN  midazolam, 2 mg, Q2H PRN  metoprolol, 5 mg, Q6H PRN  glucose, 15 g, PRN  dextrose, 12.5 g, PRN  glucagon (rDNA), 1 mg, PRN  dextrose, 100 mL/hr, PRN  sodium chloride flush, 5-40 mL, PRN  sodium chloride, , PRN  ondansetron, 4 mg, Q8H PRN   Or  ondansetron, 4 mg, Q6H PRN  polyethylene glycol, 17 g, Daily PRN  acetaminophen, 650 mg, Q6H PRN   Or  acetaminophen, 650 mg, Q6H PRN        SUPPORT DEVICES: [] Ventilator [] BIPAP  [] Nasal Cannula [] Room Air    VENT SETTINGS (Comprehensive) (if applicable):  Vent Information  Ventilator ID: [id number]  Vent Mode: PRVC  Additional Respiratory Assessments  Pulse: 100  Resp: 25  SpO2: 95 %  End Tidal CO2: 27 (%)  Position: Semi-[place]entura's  Humidification Source: Heated wire  Humidification Temp: 36.9  Circuit Condensation: Not drained  Subglottic Suction Done: Yes  Cuff Pressure (cm H2O):  (MLT)    ABGs:     Lab Results   Component Value Date    FIO2 60.0 04/21/2022     Lactic Acid: No results found for: LACTA      DATA:  Complete Blood Count:   Recent Labs     04/19/22  1332 04/20/22  0504 04/21/22  0526   WBC 33.2* 25.1* 15.7*   HGB 11.6* 11.1* 10.6*   MCV 90.8 88.2 87.4    274 269   RBC 4.13 3.99 3.96   HCT 37.5 35.2* 34.6*   MCH 28.1 27.8 26.8   MCHC 30.9 31.5 30.6   RDW 14.1 14.2 14.0   MPV 10.4 10.0 9.9        PT/INR:    Lab Results   Component Value Date    PROTIME 10.6 04/21/2022    INR 1.0 04/21/2022     PTT:    Lab Results   Component Value Date    APTT 22.1 04/21/2022       Basal Metabolic Profile:   Recent Labs     04/19/22  0550 04/20/22  0504 04/21/22  0526   * 141 141   K 4.5 4.0 4.5   BUN 19 31* 30*   CREATININE 0.86 0.75 0.63    104 102   CO2 24 28 29      Magnesium:   Lab Results   Component Value Date    MG 3.3 04/19/2022     Phosphorus: No results found for: PHOS  S. Calcium:  Recent Labs     04/21/22  0526   CALCIUM 8.1*     S. Ionized Calcium:No results for input(s): IONCA in the last 72 hours. Urinalysis:   Lab Results   Component Value Date    NITRU NEGATIVE 04/18/2022    COLORU Yellow 04/18/2022    PHUR 5.5 04/18/2022    WBCUA 5 TO 10 04/18/2022    RBCUA 5 TO 10 04/18/2022    BACTERIA MODERATE 04/18/2022    SPECGRAV 1.026 04/18/2022    LEUKOCYTESUR NEGATIVE 04/18/2022    UROBILINOGEN Normal 04/18/2022    BILIRUBINUR NEGATIVE 04/18/2022    GLUCOSEU NEGATIVE 04/18/2022    KETUA NEGATIVE 04/18/2022       CARDIAC ENZYMES: No results for input(s): CKMB, CKMBINDEX, TROPONINI in the last 72 hours. Invalid input(s): CKTOTAL;3  BNP: No results for input(s): BNP in the last 72 hours.     LFTS  Recent Labs     04/18/22  2311   ALKPHOS 86   ALT 22   AST 45*   BILITOT 0.19*   LABALBU 3.3* AMYLASE/LIPASE/AMMONIA  No results for input(s): AMYLASE, LIPASE, AMMONIA in the last 72 hours. Last 3 Blood Glucose:   Recent Labs     04/18/22  2311 04/19/22  0550 04/20/22  0504 04/21/22  0526   GLUCOSE 144* 142* 183* 154*      HgBA1c:    Lab Results   Component Value Date    LABA1C 5.1 04/19/2022         TSH:  No results found for: TSH  ANEMIA STUDIES  No results for input(s): LABIRON, TIBC, FERRITIN, [id number], FOLATE, OCCULTBLD in the last 72 hours. Cultures during this admission:     Blood cultures:                 [] None drawn      [x] Negative             []  Positive (Details:  )  Urine Culture:                   [] None drawn      [x] Negative             []  Positive (Details:  )  Sputum Culture:               [] None drawn       [x] Negative             []  Positive (Details:  )   Endotracheal aspirate:     [] None drawn       [] Negative             []  Positive (Details:  )        ASSESSMENT:     Principal Problem:    Acute asthma exacerbation  Active Problems:    History of seizures    Hypercapnic respiratory failure ([address]    Endotracheally intubated    On mechanically assisted ventilation (HCC)    Diastolic dysfunction  Resolved Problems:    * No resolved hospital problems. *        PLAN:     Acute Hypercapnic, hypoxicRespiratory Failure due to Asthma Exacerbation  -Cause unknown   -Endotracheal Intubated with mechanical ventilation  -Propofol at 40 mcg/kg/min  -PRVC mode - FiO2 70 /  / PEEP 8  -ABG pH 7.53 / pHCO3 31 / pCO2 37.4  -Duoneb nebulization 1 ampule q 4 hours  -IV Solumedrol 40 mg three times a day. -Continue Ceftriaxone and Azithromycin for 4 more days    Troponin elevation type II   - stress test before discharge due to abnormal echocardiogram which showed apical hypokinesis.     Tachycardia:  - diltiazem 30 mg q6hr      DVT ppx: Heparin  GI ppx: Lansoprazole suspension  Tube feeds ongoing    Plan for transfer to CV ICU      Zac Lang MD, M.D. Department of Internal Medicine/ Critical care  [id number] [facility])             4/21/2022, 1:13 PM

## 2022-04-21 NOTE — PLAN OF CARE
Problem: Safety - Medical Restraint  Goal: Remains free of injury from restraints (Restraint for Interference with Medical Device)  Description: INTERVENTIONS:  1. Determine that other, less restrictive measures have been tried or would not be effective before applying the restraint  2. Evaluate the patient's condition at the time of restraint application  3. Inform patient/family regarding the reason for restraint  4. Q2H: Monitor safety, psychosocial status, comfort, nutrition and hydration  Outcome: Progressing     Problem: Gas Exchange - Impaired:  Goal: Levels of oxygenation will improve  Description: Levels of oxygenation will improve  Outcome: Progressing     Problem:  Activity:  Goal: Ability to tolerate increased activity will improve  Description: Ability to tolerate increased activity will improve  Outcome: Progressing     Problem: Cardiac:  Goal: Hemodynamic stability will improve  Description: Hemodynamic stability will improve  Outcome: Progressing     Problem: Respiratory:  Goal: Ability to maintain a clear airway will improve  Description: Ability to maintain a clear airway will improve  4/21/2022 0648 by Pavithra Foreman RN  Outcome: Progressing     Problem: Respiratory:  Goal: Ability to maintain adequate ventilation will improve  Description: Ability to maintain adequate ventilation will improve  4/21/2022 0648 by Pavithra Foreman RN  Outcome: Progressing

## 2022-04-21 NOTE — PLAN OF CARE
Problem: OXYGENATION/RESPIRATORY FUNCTION  Goal: Patient will maintain patent airway  4/21/2022 0900 by Jaiden Avendano RCP  Outcome: Progressing  4/21/2022 0648 by Jena Sinha RN  Outcome: Progressing  4/20/2022 1956 by Virgie Crenshaw RCP  Outcome: Progressing  Goal: Patient will achieve/maintain normal respiratory rate/effort  Description: Respiratory rate and effort will be within normal limits for the patient  4/21/2022 0900 by Jaiden Avendano RCP  Outcome: Progressing  4/21/2022 0648 by Jena Sinha RN  Outcome: Progressing  4/20/2022 1956 by Daniel Granados RCP  Outcome: Progressing     Problem: MECHANICAL VENTILATION  Goal: Patient will maintain patent airway  4/21/2022 0900 by Jaiden Avendano RCP  Outcome: Progressing  4/21/2022 0648 by Jena Sinha RN  Outcome: Progressing  4/20/2022 1956 by Daniel Granados RCP  Outcome: Progressing  Goal: Oral health is maintained or improved  4/21/2022 0900 by Jaiden Avendano RCP  Outcome: Progressing  4/21/2022 0648 by Jena Sinha RN  Outcome: Progressing  4/20/2022 1956 by Daniel Granados RCP  Outcome: Progressing  Goal: ET tube will be managed safely  4/21/2022 0900 by Jaiden Avendano RCP  Outcome: Progressing  4/21/2022 0648 by Jena Sinha RN  Outcome: Progressing  4/20/2022 1956 by Daniel Granados RCP  Outcome: Progressing

## 2022-04-22 ENCOUNTER — APPOINTMENT (OUTPATIENT)
Dept: GENERAL RADIOLOGY | Age: 22
DRG: 003 | End: 2022-04-22
Attending: INTERNAL MEDICINE
Payer: COMMERCIAL

## 2022-04-22 ENCOUNTER — ANESTHESIA (OUTPATIENT)
Dept: OPERATING ROOM | Age: 22
DRG: 003 | End: 2022-04-22
Payer: COMMERCIAL

## 2022-04-22 VITALS — TEMPERATURE: 75.8 F | OXYGEN SATURATION: 94 %

## 2022-04-22 LAB
ABSOLUTE EOS #: 0.35 K/UL (ref 0–0.4)
ABSOLUTE IMMATURE GRANULOCYTE: 0.71 K/UL (ref 0–0.3)
ABSOLUTE LYMPH #: 0.89 K/UL (ref 1–4.8)
ABSOLUTE MONO #: 0.89 K/UL (ref 0.1–0.8)
ACTIVATED CLOTTING TIME: 195 SEC (ref 79–149)
ALBUMIN SERPL-MCNC: 3.5 G/DL (ref 3.5–5.2)
ALBUMIN/GLOBULIN RATIO: 1.5 (ref 1–2.5)
ALLEN TEST: ABNORMAL
ALP BLD-CCNC: 49 U/L (ref 35–104)
ALT SERPL-CCNC: 69 U/L (ref 5–33)
ANGLE TEG W HEPARIN: 72.7 DEG (ref 53–72)
ANGLE TEG: 1.5 DEG (ref 53–72)
ANION GAP SERPL CALCULATED.3IONS-SCNC: 10 MMOL/L (ref 9–17)
ANION GAP SERPL CALCULATED.3IONS-SCNC: 11 MMOL/L (ref 9–17)
ANION GAP SERPL CALCULATED.3IONS-SCNC: 12 MMOL/L (ref 9–17)
ANION GAP SERPL CALCULATED.3IONS-SCNC: 9 MMOL/L (ref 9–17)
ANION GAP: 9 MMOL/L (ref 7–16)
AST SERPL-CCNC: 52 U/L
AT-III ACTIVITY: 104 % (ref 83–122)
BASOPHILS # BLD: 0 % (ref 0–2)
BASOPHILS ABSOLUTE: 0 K/UL (ref 0–0.2)
BILIRUB SERPL-MCNC: 0.58 MG/DL (ref 0.3–1.2)
BILIRUBIN DIRECT: 0.28 MG/DL
BILIRUBIN, INDIRECT: 0.3 MG/DL (ref 0–1)
BUN BLDV-MCNC: 28 MG/DL (ref 6–20)
BUN BLDV-MCNC: 31 MG/DL (ref 6–20)
BUN BLDV-MCNC: 33 MG/DL (ref 6–20)
BUN BLDV-MCNC: 37 MG/DL (ref 6–20)
CALCIUM IONIZED: 1.04 MMOL/L (ref 1.13–1.33)
CALCIUM IONIZED: 1.09 MMOL/L (ref 1.13–1.33)
CALCIUM IONIZED: 1.1 MMOL/L (ref 1.13–1.33)
CALCIUM SERPL-MCNC: 8 MG/DL (ref 8.6–10.4)
CALCIUM SERPL-MCNC: 8.2 MG/DL (ref 8.6–10.4)
CHLORIDE BLD-SCNC: 100 MMOL/L (ref 98–107)
CHLORIDE BLD-SCNC: 100 MMOL/L (ref 98–107)
CHLORIDE BLD-SCNC: 101 MMOL/L (ref 98–107)
CHLORIDE BLD-SCNC: 103 MMOL/L (ref 98–107)
CO2: 26 MMOL/L (ref 20–31)
CO2: 27 MMOL/L (ref 20–31)
CO2: 28 MMOL/L (ref 20–31)
CO2: 29 MMOL/L (ref 20–31)
CREAT SERPL-MCNC: 0.5 MG/DL (ref 0.5–0.9)
CREAT SERPL-MCNC: 0.57 MG/DL (ref 0.5–0.9)
CREAT SERPL-MCNC: 0.89 MG/DL (ref 0.5–0.9)
CREAT SERPL-MCNC: 1.03 MG/DL (ref 0.5–0.9)
EOSINOPHILS RELATIVE PERCENT: 2 % (ref 1–4)
EPL TEG, W/HEP: 0 % (ref 0–15)
EPL-TEG: 0 % (ref 0–15)
FIBRINOGEN: 251 MG/DL (ref 140–420)
FIBRINOGEN: 331 MG/DL (ref 140–420)
FIBRINOGEN: 397 MG/DL (ref 140–420)
FIO2: 100
FIO2: 100
FIO2: 40
FIO2: 90
GFR AFRICAN AMERICAN: >60 ML/MIN
GFR NON-AFRICAN AMERICAN: >60 ML/MIN
GFR SERPL CREATININE-BSD FRML MDRD: >60 ML/MIN
GFR SERPL CREATININE-BSD FRML MDRD: ABNORMAL ML/MIN/{1.73_M2}
GFR SERPL CREATININE-BSD FRML MDRD: NORMAL ML/MIN/{1.73_M2}
GLUCOSE BLD-MCNC: 128 MG/DL (ref 65–105)
GLUCOSE BLD-MCNC: 137 MG/DL (ref 65–105)
GLUCOSE BLD-MCNC: 138 MG/DL (ref 74–100)
GLUCOSE BLD-MCNC: 142 MG/DL (ref 70–99)
GLUCOSE BLD-MCNC: 167 MG/DL (ref 70–99)
GLUCOSE BLD-MCNC: 168 MG/DL (ref 70–99)
GLUCOSE BLD-MCNC: 171 MG/DL (ref 74–100)
GLUCOSE BLD-MCNC: 172 MG/DL (ref 74–100)
GLUCOSE BLD-MCNC: 176 MG/DL (ref 74–100)
GLUCOSE BLD-MCNC: 178 MG/DL (ref 70–99)
HCO3 VENOUS: 30.7 MMOL/L (ref 22–29)
HCT VFR BLD CALC: 32.6 % (ref 36.3–47.1)
HCT VFR BLD CALC: 32.8 % (ref 36.3–47.1)
HCT VFR BLD CALC: 34.3 % (ref 36.3–47.1)
HCT VFR BLD CALC: 38.3 % (ref 36.3–47.1)
HEMOGLOBIN: 10 G/DL (ref 11.9–15.1)
HEMOGLOBIN: 10 G/DL (ref 11.9–15.1)
HEMOGLOBIN: 10.7 G/DL (ref 11.9–15.1)
HEMOGLOBIN: 12 G/DL (ref 11.9–15.1)
HEPARIN THERAPY: YES
HEPARIN THERAPY: YES
IMMATURE GRANULOCYTES: 4 %
INR BLD: 1
INR BLD: 1.2
INR BLD: 1.3
INR BLD: 1.7
KINETICS TEG W HEPARIN: 1.2 MIN (ref 1–3)
LACTIC ACID, WHOLE BLOOD: 0.8 MMOL/L (ref 0.7–2.1)
LACTIC ACID, WHOLE BLOOD: 1.1 MMOL/L (ref 0.7–2.1)
LACTIC ACID, WHOLE BLOOD: 1.4 MMOL/L (ref 0.7–2.1)
LY30 (LYSIS) TEG: 0 % (ref 0–8)
LY30(LYSIS) TEG W HEPARIN: 0 % (ref 0–8)
LYMPHOCYTES # BLD: 5 % (ref 24–44)
MA (MAX CLOT) TEG W HEPARIN: 71 MM (ref 50–70)
MA (MAX CLOT) TEG: 2.2 MM (ref 50–70)
MAGNESIUM: 2.8 MG/DL (ref 1.6–2.6)
MAGNESIUM: 2.8 MG/DL (ref 1.6–2.6)
MAGNESIUM: 3 MG/DL (ref 1.6–2.6)
MCH RBC QN AUTO: 27.2 PG (ref 25.2–33.5)
MCH RBC QN AUTO: 27.4 PG (ref 25.2–33.5)
MCH RBC QN AUTO: 27.4 PG (ref 25.2–33.5)
MCH RBC QN AUTO: 27.8 PG (ref 25.2–33.5)
MCHC RBC AUTO-ENTMCNC: 30.5 G/DL (ref 28.4–34.8)
MCHC RBC AUTO-ENTMCNC: 30.7 G/DL (ref 28.4–34.8)
MCHC RBC AUTO-ENTMCNC: 31.2 G/DL (ref 28.4–34.8)
MCHC RBC AUTO-ENTMCNC: 31.3 G/DL (ref 28.4–34.8)
MCV RBC AUTO: 87.4 FL (ref 82.6–102.9)
MCV RBC AUTO: 89.1 FL (ref 82.6–102.9)
MCV RBC AUTO: 89.3 FL (ref 82.6–102.9)
MCV RBC AUTO: 89.4 FL (ref 82.6–102.9)
MODE: ABNORMAL
MONOCYTES # BLD: 5 % (ref 1–7)
MORPHOLOGY: NORMAL
NRBC AUTOMATED: 0.8 PER 100 WBC
NRBC AUTOMATED: 0.9 PER 100 WBC
NRBC AUTOMATED: 1.1 PER 100 WBC
NRBC AUTOMATED: 1.2 PER 100 WBC
O2 DEVICE/FLOW/%: ABNORMAL
O2 SAT, VEN: 72 % (ref 60–85)
PARTIAL THROMBOPLASTIN TIME: 20.8 SEC (ref 20.5–30.5)
PARTIAL THROMBOPLASTIN TIME: 26.7 SEC (ref 20.5–30.5)
PARTIAL THROMBOPLASTIN TIME: 30 SEC (ref 20.5–30.5)
PARTIAL THROMBOPLASTIN TIME: 33.7 SEC (ref 20.5–30.5)
PARTIAL THROMBOPLASTIN TIME: 39.1 SEC (ref 20.5–30.5)
PARTIAL THROMBOPLASTIN TIME: >120 SEC (ref 20.5–30.5)
PCO2, VEN: 43.7 MM HG (ref 41–51)
PDW BLD-RTO: 13.2 % (ref 11.8–14.4)
PDW BLD-RTO: 13.3 % (ref 11.8–14.4)
PDW BLD-RTO: 13.4 % (ref 11.8–14.4)
PDW BLD-RTO: 13.5 % (ref 11.8–14.4)
PERFORMING LOCATION: ABNORMAL
PERFORMING LOCATION: ABNORMAL
PH VENOUS: 7.46 (ref 7.32–7.43)
PLATELET # BLD: 254 K/UL (ref 138–453)
PLATELET # BLD: 268 K/UL (ref 138–453)
PLATELET # BLD: 272 K/UL (ref 138–453)
PLATELET # BLD: 275 K/UL (ref 138–453)
PMV BLD AUTO: 10 FL (ref 8.1–13.5)
PMV BLD AUTO: 10.4 FL (ref 8.1–13.5)
PMV BLD AUTO: 9.7 FL (ref 8.1–13.5)
PMV BLD AUTO: 9.9 FL (ref 8.1–13.5)
PO2, VEN: 36.6 MM HG (ref 30–50)
POC BUN: 30 MG/DL (ref 8–26)
POC CHLORIDE: 102 MMOL/L (ref 98–107)
POC CREATININE: 0.96 MG/DL (ref 0.51–1.19)
POC HCO3: 28.8 MMOL/L (ref 21–28)
POC HCO3: 28.9 MMOL/L (ref 21–28)
POC HCO3: 28.9 MMOL/L (ref 21–28)
POC HCO3: 29.3 MMOL/L (ref 21–28)
POC HCO3: 29.8 MMOL/L (ref 21–28)
POC HCO3: 30.2 MMOL/L (ref 21–28)
POC HCO3: 30.2 MMOL/L (ref 21–28)
POC HEMATOCRIT: 33 % (ref 36–46)
POC HEMATOCRIT: 33 % (ref 36–46)
POC HEMATOCRIT: 34 % (ref 36–46)
POC HEMOGLOBIN: 11.2 G/DL (ref 12–16)
POC HEMOGLOBIN: 11.3 G/DL (ref 12–16)
POC HEMOGLOBIN: 11.5 G/DL (ref 12–16)
POC IONIZED CALCIUM: 1.02 MMOL/L (ref 1.15–1.33)
POC LACTIC ACID: 0.99 MMOL/L (ref 0.56–1.39)
POC O2 SATURATION: 100 % (ref 94–98)
POC O2 SATURATION: 84 % (ref 94–98)
POC O2 SATURATION: 84 % (ref 94–98)
POC O2 SATURATION: 86 % (ref 94–98)
POC O2 SATURATION: 88 % (ref 94–98)
POC O2 SATURATION: 88 % (ref 94–98)
POC O2 SATURATION: 91 % (ref 94–98)
POC PCO2: 35.4 MM HG (ref 35–48)
POC PCO2: 36.7 MM HG (ref 35–48)
POC PCO2: 41.7 MM HG (ref 35–48)
POC PCO2: 43.1 MM HG (ref 35–48)
POC PCO2: 44.8 MM HG (ref 35–48)
POC PCO2: 45.9 MM HG (ref 35–48)
POC PCO2: 53.5 MM HG (ref 35–48)
POC PH: 7.35 (ref 7.35–7.45)
POC PH: 7.41 (ref 7.35–7.45)
POC PH: 7.42 (ref 7.35–7.45)
POC PH: 7.45 (ref 7.35–7.45)
POC PH: 7.45 (ref 7.35–7.45)
POC PH: 7.52 (ref 7.35–7.45)
POC PH: 7.53 (ref 7.35–7.45)
POC PO2: 43.4 MM HG (ref 83–108)
POC PO2: 47.7 MM HG (ref 83–108)
POC PO2: 48.6 MM HG (ref 83–108)
POC PO2: 49.4 MM HG (ref 83–108)
POC PO2: 507.2 MM HG (ref 83–108)
POC PO2: 58 MM HG (ref 83–108)
POC PO2: 59.1 MM HG (ref 83–108)
POC POTASSIUM: 4.7 MMOL/L (ref 3.5–4.5)
POC SODIUM: 139 MMOL/L (ref 138–146)
POC TCO2: 29 MMOL/L (ref 22–30)
POSITIVE BASE EXCESS, ART: 3 (ref 0–3)
POSITIVE BASE EXCESS, ART: 4 (ref 0–3)
POSITIVE BASE EXCESS, ART: 6 (ref 0–3)
POSITIVE BASE EXCESS, ART: 6 (ref 0–3)
POSITIVE BASE EXCESS, ART: 7 (ref 0–3)
POSITIVE BASE EXCESS, VEN: 6 (ref 0–3)
POTASSIUM SERPL-SCNC: 4.6 MMOL/L (ref 3.7–5.3)
POTASSIUM SERPL-SCNC: 4.8 MMOL/L (ref 3.7–5.3)
POTASSIUM SERPL-SCNC: 5 MMOL/L (ref 3.7–5.3)
POTASSIUM SERPL-SCNC: 5.6 MMOL/L (ref 3.7–5.3)
PROTHROMBIN TIME: 11.1 SEC (ref 9.1–12.3)
PROTHROMBIN TIME: 12.3 SEC (ref 9.1–12.3)
PROTHROMBIN TIME: 13.2 SEC (ref 9.1–12.3)
PROTHROMBIN TIME: 17.3 SEC (ref 9.1–12.3)
RBC # BLD: 3.65 M/UL (ref 3.95–5.11)
RBC # BLD: 3.67 M/UL (ref 3.95–5.11)
RBC # BLD: 3.85 M/UL (ref 3.95–5.11)
RBC # BLD: 4.38 M/UL (ref 3.95–5.11)
REACTION TIME TEG W HEPARIN: 5.1 MIN (ref 5–10)
REACTION TIME TEG: 36.2 MIN (ref 5–10)
REASON FOR REJECTION: NORMAL
SAMPLE SITE: ABNORMAL
SEG NEUTROPHILS: 84 % (ref 36–66)
SEGMENTED NEUTROPHILS ABSOLUTE COUNT: 14.86 K/UL (ref 1.8–7.7)
SODIUM BLD-SCNC: 137 MMOL/L (ref 135–144)
SODIUM BLD-SCNC: 139 MMOL/L (ref 135–144)
SODIUM BLD-SCNC: 140 MMOL/L (ref 135–144)
SODIUM BLD-SCNC: 140 MMOL/L (ref 135–144)
TOTAL PROTEIN: 5.8 G/DL (ref 6.4–8.3)
WBC # BLD: 17.7 K/UL (ref 3.5–11.3)
WBC # BLD: 21.7 K/UL (ref 3.5–11.3)
WBC # BLD: 22.1 K/UL (ref 3.5–11.3)
WBC # BLD: 24.2 K/UL (ref 3.5–11.3)
ZZ NTE CLEAN UP: ORDERED TEST: NORMAL
ZZ NTE WITH NAME CLEAN UP: SPECIMEN SOURCE: NORMAL

## 2022-04-22 PROCEDURE — 6370000000 HC RX 637 (ALT 250 FOR IP)

## 2022-04-22 PROCEDURE — 80051 ELECTROLYTE PANEL: CPT

## 2022-04-22 PROCEDURE — 71045 X-RAY EXAM CHEST 1 VIEW: CPT

## 2022-04-22 PROCEDURE — 82565 ASSAY OF CREATININE: CPT

## 2022-04-22 PROCEDURE — 5A1955Z RESPIRATORY VENTILATION, GREATER THAN 96 CONSECUTIVE HOURS: ICD-10-PCS | Performed by: INTERNAL MEDICINE

## 2022-04-22 PROCEDURE — P9041 ALBUMIN (HUMAN),5%, 50ML: HCPCS | Performed by: THORACIC SURGERY (CARDIOTHORACIC VASCULAR SURGERY)

## 2022-04-22 PROCEDURE — 85027 COMPLETE CBC AUTOMATED: CPT

## 2022-04-22 PROCEDURE — 99232 SBSQ HOSP IP/OBS MODERATE 35: CPT | Performed by: INTERNAL MEDICINE

## 2022-04-22 PROCEDURE — 6370000000 HC RX 637 (ALT 250 FOR IP): Performed by: INTERNAL MEDICINE

## 2022-04-22 PROCEDURE — 86850 RBC ANTIBODY SCREEN: CPT

## 2022-04-22 PROCEDURE — 85300 ANTITHROMBIN III ACTIVITY: CPT

## 2022-04-22 PROCEDURE — C1769 GUIDE WIRE: HCPCS

## 2022-04-22 PROCEDURE — 85390 FIBRINOLYSINS SCREEN I&R: CPT

## 2022-04-22 PROCEDURE — 82803 BLOOD GASES ANY COMBINATION: CPT

## 2022-04-22 PROCEDURE — 86920 COMPATIBILITY TEST SPIN: CPT

## 2022-04-22 PROCEDURE — C9113 INJ PANTOPRAZOLE SODIUM, VIA: HCPCS | Performed by: INTERNAL MEDICINE

## 2022-04-22 PROCEDURE — 82947 ASSAY GLUCOSE BLOOD QUANT: CPT

## 2022-04-22 PROCEDURE — 6360000002 HC RX W HCPCS: Performed by: INTERNAL MEDICINE

## 2022-04-22 PROCEDURE — 06HY33Z INSERTION OF INFUSION DEVICE INTO LOWER VEIN, PERCUTANEOUS APPROACH: ICD-10-PCS | Performed by: INTERNAL MEDICINE

## 2022-04-22 PROCEDURE — 2709999900 HC NON-CHARGEABLE SUPPLY: Performed by: THORACIC SURGERY (CARDIOTHORACIC VASCULAR SURGERY)

## 2022-04-22 PROCEDURE — 86900 BLOOD TYPING SEROLOGIC ABO: CPT

## 2022-04-22 PROCEDURE — 6360000002 HC RX W HCPCS: Performed by: STUDENT IN AN ORGANIZED HEALTH CARE EDUCATION/TRAINING PROGRAM

## 2022-04-22 PROCEDURE — 85384 FIBRINOGEN ACTIVITY: CPT

## 2022-04-22 PROCEDURE — B246ZZ4 ULTRASONOGRAPHY OF RIGHT AND LEFT HEART, TRANSESOPHAGEAL: ICD-10-PCS | Performed by: THORACIC SURGERY (CARDIOTHORACIC VASCULAR SURGERY)

## 2022-04-22 PROCEDURE — 83605 ASSAY OF LACTIC ACID: CPT

## 2022-04-22 PROCEDURE — 6370000000 HC RX 637 (ALT 250 FOR IP): Performed by: STUDENT IN AN ORGANIZED HEALTH CARE EDUCATION/TRAINING PROGRAM

## 2022-04-22 PROCEDURE — 2000000000 HC ICU R&B

## 2022-04-22 PROCEDURE — 85730 THROMBOPLASTIN TIME PARTIAL: CPT

## 2022-04-22 PROCEDURE — 82330 ASSAY OF CALCIUM: CPT

## 2022-04-22 PROCEDURE — 2720000010 HC SURG SUPPLY STERILE: Performed by: THORACIC SURGERY (CARDIOTHORACIC VASCULAR SURGERY)

## 2022-04-22 PROCEDURE — 94761 N-INVAS EAR/PLS OXIMETRY MLT: CPT

## 2022-04-22 PROCEDURE — 3600000007 HC SURGERY HYBRID BASE: Performed by: THORACIC SURGERY (CARDIOTHORACIC VASCULAR SURGERY)

## 2022-04-22 PROCEDURE — A4217 STERILE WATER/SALINE, 500 ML: HCPCS | Performed by: THORACIC SURGERY (CARDIOTHORACIC VASCULAR SURGERY)

## 2022-04-22 PROCEDURE — 3600000017 HC SURGERY HYBRID ADDL 15MIN: Performed by: THORACIC SURGERY (CARDIOTHORACIC VASCULAR SURGERY)

## 2022-04-22 PROCEDURE — 2500000003 HC RX 250 WO HCPCS: Performed by: INTERNAL MEDICINE

## 2022-04-22 PROCEDURE — 94640 AIRWAY INHALATION TREATMENT: CPT

## 2022-04-22 PROCEDURE — 2700000000 HC OXYGEN THERAPY PER DAY

## 2022-04-22 PROCEDURE — 80076 HEPATIC FUNCTION PANEL: CPT

## 2022-04-22 PROCEDURE — 2580000003 HC RX 258: Performed by: THORACIC SURGERY (CARDIOTHORACIC VASCULAR SURGERY)

## 2022-04-22 PROCEDURE — 2500000003 HC RX 250 WO HCPCS: Performed by: STUDENT IN AN ORGANIZED HEALTH CARE EDUCATION/TRAINING PROGRAM

## 2022-04-22 PROCEDURE — 3700000000 HC ANESTHESIA ATTENDED CARE: Performed by: THORACIC SURGERY (CARDIOTHORACIC VASCULAR SURGERY)

## 2022-04-22 PROCEDURE — 33946 ECMO/ECLS INITIATION VENOUS: CPT | Performed by: THORACIC SURGERY (CARDIOTHORACIC VASCULAR SURGERY)

## 2022-04-22 PROCEDURE — 84520 ASSAY OF UREA NITROGEN: CPT

## 2022-04-22 PROCEDURE — 2580000003 HC RX 258: Performed by: NURSE ANESTHETIST, CERTIFIED REGISTERED

## 2022-04-22 PROCEDURE — 83051 HEMOGLOBIN PLASMA: CPT

## 2022-04-22 PROCEDURE — 6370000000 HC RX 637 (ALT 250 FOR IP): Performed by: THORACIC SURGERY (CARDIOTHORACIC VASCULAR SURGERY)

## 2022-04-22 PROCEDURE — 85025 COMPLETE CBC W/AUTO DIFF WBC: CPT

## 2022-04-22 PROCEDURE — 2500000003 HC RX 250 WO HCPCS: Performed by: NURSE ANESTHETIST, CERTIFIED REGISTERED

## 2022-04-22 PROCEDURE — 94003 VENT MGMT INPAT SUBQ DAY: CPT

## 2022-04-22 PROCEDURE — 86901 BLOOD TYPING SEROLOGIC RH(D): CPT

## 2022-04-22 PROCEDURE — 6360000002 HC RX W HCPCS: Performed by: THORACIC SURGERY (CARDIOTHORACIC VASCULAR SURGERY)

## 2022-04-22 PROCEDURE — 51702 INSERT TEMP BLADDER CATH: CPT

## 2022-04-22 PROCEDURE — 2580000003 HC RX 258: Performed by: INTERNAL MEDICINE

## 2022-04-22 PROCEDURE — 85347 COAGULATION TIME ACTIVATED: CPT

## 2022-04-22 PROCEDURE — 85014 HEMATOCRIT: CPT

## 2022-04-22 PROCEDURE — 33948 ECMO/ECLS DAILY MGMT-VENOUS: CPT

## 2022-04-22 PROCEDURE — 85576 BLOOD PLATELET AGGREGATION: CPT

## 2022-04-22 PROCEDURE — 3700000001 HC ADD 15 MINUTES (ANESTHESIA): Performed by: THORACIC SURGERY (CARDIOTHORACIC VASCULAR SURGERY)

## 2022-04-22 PROCEDURE — 10701684 HC MISC GUIDEWIRE

## 2022-04-22 PROCEDURE — 33952 ECMO/ECLS INSJ PRPH CANNULA: CPT | Performed by: THORACIC SURGERY (CARDIOTHORACIC VASCULAR SURGERY)

## 2022-04-22 PROCEDURE — 99291 CRITICAL CARE FIRST HOUR: CPT | Performed by: INTERNAL MEDICINE

## 2022-04-22 PROCEDURE — 33946 ECMO/ECLS INITIATION VENOUS: CPT | Performed by: INTERNAL MEDICINE

## 2022-04-22 PROCEDURE — C1769 GUIDE WIRE: HCPCS | Performed by: THORACIC SURGERY (CARDIOTHORACIC VASCULAR SURGERY)

## 2022-04-22 PROCEDURE — 5A1522H EXTRACORPOREAL OXYGENATION, MEMBRANE, PERIPHERAL VENO-VENOUS: ICD-10-PCS | Performed by: THORACIC SURGERY (CARDIOTHORACIC VASCULAR SURGERY)

## 2022-04-22 PROCEDURE — 83735 ASSAY OF MAGNESIUM: CPT

## 2022-04-22 PROCEDURE — 6360000002 HC RX W HCPCS: Performed by: NURSE ANESTHETIST, CERTIFIED REGISTERED

## 2022-04-22 PROCEDURE — 85610 PROTHROMBIN TIME: CPT

## 2022-04-22 PROCEDURE — 37799 UNLISTED PX VASCULAR SURGERY: CPT

## 2022-04-22 PROCEDURE — 80048 BASIC METABOLIC PNL TOTAL CA: CPT

## 2022-04-22 RX ORDER — ALBUMIN (HUMAN) 12.5 G/50ML
25 SOLUTION INTRAVENOUS ONCE
Status: DISCONTINUED | OUTPATIENT
Start: 2022-04-22 | End: 2022-05-10 | Stop reason: HOSPADM

## 2022-04-22 RX ORDER — HEPARIN SODIUM 1000 [USP'U]/ML
INJECTION, SOLUTION INTRAVENOUS; SUBCUTANEOUS PRN
Status: DISCONTINUED | OUTPATIENT
Start: 2022-04-22 | End: 2022-04-22 | Stop reason: SDUPTHER

## 2022-04-22 RX ORDER — CALCIUM GLUCONATE 20 MG/ML
1000 INJECTION, SOLUTION INTRAVENOUS AS NEEDED
Status: COMPLETED | OUTPATIENT
Start: 2022-04-22 | End: 2022-04-24

## 2022-04-22 RX ORDER — LIDOCAINE HYDROCHLORIDE 10 MG/ML
INJECTION, SOLUTION EPIDURAL; INFILTRATION; INTRACAUDAL; PERINEURAL PRN
Status: DISCONTINUED | OUTPATIENT
Start: 2022-04-22 | End: 2022-04-22 | Stop reason: ALTCHOICE

## 2022-04-22 RX ORDER — HEPARIN SODIUM,PORCINE 10 UNIT/ML
3 VIAL (ML) INTRAVENOUS EVERY 12 HOURS
Status: DISCONTINUED | OUTPATIENT
Start: 2022-04-22 | End: 2022-04-30

## 2022-04-22 RX ORDER — HEPARIN SODIUM 1000 [USP'U]/ML
INJECTION, SOLUTION INTRAVENOUS; SUBCUTANEOUS
Status: DISPENSED
Start: 2022-04-22 | End: 2022-04-22

## 2022-04-22 RX ORDER — LIDOCAINE HYDROCHLORIDE 10 MG/ML
INJECTION, SOLUTION INFILTRATION; PERINEURAL
Status: DISPENSED
Start: 2022-04-22 | End: 2022-04-22

## 2022-04-22 RX ORDER — ALBUMIN, HUMAN INJ 5% 5 %
25 SOLUTION INTRAVENOUS ONCE
Status: COMPLETED | OUTPATIENT
Start: 2022-04-22 | End: 2022-04-22

## 2022-04-22 RX ORDER — HEPARIN SODIUM,PORCINE 10 UNIT/ML
3 VIAL (ML) INTRAVENOUS PRN
Status: DISCONTINUED | OUTPATIENT
Start: 2022-04-22 | End: 2022-05-08

## 2022-04-22 RX ORDER — ARGATROBAN 1 MG/ML
.0625-1 INJECTION, SOLUTION INTRAVENOUS CONTINUOUS
Status: DISCONTINUED | OUTPATIENT
Start: 2022-04-22 | End: 2022-04-24

## 2022-04-22 RX ORDER — ALBUTEROL SULFATE 2.5 MG/3ML
2.5 SOLUTION RESPIRATORY (INHALATION) EVERY 4 HOURS PRN
Status: DISCONTINUED | OUTPATIENT
Start: 2022-04-22 | End: 2022-05-10 | Stop reason: HOSPADM

## 2022-04-22 RX ORDER — FENTANYL CITRATE 50 UG/ML
INJECTION, SOLUTION INTRAMUSCULAR; INTRAVENOUS PRN
Status: DISCONTINUED | OUTPATIENT
Start: 2022-04-22 | End: 2022-04-22 | Stop reason: SDUPTHER

## 2022-04-22 RX ORDER — ROCURONIUM BROMIDE 10 MG/ML
INJECTION, SOLUTION INTRAVENOUS PRN
Status: DISCONTINUED | OUTPATIENT
Start: 2022-04-22 | End: 2022-04-22 | Stop reason: SDUPTHER

## 2022-04-22 RX ORDER — OXYCODONE HYDROCHLORIDE 5 MG/1
10 TABLET ORAL EVERY 4 HOURS PRN
Status: DISCONTINUED | OUTPATIENT
Start: 2022-04-22 | End: 2022-04-24

## 2022-04-22 RX ORDER — SODIUM CHLORIDE 9 MG/ML
INJECTION, SOLUTION INTRAVENOUS CONTINUOUS PRN
Status: DISCONTINUED | OUTPATIENT
Start: 2022-04-22 | End: 2022-04-22 | Stop reason: SDUPTHER

## 2022-04-22 RX ADMIN — HEPARIN SODIUM 5000 UNITS: 5000 INJECTION INTRAVENOUS; SUBCUTANEOUS at 06:20

## 2022-04-22 RX ADMIN — AZITHROMYCIN MONOHYDRATE 500 MG: 500 INJECTION, POWDER, LYOPHILIZED, FOR SOLUTION INTRAVENOUS at 15:36

## 2022-04-22 RX ADMIN — PROPOFOL 45 MCG/KG/MIN: 10 INJECTION, EMULSION INTRAVENOUS at 02:27

## 2022-04-22 RX ADMIN — FENTANYL CITRATE 25 MCG: 50 INJECTION INTRAMUSCULAR; INTRAVENOUS at 11:32

## 2022-04-22 RX ADMIN — PROPOFOL 55 MCG/KG/MIN: 10 INJECTION, EMULSION INTRAVENOUS at 10:00

## 2022-04-22 RX ADMIN — HEPARIN SODIUM 10000 UNITS: 1000 INJECTION, SOLUTION INTRAVENOUS; SUBCUTANEOUS at 09:55

## 2022-04-22 RX ADMIN — ACETAMINOPHEN 650 MG: 325 TABLET ORAL at 18:30

## 2022-04-22 RX ADMIN — SODIUM CHLORIDE: 9 INJECTION, SOLUTION INTRAVENOUS at 08:33

## 2022-04-22 RX ADMIN — ROCURONIUM BROMIDE 50 MG: 10 INJECTION INTRAVENOUS at 08:53

## 2022-04-22 RX ADMIN — DILTIAZEM HYDROCHLORIDE 30 MG: 30 TABLET ORAL at 12:18

## 2022-04-22 RX ADMIN — PROPOFOL 55 MCG/KG/MIN: 10 INJECTION, EMULSION INTRAVENOUS at 07:04

## 2022-04-22 RX ADMIN — PROPOFOL 55 MCG/KG/MIN: 10 INJECTION, EMULSION INTRAVENOUS at 04:59

## 2022-04-22 RX ADMIN — OXYCODONE HYDROCHLORIDE 10 MG: 5 TABLET ORAL at 12:18

## 2022-04-22 RX ADMIN — IPRATROPIUM BROMIDE AND ALBUTEROL SULFATE 1 AMPULE: .5; 2.5 SOLUTION RESPIRATORY (INHALATION) at 19:46

## 2022-04-22 RX ADMIN — METHYLPREDNISOLONE SODIUM SUCCINATE 40 MG: 40 INJECTION, POWDER, FOR SOLUTION INTRAMUSCULAR; INTRAVENOUS at 18:57

## 2022-04-22 RX ADMIN — DILTIAZEM HYDROCHLORIDE 30 MG: 30 TABLET ORAL at 00:57

## 2022-04-22 RX ADMIN — Medication 200 MCG/HR: at 22:44

## 2022-04-22 RX ADMIN — INSULIN LISPRO 2 UNITS: 100 INJECTION, SOLUTION INTRAVENOUS; SUBCUTANEOUS at 06:21

## 2022-04-22 RX ADMIN — POLYVINYL ALCOHOL 1 DROP: 14 SOLUTION/ DROPS OPHTHALMIC at 21:11

## 2022-04-22 RX ADMIN — POLYVINYL ALCOHOL 1 DROP: 14 SOLUTION/ DROPS OPHTHALMIC at 16:40

## 2022-04-22 RX ADMIN — CALCIUM GLUCONATE 1000 MG: 20 INJECTION, SOLUTION INTRAVENOUS at 18:35

## 2022-04-22 RX ADMIN — ALBUMIN (HUMAN) 25 G: 12.5 INJECTION, SOLUTION INTRAVENOUS at 15:21

## 2022-04-22 RX ADMIN — Medication 200 MCG/HR: at 16:33

## 2022-04-22 RX ADMIN — Medication 6 MG/HR: at 16:35

## 2022-04-22 RX ADMIN — SODIUM CHLORIDE, PRESERVATIVE FREE 40 MG: 5 INJECTION INTRAVENOUS at 12:26

## 2022-04-22 RX ADMIN — ROCURONIUM BROMIDE 50 MG: 10 INJECTION INTRAVENOUS at 09:59

## 2022-04-22 RX ADMIN — OXYCODONE HYDROCHLORIDE 10 MG: 5 TABLET ORAL at 18:50

## 2022-04-22 RX ADMIN — Medication 200 MCG/HR: at 07:07

## 2022-04-22 RX ADMIN — PROPOFOL 35 MCG/KG/MIN: 10 INJECTION, EMULSION INTRAVENOUS at 15:09

## 2022-04-22 RX ADMIN — OXYCODONE HYDROCHLORIDE 10 MG: 5 TABLET ORAL at 15:13

## 2022-04-22 RX ADMIN — DILTIAZEM HYDROCHLORIDE 30 MG: 30 TABLET ORAL at 06:20

## 2022-04-22 RX ADMIN — FENTANYL CITRATE 25 MCG: 50 INJECTION INTRAMUSCULAR; INTRAVENOUS at 16:28

## 2022-04-22 RX ADMIN — INSULIN LISPRO 2 UNITS: 100 INJECTION, SOLUTION INTRAVENOUS; SUBCUTANEOUS at 11:58

## 2022-04-22 RX ADMIN — METHYLPREDNISOLONE SODIUM SUCCINATE 40 MG: 40 INJECTION, POWDER, FOR SOLUTION INTRAMUSCULAR; INTRAVENOUS at 01:50

## 2022-04-22 RX ADMIN — ROCURONIUM BROMIDE 50 MG: 10 INJECTION INTRAVENOUS at 10:24

## 2022-04-22 RX ADMIN — Medication 30 UNITS: at 18:52

## 2022-04-22 RX ADMIN — IPRATROPIUM BROMIDE AND ALBUTEROL SULFATE 1 AMPULE: .5; 2.5 SOLUTION RESPIRATORY (INHALATION) at 03:08

## 2022-04-22 RX ADMIN — Medication 30 UNITS: at 13:44

## 2022-04-22 RX ADMIN — SODIUM CHLORIDE, PRESERVATIVE FREE 10 ML: 5 INJECTION INTRAVENOUS at 20:34

## 2022-04-22 RX ADMIN — DILTIAZEM HYDROCHLORIDE 30 MG: 30 TABLET ORAL at 23:41

## 2022-04-22 RX ADMIN — ROCURONIUM BROMIDE 50 MG: 10 INJECTION INTRAVENOUS at 08:42

## 2022-04-22 RX ADMIN — ARGATROBAN 0.84 MCG/KG/MIN: 50 INJECTION INTRAVENOUS at 20:29

## 2022-04-22 RX ADMIN — FENTANYL CITRATE 100 MCG: 50 INJECTION, SOLUTION INTRAMUSCULAR; INTRAVENOUS at 08:50

## 2022-04-22 RX ADMIN — IPRATROPIUM BROMIDE AND ALBUTEROL SULFATE 1 AMPULE: .5; 2.5 SOLUTION RESPIRATORY (INHALATION) at 07:48

## 2022-04-22 RX ADMIN — METHYLPREDNISOLONE SODIUM SUCCINATE 40 MG: 40 INJECTION, POWDER, FOR SOLUTION INTRAMUSCULAR; INTRAVENOUS at 12:25

## 2022-04-22 RX ADMIN — DILTIAZEM HYDROCHLORIDE 30 MG: 30 TABLET ORAL at 18:30

## 2022-04-22 RX ADMIN — ARGATROBAN 0.5 MCG/KG/MIN: 50 INJECTION INTRAVENOUS at 15:03

## 2022-04-22 RX ADMIN — PROPOFOL 5 MCG/KG/MIN: 10 INJECTION, EMULSION INTRAVENOUS at 23:47

## 2022-04-22 RX ADMIN — IPRATROPIUM BROMIDE AND ALBUTEROL SULFATE 1 AMPULE: .5; 2.5 SOLUTION RESPIRATORY (INHALATION) at 17:34

## 2022-04-22 RX ADMIN — OXYCODONE HYDROCHLORIDE 10 MG: 5 TABLET ORAL at 23:41

## 2022-04-22 RX ADMIN — CALCIUM GLUCONATE 1000 MG: 20 INJECTION, SOLUTION INTRAVENOUS at 13:35

## 2022-04-22 RX ADMIN — IPRATROPIUM BROMIDE AND ALBUTEROL SULFATE 1 AMPULE: .5; 2.5 SOLUTION RESPIRATORY (INHALATION) at 23:21

## 2022-04-22 RX ADMIN — CEFTRIAXONE SODIUM 1000 MG: 1 INJECTION, POWDER, FOR SOLUTION INTRAMUSCULAR; INTRAVENOUS at 00:57

## 2022-04-22 RX ADMIN — Medication 30 UNITS: at 18:51

## 2022-04-22 RX ADMIN — PROPOFOL 40 MCG/KG/MIN: 10 INJECTION, EMULSION INTRAVENOUS at 11:44

## 2022-04-22 RX ADMIN — ALBUTEROL SULFATE 2.5 MG: 2.5 SOLUTION RESPIRATORY (INHALATION) at 02:21

## 2022-04-22 ASSESSMENT — PULMONARY FUNCTION TESTS
PIF_VALUE: 40
PIF_VALUE: 39
PIF_VALUE: 35
PIF_VALUE: 42
PIF_VALUE: 35
PIF_VALUE: 17
PIF_VALUE: 35
PIF_VALUE: 26
PIF_VALUE: 35
PIF_VALUE: 36
PIF_VALUE: 24
PIF_VALUE: 42
PIF_VALUE: 43
PIF_VALUE: 35
PIF_VALUE: 41
PIF_VALUE: 35
PIF_VALUE: 36
PIF_VALUE: 10
PIF_VALUE: 27
PIF_VALUE: 33
PIF_VALUE: 35
PIF_VALUE: 34
PIF_VALUE: 28
PIF_VALUE: 35
PIF_VALUE: 28
PIF_VALUE: 35
PIF_VALUE: 35
PIF_VALUE: 36
PIF_VALUE: 19
PIF_VALUE: 23
PIF_VALUE: 42
PIF_VALUE: 35
PIF_VALUE: 40
PIF_VALUE: 42
PIF_VALUE: 27
PIF_VALUE: 35
PIF_VALUE: 27
PIF_VALUE: 23
PIF_VALUE: 41
PIF_VALUE: 33
PIF_VALUE: 40
PIF_VALUE: 25
PIF_VALUE: 35
PIF_VALUE: 27
PIF_VALUE: 35
PIF_VALUE: 2
PIF_VALUE: 34
PIF_VALUE: 41
PIF_VALUE: 35
PIF_VALUE: 41
PIF_VALUE: 2
PIF_VALUE: 41
PIF_VALUE: 36
PIF_VALUE: 35
PIF_VALUE: 37
PIF_VALUE: 24
PIF_VALUE: 24
PIF_VALUE: 23
PIF_VALUE: 35
PIF_VALUE: 42
PIF_VALUE: 35
PIF_VALUE: 36
PIF_VALUE: 36
PIF_VALUE: 27
PIF_VALUE: 42
PIF_VALUE: 23
PIF_VALUE: 36
PIF_VALUE: 38
PIF_VALUE: 4
PIF_VALUE: 35
PIF_VALUE: 41
PIF_VALUE: 35
PIF_VALUE: 24
PIF_VALUE: 37
PIF_VALUE: 42
PIF_VALUE: 35
PIF_VALUE: 23
PIF_VALUE: 35
PIF_VALUE: 40
PIF_VALUE: 23
PIF_VALUE: 42
PIF_VALUE: 43
PIF_VALUE: 42
PIF_VALUE: 35
PIF_VALUE: 42
PIF_VALUE: 23
PIF_VALUE: 46
PIF_VALUE: 38
PIF_VALUE: 36
PIF_VALUE: 19
PIF_VALUE: 42
PIF_VALUE: 42
PIF_VALUE: 43
PIF_VALUE: 35
PIF_VALUE: 42
PIF_VALUE: 33
PIF_VALUE: 40
PIF_VALUE: 23
PIF_VALUE: 42
PIF_VALUE: 35
PIF_VALUE: 43
PIF_VALUE: 23
PIF_VALUE: 40
PIF_VALUE: 41
PIF_VALUE: 35
PIF_VALUE: 35
PIF_VALUE: 40
PIF_VALUE: 23
PIF_VALUE: 28
PIF_VALUE: 22
PIF_VALUE: 27
PIF_VALUE: 37
PIF_VALUE: 41
PIF_VALUE: 42
PIF_VALUE: 36
PIF_VALUE: 36
PIF_VALUE: 35
PIF_VALUE: 39
PIF_VALUE: 35
PIF_VALUE: 41
PIF_VALUE: 28
PIF_VALUE: 28
PIF_VALUE: 35
PIF_VALUE: 35
PIF_VALUE: 41
PIF_VALUE: 43
PIF_VALUE: 19
PIF_VALUE: 46
PIF_VALUE: 43
PIF_VALUE: 25
PIF_VALUE: 24
PIF_VALUE: 43
PIF_VALUE: 35
PIF_VALUE: 43
PIF_VALUE: 27
PIF_VALUE: 37
PIF_VALUE: 27
PIF_VALUE: 42
PIF_VALUE: 38
PIF_VALUE: 36

## 2022-04-22 ASSESSMENT — PAIN SCALES - GENERAL
PAINLEVEL_OUTOF10: 0
PAINLEVEL_OUTOF10: 4

## 2022-04-22 NOTE — PROGRESS NOTES
Abbi to Dr. Danish Fields at bedside, updated physician and asked for calcium prn coverage. Also informed physician patient has still not made any urine.

## 2022-04-22 NOTE — PROGRESS NOTES
[facility]  Internal Medicine Teaching Residency Program  Inpatient Daily Progress Note  ______________________________________________________________________________    Patient: Peace Monique  YOB: 2000   YCV:[id number]    Acct: [de-identified]     Room: [address] [place] [address]  Admit date: 4/18/2022  Today's date: 04/22/22  Number of days in the hospital: 4    SUBJECTIVE   Admitting Diagnosis: Acute asthma exacerbation     CC: Shortness of breath    Pt examined at bedside. Chart & results reviewed. - Patient was in MICU yesterday and transferred to Cardiac ICU today after ECMO cannulation.   - Patient was desaturating on mechanical ventilation while in MICU overnight with increasing FiO2 requirement 90%. She also had bilateral wheezing on exam and was given Solumedrol, nebulizer treatment and CXR was done that showed stable bibasilar infiltrates, right side> left concerning for atelectasis Vs. Pleural effusion. Superimposed PNA cannot be excluded. - Cardiology following from periphery.   - She received ECMO cannulation today on 4/22/22. ECMO management per Cardiothoracic surgery. - Ventilator management per critical care/pulmonology. - Vent setting: AC/PC/ RR 20//PEEP 10/FiO2 40%  - BP stable- 150/66;   - Sedation: Fentanyl @ 200 mcg/hr; Propofol 40 mcg/kg/min; Versed @ 4 mg/hr  - Pressors: off  - Paralytics: off  - Blood sugars: 140s-170s  - Labs reviewed: BUN 28<--31; calcium 1.04<--replaced;   - Blood gas pH 7.45/pCo2 43.1/pO2 43      ROS:  Unable to assess due to patient being intubated, sedated on mechanical ventilation and ECMO    BRIEF HISTORY     23 y. o. female with a past medical history of bronchial asthma who was admitted to [facility] with an acute exacerbation causing increasing shortness of breath.  Patient was found to be hypoxemic with hypercarbic respiratory failure requiring intubation and mechanical ventilation.  Patient was transferred to [facility] for further elevation with possibility of ECMO. On arrival patient was found to be on a bicarb drip due to presumed respiratory acidosis but this was discontinued due to to worsening of the hypercarbia.  Additionally patient was started on Nimbex due to the necessity for paralyzation as she was breathing over the vent. Pulmonology and cardiothoracic surgery were consulted- plan is for ECMO. In the MICU, patient remained stable, had an episode of dark emesis which turned out to be bilious output, Hb remained stable. She was hypertensive, started on lopressor IV 5 mg PRN. Had left IJ placed which was malpositioned, removed and then CVC in left femoral was placed overnight. She was hypoxic, requiring higher FiO2 of 80%, PEEP 16, RR 32, , blood gas showed pH 7.449, pCO2 41.7, pCO2 59. 1. treated with solumedrol, bronchodilators, CXR showed no pneumothorax or pleural effusion. On  overnight, patient became more hypoxic with increasing oxygen requirement, increasing wheezing on exam. It was determined to go with ECMO canulation this morning per     OBJECTIVE     Vital Signs:  /60   Pulse 76   Temp 99 °F (37.2 °C) (Bladder)   Resp 9   Ht 5' 3\" (1.6 m)   Wt (!) 307 lb 14.4 oz (139.7 kg)   SpO2 (!) 85%   BMI 54.54 kg/m²     Temp (24hrs), Av.8 °F (36.6 °C), Min:75.8 °F (24.3 °C), Max:100.2 °F (37.9 °C)    In: .6   Out: 1140 [Urine:1140]    Physical Exam:  Constitutional: This is a well developed, well nourished, Greater than 40 - Morbid Obesity / Extreme Obesity / Grade III 23y.o. year old female who is on ECMO and mechanically ventilator. EENT:  PERRLA. No conjunctival injections. Septum was midline, mucosa was without erythema, exudates or cobblestoning. No thrush was noted. Neck: Supple without thyromegaly. No elevated JVP. Trachea was midline. Respiratory: Chest was symmetrical without dullness to percussion.   Breath sounds bilaterally were clear to auscultation. There were no wheezes, rhonchi or rales. There is no intercostal retraction or use of accessory muscles. No egophony noted. Cardiovascular: Regular without murmur, clicks, gallops or rubs. Abdomen: Slightly rounded and soft without organomegaly. No rebound, rigidity or guarding was appreciated. Lymphatic: No lymphadenopathy. Musculoskeletal: Normal curvature of the spine. No gross muscle weakness. Extremities:  No lower extremity edema, ulcerations, tenderness, varicosities or erythema. Muscle size, tone and strength are normal.  No involuntary movements are noted. Skin:  Warm and dry. Good color, turgor and pigmentation. No lesions or scars.   No cyanosis or clubbing  Neurological/Psychiatric: On sedation, unable to assess neuro function         Medications:  Scheduled Medications:    heparin (porcine)        lidocaine        heparin flush (PF)  3 mL IntraVENous Q12H    heparin flush (PF)  3 mL IntraVENous Q12H    heparin flush (PF)  3 mL IntraVENous Q12H    heparin flush (PF)  3 mL IntraVENous Q12H    pantoprazole (PROTONIX) 40 mg injection  40 mg IntraVENous Daily    cefTRIAXone (ROCEPHIN) IV  1,000 mg IntraVENous Q24H    azithromycin  500 mg IntraVENous Q24H    methylPREDNISolone  40 mg IntraVENous Q8H    dilTIAZem  30 mg Oral 4 times per day    polyvinyl alcohol  1 drop Both Eyes Q6H    ipratropium-albuterol  1 ampule Inhalation Q4H    insulin lispro  0-12 Units SubCUTAneous Q6H    sodium chloride flush  5-40 mL IntraVENous 2 times per day     Continuous Infusions:    argatroban infusion      midazolam 4 mg/hr (04/22/22 0829)    fentaNYL 50 mcg/mL 200 mcg/hr (04/22/22 0829)    cisatracurium (NIMBEX) infusion Stopped (04/20/22 1330)    propofol 40 mcg/kg/min (04/22/22 1144)    dextrose      sodium chloride Stopped (04/21/22 1431)     PRN Medicationsalbuterol, 2.5 mg, Q4H PRN  oxyCODONE, 10 mg, Q4H PRN  heparin flush (PF), 3 mL, PRN  fentanNYL, 25 mcg, Q1H PRN  midazolam, 2 mg, Q2H PRN  metoprolol, 5 mg, Q6H PRN  glucose, 15 g, PRN  dextrose, 12.5 g, PRN  glucagon (rDNA), 1 mg, PRN  dextrose, 100 mL/hr, PRN  sodium chloride flush, 5-40 mL, PRN  sodium chloride, , PRN  ondansetron, 4 mg, Q8H PRN   Or  ondansetron, 4 mg, Q6H PRN  polyethylene glycol, 17 g, Daily PRN  acetaminophen, 650 mg, Q6H PRN   Or  acetaminophen, 650 mg, Q6H PRN        Diagnostic Labs:  CBC:   Recent Labs     04/21/22  0526 04/21/22  0526 04/21/22  1741 04/22/22  0511 04/22/22  1151   WBC 15.7*  --   --  17.7* 21.7*   RBC 3.96  --   --  4.38 3.85*   HGB 10.6*   < > 10.8* 12.0 10.7*   HCT 34.6*   < > 34.0* 38.3 34.3*   MCV 87.4  --   --  87.4 89.1   RDW 14.0  --   --  13.4 13.2     --   --  254 275    < > = values in this interval not displayed. BMP:   Recent Labs     04/21/22  0526 04/21/22  0526 04/22/22  0545 04/22/22  1146 04/22/22  1151     --  140  --  139   K 4.5  --  4.8  --  4.6     --  100  --  101   CO2 29  --  29  --  26   BUN 30*  --  31*  --  28*   CREATININE 0.63   < > 0.57 0.96 0.50    < > = values in this interval not displayed. BNP: No results for input(s): BNP in the last 72 hours. PT/INR:   Recent Labs     04/21/22  0526 04/22/22  0511 04/22/22  1151   PROTIME 10.6 11.1 12.3   INR 1.0 1.0 1.2     APTT:   Recent Labs     04/21/22  0526 04/22/22  0511 04/22/22  1151   APTT 22.1 20.8 >120.0*     CARDIAC ENZYMES: No results for input(s): CKMB, CKMBINDEX, TROPONINI in the last 72 hours. Invalid input(s): CKTOTAL;3  FASTING LIPID PANEL:No results found for: CHOL, HDL, TRIG  LIVER PROFILE: No results for input(s): AST, ALT, ALB, BILIDIR, BILITOT, ALKPHOS in the last 72 hours. MICROBIOLOGY:   Lab Results   Component Value Date/Time    CULTURE NO GROWTH 12 HOURS 04/21/2022 03:07 PM    CULTURE NO GROWTH 12 HOURS 04/21/2022 03:07 PM       Imaging:    XR CHEST (SINGLE VIEW FRONTAL)    Result Date: 4/22/2022  1. Support tubes and lines are unchanged.  2. Stable bibasilar lung infiltrates, right side greater than left. This may be related to atelectasis and/or pleural effusion. Superimposed pneumonia cannot be excluded. XR CHEST (SINGLE VIEW FRONTAL)    Result Date: 4/21/2022  Suboptimal position left IJ catheter. Consider removal and replacement or repositioning. XR CHEST (SINGLE VIEW FRONTAL)    Result Date: 4/18/2022  Diffuse interstitial opacities with alveolar/consolidative opacities at the bases favoring multifocal airspace disease. XR CHEST PORTABLE    Result Date: 4/21/2022  1. On today's exam the endotracheal tube tip is positioned 1.5 cm above the soto. Recommend retraction by an additional 2 cm for optimal positioning. 2.  Interval development of right lower lobe airspace disease. This could represent atelectasis     XR CHEST PORTABLE    Result Date: 4/19/2022  1. Endotracheal and enteric tubes as above. 2. Diffuse interstitial opacities throughout both lungs, right greater than left. No new focal airspace consolidation. Follow-up is recommended to document resolution. ASSESSMENT & PLAN     ASSESSMENT / PLAN:     Principal Problem:    Acute asthma exacerbation  Active Problems:    History of seizures    Hypercapnic respiratory failure (HCC)    Endotracheally intubated    On mechanically assisted ventilation (HCC)    Diastolic dysfunction  Resolved Problems:    * No resolved hospital problems. *    1. Acute hypoxic hypercapnic respiratory failure on mechanical ventilation and s/p ECMO cannulation on 4/22/22 secondary asthma exacerbation: Continue SoluMedrol 40 mg IV q8 hours, Continue IV Rocephin and IV Azithromycin until 4/24. Duoneb q4 hours and Albuterol as needed. Pulm. Critical care following for ventilator management. 2. Troponin elevation type II demand ischemia: Continue Cardizem 30 mg q6 hours per cardiology.  Cardiology to follow from a distance, once extubated, will contact cardiology for stress test prior to discharge due to apical hypokinesis on echo. Troponin flat trend. 3. Sinus tachycardia: Continue Cardizem 30 mg q6 hours per Cardiology. Diet: On tube feeds  DVT ppx : On Argatroban infusion  GI ppx: Protonix     PT/OT: Consulted  Discharge Planning / SW:  consulted, will follow up once patient is extubated and ECMo cannulation removed and stable     Maria Del Rosario Lambert MD  Internal Medicine Resident, PGY-1  [facility];  [place], [place]  4/22/2022, 1:02 PM

## 2022-04-22 NOTE — PROGRESS NOTES
Notified by nurse that patient acutely desaturated requiring increased FiO2 to 90. Bilateral breath sounds but significant wheezing and tight. Stat CXR ordered. Given scheduled solumedrol. Will give nebulized breathing treatment.      Moon Booth, PG3

## 2022-04-22 NOTE — PLAN OF CARE
Problem: OXYGENATION/RESPIRATORY FUNCTION  Goal: Patient will maintain patent airway  4/22/2022 1133 by Mally Jaquez RCP  Outcome: Progressing     Problem: OXYGENATION/RESPIRATORY FUNCTION  Goal: Patient will achieve/maintain normal respiratory rate/effort  Description: Respiratory rate and effort will be within normal limits for the patient  4/22/2022 1133 by Mally Jaquez RCP  Outcome: Progressing     Problem: MECHANICAL VENTILATION  Goal: Patient will maintain patent airway  4/22/2022 1133 by Mally Jaquez RCP  Outcome: Progressing     Problem: MECHANICAL VENTILATION  Goal: Oral health is maintained or improved  4/22/2022 1133 by Mally Jaquez RCP  Outcome: Progressing     Problem: MECHANICAL VENTILATION  Goal: ET tube will be managed safely  4/22/2022 1133 by Mally Jaquez RCP  Outcome: Progressing

## 2022-04-22 NOTE — PROGRESS NOTES
Comprehensive Nutrition Assessment    Type and Reason for Visit:  Reassess    Nutrition Recommendations/Plan:   Start TF as able/warranted; suggest Peptide Based High Protein Formula goal 55 mL/hr once off propofol. This will proide 1320 kcal and 116 g pro/day. Will monitor TF tolerance/adequacy-modify TF formula/rate as needed. Malnutrition Assessment:  Malnutrition Status: At risk for malnutrition  Context:  Acute Illness     Findings of the 6 clinical characteristics of malnutrition:  Energy Intake:  Mild decrease in energy intake   Weight Loss:  Unable to assess     Body Fat Loss:  No significant body fat loss     Muscle Mass Loss:  No significant muscle mass loss    Fluid Accumulation:  Mild to moderate, Extremities,Generalized   Strength:  Not Performed    Nutrition Assessment:    Chart reviewed. Pt remains on vent, now on ECMO. Propofol currently at 32 mL/hr, but plan to discontinue per RN. TF order for Peptide Based High Protein formula at 30 mL/hr + 1 protein modular/day; no tube feeding running at present. Plan to start TF soon per RN; new TF recommendations requested as propofol will be discontinued. Meds/labs reviewed. Nutrition Related Findings:    Meds/labs reviewed Wound Type: None       Current Nutrition Intake & Therapies:    Average Meal Intake: NPO  Average Supplements Intake: NPO  ADULT TUBE FEEDING; Nasogastric; Peptide Based High Protein; Continuous; 10; Yes; 10; Q 4 hours; 30; 30; Q 4 hours; Protein; Provide 1 Protein modular per day. Current Tube Feeding (TF) Orders:  · Feeding Route: Nasogastric  · Formula: Peptide Based High Protein  · Schedule: Continuous  · Feeding Regimen: goal 30 mL/hr ordered- off at present  · Additives/Modulars: Protein (once daily ordered)  · Current TF & Flush Orders Provides: 0  · Goal TF & Flush Orders Provides: Goal of 30 mL/hr + 1 Proteinex protein modular/day (while on propofol) =824 kcal and 89 g pro/day.  Goal of 55 mL/hr (once off propofol) will provide 1320 kcal and 115 g pro/day. Additional Calorie Sources:  · Propofol currently at 32 mL/hr =845 kcal/day. To discontinue propofol today per RN. Anthropometric Measures:  Height: 5' 3\" (160 cm)  Ideal Body Weight (IBW): 115 lbs (52 kg)    Admission Body Weight: 293 lb 10.4 oz (133.2 kg)  Current Body Weight: 307 lb 14.4 oz (139.7 kg), 267.7 % IBW. Weight Source: Bed Scale  Current BMI (kg/m2): 54.6                          BMI Categories: Obese Class 3 (BMI 40.0 or greater)    Estimated Daily Nutrient Needs:  Energy Requirements Based On: Kcal/kg  Weight Used for Energy Requirements: Ideal  Energy (kcal/day): 1300 kcal/day  Weight Used for Protein Requirements: Ideal  Protein (g/day): 100 g pro/day  Method Used for Fluid Requirements: ml/Kg  Fluid (ml/day): per MD    Nutrition Diagnosis:   · Inadequate oral intake related to impaired respiratory function as evidenced by NPO or clear liquid status due to medical condition, need for enteral nutrition support      Nutrition Interventions:   Food and/or Nutrient Delivery:  Start TF as able/warranted; suggest Peptide Based High Protein Formula goal 55 mL/hr once off propofol. This will proide 1320 kcal and 116 g pro/day  Nutrition Education/Counseling: No recommendation at this time  Coordination of Nutrition Care: Continue to monitor while inpatient       Goals:  Previous Goal Met: Progressing toward Goal(s)  Goals: Meet at least 75% of estimated needs       Nutrition Monitoring and Evaluation:   Behavioral-Environmental Outcomes: None Identified  Food/Nutrient Intake Outcomes: Enteral Nutrition Intake/Tolerance  Physical Signs/Symptoms Outcomes: Biochemical Data,Fluid Status or Edema,Nutrition Focused Physical Findings,Skin,Weight    Discharge Planning:     Too soon to determine     [address], LD  Contact: [phone]

## 2022-04-22 NOTE — BRIEF OP NOTE
Brief Postoperative Note      Patient: Chiara Ramos  YOB: 2000  MRN: [id number]    Date of Procedure: 4/22/2022    Pre-Op Diagnosis: ECMO    Post-Op Diagnosis: Same       Procedure(s):  EXTRA CORPOREAL MEMBRANE OXYGENATION 23 ON THE RIGHT 25 LEFT FEMORALS    Surgeon(s):  Stuart Espinal MD    Assistant:  * No surgical staff found *    Anesthesia: General    Estimated Blood Loss (mL): N/A    Complications: None    Specimens:   * No specimens in log *    Implants:  * No implants in log *      Drains:   NG/OG/NJ/NE Tube Nasogastric Right nostril (Active)   Surrounding Skin Clean, dry & intact 04/22/22 1100   Securement device Adhesive based morrison 04/22/22 1100   Status Clamped 04/22/22 1100   Placement Verified X-Ray (repeat); Respiratory Status; External Catheter Length;Gastric Contents 04/22/22 1100   NG/OG/NJ/NE External Measurement (cm) 60 cm 04/22/22 1100   Drainage Appearance Green 04/22/22 1100   Free Water/Flush (mL) 60 mL 04/22/22 1100   Output (mL) 75 ml 04/21/22 0430       Urethral Catheter Temperature probe 16 fr (Active)   $ Urethral catheter insertion $ Not inserted for procedure 04/18/22 2000   Catheter Indications Need for fluid volume management of the critically ill patient in a critical care setting 04/22/22 1100   Site Assessment No urethral drainage 04/22/22 1100   Urine Color Unable to Assess 04/22/22 1100   Urine Appearance Sediment 04/22/22 0400   Urine Odor Malodorous 04/21/22 0800   Collection Container Standard 04/22/22 1100   Securement Method Leg strap 04/22/22 1100   Catheter Care Completed Yes 04/22/22 1100   Catheter Best Practices  Drainage tube clipped to bed; Bag below bladder;Bag not on floor; Lack of dependent loop in tubing 04/22/22 1100   Status Draining 04/22/22 1100   Output (mL) 0 mL 04/22/22 1400       Findings: successful cannulation and initiation of ECMO    Electronically signed by Stuart Espinal MD on 4/22/2022 at 2:40 PM

## 2022-04-22 NOTE — PROCEDURES
Left IJ CVC not in adequate positioning and requiring removal. Discussed with CT surgery team and recommending left fem as next site for CVC as patient will require ecmo tomorrow. PREOPERATIVE DIAGNOSIS:  Acute asthma exacerbation and need for ecmo  POSTOPERATIVE DIAGNOSIS:  Same  PROCEDURE PERFORMED:  Left Femoral Vein Central Line Insertion  ANESTHESIA:  Local utilizing 1% lidocaine  ESTIMATED BLOOD LOSS:  Less than 25 ml  COMPLICATIONS:  None immediately appreciated. DISCUSSION:  Claritza Horner is a 23y.o.-year-old female who requires additional IV access and central line for ecmo. The history and physical examination were reviewed and confirmed. The diagnoses, proposed procedure, risks, possible complications, benefits and alternatives were discussed with the patient or family. She was given the opportunity to ask questions, and once answered, informed consent was obtained verbally. The patient was then prepared for the procedure. PROCEDURE:  A timeout was initiated and was confirmed by those present. The patient was placed in a supine position. Ultrasound guidance was utilized. The skin overlying the Left Femoral Vein was prepped with chlorhexadine and draped in sterile fashion. The skin was infiltrated with local anesthetic. Through the anesthetized region, the introducer needle was inserted into the femoral vein returning dark red non pulsatile blood. A guidewire was placed through the center of the needle with no resistance. A small incision made in the skin with a #11 scalpel blade. The dilator was inserted into the skin and vein over guidewire using Seldinger technique. The dilator was then removed and the catheter was placed in the vein over the guidewire using Seldinger technique. The guidewire was then removed and all ports aspirated and flushed appropriately. The catheter then secured using silk suture and a temporary sterile dressing was applied.   No immediate complication was evident. All sponge, instrument and needle counts were correct at the completion of the procedure. The patient tolerated the procedure well with no immediate complication evident.      Erlinda Rivera DO  4/22/2022, 12:01 AM

## 2022-04-22 NOTE — PLAN OF CARE
Problem: Gas Exchange - Impaired:  Goal: Levels of oxygenation will improve  Description: Levels of oxygenation will improve  4/21/2022 2157 by Sienna Sterling RN  Outcome: Progressing     Problem:  Activity:  Goal: Ability to tolerate increased activity will improve  Description: Ability to tolerate increased activity will improve  4/21/2022 2157 by Sienna Sterling RN  Outcome: Progressing     Problem: Cardiac:  Goal: Hemodynamic stability will improve  Description: Hemodynamic stability will improve  4/21/2022 2157 by Sienna Sterling RN  Outcome: Progressing     Problem: Respiratory:  Goal: Ability to maintain a clear airway will improve  Description: Ability to maintain a clear airway will improve  4/21/2022 2157 by Sienna Sterling RN  Outcome: Progressing     Problem: Respiratory:  Goal: Ability to maintain adequate ventilation will improve  Description: Ability to maintain adequate ventilation will improve  4/21/2022 2157 by Sienna Sterling RN  Outcome: Progressing     Problem: Respiratory:  Goal: Complications related to the disease process, condition or treatment will be avoided or minimized  Description: Complications related to the disease process, condition or treatment will be avoided or minimized  4/21/2022 2157 by Sienna Sterling RN  Outcome: Progressing     Problem: Skin Integrity:  Goal: Risk for impaired skin integrity will decrease  Description: Risk for impaired skin integrity will decrease  4/21/2022 2157 by Sienna Sterling RN  Outcome: Progressing     Problem: OXYGENATION/RESPIRATORY FUNCTION  Goal: Patient will maintain patent airway  4/21/2022 2157 by Sienna Sterling RN  Outcome: Progressing     Problem: OXYGENATION/RESPIRATORY FUNCTION  Goal: Patient will achieve/maintain normal respiratory rate/effort  Description: Respiratory rate and effort will be within normal limits for the patient  4/21/2022 2157 by Sienna Sterling RN  Outcome: Progressing     Problem: MECHANICAL VENTILATION  Goal: Patient will maintain patent airway  4/21/2022 2157 by Yuly Purcell RN  Outcome: Progressing     Problem: MECHANICAL VENTILATION  Goal: Oral health is maintained or improved  4/21/2022 2157 by Yuly Purcell RN  Outcome: Progressing     Problem: MECHANICAL VENTILATION  Goal: ET tube will be managed safely  4/21/2022 2157 by Yuly Purcell RN  Outcome: Progressing     Problem: Nutrition  Goal: Optimal nutrition therapy  4/21/2022 2157 by Yuly Purcell RN  Outcome: Progressing     Problem: Discharge Planning  Goal: Discharge to home or other facility with appropriate resources  4/21/2022 2157 by Yuly Purcell RN  Outcome: Progressing     Problem: Genitourinary - Adult  Goal: Urinary catheter remains patent  4/21/2022 2157 by Yuly Purcell RN  Outcome: Progressing     Problem: Neurosensory - Adult  Goal: Achieves stable or improved neurological status  4/21/2022 2157 by Yuly Purcell RN  Outcome: Progressing     Problem: Neurosensory - Adult  Goal: Achieves maximal functionality and self care  4/21/2022 2157 by Yuly Purcell RN  Outcome: Progressing     Problem: Safety - Medical Restraint  Goal: Remains free of injury from restraints (Restraint for Interference with Medical Device)  Description: INTERVENTIONS:  1. Determine that other, less restrictive measures have been tried or would not be effective before applying the restraint  2. Evaluate the patient's condition at the time of restraint application  3. Inform patient/family regarding the reason for restraint  4.  Q2H: Monitor safety, psychosocial status, comfort, nutrition and hydration  4/21/2022 2157 by Yuly Purcell RN  Outcome: Progressing     Problem: Pain  Goal: Verbalizes/displays adequate comfort level or baseline comfort level  4/21/2022 2157 by Yuly Purcell RN  Outcome: Progressing

## 2022-04-22 NOTE — PROGRESS NOTES
ECMO DAILY ROUNDING NOTE     Patient:  Shamika Last  MRN: [id number]  Admit date: 4/18/2022  Primary Care Physician: Emil Norris MD  Consulting Physician: Vladimir Fox MD  CODE Status: Full Code  LOS: 4    [x] Team present at bedside   [x] Family updated    INDICATION:   Respiratory Failure     CANNULATION:  [employer] ECMO cannulation on 4/22/22    TREATMENT GOALS:  PUMP  FLOW: 3-5L/min  pH: 7.35-7.45   SvO2: >70%  SaO2:>97%  Sedation : RASS -1 to+1    CURRENT ECMO PARAMETERS:  PUMP     SWEEP GAS     CIRCUIT PRESSURES     CIRCUIT CHECK       CURRENT VENTILATOR SETTINGS:  Vent Information  Ventilator ID: [id number]  Vent Mode: AC/PC     RECENT LABS:  ABG Recent Labs     04/21/22  0409 04/22/22  0456 04/22/22  1146 04/22/22  1306 04/22/22  1409   POCPH 7.537* 7.449 7.526* 7.523* 7.454*   POCPCO2 37.4 41.7 35.4 36.7 43.1   POCPO2 52.2* 59.1* 43.4* 47.7* 48.6*   POCHCO3 31.8* 28.9* 29.3* 30.2* 30.2*   IMON7EWM 90* 91* 84* 88* 86*      VBG Recent Labs     04/22/22  1350   PHVEN 7.456*   UVH0DRP 43.7   VWC4VYI 30.7*   R8KSAVEP 72      CBC Recent Labs     04/20/22  0504 04/21/22  0526 04/21/22  1741 04/22/22  0511 04/22/22  1151   WBC 25.1* 15.7*  --  17.7* 21.7*   HGB 11.1* 10.6* 10.8* 12.0 10.7*   HCT 35.2* 34.6* 34.0* 38.3 34.3*    269  --  254 275      COAGS Recent Labs     04/20/22  0504 04/21/22  0526 04/22/22  0511 04/22/22  1151 04/22/22  1415   INR 0.9 1.0 1.0 1.2  --    PROTIME 10.1 10.6 11.1 12.3  --    APTT 22.0 22.1 20.8 >120.0* 26.7      TEG Recent Labs     04/22/22  1151 04/22/22  1153   HEPTHERAPY YES YES   REACTTIMETEG 36.2*  --    ANGLETEG 1.5*  --    MAXCLOTTEG 2.2*  --    VR29GOM 0.0  --    EPLTEG 0.0  --       BMP Recent Labs     04/20/22  0504 04/21/22  0526 04/22/22  0545 04/22/22  1146 04/22/22  1151    141 140  --  139   K 4.0 4.5 4.8  --  4.6    102 100  --  101   CO2 28 29 29  --  26   BUN 31* 30* 31*  --  28*   CREATININE 0.75 0.63 0.57 0.96 0.50   GLUCOSE 183* 154* 178*  --  167*   MG  --   --   --   --  2.8*      LFT No results for input(s): PROT, LABALBU, ALT, AST, GGT, ALKPHOS, BILITOT in the last 72 hours. INFLAMMATORY MARKERS No results for input(s): CRP, FERRITIN, LDH in the last 72 hours. Invalid input(s):  ESR   CARDIAC No results for input(s): CKTOTAL, CKMB, CKMBINDEX, TROPONINI, BNP, PROBNP in the last 72 hours. Invalid input(s): TROPONIN, HSTROP   LACTIC ACID No results for input(s): LACTA in the last 72 hours. TRIGLYCERIDE No results for input(s): TRIG in the last 72 hours.      ASSESSMENT AND SYSTEM BASED PLAN (MEDICAL DECISION MAKING):    Neurologic                                                  [x] Sedation/paralytic requirement reviewed                              [x] Neurological assessment; not done, patient sedated with propofol and fentanyl currently     Cardiovascular                               [x] Cannulas secured, dsg dry and intact, no drainage or hematoma noted                             [x] ECMO Pump flow/pressures reviewed                             [x] Telemetry reviewed                             [x] Hemodynamic parameters stable    Pulmonary                             [x] Blood gases (Patient/Circuit) reviewed                             [x] Sweep/ settings reviewed                             [x] Vent Settings reviewed                             [x] Chest X-ray reviewed    Genitourinary                               [x] Intake/Output reviewed                             [x] Need for continuous IV fluids assessed, recommended diuresis with Lasix                             [x] Need for diuresis/renal replacement therapy reviewed    Gastrointestinal                              [x] GI Prophylaxis reviewed                              [x] Enteral nutrition reviewed    Hematology                             [x] Anticoagulation: Argatroban                            [x] Reviewed CBC, coagulation profile, ACT, TEG and platelet count                            [x] Transfusion needs reviewed                    Infectious disease                           [x] Reviewed T-max, white count and cultures results                           [x] Antimicrobials reviewed; continue ceftriaxone    Endocrine                            [x] Reviewed glycemic control                           [x] Reviewed need for steroids; continue Solu-Medrol    Others                           [x] Reviewed need for restrains                           [x] Reviewed need for lines/drains/invasive devices                           [x] Skin/Wound care                           [x] Reviewed current Medications list/orders                           [x] Reviewed goals of care                   OVERALL CONDITION:   Stable    Manda Chowdhury MD  Pulmonary & Critical care Medicine  4/22/2022

## 2022-04-22 NOTE — PROGRESS NOTES
PULMONARY & CRITICAL CARE MEDICINE PROGRESS NOTE     Patient:  Brie Lopez  MRN: [id number]  Admit date: 2022  Primary Care Physician: Noble Fernandez MD  Consulting Physician: Soumya Chauhan MD  CODE Status: Full Code  LOS: 4     SUBJECTIVE     CHIEF COMPLAINT/REASON FOR INITIAL CONSULT: Acute respiratory failure    BRIEF HOSPITAL COURSE:   The patient is a 23 y.o. female with past medical history of asthma was initially admitted to [facility] with acute exacerbation. She was initially put on nonrebreather, subsequently required intubation and initiation of mechanical ventilation due to worsening respiratory status. Patient was also on bicarb drip which was discontinued on arrival to [facility].  She is sedated, paralyzed and mechanically ventilated. ABG shows improvement in respiratory acidosis and oxygenation.     INTERVAL HISTORY:  22  Patient had worsening of oxygenation and increased vent requirement yesterday  Decision was made to put the patient on ECMO  Patient underwent cannulation this morning  She is sedated with propofol and fentanyl, plan to switch to Versed and discontinue propofol  Her respiratory panel was positive for rhino/enterovirus  Remains on ceftriaxone and IV Solu-Medrol  Urine output low, will give a dose of Lasix    REVIEW OF SYSTEMS:  Unobtainable from patient due to sedation/mechanical ventilation    OBJECTIVE     VENTILATOR SETTINGS:  Vent Information  Ventilator ID: [id number]  Vent Mode: AC/PC     PaO2/FiO2 RATIO:  Recent Labs     22  1409   POCPO2 48.6*      FiO2 : 40 %     VITAL SIGNS:   LAST:  /67   Pulse 81   Temp 99 °F (37.2 °C) (Bladder)   Resp 10   Ht 5' 3\" (1.6 m)   Wt (!) 307 lb 14.4 oz (139.7 kg)   SpO2 (!) 86%   BMI 54.54 kg/m²   8-24 HR RANGE:  TEMP Temp  Av.8 °F (36.6 °C)  Min: 75.8 °F (24.3 °C)  Max: 100.2 °F (11.7 °C)   BP Systolic (69RBM), HPZ:573 , Min:121 , LMD:006      Diastolic (91FKJ), GMV:87, Min:60, Max:82     PULSE Pulse  Av.9  Min: 67  Max: 125   RR Resp  Av.8  Min: 0  Max: 32   O2 SAT SpO2  Av.5 %  Min: 82 %  Max: 100 %   OXYGEN DELIVERY No data recorded        Physical Exam  Constitutional:       General: She is not in acute distress. Appearance: She is morbidly obese. She is ill-appearing. Interventions: She is sedated, chemically paralyzed and intubated. HENT:      Head: Normocephalic and atraumatic. Eyes:      Conjunctiva/sclera: Conjunctivae normal.      Pupils: Pupils are equal, round, and reactive to light. Neck:      Thyroid: No thyromegaly. Vascular: No JVD. Cardiovascular:      Rate and Rhythm: Normal rate and regular rhythm. Pulses: Normal pulses. Heart sounds: Normal heart sounds, S1 normal and S2 normal. No murmur heard. Pulmonary:      Effort: Prolonged expiration present. She is intubated. Abdominal:      General: Bowel sounds are normal. There is no distension. Palpations: Abdomen is soft. There is no mass. Musculoskeletal:      Cervical back: Neck supple. Right lower leg: No edema. Left lower leg: No edema. Lymphadenopathy:      Cervical: No cervical adenopathy. Skin:     Coloration: Skin is not jaundiced or pale. Findings: No rash. Nails: There is no clubbing. Neurological:      General: No focal deficit present.        DATA REVIEW     Medications:  Scheduled Meds:   heparin (porcine)        lidocaine        heparin flush (PF)  3 mL IntraVENous Q12H    heparin flush (PF)  3 mL IntraVENous Q12H    heparin flush (PF)  3 mL IntraVENous Q12H    heparin flush (PF)  3 mL IntraVENous Q12H    albumin human  25 g IntraVENous Once    pantoprazole (PROTONIX) 40 mg injection  40 mg IntraVENous Daily    cefTRIAXone (ROCEPHIN) IV  1,000 mg IntraVENous Q24H    azithromycin  500 mg IntraVENous Q24H    methylPREDNISolone  40 mg IntraVENous Q8H    dilTIAZem  30 mg Oral 4 times per day    polyvinyl alcohol  1 drop Both Eyes Q6H    ipratropium-albuterol  1 ampule Inhalation Q4H    insulin lispro  0-12 Units SubCUTAneous Q6H    sodium chloride flush  5-40 mL IntraVENous 2 times per day     Continuous Infusions:   argatroban infusion 0.5 mcg/kg/min (04/22/22 1503)    midazolam 4 mg/hr (04/22/22 0829)    fentaNYL 50 mcg/mL 200 mcg/hr (04/22/22 0829)    cisatracurium (NIMBEX) infusion Stopped (04/20/22 1330)    propofol 35 mcg/kg/min (04/22/22 1509)    dextrose      sodium chloride Stopped (04/21/22 1431)       INPUT/OUTPUT:  In: 2112.6 [I.V.:1476.5; NG/GT:220]  Out: 1140 [Urine:1140]  Date 04/22/22 0000 - 04/22/22 2359   Shift 8275-6007 4583-4745 1285-0111 24 Hour Total   INTAKE   I.V.(mL/kg) 814.6(5.8) 661.9(4.7)  1476.5(10.6)   NG/GT(mL/kg)  60(0.4)  60(0.4)   IV Piggyback(mL/kg) 416(3)   416(3)   Shift Total(mL/kg) 1230.6(8.8) 721. 9(5.2)  1952.6(14)   OUTPUT   Urine(mL/kg/hr) 430(0.4) 0  430   Shift Total(mL/kg) 430(3.1) 0(0)  430(3.1)   Weight (kg) 139.7 139.7 139.7 139.7        LABS:  ABGs:   Recent Labs     04/21/22  0409 04/22/22  0456 04/22/22  1146 04/22/22  1306 04/22/22  1409   POCPH 7.537* 7.449 7.526* 7.523* 7.454*   POCPCO2 37.4 41.7 35.4 36.7 43.1   POCPO2 52.2* 59.1* 43.4* 47.7* 48.6*   POCHCO3 31.8* 28.9* 29.3* 30.2* 30.2*   SMAD0HHX 90* 91* 84* 88* 86*     CBC:   Recent Labs     04/20/22  0504 04/21/22  0526 04/21/22  1741 04/22/22  0511 04/22/22  1151   WBC 25.1* 15.7*  --  17.7* 21.7*   HGB 11.1* 10.6* 10.8* 12.0 10.7*   HCT 35.2* 34.6* 34.0* 38.3 34.3*   MCV 88.2 87.4  --  87.4 89.1    269  --  254 275   LYMPHOPCT 3* 12*  --  5*  --    RBC 3.99 3.96  --  4.38 3. 85*   MCH 27.8 26.8  --  27.4 27.8   MCHC 31.5 30.6  --  31.3 31.2   RDW 14.2 14.0  --  13.4 13.2     CRP:   No results for input(s): CRP in the last 72 hours. LDH:   No results for input(s): LDH in the last 72 hours.   BMP:   Recent Labs     04/20/22  0504 04/21/22  0526 04/22/22  0545 04/22/22  1146 04/22/22  1151  141 140  --  139   K 4.0 4.5 4.8  --  4.6    102 100  --  101   CO2 28 29 29  --  26   BUN 31* 30* 31*  --  28*   CREATININE 0.75 0.63 0.57 0.96 0.50   GLUCOSE 183* 154* 178*  --  167*     Liver Function Test:   No results for input(s): PROT, LABALBU, ALT, AST, GGT, ALKPHOS, BILITOT in the last 72 hours. Coagulation Profile:   Recent Labs     04/20/22  0504 04/21/22  0526 04/22/22  0511 04/22/22  1151 04/22/22  1415   INR 0.9 1.0 1.0 1.2  --    PROTIME 10.1 10.6 11.1 12.3  --    APTT 22.0 22.1 20.8 >120.0* 26.7     D-Dimer:  No results for input(s): DDIMER in the last 72 hours. Lactic Acid:  No results for input(s): LACTA in the last 72 hours. Cardiac Enzymes:  No results for input(s): CKTOTAL, CKMB, CKMBINDEX, TROPONINI in the last 72 hours. Invalid input(s): TROPONIN, HSTROP  BNP/ProBNP:   No results for input(s): BNP, PROBNP in the last 72 hours. Triglycerides:  No results for input(s): TRIG in the last 72 hours. Microbiology:  Urine Culture:  No components found for: CURINE  Blood Culture:  No components found for: CBLOOD, CFUNGUSBL  Sputum Culture:  No components found for: CSPUTUM  Recent Labs     04/21/22  1507   [facility] . BLOOD  . BLOOD   SPECIAL 6ML  R HAND 20ML   CULTURE NO GROWTH 12 HOURS  NO GROWTH 12 HOURS     Recent Labs     04/21/22  1507   SPECDESC . BLOOD  . BLOOD   SPECIAL 6ML  R HAND 20ML   CULTURE NO GROWTH 12 HOURS  NO GROWTH 12 HOURS        Pathology:    Radiology Reports:  XR CHEST PORTABLE   Preliminary Result   Endotracheal tube tip is in the right main bronchus and should be retracted   approximately 4-5 cm. Tips of ECMO cannulas project in the expected location of the IVC. Findings were discussed with Gracie Webb RN (the patient's ICU nurse)   at 2:26 pm on 4/22/2022. XR CHEST (SINGLE VIEW FRONTAL)   Final Result   1. Support tubes and lines are unchanged. 2. Stable bibasilar lung infiltrates, right side greater than left.   This may be related to atelectasis and/or pleural effusion. Superimposed pneumonia   cannot be excluded. XR CHEST (SINGLE VIEW FRONTAL)   Final Result   Suboptimal position left IJ catheter. Consider removal and replacement or   repositioning. XR CHEST PORTABLE   Final Result   1. On today's exam the endotracheal tube tip is positioned 1.5 cm above the   soto. Recommend retraction by an additional 2 cm for optimal positioning. 2.  Interval development of right lower lobe airspace disease. This could   represent atelectasis         XR CHEST PORTABLE   Final Result   1. Endotracheal and enteric tubes as above. 2. Diffuse interstitial opacities throughout both lungs, right greater than   left. No new focal airspace consolidation. Follow-up is recommended to   document resolution. XR CHEST (SINGLE VIEW FRONTAL)   Final Result   Diffuse interstitial opacities with alveolar/consolidative opacities at the   bases favoring multifocal airspace disease. XR CHEST PORTABLE    (Results Pending)   XR CHEST PORTABLE    (Results Pending)        Echocardiogram:   Results for orders placed during the hospital encounter of 04/18/22    ECHO Complete 2D W Doppler W Color      Summary  Left ventricle is normal in size, global left ventricular systolic function  is preserved, estimated ejection fraction is 50%. There appears to be some apical hypokinesis, in the apical views. Evidence of diastolic dysfunction. Trivial mitral regurgitation. Trivial pulmonic insufficiency. IVC dilated but unable to assess respiratory collapse due to patient on  ventilator.        ASSESSMENT AND PLAN     Assessment:    // Acute hypoxic/hypercapnic respiratory failure, on VV ECMO/mechanical ventilation  // Acute respiratory acidosis, improved  // Acute exacerbation of bronchial asthma  // Rhino/enterovirus infection  // Leukocytosis  // Hyperglycemia  // Elevated troponin  // Morbid obesity    Plan:    I personally interviewed/examined the patient; reviewed interval history, interpreted all available radiographic and laboratory data at the time of service.  Patient currently sedated with fentanyl and propofol   Plan to switch to Versed and fentanyl   Patient cannulated this morning and started on on VV-ECMO   She hemodynamically stable   Continue lung protective mechanical ventilation at rest settings   Wean FiO2/PEEP as tolerated   Monitor endotracheal secretions    Obtain X-ray chest as needed    Continue pulmonary toilet, aspiration precautions and bronchodilators   Continue to monitor I/O with a goal of even/negative fluid balance   Recommended IV Lasix follow urine output   Recommended re-initiation of tube feeds   Stress ulcer prophylaxis   Chemical DVT prophylaxis; not indicated patient on systemic anticoagulation   Antimicrobials reviewed; continue ceftriaxone/azithromycin   Glycemic control appropriate; sliding scale insulin   Continue IV Solu-Medrol at current dose   Skin/wound care reviewed with the nursing staff   Physical/occupational therapy    The patient remains critically ill with illness/injury that acutely impairs one or more vital organ systems, such that there is a high probability of imminent or life threatening deterioration in the patient's condition. Critical care time of 40 minutes was spent (excluding procedures), in coordination of care during bedside rounds and discussion of patient care in detail, and recommendations of the team were adopted in the plan. Necessity of all invasive devices was also confirmed. Latasha Peterson MD  Pulmonary and Critical Care Medicine           4/22/2022, 3:20 PM    Please note that this chart was generated using voice recognition Dragon dictation software. Although every effort was made to ensure the accuracy of this automated transcription, some errors in transcription may have occurred.

## 2022-04-22 NOTE — PROGRESS NOTES
Radiology called to inform writer ET tube was in right main bronch and needs pulled back 4-5 cm. Writer informed respiratory whom adjusted ET tube. Repeat xray was done to verify placement. Writer updated Dr. Anna Grady and Dr. Darlene Tristan.

## 2022-04-22 NOTE — PROGRESS NOTES
Cardiology to follow peripherally. Please contact with any questions or concerns.     Milton Mccullough MD  [facility]  [address] [facility]

## 2022-04-22 NOTE — PROGRESS NOTES
1600 Dr. Marcell Navarro at bedside updated physician patient still has no urine output. No further orders at current time.

## 2022-04-22 NOTE — PLAN OF CARE
Problem: Cardiac:  Goal: Hemodynamic stability will improve  Description: Hemodynamic stability will improve  Outcome: Progressing     Problem: Respiratory:  Goal: Ability to maintain a clear airway will improve  Description: Ability to maintain a clear airway will improve  Outcome: Progressing  Goal: Ability to maintain adequate ventilation will improve  Description: Ability to maintain adequate ventilation will improve  Outcome: Progressing  Goal: Complications related to the disease process, condition or treatment will be avoided or minimized  Description: Complications related to the disease process, condition or treatment will be avoided or minimized  Outcome: Progressing     Problem: Skin Integrity:  Goal: Risk for impaired skin integrity will decrease  Description: Risk for impaired skin integrity will decrease  Outcome: Progressing     Problem: MECHANICAL VENTILATION  Goal: Patient will maintain patent airway  4/22/2022 1133 by Angel Zhang RCP  Outcome: Progressing  Goal: Oral health is maintained or improved  4/22/2022 1133 by Angel Zhang RCP  Outcome: Progressing  Goal: ET tube will be managed safely  4/22/2022 1133 by Angel Zhang RCP  Outcome: Progressing     Problem: Safety - Medical Restraint  Goal: Remains free of injury from restraints (Restraint for Interference with Medical Device)  Description: INTERVENTIONS:  1. Determine that other, less restrictive measures have been tried or would not be effective before applying the restraint  2. Evaluate the patient's condition at the time of restraint application  3. Inform patient/family regarding the reason for restraint  4.  Q2H: Monitor safety, psychosocial status, comfort, nutrition and hydration  Outcome: Progressing

## 2022-04-22 NOTE — BRIEF OP NOTE
Brief Postoperative Note      Patient: Betsey Guthrie  YOB: 2000  MRN: [id number]    Date of Procedure: 4/22/2022    Pre-Op Diagnosis:  Status asthmaticus with refractory hypoxia on conventional ventilation     Post-Op Diagnosis: Same        Procedure(s):  EXTRA CORPOREAL MEMBRANE OXYGENATION 23 ON THE RIGHT 25 LEFT FEMORALS     - Ultrasound guided Right common femoral venous access  - Placement of VV ECMO cannula (23 F return in the right atrium through right CFV and 25 F Drainage in intra-hepatic IVC through left CFV) under fluoroscopic and SUMAYA guidance   - Intraop SUMAYA     Operators: Rhonda Correa/DEAN Haas     Anesthesia: General    Estimated Blood Loss (mL):   cc     Complications: None    Electronically signed by Tristen Cuellar MD on 4/22/2022 at 10:32 AM

## 2022-04-22 NOTE — PROGRESS NOTES
1005 Patient was placed on ECMO  1031: patient landed in Room from [address] [place] [address].

## 2022-04-22 NOTE — PROGRESS NOTES
Critical care team - Resident sign-out to medicine service      Date and time: 4/22/2022 8:30 AM  Patient's name:  Gricel Jo  Medical Record Number: [id number]  Patient's account/billing number: [de-identified]  Patient's YOB: 2000  Age: 23 y. o. Date of Admission: 4/18/2022  2:00 PM  Length of stay during current admission: 4    Primary Care Physician: Channing Gray MD    Code Status: Full Code    Mode of physician to physician communication:        [] Via telephone   [] In person     Date and time of sign-out: 4/22/2022 8:30 AM    Accepting Internal Medicine resident: Dr. Diana Franco    Accepting Medicine team: IM Team Med 2    Accepting team's attending: Dr. Bessie Coppola    Patient's current ICU Bed:  8390    Patient's assigned bed on floor:  1004      [] Med-Surg Monitored [x] Step-down       [] Psychiatry ICU       [] Psych floor     Reason for ICU admission:     Acute hypoxic resp failure on vent support    ICU course summary:     23 y.o. female with a past medical history of bronchial asthma who was admitted to [facility] with an acute exacerbation causing increasing shortness of breath. Patient was found to be hypoxemic with hypercarbic respiratory failure requiring intubation and mechanical ventilation. Patient was transferred to [facility] of [facility] for further elevation with possibility of ECMO. On arrival patient was found to be on a bicarb drip due to presumed respiratory acidosis but this was discontinued due to to worsening of the hypercarbia. Additionally patient was started on Nimbex due to the necessity for paralyzation as she was breathing over the vent. Pulmonology and cardiothoracic surgery were consulted- plan is for ECMO. In the MICU, patient remained stable, had an episode of dark emesis which turned out to be bilious output, Hb remained stable. She was hypertensive, started on lopressor IV 5 mg PRN.  Had left IJ placed which was malpositioned, removed and then CVC in left femoral was placed overnight. She was hypoxic, requiring higher FiO2 of 80%, PEEP 16, RR 32, , blood gas showed pH 7.449, pCO2 41.7, pCO2 59. 1. treated with solumedrol, bronchodilators, CXR showed no pneumothorax or pleural effusion. Procedures during patient's ICU stay:     Left fem CVC    Current Vitals:     BP (!) 152/82   Pulse 74   Temp 99 °F (37.2 °C) (Bladder)   Resp (!) 32   Ht 5' 3\" (1.6 m)   Wt (!) 307 lb 14.4 oz (139.7 kg)   SpO2 94%   BMI 54.54 kg/m²       Cultures:     Blood cultures:                 [] None drawn      [x] Negative             []  Positive (Details:  )  Urine Culture:                   [] None drawn      [x] Negative             []  Positive (Details:  )  Sputum Culture:               [] None drawn       [] Negative             []  Positive (Details:  )   Endotracheal aspirate:     [] None drawn       [] Negative             []  Positive (Details:  )       Consults:     1. CTS  2. PUlm    Assessment:     Patient Active Problem List    Diagnosis Date Noted    Acute asthma exacerbation 04/18/2022    History of seizures 04/18/2022    Hypercapnic respiratory failure ([facility] [address] 04/18/2022    Endotracheally intubated 04/18/2022    On mechanically assisted ventilation ([facility] [address] 33/31/6206    Diastolic dysfunction 44/77/6231       Additional assessment:    Acute Hypercapnic, Hypoxic Respiratory Failure due to Asthma Exacerbation    Recommended Follow-up:     Acute Hypercapnic, hypoxicRespiratory Failure due to Asthma Exacerbation  Endotracheal Intubated with mechanical ventilation  -Propofol, fent for sedation  -PRVC mode - FiO2 80 /  / PEEP 16  -ABG pH 7.449 / pCO2 41 / pO2 51.9  -Duoneb nebulization 1 ampule q 4 hours  -IV Solumedrol 40 mg three times a day.  -Continue Ceftriaxone and Azithromycin for 4 more days     Troponin elevation type II   - stress test before discharge due to abnormal echocardiogram which showed apical hypokinesis.     Tachycardia:  - diltiazem 30 mg q6hr        DVT ppx: Heparin  GI ppx: Lansoprazole suspension  Tube feeds ongoing       Plan for ECMO    Above mentioned assessment and plan was discussed by me with the admitting medicine resident. The medicine team assigned to the patient by medicine admitting resident will be following up the patient from now onwards on the floor. Fany James MD, M.D.   Internal Medicine PGY2  4/22/2022, 8:30 AM

## 2022-04-22 NOTE — PROGRESS NOTES
ECMO CANNULATION and INITIATION NOTE:    Pt taken to the [facility] and cannulated for ECMO by Dr. Bernice Hillman and Dr. Joycelyn Whittington with a Bilateral Femoral approach under floroscopy and SUMAYA guidance using a 23 FR. Return cannula in the Right common Femoral vein and a 25 FR. Drainage cannula in the Left common Femoral vein.      ON ECMO TIME: 1005

## 2022-04-23 ENCOUNTER — APPOINTMENT (OUTPATIENT)
Dept: GENERAL RADIOLOGY | Age: 22
DRG: 003 | End: 2022-04-23
Attending: INTERNAL MEDICINE
Payer: COMMERCIAL

## 2022-04-23 LAB
ABO/RH: NORMAL
ACTIVATED CLOTTING TIME: 195 SEC (ref 79–149)
ACTIVATED CLOTTING TIME: 241 SEC (ref 79–149)
ALBUMIN SERPL-MCNC: 3.3 G/DL (ref 3.5–5.2)
ALBUMIN SERPL-MCNC: 3.4 G/DL (ref 3.5–5.2)
ALBUMIN/GLOBULIN RATIO: 1.3 (ref 1–2.5)
ALBUMIN/GLOBULIN RATIO: 1.4 (ref 1–2.5)
ALLEN TEST: ABNORMAL
ALLEN TEST: ABNORMAL
ALP BLD-CCNC: 50 U/L (ref 35–104)
ALP BLD-CCNC: 51 U/L (ref 35–104)
ALT SERPL-CCNC: 92 U/L (ref 5–33)
ALT SERPL-CCNC: 98 U/L (ref 5–33)
ANGLE TEG: 66.5 DEG (ref 53–72)
ANGLE TEG: 73.6 DEG (ref 53–72)
ANION GAP SERPL CALCULATED.3IONS-SCNC: 10 MMOL/L (ref 9–17)
ANION GAP SERPL CALCULATED.3IONS-SCNC: 6 MMOL/L (ref 9–17)
ANION GAP SERPL CALCULATED.3IONS-SCNC: 6 MMOL/L (ref 9–17)
ANION GAP SERPL CALCULATED.3IONS-SCNC: 7 MMOL/L (ref 9–17)
ANTIBODY SCREEN: NEGATIVE
ARM BAND NUMBER: NORMAL
AST SERPL-CCNC: 39 U/L
AST SERPL-CCNC: 64 U/L
AT-III ACTIVITY: 103 % (ref 83–122)
BILIRUB SERPL-MCNC: 0.46 MG/DL (ref 0.3–1.2)
BILIRUB SERPL-MCNC: 0.55 MG/DL (ref 0.3–1.2)
BILIRUBIN DIRECT: 0.24 MG/DL
BILIRUBIN DIRECT: 0.24 MG/DL
BILIRUBIN, INDIRECT: 0.22 MG/DL (ref 0–1)
BILIRUBIN, INDIRECT: 0.31 MG/DL (ref 0–1)
BLD PROD TYP BPU: NORMAL
BLD PROD TYP BPU: NORMAL
BPU ID: NORMAL
BPU ID: NORMAL
BUN BLDV-MCNC: 30 MG/DL (ref 6–20)
BUN BLDV-MCNC: 31 MG/DL (ref 6–20)
BUN BLDV-MCNC: 33 MG/DL (ref 6–20)
BUN BLDV-MCNC: 36 MG/DL (ref 6–20)
CALCIUM IONIZED: 1.08 MMOL/L (ref 1.13–1.33)
CALCIUM IONIZED: 1.08 MMOL/L (ref 1.13–1.33)
CALCIUM IONIZED: 1.09 MMOL/L (ref 1.13–1.33)
CALCIUM IONIZED: 1.13 MMOL/L (ref 1.13–1.33)
CALCIUM SERPL-MCNC: 8.1 MG/DL (ref 8.6–10.4)
CALCIUM SERPL-MCNC: 8.2 MG/DL (ref 8.6–10.4)
CHLORIDE BLD-SCNC: 102 MMOL/L (ref 98–107)
CHLORIDE BLD-SCNC: 103 MMOL/L (ref 98–107)
CHLORIDE BLD-SCNC: 104 MMOL/L (ref 98–107)
CHLORIDE BLD-SCNC: 107 MMOL/L (ref 98–107)
CO2: 27 MMOL/L (ref 20–31)
CO2: 30 MMOL/L (ref 20–31)
CO2: 30 MMOL/L (ref 20–31)
CO2: 31 MMOL/L (ref 20–31)
CREAT SERPL-MCNC: 0.58 MG/DL (ref 0.5–0.9)
CREAT SERPL-MCNC: 0.63 MG/DL (ref 0.5–0.9)
CREAT SERPL-MCNC: 0.64 MG/DL (ref 0.5–0.9)
CREAT SERPL-MCNC: 0.86 MG/DL (ref 0.5–0.9)
CROSSMATCH RESULT: NORMAL
CROSSMATCH RESULT: NORMAL
DISPENSE STATUS BLOOD BANK: NORMAL
DISPENSE STATUS BLOOD BANK: NORMAL
EPL-TEG: 0 % (ref 0–15)
EPL-TEG: 0 % (ref 0–15)
EXPIRATION DATE: NORMAL
FIBRINOGEN: 120 MG/DL (ref 140–420)
FIBRINOGEN: 162 MG/DL (ref 140–420)
FIBRINOGEN: 222 MG/DL (ref 140–420)
FIBRINOGEN: <80 MG/DL (ref 140–420)
FIO2: 100
GFR AFRICAN AMERICAN: >60 ML/MIN
GFR NON-AFRICAN AMERICAN: >60 ML/MIN
GFR SERPL CREATININE-BSD FRML MDRD: >60 ML/MIN
GFR SERPL CREATININE-BSD FRML MDRD: ABNORMAL ML/MIN/{1.73_M2}
GFR SERPL CREATININE-BSD FRML MDRD: NORMAL ML/MIN/{1.73_M2}
GLUCOSE BLD-MCNC: 141 MG/DL (ref 70–99)
GLUCOSE BLD-MCNC: 142 MG/DL (ref 74–100)
GLUCOSE BLD-MCNC: 145 MG/DL (ref 70–99)
GLUCOSE BLD-MCNC: 149 MG/DL (ref 74–100)
GLUCOSE BLD-MCNC: 150 MG/DL (ref 70–99)
GLUCOSE BLD-MCNC: 154 MG/DL (ref 74–100)
GLUCOSE BLD-MCNC: 155 MG/DL (ref 70–99)
GLUCOSE BLD-MCNC: 168 MG/DL (ref 74–100)
GLUCOSE BLD-MCNC: 168 MG/DL (ref 74–100)
HCO3 VENOUS: 31.9 MMOL/L (ref 22–29)
HCT VFR BLD CALC: 32.7 % (ref 36.3–47.1)
HCT VFR BLD CALC: 33.2 % (ref 36.3–47.1)
HCT VFR BLD CALC: 33.8 % (ref 36.3–47.1)
HCT VFR BLD CALC: 33.8 % (ref 36.3–47.1)
HEMOGLOBIN: 10.1 G/DL (ref 11.9–15.1)
HEMOGLOBIN: 10.1 G/DL (ref 11.9–15.1)
HEMOGLOBIN: 10.3 G/DL (ref 11.9–15.1)
HEMOGLOBIN: 10.5 G/DL (ref 11.9–15.1)
HEPARIN THERAPY: ABNORMAL
HEPARIN THERAPY: ABNORMAL
INR BLD: 1.9
INR BLD: 2.7
INR BLD: 3.4
INR BLD: 3.5
KINETICS TEG: 1.1 MIN (ref 1–3)
KINETICS TEG: 1.8 MIN (ref 1–3)
LACTIC ACID, WHOLE BLOOD: 0.8 MMOL/L (ref 0.7–2.1)
LACTIC ACID, WHOLE BLOOD: 0.9 MMOL/L (ref 0.7–2.1)
LACTIC ACID, WHOLE BLOOD: 0.9 MMOL/L (ref 0.7–2.1)
LACTIC ACID, WHOLE BLOOD: 1.3 MMOL/L (ref 0.7–2.1)
LY30 (LYSIS) TEG: 0 % (ref 0–8)
LY30 (LYSIS) TEG: 0 % (ref 0–8)
MA (MAX CLOT) TEG: 68.9 MM (ref 50–70)
MA (MAX CLOT) TEG: 70.7 MM (ref 50–70)
MAGNESIUM: 2.8 MG/DL (ref 1.6–2.6)
MAGNESIUM: 2.9 MG/DL (ref 1.6–2.6)
MAGNESIUM: 2.9 MG/DL (ref 1.6–2.6)
MAGNESIUM: 3 MG/DL (ref 1.6–2.6)
MCH RBC QN AUTO: 27.4 PG (ref 25.2–33.5)
MCH RBC QN AUTO: 27.7 PG (ref 25.2–33.5)
MCH RBC QN AUTO: 27.8 PG (ref 25.2–33.5)
MCH RBC QN AUTO: 27.9 PG (ref 25.2–33.5)
MCHC RBC AUTO-ENTMCNC: 29.9 G/DL (ref 28.4–34.8)
MCHC RBC AUTO-ENTMCNC: 30.9 G/DL (ref 28.4–34.8)
MCHC RBC AUTO-ENTMCNC: 31 G/DL (ref 28.4–34.8)
MCHC RBC AUTO-ENTMCNC: 31.1 G/DL (ref 28.4–34.8)
MCV RBC AUTO: 89.7 FL (ref 82.6–102.9)
MCV RBC AUTO: 89.7 FL (ref 82.6–102.9)
MCV RBC AUTO: 89.8 FL (ref 82.6–102.9)
MCV RBC AUTO: 91.6 FL (ref 82.6–102.9)
NRBC AUTOMATED: 0.4 PER 100 WBC
NRBC AUTOMATED: 0.5 PER 100 WBC
NRBC AUTOMATED: 0.5 PER 100 WBC
NRBC AUTOMATED: 0.8 PER 100 WBC
O2 DEVICE/FLOW/%: ABNORMAL
O2 DEVICE/FLOW/%: ABNORMAL
O2 SAT, VEN: 72 % (ref 60–85)
PARTIAL THROMBOPLASTIN TIME: 38.1 SEC (ref 20.5–30.5)
PARTIAL THROMBOPLASTIN TIME: 39.4 SEC (ref 20.5–30.5)
PARTIAL THROMBOPLASTIN TIME: 39.5 SEC (ref 20.5–30.5)
PARTIAL THROMBOPLASTIN TIME: 44.6 SEC (ref 20.5–30.5)
PARTIAL THROMBOPLASTIN TIME: 46 SEC (ref 20.5–30.5)
PARTIAL THROMBOPLASTIN TIME: 48.4 SEC (ref 20.5–30.5)
PARTIAL THROMBOPLASTIN TIME: 53.4 SEC (ref 20.5–30.5)
PARTIAL THROMBOPLASTIN TIME: 55.3 SEC (ref 20.5–30.5)
PCO2, VEN: 56.3 MM HG (ref 41–51)
PDW BLD-RTO: 13.5 % (ref 11.8–14.4)
PDW BLD-RTO: 13.5 % (ref 11.8–14.4)
PDW BLD-RTO: 13.6 % (ref 11.8–14.4)
PDW BLD-RTO: 13.6 % (ref 11.8–14.4)
PERFORMING LOCATION: ABNORMAL
PERFORMING LOCATION: ABNORMAL
PH VENOUS: 7.36 (ref 7.32–7.43)
PLATELET # BLD: 243 K/UL (ref 138–453)
PLATELET # BLD: 254 K/UL (ref 138–453)
PLATELET # BLD: 269 K/UL (ref 138–453)
PLATELET # BLD: 273 K/UL (ref 138–453)
PMV BLD AUTO: 9.6 FL (ref 8.1–13.5)
PMV BLD AUTO: 9.6 FL (ref 8.1–13.5)
PMV BLD AUTO: 9.7 FL (ref 8.1–13.5)
PMV BLD AUTO: 9.8 FL (ref 8.1–13.5)
PO2, VEN: 40.3 MM HG (ref 30–50)
POC CREATININE: 1.06 MG/DL (ref 0.51–1.19)
POC HCO3: 30 MMOL/L (ref 21–28)
POC HCO3: 30.5 MMOL/L (ref 21–28)
POC HCO3: 31.1 MMOL/L (ref 21–28)
POC HCO3: 31.3 MMOL/L (ref 21–28)
POC HCO3: 31.3 MMOL/L (ref 21–28)
POC HCO3: 31.4 MMOL/L (ref 21–28)
POC HCO3: 31.9 MMOL/L (ref 21–28)
POC HCO3: 32.1 MMOL/L (ref 21–28)
POC HCO3: 32.3 MMOL/L (ref 21–28)
POC HCO3: 33 MMOL/L (ref 21–28)
POC HEMATOCRIT: 30 % (ref 36–46)
POC HEMATOCRIT: 34 % (ref 36–46)
POC HEMOGLOBIN: 10.3 G/DL (ref 12–16)
POC HEMOGLOBIN: 11.4 G/DL (ref 12–16)
POC O2 SATURATION: 100 % (ref 94–98)
POC O2 SATURATION: 100 % (ref 94–98)
POC O2 SATURATION: 87 % (ref 94–98)
POC O2 SATURATION: 88 % (ref 94–98)
POC O2 SATURATION: 89 % (ref 94–98)
POC O2 SATURATION: 89 % (ref 94–98)
POC O2 SATURATION: 90 % (ref 94–98)
POC O2 SATURATION: 94 % (ref 94–98)
POC PCO2: 46.3 MM HG (ref 35–48)
POC PCO2: 48.3 MM HG (ref 35–48)
POC PCO2: 51.1 MM HG (ref 35–48)
POC PCO2: 51.6 MM HG (ref 35–48)
POC PCO2: 53.3 MM HG (ref 35–48)
POC PCO2: 53.5 MM HG (ref 35–48)
POC PCO2: 53.6 MM HG (ref 35–48)
POC PCO2: 53.7 MM HG (ref 35–48)
POC PCO2: 54.5 MM HG (ref 35–48)
POC PCO2: 54.8 MM HG (ref 35–48)
POC PH: 7.37 (ref 7.35–7.45)
POC PH: 7.38 (ref 7.35–7.45)
POC PH: 7.39 (ref 7.35–7.45)
POC PH: 7.4 (ref 7.35–7.45)
POC PH: 7.41 (ref 7.35–7.45)
POC PH: 7.42 (ref 7.35–7.45)
POC PO2: 471.3 MM HG (ref 83–108)
POC PO2: 486.9 MM HG (ref 83–108)
POC PO2: 53.6 MM HG (ref 83–108)
POC PO2: 55.7 MM HG (ref 83–108)
POC PO2: 56 MM HG (ref 83–108)
POC PO2: 58 MM HG (ref 83–108)
POC PO2: 58.8 MM HG (ref 83–108)
POC PO2: 58.9 MM HG (ref 83–108)
POC PO2: 61.5 MM HG (ref 83–108)
POC PO2: 73.2 MM HG (ref 83–108)
POC POTASSIUM: 5.4 MMOL/L (ref 3.5–4.5)
POC POTASSIUM: 5.5 MMOL/L (ref 3.5–4.5)
POSITIVE BASE EXCESS, ART: 5 (ref 0–3)
POSITIVE BASE EXCESS, ART: 6 (ref 0–3)
POSITIVE BASE EXCESS, ART: 7 (ref 0–3)
POSITIVE BASE EXCESS, VEN: 5 (ref 0–3)
POTASSIUM SERPL-SCNC: 5.2 MMOL/L (ref 3.7–5.3)
POTASSIUM SERPL-SCNC: 5.4 MMOL/L (ref 3.7–5.3)
POTASSIUM SERPL-SCNC: 5.4 MMOL/L (ref 3.7–5.3)
POTASSIUM SERPL-SCNC: 5.5 MMOL/L (ref 3.7–5.3)
PROCALCITONIN: 0.11 NG/ML
PROTHROMBIN TIME: 19.7 SEC (ref 9.1–12.3)
PROTHROMBIN TIME: 26.9 SEC (ref 9.1–12.3)
PROTHROMBIN TIME: 32.8 SEC (ref 9.1–12.3)
PROTHROMBIN TIME: 33.5 SEC (ref 9.1–12.3)
RBC # BLD: 3.64 M/UL (ref 3.95–5.11)
RBC # BLD: 3.69 M/UL (ref 3.95–5.11)
RBC # BLD: 3.7 M/UL (ref 3.95–5.11)
RBC # BLD: 3.77 M/UL (ref 3.95–5.11)
REACTION TIME TEG: 4.4 MIN (ref 5–10)
REACTION TIME TEG: 8.8 MIN (ref 5–10)
SAMPLE SITE: ABNORMAL
SODIUM BLD-SCNC: 139 MMOL/L (ref 135–144)
SODIUM BLD-SCNC: 140 MMOL/L (ref 135–144)
SODIUM BLD-SCNC: 140 MMOL/L (ref 135–144)
SODIUM BLD-SCNC: 144 MMOL/L (ref 135–144)
TOTAL PROTEIN: 5.8 G/DL (ref 6.4–8.3)
TOTAL PROTEIN: 5.9 G/DL (ref 6.4–8.3)
TRANSFUSION STATUS: NORMAL
TRANSFUSION STATUS: NORMAL
UNIT DIVISION: 0
UNIT DIVISION: 0
WBC # BLD: 23.9 K/UL (ref 3.5–11.3)
WBC # BLD: 24.3 K/UL (ref 3.5–11.3)
WBC # BLD: 25.8 K/UL (ref 3.5–11.3)
WBC # BLD: 27.3 K/UL (ref 3.5–11.3)

## 2022-04-23 PROCEDURE — 82330 ASSAY OF CALCIUM: CPT

## 2022-04-23 PROCEDURE — 2700000000 HC OXYGEN THERAPY PER DAY

## 2022-04-23 PROCEDURE — 33948 ECMO/ECLS DAILY MGMT-VENOUS: CPT

## 2022-04-23 PROCEDURE — 6360000002 HC RX W HCPCS: Performed by: INTERNAL MEDICINE

## 2022-04-23 PROCEDURE — 2500000003 HC RX 250 WO HCPCS: Performed by: STUDENT IN AN ORGANIZED HEALTH CARE EDUCATION/TRAINING PROGRAM

## 2022-04-23 PROCEDURE — 6370000000 HC RX 637 (ALT 250 FOR IP): Performed by: INTERNAL MEDICINE

## 2022-04-23 PROCEDURE — 85014 HEMATOCRIT: CPT

## 2022-04-23 PROCEDURE — 82803 BLOOD GASES ANY COMBINATION: CPT

## 2022-04-23 PROCEDURE — 2500000003 HC RX 250 WO HCPCS: Performed by: INTERNAL MEDICINE

## 2022-04-23 PROCEDURE — 99291 CRITICAL CARE FIRST HOUR: CPT | Performed by: INTERNAL MEDICINE

## 2022-04-23 PROCEDURE — 85347 COAGULATION TIME ACTIVATED: CPT

## 2022-04-23 PROCEDURE — 83735 ASSAY OF MAGNESIUM: CPT

## 2022-04-23 PROCEDURE — 6370000000 HC RX 637 (ALT 250 FOR IP): Performed by: STUDENT IN AN ORGANIZED HEALTH CARE EDUCATION/TRAINING PROGRAM

## 2022-04-23 PROCEDURE — 85390 FIBRINOLYSINS SCREEN I&R: CPT

## 2022-04-23 PROCEDURE — 80048 BASIC METABOLIC PNL TOTAL CA: CPT

## 2022-04-23 PROCEDURE — 85610 PROTHROMBIN TIME: CPT

## 2022-04-23 PROCEDURE — 33948 ECMO/ECLS DAILY MGMT-VENOUS: CPT | Performed by: INTERNAL MEDICINE

## 2022-04-23 PROCEDURE — 84132 ASSAY OF SERUM POTASSIUM: CPT

## 2022-04-23 PROCEDURE — 94003 VENT MGMT INPAT SUBQ DAY: CPT

## 2022-04-23 PROCEDURE — 85576 BLOOD PLATELET AGGREGATION: CPT

## 2022-04-23 PROCEDURE — 6360000002 HC RX W HCPCS: Performed by: THORACIC SURGERY (CARDIOTHORACIC VASCULAR SURGERY)

## 2022-04-23 PROCEDURE — 94640 AIRWAY INHALATION TREATMENT: CPT

## 2022-04-23 PROCEDURE — 85300 ANTITHROMBIN III ACTIVITY: CPT

## 2022-04-23 PROCEDURE — 2580000003 HC RX 258: Performed by: INTERNAL MEDICINE

## 2022-04-23 PROCEDURE — 82947 ASSAY GLUCOSE BLOOD QUANT: CPT

## 2022-04-23 PROCEDURE — 6370000000 HC RX 637 (ALT 250 FOR IP): Performed by: THORACIC SURGERY (CARDIOTHORACIC VASCULAR SURGERY)

## 2022-04-23 PROCEDURE — 2000000000 HC ICU R&B

## 2022-04-23 PROCEDURE — 99233 SBSQ HOSP IP/OBS HIGH 50: CPT | Performed by: INTERNAL MEDICINE

## 2022-04-23 PROCEDURE — 83605 ASSAY OF LACTIC ACID: CPT

## 2022-04-23 PROCEDURE — 84145 PROCALCITONIN (PCT): CPT

## 2022-04-23 PROCEDURE — C9113 INJ PANTOPRAZOLE SODIUM, VIA: HCPCS | Performed by: INTERNAL MEDICINE

## 2022-04-23 PROCEDURE — 2100000001 HC CVICU R&B

## 2022-04-23 PROCEDURE — 85027 COMPLETE CBC AUTOMATED: CPT

## 2022-04-23 PROCEDURE — 82565 ASSAY OF CREATININE: CPT

## 2022-04-23 PROCEDURE — 85730 THROMBOPLASTIN TIME PARTIAL: CPT

## 2022-04-23 PROCEDURE — 80076 HEPATIC FUNCTION PANEL: CPT

## 2022-04-23 PROCEDURE — 71045 X-RAY EXAM CHEST 1 VIEW: CPT

## 2022-04-23 PROCEDURE — 83051 HEMOGLOBIN PLASMA: CPT

## 2022-04-23 PROCEDURE — 85384 FIBRINOGEN ACTIVITY: CPT

## 2022-04-23 PROCEDURE — 94761 N-INVAS EAR/PLS OXIMETRY MLT: CPT

## 2022-04-23 RX ORDER — DEXTROSE MONOHYDRATE 25 G/50ML
25 INJECTION, SOLUTION INTRAVENOUS ONCE
Status: COMPLETED | OUTPATIENT
Start: 2022-04-23 | End: 2022-04-23

## 2022-04-23 RX ADMIN — DILTIAZEM HYDROCHLORIDE 30 MG: 30 TABLET ORAL at 05:58

## 2022-04-23 RX ADMIN — ARGATROBAN 2.9 MCG/KG/MIN: 50 INJECTION INTRAVENOUS at 11:11

## 2022-04-23 RX ADMIN — Medication 200 MCG/HR: at 08:47

## 2022-04-23 RX ADMIN — IPRATROPIUM BROMIDE AND ALBUTEROL SULFATE 1 AMPULE: .5; 2.5 SOLUTION RESPIRATORY (INHALATION) at 23:13

## 2022-04-23 RX ADMIN — ARGATROBAN 4.2 MCG/KG/MIN: 50 INJECTION INTRAVENOUS at 18:09

## 2022-04-23 RX ADMIN — OXYCODONE HYDROCHLORIDE 10 MG: 5 TABLET ORAL at 06:13

## 2022-04-23 RX ADMIN — IPRATROPIUM BROMIDE AND ALBUTEROL SULFATE 1 AMPULE: .5; 2.5 SOLUTION RESPIRATORY (INHALATION) at 03:10

## 2022-04-23 RX ADMIN — DILTIAZEM HYDROCHLORIDE 30 MG: 30 TABLET ORAL at 17:35

## 2022-04-23 RX ADMIN — Medication 200 MCG/HR: at 21:35

## 2022-04-23 RX ADMIN — Medication 30 UNITS: at 19:17

## 2022-04-23 RX ADMIN — KETAMINE HYDROCHLORIDE 0.2 MG/KG/HR: 50 INJECTION INTRAMUSCULAR; INTRAVENOUS at 12:54

## 2022-04-23 RX ADMIN — FENTANYL CITRATE 25 MCG: 50 INJECTION INTRAMUSCULAR; INTRAVENOUS at 09:37

## 2022-04-23 RX ADMIN — Medication 30 UNITS: at 06:40

## 2022-04-23 RX ADMIN — SODIUM CHLORIDE, PRESERVATIVE FREE 40 MG: 5 INJECTION INTRAVENOUS at 09:26

## 2022-04-23 RX ADMIN — Medication 25 G: at 01:25

## 2022-04-23 RX ADMIN — ARGATROBAN 1.43 MCG/KG/MIN: 50 INJECTION INTRAVENOUS at 02:19

## 2022-04-23 RX ADMIN — POLYVINYL ALCOHOL 1 DROP: 14 SOLUTION/ DROPS OPHTHALMIC at 03:20

## 2022-04-23 RX ADMIN — ARGATROBAN 4.2 MCG/KG/MIN: 50 INJECTION INTRAVENOUS at 15:05

## 2022-04-23 RX ADMIN — ARGATROBAN 4.2 MCG/KG/MIN: 50 INJECTION INTRAVENOUS at 19:38

## 2022-04-23 RX ADMIN — METHYLPREDNISOLONE SODIUM SUCCINATE 40 MG: 40 INJECTION, POWDER, FOR SOLUTION INTRAMUSCULAR; INTRAVENOUS at 09:26

## 2022-04-23 RX ADMIN — METOPROLOL TARTRATE 5 MG: 1 INJECTION, SOLUTION INTRAVENOUS at 16:11

## 2022-04-23 RX ADMIN — IPRATROPIUM BROMIDE AND ALBUTEROL SULFATE 1 AMPULE: .5; 2.5 SOLUTION RESPIRATORY (INHALATION) at 11:32

## 2022-04-23 RX ADMIN — POLYVINYL ALCOHOL 1 DROP: 14 SOLUTION/ DROPS OPHTHALMIC at 09:26

## 2022-04-23 RX ADMIN — ARGATROBAN 4.2 MCG/KG/MIN: 50 INJECTION INTRAVENOUS at 21:11

## 2022-04-23 RX ADMIN — IPRATROPIUM BROMIDE AND ALBUTEROL SULFATE 1 AMPULE: .5; 2.5 SOLUTION RESPIRATORY (INHALATION) at 19:42

## 2022-04-23 RX ADMIN — Medication 30 UNITS: at 06:42

## 2022-04-23 RX ADMIN — METHYLPREDNISOLONE SODIUM SUCCINATE 40 MG: 40 INJECTION, POWDER, FOR SOLUTION INTRAMUSCULAR; INTRAVENOUS at 01:59

## 2022-04-23 RX ADMIN — ARGATROBAN 3.5 MCG/KG/MIN: 50 INJECTION INTRAVENOUS at 13:24

## 2022-04-23 RX ADMIN — DILTIAZEM HYDROCHLORIDE 30 MG: 30 TABLET ORAL at 12:04

## 2022-04-23 RX ADMIN — POLYVINYL ALCOHOL 1 DROP: 14 SOLUTION/ DROPS OPHTHALMIC at 14:37

## 2022-04-23 RX ADMIN — AZITHROMYCIN MONOHYDRATE 500 MG: 500 INJECTION, POWDER, LYOPHILIZED, FOR SOLUTION INTRAVENOUS at 16:14

## 2022-04-23 RX ADMIN — FENTANYL CITRATE 25 MCG: 50 INJECTION INTRAMUSCULAR; INTRAVENOUS at 01:55

## 2022-04-23 RX ADMIN — POLYVINYL ALCOHOL 1 DROP: 14 SOLUTION/ DROPS OPHTHALMIC at 21:02

## 2022-04-23 RX ADMIN — ARGATROBAN 4.2 MCG/KG/MIN: 50 INJECTION INTRAVENOUS at 22:37

## 2022-04-23 RX ADMIN — ARGATROBAN 2.23 MCG/KG/MIN: 50 INJECTION INTRAVENOUS at 06:22

## 2022-04-23 RX ADMIN — Medication 30 UNITS: at 19:16

## 2022-04-23 RX ADMIN — METHYLPREDNISOLONE SODIUM SUCCINATE 40 MG: 40 INJECTION, POWDER, FOR SOLUTION INTRAMUSCULAR; INTRAVENOUS at 17:35

## 2022-04-23 RX ADMIN — Medication 4 MG/HR: at 09:34

## 2022-04-23 RX ADMIN — INSULIN HUMAN 10 UNITS: 100 INJECTION, SOLUTION PARENTERAL at 01:26

## 2022-04-23 RX ADMIN — Medication 30 UNITS: at 06:41

## 2022-04-23 RX ADMIN — OXYCODONE HYDROCHLORIDE 10 MG: 5 TABLET ORAL at 20:59

## 2022-04-23 RX ADMIN — ARGATROBAN 2.9 MCG/KG/MIN: 50 INJECTION INTRAVENOUS at 08:56

## 2022-04-23 RX ADMIN — SODIUM ZIRCONIUM CYCLOSILICATE 5 G: 5 POWDER, FOR SUSPENSION ORAL at 14:44

## 2022-04-23 RX ADMIN — FENTANYL CITRATE 25 MCG: 50 INJECTION INTRAMUSCULAR; INTRAVENOUS at 21:26

## 2022-04-23 RX ADMIN — CEFTRIAXONE SODIUM 1000 MG: 1 INJECTION, POWDER, FOR SOLUTION INTRAMUSCULAR; INTRAVENOUS at 00:39

## 2022-04-23 RX ADMIN — CALCIUM GLUCONATE 1000 MG: 20 INJECTION, SOLUTION INTRAVENOUS at 22:21

## 2022-04-23 RX ADMIN — Medication 30 UNITS: at 05:57

## 2022-04-23 RX ADMIN — Medication 30 UNITS: at 19:15

## 2022-04-23 RX ADMIN — ARGATROBAN 4.2 MCG/KG/MIN: 50 INJECTION INTRAVENOUS at 16:42

## 2022-04-23 RX ADMIN — IPRATROPIUM BROMIDE AND ALBUTEROL SULFATE 1 AMPULE: .5; 2.5 SOLUTION RESPIRATORY (INHALATION) at 07:53

## 2022-04-23 RX ADMIN — IPRATROPIUM BROMIDE AND ALBUTEROL SULFATE 1 AMPULE: .5; 2.5 SOLUTION RESPIRATORY (INHALATION) at 15:25

## 2022-04-23 RX ADMIN — SODIUM CHLORIDE, PRESERVATIVE FREE 10 ML: 5 INJECTION INTRAVENOUS at 09:29

## 2022-04-23 ASSESSMENT — PAIN SCALES - GENERAL
PAINLEVEL_OUTOF10: 0
PAINLEVEL_OUTOF10: 4

## 2022-04-23 ASSESSMENT — PULMONARY FUNCTION TESTS
PIF_VALUE: 23
PIF_VALUE: 24
PIF_VALUE: 23
PIF_VALUE: 24
PIF_VALUE: 23
PIF_VALUE: 24
PIF_VALUE: 23
PIF_VALUE: 24
PIF_VALUE: 23
PIF_VALUE: 24
PIF_VALUE: 23
PIF_VALUE: 23
PIF_VALUE: 25
PIF_VALUE: 24

## 2022-04-23 ASSESSMENT — PAIN SCALES - WONG BAKER: WONGBAKER_NUMERICALRESPONSE: 0

## 2022-04-23 NOTE — PROGRESS NOTES
ECMO DAILY ROUNDING NOTE     Patient:  Brenda Csome  MRN: [id number]  Admit date: 4/18/2022  Primary Care Physician: Mumtaz Kearns MD  Consulting Physician: Zoya Mills MD  CODE Status: Full Code  LOS: 5    [x] Team present at bedside   [x] Family updated    INDICATION:   Respiratory Failure, status asthmaticus    CANNULATION:  VenoVeno ECMO cannulation on 4/22/22  Bilateral femoral cannulas  TREATMENT GOALS:  PUMP  FLOW: 3-5L/min  pH: 7.35-7.45   SvO2: >70%  SaO2:>97%  Sedation : RASS -1 to+1    CURRENT ECMO PARAMETERS:  PUMP Patient on ECMO: On  Hours on ECMO: 25  Pump Flow (L/min): 4.29 LPM  Pump Speed (Rotations/min): 3000 RPM  ECMO Mode: V/V  Pump Mode: Cardiohelp   SWEEP GAS Sweep Flow (L/min): 4 LPM   FiO2 (%): 100 %   CIRCUIT PRESSURES Inlet Pressure (Bladder): -82 mmHg  Pre-Membrane Pressure: 143 mmHg  Post-Membrane Pressure: 120 mmHg  Delta P: 23 mmHg  SVO2 (%): 75.9 %  ECMO blood flow temperature: 98.4 °F (36.9 °C)  CVP (mmHg): 9 mmHg   CIRCUIT CHECK Heat Exchg.  Water Temp Set: 36.9  Heat Exchanger Water Temp Actual: 36.8  Heat Exchanger Water Level: Full  Unused Pigtails Cleared: Done  System Plugged to Red Outlet: Done  Emergency Backup in Use: No  Hand Crank Available: Yes  Backup Unit Blood Avail: Done  Cart Checked and Stocked: Done  Pump Battery Charged: 100 Volts  Pressure Monitors Zeroed: Done (Done on initiation of ECMO)  Pressure Limits Checked: Done  Circuit Number: 1  Back Up Circuit: Done  Back Up Console/Motor: Done (one unit with cardiohelp)  Emergency O2 Tank Available: Done  Circuits and Cannulation Sites: Done  CMAG Flow Probe Repositioned: Done  High/Low flow alarm set?: Yes  High/Low temp alarm set?: Yes  High/Low venous alarm set?: Yes  Pre-MO alarm limit: 300  Post-MO alarm limit: 300     CURRENT VENTILATOR SETTINGS:  Vent Information  Ventilator ID: [id number]  Vent Mode: AC/PC (PC set at 14)     RECENT LABS:  ABG Recent Labs     04/23/22  0035 04/23/22  0334 04/23/22  0553 04/23/22  0810 04/23/22  1014   POCPH 7.389 7.395 7.419 7.374 7.374   POCPCO2 53.5* 51.1* 46.3 53.3* 53.7*   POCPO2 58.8* 73.2* 471.3* 58.0* 58.9*   POCHCO3 32.3* 31.3* 30.0* 31.1* 31.3*   LHVM4NUV 89* 94 100* 88* 89*      VBG Recent Labs     04/22/22  1350   PHVEN 7.456*   KQQ6WUF 43.7   JYT0SBK 30.7*   O7YDQHFN 72      CBC Recent Labs     04/22/22  1151 04/22/22  1636 04/22/22 2219 04/23/22  0338 04/23/22  1008   WBC 21.7* 22.1* 24.2* 25.8* 27.3*   HGB 10.7* 10.0* 10.0* 10.1* 10.5*   HCT 34.3* 32.8* 32.6* 32.7* 33.8*    272 268 269 273      COAGS Recent Labs     04/22/22  1151 04/22/22  1415 04/22/22  1636 04/22/22  1945 04/22/22 2219 04/22/22 2219 04/23/22  0042 04/23/22  0338 04/23/22  0553 04/23/22  0830 04/23/22  1008 04/23/22  1111   INR 1.2  --  1.3  --  1.7  --   --  1.9  --   --  2.7  --    PROTIME 12.3  --  13.2*  --  17.3*  --   --  19.7*  --   --  26.9*  --    APTT >120.0*   < > 30.0   < > 39.1*   < > 38.1* 44.6* 39.5* 39.4*  --  46.0*    < > = values in this interval not displayed. TEG Recent Labs     04/22/22  1151 04/22/22  1153 04/22/22 2219   HEPTHERAPY YES YES None   REACTTIMETEG 36.2*  --  4.4*   KINETICSTEG  --   --  1.1   ANGLETEG 1.5*  --  73.6*   MAXCLOTTEG 2.2*  --  68.9   OH45EOY 0.0  --  0.0   EPLTEG 0.0  --  0.0      BMP Recent Labs     04/22/22  1151 04/22/22  1151 04/22/22  1636 04/22/22  2219 04/23/22  0338 04/23/22  0810 04/23/22  1008     --  137 140 139  --  140   K 4.6  --  5.0 5.6* 5.4*  --  5.4*     --  100 103 102  --  104   CO2 26  --  28 27 27  --  30   BUN 28*  --  33* 37* 36*  --  33*   CREATININE 0.50   < > 0.89 1.03* 0.86 1.06 0.64   GLUCOSE 167*  --  168* 142* 155*  --  145*   MG 2.8*  --  2.8* 3.0* 3.0*  --  2.8*    < > = values in this interval not displayed.       LFT Recent Labs     04/22/22  1636 04/23/22  0338   PROT 5.8* 5.8*   LABALBU 3.5 3.3*   ALT 69* 98*   AST 52* 64*   ALKPHOS 49 50   BILITOT 0.58 0.46 INFLAMMATORY MARKERS No results for input(s): CRP, FERRITIN, LDH in the last 72 hours. Invalid input(s):  ESR   CARDIAC No results for input(s): CKTOTAL, CKMB, CKMBINDEX, TROPONINI, BNP, PROBNP in the last 72 hours. Invalid input(s): TROPONIN, HSTROP   LACTIC ACID No results for input(s): LACTA in the last 72 hours. TRIGLYCERIDE No results for input(s): TRIG in the last 72 hours.      ASSESSMENT AND SYSTEM BASED PLAN (MEDICAL DECISION MAKING):    Neurologic                                                  [x] Sedation/paralytic requirement reviewed                              [x] Neurological assessment; not done, patient sedated with propofol and fentanyl currently     Cardiovascular                               [x] Cannulas secured, dsg dry and intact, no drainage or hematoma noted                             [x] ECMO Pump flow/pressures reviewed                             [x] Telemetry reviewed                             [x] Hemodynamic parameters stable    Pulmonary                             [x] Blood gases (Patient/Circuit) reviewed                             [x] Sweep/ settings reviewed                             [x] Vent Settings reviewed                             [x] Chest X-ray reviewed    Genitourinary                               [x] Intake/Output reviewed                             [x] Need for continuous IV fluids assessed, recommended diuresis with Lasix                             [x] Need for diuresis/renal replacement therapy reviewed    Gastrointestinal                              [x] GI Prophylaxis reviewed                              [x] Enteral nutrition reviewed    Hematology                             [x] Anticoagulation: Argatroban                            [x] Reviewed CBC, coagulation profile, ACT, TEG and platelet count                            [x] Transfusion needs reviewed                    Infectious disease                           [x] Reviewed T-max, white count and cultures results                           [x] Antimicrobials reviewed; continue ceftriaxone    Endocrine                            [x] Reviewed glycemic control                           [x] Reviewed need for steroids; continue Solu-Medrol    Others                           [x] Reviewed need for restrains                           [x] Reviewed need for lines/drains/invasive devices                           [x] Skin/Wound care                           [x] Reviewed current Medications list/orders                           [x] Reviewed goals of care                   OVERALL CONDITION:   Stable    Zane Menjivar MD  Pulmonary & Critical care Medicine  4/23/2022

## 2022-04-23 NOTE — PROGRESS NOTES
PULMONARY & CRITICAL CARE MEDICINE PROGRESS NOTE     Patient:  Natali Parnell  MRN: [id number]  Admit date: 2022  Primary Care Physician: Yao Patel MD  Consulting Physician: Tierra Elias MD  CODE Status: Full Code  LOS: 5     SUBJECTIVE     CHIEF COMPLAINT/REASON FOR INITIAL CONSULT: Acute respiratory failure    BRIEF HOSPITAL COURSE:   The patient is a 23 y.o. female with past medical history of asthma was initially admitted to [facility] with acute exacerbation. She was initially put on nonrebreather, subsequently required intubation and initiation of mechanical ventilation due to worsening respiratory status. Patient was also on bicarb drip which was discontinued on arrival to [place].  She is sedated, paralyzed and mechanically ventilated. ABG shows improvement in respiratory acidosis and oxygenation. INTERVAL HISTORY:  22  Currently on VV ECMO. Rest settings on the ventilator  Off Nimbex  Opening eyes  Moving extremities  Sedation with Versed and fentanyl  Urine output 100 mL an hour, even I's and O's. X-ray chest reviewed endotracheal tube in place. She had right mainstem intubation which was corrected. Left lung atelectasis is better but still left lower lobe atelectasis and right basal atelectasis is present  REVIEW OF SYSTEMS:  Unobtainable from patient due to sedation/mechanical ventilation    OBJECTIVE     VENTILATOR SETTINGS:  Vent Information  Ventilator ID: [id number]  Vent Mode: AC/PC (PC set at 14)     PaO2/FiO2 RATIO:  Recent Labs     22  1014   POCPO2 58.9*      FiO2 : 40 %     VITAL SIGNS:   LAST:  /67   Pulse 83   Temp 98.4 °F (36.9 °C) (Bladder)   Resp 18   Ht 5' 3\" (1.6 m)   Wt (!) 302 lb 1.6 oz (137 kg)   SpO2 92%   BMI 53.51 kg/m²   8-24 HR RANGE:  TEMP Temp  Av.5 °F (36.9 °C)  Min: 98.2 °F (36.8 °C)  Max: 98.6 °F (37 °C)   BP No data recorded. No data recorded.      PULSE Pulse  Av.6  Min: 75  Max: 95   RR Resp  Avg: 15.5  Min: 11  Max: 18   O2 SAT SpO2  Av.8 %  Min: 91 %  Max: 93 %   OXYGEN DELIVERY No data recorded      Exam  Morbid obesity  Endotracheal tube in place  Poor air entry bilaterally with rhonchi and wheezing. Abdomen soft obese nontender  Right NG right nasal passage  Lower extremity edema  Bilateral femoral cannulas  Upper extremity noted    DATA REVIEW     Medications:  Scheduled Meds:   heparin flush (PF)  3 mL IntraVENous Q12H    heparin flush (PF)  3 mL IntraVENous Q12H    heparin flush (PF)  3 mL IntraVENous Q12H    heparin flush (PF)  3 mL IntraVENous Q12H    albumin human  25 g IntraVENous Once    pantoprazole (PROTONIX) 40 mg injection  40 mg IntraVENous Daily    cefTRIAXone (ROCEPHIN) IV  1,000 mg IntraVENous Q24H    azithromycin  500 mg IntraVENous Q24H    methylPREDNISolone  40 mg IntraVENous Q8H    dilTIAZem  30 mg Oral 4 times per day    polyvinyl alcohol  1 drop Both Eyes Q6H    ipratropium-albuterol  1 ampule Inhalation Q4H    insulin lispro  0-12 Units SubCUTAneous Q6H    sodium chloride flush  5-40 mL IntraVENous 2 times per day     Continuous Infusions:   ketamine (KETALAR) 2 mg/mL infusion for analgosedation      argatroban infusion 2.9 mcg/kg/min (22 1111)    midazolam 4 mg/hr (22 0934)    fentaNYL 50 mcg/mL 200 mcg/hr (22 0847)    cisatracurium (NIMBEX) infusion Stopped (22 1330)    dextrose      sodium chloride 6 mL/hr at 22 1528       INPUT/OUTPUT:  In: 2543.5 [I.V.:1439.4;  Other:20; NG/GT:290]  Out:  [Urine:2895]  Date 22 0000 - 22   Shift 0801-0116 4967-9369 3276-1157 24 Hour Total   INTAKE   I.V.(mL/kg) 309.1(2.3) 51.3(0.4)  360.4(2.6)   NG/GT(mL/kg) 150(1.1)   150(1.1)   IV Piggyback(mL/kg) 50(0.4)   50(0.4)   Shift Total(mL/kg) 509.1(3.7) 51.3(0.4)  560.4(4.1)   OUTPUT   Urine(mL/kg/hr) 825(0.8) 435  1260   Emesis/NG output(mL/kg) 300(2.2)   300(2.2)   Shift Total(mL/kg) 1125(8.2) 435(3.2) 3646(42.8)   Weight (kg) 137 137 137 137        LABS:  ABGs:   Recent Labs     04/23/22  0035 04/23/22  0334 04/23/22  0553 04/23/22  0810 04/23/22  1014   POCPH 7.389 7.395 7.419 7.374 7.374   POCPCO2 53.5* 51.1* 46.3 53.3* 53.7*   POCPO2 58.8* 73.2* 471.3* 58.0* 58.9*   POCHCO3 32.3* 31.3* 30.0* 31.1* 31.3*   WIBM3DFS 89* 94 100* 88* 89*     CBC:   Recent Labs     04/21/22  0526 04/21/22  1741 04/22/22  0511 04/22/22  0511 04/22/22  1151 04/22/22  1636 04/22/22  2219 04/23/22  0338 04/23/22  1008   WBC 15.7*  --  17.7*   < > 21.7* 22.1* 24.2* 25.8* 27.3*   HGB 10.6*   < > 12.0   < > 10.7* 10.0* 10.0* 10.1* 10.5*   HCT 34.6*   < > 38.3   < > 34.3* 32.8* 32.6* 32.7* 33.8*   MCV 87.4  --  87.4   < > 89.1 89.4 89.3 89.8 89.7     --  254   < > 275 272 268 269 273   LYMPHOPCT 12*  --  5*  --   --   --   --   --   --    RBC 3.96  --  4.38   < > 3.85* 3.67* 3.65* 3.64* 3.77*   MCH 26.8  --  27.4   < > 27.8 27.2 27.4 27.7 27.9   MCHC 30.6  --  31.3   < > 31.2 30.5 30.7 30.9 31.1   RDW 14.0  --  13.4   < > 13.2 13.3 13.5 13.5 13.6    < > = values in this interval not displayed. CRP:   No results for input(s): CRP in the last 72 hours. LDH:   No results for input(s): LDH in the last 72 hours. BMP:   Recent Labs     04/22/22  1151 04/22/22  1151 04/22/22  1636 04/22/22  2219 04/23/22  0338 04/23/22  0810 04/23/22  1008     --  137 140 139  --  140   K 4.6  --  5.0 5.6* 5.4*  --  5.4*     --  100 103 102  --  104   CO2 26  --  28 27 27  --  30   BUN 28*  --  33* 37* 36*  --  33*   CREATININE 0.50   < > 0.89 1.03* 0.86 1.06 0.64   GLUCOSE 167*  --  168* 142* 155*  --  145*    < > = values in this interval not displayed.      Liver Function Test:   Recent Labs     04/22/22  1636 04/23/22  0338   PROT 5.8* 5.8*   LABALBU 3.5 3.3*   ALT 69* 98*   AST 52* 64*   ALKPHOS 49 50   BILITOT 0.58 0.46     Coagulation Profile:   Recent Labs     04/22/22  1151 04/22/22  1415 04/22/22  1636 04/22/22  1945 04/22/22  2219 04/22/22  2219 04/23/22  0042 04/23/22  0338 04/23/22  0553 04/23/22  0830 04/23/22  1008 04/23/22  1111   INR 1.2  --  1.3  --  1.7  --   --  1.9  --   --  2.7  --    PROTIME 12.3  --  13.2*  --  17.3*  --   --  19.7*  --   --  26.9*  --    APTT >120.0*   < > 30.0   < > 39.1*   < > 38.1* 44.6* 39.5* 39.4*  --  46.0*    < > = values in this interval not displayed. D-Dimer:  No results for input(s): DDIMER in the last 72 hours. Lactic Acid:  No results for input(s): LACTA in the last 72 hours. Cardiac Enzymes:  No results for input(s): CKTOTAL, CKMB, CKMBINDEX, TROPONINI in the last 72 hours. Invalid input(s): TROPONIN, HSTROP  BNP/ProBNP:   No results for input(s): BNP, PROBNP in the last 72 hours. Triglycerides:  No results for input(s): TRIG in the last 72 hours. Microbiology:  Urine Culture:  No components found for: CURINE  Blood Culture:  No components found for: CBLOOD, CFUNGUSBL  Sputum Culture:  No components found for: CSPUTUM  Recent Labs     04/21/22  1507   [facility] . BLOOD  . BLOOD   SPECIAL 6ML  R HAND 20ML   CULTURE NO GROWTH 1 DAY  NO GROWTH 1 DAY     Recent Labs     04/21/22  1507   SPECDESC . BLOOD  . BLOOD   SPECIAL 6ML  R HAND 20ML   CULTURE NO GROWTH 1 DAY  NO GROWTH 1 DAY        Pathology:    Radiology Reports:  XR CHEST PORTABLE   Preliminary Result   Lines and tubes are unchanged. Low lung volumes. Cardiomegaly. XR CHEST PORTABLE   Final Result   1. Endotracheal tube tip is now 1 cm above the soto. Recommend retraction   by an additional 2.5 cm for optimal positioning. 2.  Improved aeration in the left lung with continued atelectatic/airspace   infiltrates. XR CHEST PORTABLE   Final Result   Endotracheal tube tip is in the right main bronchus and should be retracted   approximately 4-5 cm. Tips of ECMO cannulas project in the expected location of the IVC.       Findings were discussed with Soraida Guillermo RN (the patient's ICU nurse)   at 2:26 pm on 4/22/2022. XR CHEST (SINGLE VIEW FRONTAL)   Final Result   1. Support tubes and lines are unchanged. 2. Stable bibasilar lung infiltrates, right side greater than left. This may   be related to atelectasis and/or pleural effusion. Superimposed pneumonia   cannot be excluded. XR CHEST (SINGLE VIEW FRONTAL)   Final Result   Suboptimal position left IJ catheter. Consider removal and replacement or   repositioning. XR CHEST PORTABLE   Final Result   1. On today's exam the endotracheal tube tip is positioned 1.5 cm above the   soto. Recommend retraction by an additional 2 cm for optimal positioning. 2.  Interval development of right lower lobe airspace disease. This could   represent atelectasis         XR CHEST PORTABLE   Final Result   1. Endotracheal and enteric tubes as above. 2. Diffuse interstitial opacities throughout both lungs, right greater than   left. No new focal airspace consolidation. Follow-up is recommended to   document resolution. XR CHEST (SINGLE VIEW FRONTAL)   Final Result   Diffuse interstitial opacities with alveolar/consolidative opacities at the   bases favoring multifocal airspace disease. XR CHEST PORTABLE    (Results Pending)        Echocardiogram:   Results for orders placed during the hospital encounter of 04/18/22    ECHO Complete 2D W Doppler W Color      Summary  Left ventricle is normal in size, global left ventricular systolic function  is preserved, estimated ejection fraction is 50%. There appears to be some apical hypokinesis, in the apical views. Evidence of diastolic dysfunction. Trivial mitral regurgitation. Trivial pulmonic insufficiency. IVC dilated but unable to assess respiratory collapse due to patient on  ventilator.        ASSESSMENT AND PLAN     Assessment:    // Acute hypoxic/hypercapnic respiratory failure due to status asthmaticus, on VV ECMO/mechanical ventilation  // Acute respiratory acidosis,  // Rhino/enterovirus infection  // Leukocytosis  // Hyperglycemia  // Elevated troponin  // Morbid obesity    Plan:    I personally interviewed/examined the patient; reviewed interval history, interpreted all available radiographic and laboratory data at the time of service.  Continue sedation with Versed and fentanyl. Can add ketamine if needed.  Restraints renewed.  Continue IV steroids.  Continue bronchodilator therapy.  Patient continues to be in bronchospasm.  Continue VV ECMO.  Blood pressure is stable.  Give 20 mg IV Lasix if urine output less than 50 mL/h for 2 consecutive hours.  Start tube feeds.  Will evaluate for bronchoscopy tomorrow, but do not want to worsen her bronchospasm.  Continue lung protective mechanical ventilation at rest settings   Monitor endotracheal secretions    Obtain X-ray chest daily   Continue to monitor I/O with a goal of even/negative fluid balance   Stress ulcer prophylaxis   Chemical DVT prophylaxis; not indicated patient on systemic anticoagulation-with argatroban   Antimicrobials reviewed; continue ceftriaxone/azithromycin   Glycemic control appropriate; sliding scale insulin   Continue IV Solu-Medrol at current dose   Skin/wound care reviewed with the nursing staff   Physical/occupational therapy    Discussed with ECMO staff, nursing staff. The patient remains critically ill with illness/injury that acutely impairs one or more vital organ systems, such that there is a high probability of imminent or life threatening deterioration in the patient's condition. Critical care time of 40 minutes was spent (excluding procedures), in coordination of care during bedside rounds and discussion of patient care in detail, and recommendations of the team were adopted in the plan. Necessity of all invasive devices was also confirmed.      Magdi Pinzon MD  Pulmonary and Critical Care Medicine           4/23/2022, 11:33 AM    Please note that this chart was generated using voice recognition Dragon dictation software. Although every effort was made to ensure the accuracy of this automated transcription, some errors in transcription may have occurred.

## 2022-04-23 NOTE — PROGRESS NOTES
[facility]  Internal Medicine Teaching Residency Program  Inpatient Daily Progress Note  ______________________________________________________________________________    Patient: Brenda Cosme  YOB: 2000   ZBY:[id number]    Acct: [de-identified]     Room: [address] [place] [address]  Admit date: 4/18/2022  Today's date: 04/23/22  Number of days in the hospital: 5    SUBJECTIVE   Admitting Diagnosis: Acute asthma exacerbation  CC: Shortness of breath    Pt examined at bedside. Chart & results reviewed. -Patient had concern with low urine output yesterday, 1 dose of Lasix IV was ordered however Osullivan was exchanged and urine output increased to 1.2 L since change of Osullivan. Lasix was not given  - Vent settings addressed with ECMO  -Sedation: Fentanyl at 200 MCG per hour, propofol at 5 MCG per KG per minute, Versed at 4 mg/h  -Not requiring any pressors, hemodynamically stable with heart rate in 80s  -Off paralytics  -On argatroban infusion with ECMO   -Patient had worsening creatinine yesterday with 1.03, after Osullivan exchange, her creatinine improved 0.64. Patient's potassium has been high 5.6-->5.4-->5.4. We will repeat afternoon labs, will follow up  -Currently on IV Solu-Medrol 40 every 8 hours  -Pulmonology/critical care following for vent management  -Labs reviewed: K5.4, BUN 33, ionized calcium 1.08<--replaced; WBC 27.3<--25.8 (likely from steroid use),   -ECMO management per CT surgery    ROS:  Unable to assess due to patient's being on ventilator and sedation. BRIEF HISTORY     22 y. o. female with a past medical history of bronchial asthma who was admitted to [facility] with an acute exacerbation causing increasing shortness of breath.  Patient was found to be hypoxemic with hypercarbic respiratory failure requiring intubation and mechanical ventilation.  Patient was transferred to [facility] for further elevation with possibility of ECMO.  On arrival patient was found to be on a bicarb drip due to presumed respiratory acidosis but this was discontinued due to to worsening of the hypercarbia.  Additionally patient was started on Nimbex due to the necessity for paralyzation as she was breathing over the vent.  Pulmonology and cardiothoracic surgery were consulted- plan is for ECMO. In the MICU, patient remained stable, had an episode of dark emesis which turned out to be bilious output, Hb remained stable. She was hypertensive, started on lopressor IV 5 mg PRN. Had left IJ placed which was malpositioned, removed and then CVC in left femoral was placed overnight. She was hypoxic, requiring higher FiO2 of 80%, PEEP 16, RR 32, , blood gas showed pH 7.449, pCO2 41.7, pCO2 59. 1. treated with solumedrol, bronchodilators, CXR showed no pneumothorax or pleural effusion. On  overnight, patient became more hypoxic with increasing oxygen requirement, increasing wheezing on exam. It was determined to go with ECMO canulation on . OBJECTIVE     Vital Signs:  /67   Pulse 83   Temp 98.4 °F (36.9 °C) (Bladder)   Resp 16   Ht 5' 3\" (1.6 m)   Wt (!) 302 lb 1.6 oz (137 kg)   SpO2 92%   BMI 53.51 kg/m²     Temp (24hrs), Av.7 °F (36.5 °C), Min:75.8 °F (24.3 °C), Max:98.6 °F (37 °C)    In: 2492.2   Out: 2760 [Urine:2460]    Physical Exam:  Constitutional: This is a well developed, well nourished, Greater than 40 - Morbid Obesity / Extreme Obesity / Grade III 23y.o. year old female who is on ECMO and mechanically ventilator. EENT:  PERRLA. No conjunctival injections. Septum was midline, mucosa was without erythema, exudates or cobblestoning. No thrush was noted. Neck: Supple without thyromegaly. No elevated JVP. Trachea was midline. Respiratory: Chest was symmetrical without dullness to percussion. Breath sounds bilaterally were clear to auscultation. There were no wheezes, rhonchi or rales.  There is no intercostal retraction or use of accessory muscles. No egophony noted. Cardiovascular: Regular without murmur, clicks, gallops or rubs. Abdomen: Slightly rounded and soft without organomegaly. No rebound, rigidity or guarding was appreciated. Lymphatic: No lymphadenopathy. Musculoskeletal: Normal curvature of the spine. No gross muscle weakness. Extremities:  No lower extremity edema, ulcerations, tenderness, varicosities or erythema. Muscle size, tone and strength are normal.  No involuntary movements are noted. Skin:  Warm and dry. Good color, turgor and pigmentation. No lesions or scars.   No cyanosis or clubbing  Neurological/Psychiatric: On sedation, unable to assess neuro function       Medications:  Scheduled Medications:    heparin flush (PF)  3 mL IntraVENous Q12H    heparin flush (PF)  3 mL IntraVENous Q12H    heparin flush (PF)  3 mL IntraVENous Q12H    heparin flush (PF)  3 mL IntraVENous Q12H    albumin human  25 g IntraVENous Once    pantoprazole (PROTONIX) 40 mg injection  40 mg IntraVENous Daily    cefTRIAXone (ROCEPHIN) IV  1,000 mg IntraVENous Q24H    azithromycin  500 mg IntraVENous Q24H    methylPREDNISolone  40 mg IntraVENous Q8H    dilTIAZem  30 mg Oral 4 times per day    polyvinyl alcohol  1 drop Both Eyes Q6H    ipratropium-albuterol  1 ampule Inhalation Q4H    insulin lispro  0-12 Units SubCUTAneous Q6H    sodium chloride flush  5-40 mL IntraVENous 2 times per day     Continuous Infusions:    argatroban infusion 2.23 mcg/kg/min (04/23/22 0622)    midazolam 5 mg/hr (04/23/22 0622)    fentaNYL 50 mcg/mL 200 mcg/hr (04/23/22 0622)    cisatracurium (NIMBEX) infusion Stopped (04/20/22 1330)    propofol 5 mcg/kg/min (04/23/22 0622)    dextrose      sodium chloride 6 mL/hr at 04/22/22 1528     PRN Medicationsalbuterol, 2.5 mg, Q4H PRN  oxyCODONE, 10 mg, Q4H PRN  heparin flush (PF), 3 mL, PRN  calcium gluconate-NaCl, 1,000 mg, PRN  fentanNYL, 25 mcg, Q1H PRN  midazolam, 2 mg, Q2H PRN  metoprolol, 5 mg, Q6H PRN  glucose, 15 g, PRN  dextrose, 12.5 g, PRN  glucagon (rDNA), 1 mg, PRN  dextrose, 100 mL/hr, PRN  sodium chloride flush, 5-40 mL, PRN  sodium chloride, , PRN  ondansetron, 4 mg, Q8H PRN   Or  ondansetron, 4 mg, Q6H PRN  polyethylene glycol, 17 g, Daily PRN  acetaminophen, 650 mg, Q6H PRN   Or  acetaminophen, 650 mg, Q6H PRN        Diagnostic Labs:  CBC:   Recent Labs     04/22/22 1636 04/22/22 2219 04/23/22 0338   WBC 22.1* 24.2* 25.8*   RBC 3.67* 3.65* 3.64*   HGB 10.0* 10.0* 10.1*   HCT 32.8* 32.6* 32.7*   MCV 89.4 89.3 89.8   RDW 13.3 13.5 13.5    268 269     BMP:   Recent Labs     04/22/22 1636 04/22/22 2219 04/23/22 0338    140 139   K 5.0 5.6* 5.4*    103 102   CO2 28 27 27   BUN 33* 37* 36*   CREATININE 0.89 1.03* 0.86     BNP: No results for input(s): BNP in the last 72 hours. PT/INR:   Recent Labs     04/22/22 1636 04/22/22 2219 04/23/22 0338   PROTIME 13.2* 17.3* 19.7*   INR 1.3 1.7 1.9     APTT:   Recent Labs     04/23/22  0042 04/23/22  0338 04/23/22  0553   APTT 38.1* 44.6* 39.5*     CARDIAC ENZYMES: No results for input(s): CKMB, CKMBINDEX, TROPONINI in the last 72 hours. Invalid input(s): CKTOTAL;3  FASTING LIPID PANEL:No results found for: CHOL, HDL, TRIG  LIVER PROFILE:   Recent Labs     04/22/22 1636 04/23/22  0338   AST 52* 64*   ALT 69* 98*   BILIDIR 0.28 0.24   BILITOT 0.58 0.46   ALKPHOS 49 50      MICROBIOLOGY:   Lab Results   Component Value Date/Time    CULTURE NO GROWTH 1 DAY 04/21/2022 03:07 PM    CULTURE NO GROWTH 1 DAY 04/21/2022 03:07 PM       Imaging:    XR CHEST (SINGLE VIEW FRONTAL)    Result Date: 4/22/2022  1. Support tubes and lines are unchanged. 2. Stable bibasilar lung infiltrates, right side greater than left. This may be related to atelectasis and/or pleural effusion. Superimposed pneumonia cannot be excluded. XR CHEST (SINGLE VIEW FRONTAL)    Result Date: 4/21/2022  Suboptimal position left IJ catheter. Consider removal and replacement or repositioning. XR CHEST (SINGLE VIEW FRONTAL)    Result Date: 4/18/2022  Diffuse interstitial opacities with alveolar/consolidative opacities at the bases favoring multifocal airspace disease. XR CHEST PORTABLE    Result Date: 4/22/2022  Endotracheal tube tip is in the right main bronchus and should be retracted approximately 4-5 cm. Tips of ECMO cannulas project in the expected location of the IVC. Findings were discussed with Quynh Lorenzo RN (the patient's ICU nurse) at 2:26 pm on 4/22/2022. XR CHEST PORTABLE    Result Date: 4/22/2022  1. Endotracheal tube tip is now 1 cm above the soto. Recommend retraction by an additional 2.5 cm for optimal positioning. 2.  Improved aeration in the left lung with continued atelectatic/airspace infiltrates. XR CHEST PORTABLE    Result Date: 4/21/2022  1. On today's exam the endotracheal tube tip is positioned 1.5 cm above the soto. Recommend retraction by an additional 2 cm for optimal positioning. 2.  Interval development of right lower lobe airspace disease. This could represent atelectasis     XR CHEST PORTABLE    Result Date: 4/19/2022  1. Endotracheal and enteric tubes as above. 2. Diffuse interstitial opacities throughout both lungs, right greater than left. No new focal airspace consolidation. Follow-up is recommended to document resolution. ASSESSMENT & PLAN     ASSESSMENT / PLAN:     Principal Problem:    Acute asthma exacerbation  Active Problems:    History of seizures    Hypercapnic respiratory failure (HCC)    Endotracheally intubated    On mechanically assisted ventilation (HCC)    Diastolic dysfunction  Resolved Problems:    * No resolved hospital problems. *    1.  Acute hypoxic hypercapnic respiratory failure on mechanical ventilation and s/p ECMO cannulation on 4/22/22 secondary asthma exacerbation: Continue SoluMedrol 40 mg IV q8 hours, Continue IV Rocephin and IV Azithromycin until 4/24. Duoneb q4 hours and Albuterol as needed. Pulm. Critical care following for ventilator management.     2. Troponin elevation type II demand ischemia: Continue Cardizem 30 mg q6 hours per cardiology. Cardiology to follow from a distance, once extubated, will contact cardiology for stress test prior to discharge due to apical hypokinesis on echo. Troponin flat trend.      3. Sinus tachycardia:- Controlled Continue Cardizem 30 mg q6 hours per Cardiology.         Diet: On tube feeds  DVT ppx : On Argatroban infusion  GI ppx: Protonix      PT/OT: Consulted  Discharge Planning / SW:  consulted, will follow up once patient is extubated and ECMo cannulation removed and stable     Lor Alfonso MD  Internal Medicine Resident, PGY-1  [address], [place]  4/23/2022, 7:22 AM

## 2022-04-23 NOTE — PLAN OF CARE
Problem: OXYGENATION/RESPIRATORY FUNCTION  Goal: Patient will maintain patent airway  4/23/2022 0801 by El Lee RCP  Outcome: Progressing     Problem: OXYGENATION/RESPIRATORY FUNCTION  Goal: Patient will achieve/maintain normal respiratory rate/effort  Description: Respiratory rate and effort will be within normal limits for the patient  4/23/2022 0801 by El Lee RCP  Outcome: Progressing     Problem: MECHANICAL VENTILATION  Goal: Patient will maintain patent airway  4/23/2022 0801 by El Lee RCP  Outcome: Progressing     Problem: MECHANICAL VENTILATION  Goal: Oral health is maintained or improved  4/23/2022 0801 by El Lee RCP  Outcome: Progressing     Problem: MECHANICAL VENTILATION  Goal: ET tube will be managed safely  4/23/2022 0801 by El Lee RCP  Outcome: Progressing

## 2022-04-23 NOTE — PLAN OF CARE
Problem: Gas Exchange - Impaired:  Goal: Levels of oxygenation will improve  Description: Levels of oxygenation will improve  Outcome: Progressing     Problem:  Activity:  Goal: Ability to tolerate increased activity will improve  Description: Ability to tolerate increased activity will improve  Outcome: Progressing     Problem: Cardiac:  Goal: Hemodynamic stability will improve  Description: Hemodynamic stability will improve  4/23/2022 0211 by Wilian Rahman RN  Outcome: Progressing  4/22/2022 1812 by Zuleyka Butler RN  Outcome: Progressing     Problem: Respiratory:  Goal: Ability to maintain a clear airway will improve  Description: Ability to maintain a clear airway will improve  4/23/2022 0211 by Wilian Rahman RN  Outcome: Progressing  4/22/2022 1812 by Zuleyka Butler RN  Outcome: Progressing  Goal: Ability to maintain adequate ventilation will improve  Description: Ability to maintain adequate ventilation will improve  4/23/2022 0211 by Wilian Rahman RN  Outcome: Progressing  4/22/2022 1812 by Zuleyka Butler RN  Outcome: Progressing  Goal: Complications related to the disease process, condition or treatment will be avoided or minimized  Description: Complications related to the disease process, condition or treatment will be avoided or minimized  4/23/2022 0211 by Wilian Rahman RN  Outcome: Progressing  4/22/2022 1812 by Zuleyka Butler RN  Outcome: Progressing     Problem: Skin Integrity:  Goal: Risk for impaired skin integrity will decrease  Description: Risk for impaired skin integrity will decrease  4/23/2022 0211 by Wilian Rahman RN  Outcome: Progressing  4/22/2022 1812 by Zuleyka Butler RN  Outcome: Progressing     Problem: OXYGENATION/RESPIRATORY FUNCTION  Goal: Patient will maintain patent airway  Outcome: Progressing  Goal: Patient will achieve/maintain normal respiratory rate/effort  Description: Respiratory rate and effort will be within normal limits for the patient  Outcome: Progressing     Problem: MECHANICAL VENTILATION  Goal: Patient will maintain patent airway  Outcome: Progressing  Goal: Oral health is maintained or improved  Outcome: Progressing  Goal: ET tube will be managed safely  Outcome: Progressing     Problem: Nutrition  Goal: Optimal nutrition therapy  4/23/2022 0211 by Jaime Guadalupe RN  Outcome: Progressing  4/22/2022 1812 by Kinga Parsons RN  Outcome: Progressing     Problem: Discharge Planning  Goal: Discharge to home or other facility with appropriate resources  Outcome: Progressing     Problem: Genitourinary - Adult  Goal: Urinary catheter remains patent  Outcome: Progressing     Problem: Neurosensory - Adult  Goal: Achieves stable or improved neurological status  Outcome: Progressing  Goal: Achieves maximal functionality and self care  Outcome: Progressing     Problem: Safety - Medical Restraint  Goal: Remains free of injury from restraints (Restraint for Interference with Medical Device)  Description: INTERVENTIONS:  1. Determine that other, less restrictive measures have been tried or would not be effective before applying the restraint  2. Evaluate the patient's condition at the time of restraint application  3. Inform patient/family regarding the reason for restraint  4. Q2H: Monitor safety, psychosocial status, comfort, nutrition and hydration  4/23/2022 0211 by Jaime Guadalupe RN  Outcome: Progressing  4/22/2022 1812 by Kinga Parsons RN  Outcome: Progressing     Problem: Pain  Goal: Verbalizes/displays adequate comfort level or baseline comfort level  4/23/2022 0211 by Jaime Guadalupe RN  Outcome: Progressing  4/22/2022 1812 by Kinga Parsons RN  Outcome: Progressing     Problem: Safety - Adult  Goal: Free from fall injury  Outcome: Progressing     Problem: ABCDS Injury Assessment  Goal: Absence of physical injury  Outcome: Progressing     Problem: Skin/Tissue Integrity  Goal: Absence of new skin breakdown  Description: 1.   Monitor for areas of redness and/or skin breakdown  2. Assess vascular access sites hourly  3. Every 4-6 hours minimum:  Change oxygen saturation probe site  4. Every 4-6 hours:  If on nasal continuous positive airway pressure, respiratory therapy assess nares and determine need for appliance change or resting period.   Outcome: Progressing

## 2022-04-23 NOTE — PROGRESS NOTES
Ecmo End of Shift Note:       Cannulation date/time: 4/22/22  ECMO type:V/V  Cannula sizes/locations: 25fr LFV / 23Fr RFV  Flow ordered at:4-6 LPM       Currently flowing at:4.4       FIO2 at:100       Sweep at:4  Delta pressure:25  Clot burden:        Oxygenator: small clots at 12, 3 & 9 o'clock on MO       Circuit: None  Anticoagulation: Argatroban @ 2.23 Mcg/kg/min  Products given:        PRBC's: 0       PLT's: 0       FFP: 0       Cryo: 0       Albumin: 0    No issues this shift.  Titrated argatroban through out shift, next PTT @ 0800

## 2022-04-23 NOTE — PROGRESS NOTES
Ecmo End of Shift Note:       Cannulation date/time: 4/22/2022 at 1005  ECMO type: VV  Cannula sizes/locations: 25Fr LFV, 23Fr RFV  Flow ordered at: 4-6 lpm       Currently flowing at: 4.22       FIO2 at: 100       Sweep at: 4  Delta pressure: 23  Clot burden:       Oxygenator: None       Circuit: None  Anticoagulation: Argatroban at 0.65 mcg/kg/min  Products given:        PRBC's: 0       PLT's: 0       FFP: 0       Cryo: 0       Albumin: 1- 5% 25g  Cannulation went without incident. Gave 1 500ml bottle albumin due to high venous pressures and CVP 5 after lasix given for decreased UO ([place] was clogged). Next PTT at 2000, q2hr until therap x 2.

## 2022-04-24 ENCOUNTER — APPOINTMENT (OUTPATIENT)
Dept: GENERAL RADIOLOGY | Age: 22
DRG: 003 | End: 2022-04-24
Attending: INTERNAL MEDICINE
Payer: COMMERCIAL

## 2022-04-24 LAB
ACTIVATED CLOTTING TIME: 203 SEC (ref 79–149)
ACTIVATED CLOTTING TIME: 271 SEC (ref 79–149)
ALBUMIN SERPL-MCNC: 3.2 G/DL (ref 3.5–5.2)
ALBUMIN SERPL-MCNC: 3.2 G/DL (ref 3.5–5.2)
ALBUMIN/GLOBULIN RATIO: 1.3 (ref 1–2.5)
ALBUMIN/GLOBULIN RATIO: 1.4 (ref 1–2.5)
ALLEN TEST: ABNORMAL
ALP BLD-CCNC: 47 U/L (ref 35–104)
ALP BLD-CCNC: 48 U/L (ref 35–104)
ALT SERPL-CCNC: 87 U/L (ref 5–33)
ALT SERPL-CCNC: 95 U/L (ref 5–33)
ANGLE TEG: 64.7 DEG (ref 53–72)
ANGLE TEG: 66 DEG (ref 53–72)
ANION GAP SERPL CALCULATED.3IONS-SCNC: 5 MMOL/L (ref 9–17)
ANION GAP SERPL CALCULATED.3IONS-SCNC: 6 MMOL/L (ref 9–17)
ANION GAP SERPL CALCULATED.3IONS-SCNC: 7 MMOL/L (ref 9–17)
ANION GAP SERPL CALCULATED.3IONS-SCNC: 7 MMOL/L (ref 9–17)
AST SERPL-CCNC: 36 U/L
AST SERPL-CCNC: 37 U/L
BILIRUB SERPL-MCNC: 0.41 MG/DL (ref 0.3–1.2)
BILIRUB SERPL-MCNC: 0.44 MG/DL (ref 0.3–1.2)
BILIRUBIN DIRECT: 0.16 MG/DL
BILIRUBIN DIRECT: 0.17 MG/DL
BILIRUBIN, INDIRECT: 0.25 MG/DL (ref 0–1)
BILIRUBIN, INDIRECT: 0.27 MG/DL (ref 0–1)
BUN BLDV-MCNC: 28 MG/DL (ref 6–20)
BUN BLDV-MCNC: 28 MG/DL (ref 6–20)
BUN BLDV-MCNC: 30 MG/DL (ref 6–20)
BUN BLDV-MCNC: 30 MG/DL (ref 6–20)
CALCIUM IONIZED: 1.01 MMOL/L (ref 1.13–1.33)
CALCIUM IONIZED: 1.13 MMOL/L (ref 1.13–1.33)
CALCIUM IONIZED: 1.14 MMOL/L (ref 1.13–1.33)
CALCIUM IONIZED: 1.15 MMOL/L (ref 1.13–1.33)
CALCIUM SERPL-MCNC: 8.1 MG/DL (ref 8.6–10.4)
CALCIUM SERPL-MCNC: 8.1 MG/DL (ref 8.6–10.4)
CALCIUM SERPL-MCNC: 8.2 MG/DL (ref 8.6–10.4)
CALCIUM SERPL-MCNC: 8.2 MG/DL (ref 8.6–10.4)
CHLORIDE BLD-SCNC: 103 MMOL/L (ref 98–107)
CHLORIDE BLD-SCNC: 104 MMOL/L (ref 98–107)
CHLORIDE BLD-SCNC: 104 MMOL/L (ref 98–107)
CHLORIDE BLD-SCNC: 105 MMOL/L (ref 98–107)
CO2: 29 MMOL/L (ref 20–31)
CO2: 30 MMOL/L (ref 20–31)
CREAT SERPL-MCNC: 0.49 MG/DL (ref 0.5–0.9)
CREAT SERPL-MCNC: 0.5 MG/DL (ref 0.5–0.9)
CREAT SERPL-MCNC: 0.54 MG/DL (ref 0.5–0.9)
CREAT SERPL-MCNC: 0.57 MG/DL (ref 0.5–0.9)
CULTURE: NORMAL
CULTURE: NORMAL
EPL-TEG: 0 % (ref 0–15)
EPL-TEG: 0 % (ref 0–15)
FIBRINOGEN: 110 MG/DL (ref 140–420)
FIBRINOGEN: 111 MG/DL (ref 140–420)
FIBRINOGEN: 111 MG/DL (ref 140–420)
FIBRINOGEN: 113 MG/DL (ref 140–420)
FIO2: 100
FIO2: 40
GFR AFRICAN AMERICAN: >60 ML/MIN
GFR NON-AFRICAN AMERICAN: >60 ML/MIN
GFR SERPL CREATININE-BSD FRML MDRD: ABNORMAL ML/MIN/{1.73_M2}
GLUCOSE BLD-MCNC: 133 MG/DL (ref 65–105)
GLUCOSE BLD-MCNC: 138 MG/DL (ref 74–100)
GLUCOSE BLD-MCNC: 143 MG/DL (ref 70–99)
GLUCOSE BLD-MCNC: 151 MG/DL (ref 70–99)
GLUCOSE BLD-MCNC: 151 MG/DL (ref 74–100)
GLUCOSE BLD-MCNC: 155 MG/DL (ref 74–100)
GLUCOSE BLD-MCNC: 162 MG/DL (ref 74–100)
GLUCOSE BLD-MCNC: 168 MG/DL (ref 70–99)
GLUCOSE BLD-MCNC: 187 MG/DL (ref 70–99)
GLUCOSE BLD-MCNC: 191 MG/DL (ref 74–100)
HCO3 VENOUS: 29.1 MMOL/L (ref 22–29)
HCO3 VENOUS: 33.4 MMOL/L (ref 22–29)
HCT VFR BLD CALC: 32.1 % (ref 36.3–47.1)
HCT VFR BLD CALC: 32.7 % (ref 36.3–47.1)
HCT VFR BLD CALC: 33.2 % (ref 36.3–47.1)
HCT VFR BLD CALC: 34.9 % (ref 36.3–47.1)
HEMOGLOBIN: 10.1 G/DL (ref 11.9–15.1)
HEMOGLOBIN: 9.6 G/DL (ref 11.9–15.1)
HEMOGLOBIN: 9.7 G/DL (ref 11.9–15.1)
HEMOGLOBIN: 9.8 G/DL (ref 11.9–15.1)
HEPARIN THERAPY: ABNORMAL
HEPARIN THERAPY: NORMAL
INR BLD: 3.1
INR BLD: 3.3
INR BLD: 3.6
INR BLD: 3.7
KINETICS TEG: 1.8 MIN (ref 1–3)
KINETICS TEG: 2 MIN (ref 1–3)
LACTIC ACID, WHOLE BLOOD: 0.7 MMOL/L (ref 0.7–2.1)
LACTIC ACID, WHOLE BLOOD: 0.8 MMOL/L (ref 0.7–2.1)
LACTIC ACID, WHOLE BLOOD: 0.9 MMOL/L (ref 0.7–2.1)
LACTIC ACID, WHOLE BLOOD: 1.1 MMOL/L (ref 0.7–2.1)
LY30 (LYSIS) TEG: 0 % (ref 0–8)
LY30 (LYSIS) TEG: 0 % (ref 0–8)
Lab: NORMAL
Lab: NORMAL
MA (MAX CLOT) TEG: 69 MM (ref 50–70)
MA (MAX CLOT) TEG: 69.8 MM (ref 50–70)
MAGNESIUM: 2.6 MG/DL (ref 1.6–2.6)
MAGNESIUM: 2.6 MG/DL (ref 1.6–2.6)
MAGNESIUM: 2.7 MG/DL (ref 1.6–2.6)
MAGNESIUM: 2.8 MG/DL (ref 1.6–2.6)
MCH RBC QN AUTO: 27.2 PG (ref 25.2–33.5)
MCH RBC QN AUTO: 27.3 PG (ref 25.2–33.5)
MCH RBC QN AUTO: 27.4 PG (ref 25.2–33.5)
MCH RBC QN AUTO: 27.6 PG (ref 25.2–33.5)
MCHC RBC AUTO-ENTMCNC: 28.9 G/DL (ref 28.4–34.8)
MCHC RBC AUTO-ENTMCNC: 29.5 G/DL (ref 28.4–34.8)
MCHC RBC AUTO-ENTMCNC: 29.7 G/DL (ref 28.4–34.8)
MCHC RBC AUTO-ENTMCNC: 29.9 G/DL (ref 28.4–34.8)
MCV RBC AUTO: 92.1 FL (ref 82.6–102.9)
MCV RBC AUTO: 92.2 FL (ref 82.6–102.9)
MCV RBC AUTO: 92.7 FL (ref 82.6–102.9)
MCV RBC AUTO: 93.8 FL (ref 82.6–102.9)
MODE: ABNORMAL
NRBC AUTOMATED: 0.3 PER 100 WBC
NRBC AUTOMATED: 0.4 PER 100 WBC
O2 DEVICE/FLOW/%: ABNORMAL
O2 SAT, VEN: 74 % (ref 60–85)
O2 SAT, VEN: 76 % (ref 60–85)
PARTIAL THROMBOPLASTIN TIME: 58.7 SEC (ref 20.5–30.5)
PARTIAL THROMBOPLASTIN TIME: 59.1 SEC (ref 20.5–30.5)
PCO2, VEN: 42.9 MM HG (ref 41–51)
PCO2, VEN: 61.1 MM HG (ref 41–51)
PDW BLD-RTO: 13.6 % (ref 11.8–14.4)
PDW BLD-RTO: 13.7 % (ref 11.8–14.4)
PERFORMING LOCATION: ABNORMAL
PERFORMING LOCATION: NORMAL
PH VENOUS: 7.34 (ref 7.32–7.43)
PH VENOUS: 7.44 (ref 7.32–7.43)
PLATELET # BLD: 223 K/UL (ref 138–453)
PLATELET # BLD: 226 K/UL (ref 138–453)
PLATELET # BLD: 232 K/UL (ref 138–453)
PLATELET # BLD: 244 K/UL (ref 138–453)
PMV BLD AUTO: 10 FL (ref 8.1–13.5)
PMV BLD AUTO: 9.8 FL (ref 8.1–13.5)
PMV BLD AUTO: 9.9 FL (ref 8.1–13.5)
PMV BLD AUTO: 9.9 FL (ref 8.1–13.5)
PO2, VEN: 37.8 MM HG (ref 30–50)
PO2, VEN: 44.2 MM HG (ref 30–50)
POC HCO3: 29.3 MMOL/L (ref 21–28)
POC HCO3: 31 MMOL/L (ref 21–28)
POC HCO3: 32.1 MMOL/L (ref 21–28)
POC HCO3: 32.3 MMOL/L (ref 21–28)
POC HCO3: 32.4 MMOL/L (ref 21–28)
POC HCO3: 32.4 MMOL/L (ref 21–28)
POC HCO3: 32.5 MMOL/L (ref 21–28)
POC HCO3: 32.9 MMOL/L (ref 21–28)
POC HCO3: 33.3 MMOL/L (ref 21–28)
POC HCO3: 33.8 MMOL/L (ref 21–28)
POC HEMATOCRIT: 28 % (ref 36–46)
POC HEMATOCRIT: 33 % (ref 36–46)
POC HEMOGLOBIN: 11.1 G/DL (ref 12–16)
POC HEMOGLOBIN: 9.5 G/DL (ref 12–16)
POC O2 SATURATION: 100 % (ref 94–98)
POC O2 SATURATION: 100 % (ref 94–98)
POC O2 SATURATION: 86 % (ref 94–98)
POC O2 SATURATION: 89 % (ref 94–98)
POC O2 SATURATION: 90 % (ref 94–98)
POC O2 SATURATION: 90 % (ref 94–98)
POC O2 SATURATION: 91 % (ref 94–98)
POC O2 SATURATION: 91 % (ref 94–98)
POC O2 SATURATION: 92 % (ref 94–98)
POC O2 SATURATION: 94 % (ref 94–98)
POC PCO2: 37.9 MM HG (ref 35–48)
POC PCO2: 44.3 MM HG (ref 35–48)
POC PCO2: 49.4 MM HG (ref 35–48)
POC PCO2: 54.8 MM HG (ref 35–48)
POC PCO2: 56.5 MM HG (ref 35–48)
POC PCO2: 57 MM HG (ref 35–48)
POC PCO2: 59.2 MM HG (ref 35–48)
POC PCO2: 61.7 MM HG (ref 35–48)
POC PH: 7.35 (ref 7.35–7.45)
POC PH: 7.35 (ref 7.35–7.45)
POC PH: 7.37 (ref 7.35–7.45)
POC PH: 7.38 (ref 7.35–7.45)
POC PH: 7.39 (ref 7.35–7.45)
POC PH: 7.42 (ref 7.35–7.45)
POC PH: 7.45 (ref 7.35–7.45)
POC PH: 7.5 (ref 7.35–7.45)
POC PO2: 390.2 MM HG (ref 83–108)
POC PO2: 489.9 MM HG (ref 83–108)
POC PO2: 49.2 MM HG (ref 83–108)
POC PO2: 58.5 MM HG (ref 83–108)
POC PO2: 58.9 MM HG (ref 83–108)
POC PO2: 62.2 MM HG (ref 83–108)
POC PO2: 64.4 MM HG (ref 83–108)
POC PO2: 64.9 MM HG (ref 83–108)
POC PO2: 69.9 MM HG (ref 83–108)
POC PO2: 76.7 MM HG (ref 83–108)
POSITIVE BASE EXCESS, ART: 5 (ref 0–3)
POSITIVE BASE EXCESS, ART: 6 (ref 0–3)
POSITIVE BASE EXCESS, ART: 7 (ref 0–3)
POSITIVE BASE EXCESS, VEN: 4 (ref 0–3)
POSITIVE BASE EXCESS, VEN: 6 (ref 0–3)
POTASSIUM SERPL-SCNC: 4.9 MMOL/L (ref 3.7–5.3)
POTASSIUM SERPL-SCNC: 4.9 MMOL/L (ref 3.7–5.3)
POTASSIUM SERPL-SCNC: 5.1 MMOL/L (ref 3.7–5.3)
POTASSIUM SERPL-SCNC: 5.3 MMOL/L (ref 3.7–5.3)
POTASSIUM SERPL-SCNC: 5.6 MMOL/L (ref 3.7–5.3)
PROTHROMBIN TIME: 30.1 SEC (ref 9.1–12.3)
PROTHROMBIN TIME: 32.3 SEC (ref 9.1–12.3)
PROTHROMBIN TIME: 35.1 SEC (ref 9.1–12.3)
PROTHROMBIN TIME: 35.3 SEC (ref 9.1–12.3)
RBC # BLD: 3.48 M/UL (ref 3.95–5.11)
RBC # BLD: 3.55 M/UL (ref 3.95–5.11)
RBC # BLD: 3.58 M/UL (ref 3.95–5.11)
RBC # BLD: 3.72 M/UL (ref 3.95–5.11)
REACTION TIME TEG: 12.2 MIN (ref 5–10)
REACTION TIME TEG: 9.8 MIN (ref 5–10)
SAMPLE SITE: ABNORMAL
SODIUM BLD-SCNC: 139 MMOL/L (ref 135–144)
SODIUM BLD-SCNC: 140 MMOL/L (ref 135–144)
SODIUM BLD-SCNC: 140 MMOL/L (ref 135–144)
SODIUM BLD-SCNC: 141 MMOL/L (ref 135–144)
SPECIMEN DESCRIPTION: NORMAL
SPECIMEN DESCRIPTION: NORMAL
TOTAL PROTEIN: 5.5 G/DL (ref 6.4–8.3)
TOTAL PROTEIN: 5.7 G/DL (ref 6.4–8.3)
WBC # BLD: 21.9 K/UL (ref 3.5–11.3)
WBC # BLD: 23.5 K/UL (ref 3.5–11.3)
WBC # BLD: 23.6 K/UL (ref 3.5–11.3)
WBC # BLD: 26.9 K/UL (ref 3.5–11.3)

## 2022-04-24 PROCEDURE — 71045 X-RAY EXAM CHEST 1 VIEW: CPT

## 2022-04-24 PROCEDURE — 0BJ08ZZ INSPECTION OF TRACHEOBRONCHIAL TREE, VIA NATURAL OR ARTIFICIAL OPENING ENDOSCOPIC: ICD-10-PCS | Performed by: INTERNAL MEDICINE

## 2022-04-24 PROCEDURE — 2580000003 HC RX 258: Performed by: INTERNAL MEDICINE

## 2022-04-24 PROCEDURE — 94003 VENT MGMT INPAT SUBQ DAY: CPT

## 2022-04-24 PROCEDURE — 6370000000 HC RX 637 (ALT 250 FOR IP): Performed by: INTERNAL MEDICINE

## 2022-04-24 PROCEDURE — 94761 N-INVAS EAR/PLS OXIMETRY MLT: CPT

## 2022-04-24 PROCEDURE — 87205 SMEAR GRAM STAIN: CPT

## 2022-04-24 PROCEDURE — 6360000002 HC RX W HCPCS: Performed by: INTERNAL MEDICINE

## 2022-04-24 PROCEDURE — 87070 CULTURE OTHR SPECIMN AEROBIC: CPT

## 2022-04-24 PROCEDURE — 2500000003 HC RX 250 WO HCPCS: Performed by: INTERNAL MEDICINE

## 2022-04-24 PROCEDURE — 80076 HEPATIC FUNCTION PANEL: CPT

## 2022-04-24 PROCEDURE — 33948 ECMO/ECLS DAILY MGMT-VENOUS: CPT | Performed by: INTERNAL MEDICINE

## 2022-04-24 PROCEDURE — 87077 CULTURE AEROBIC IDENTIFY: CPT

## 2022-04-24 PROCEDURE — 83051 HEMOGLOBIN PLASMA: CPT

## 2022-04-24 PROCEDURE — C9113 INJ PANTOPRAZOLE SODIUM, VIA: HCPCS | Performed by: INTERNAL MEDICINE

## 2022-04-24 PROCEDURE — 99233 SBSQ HOSP IP/OBS HIGH 50: CPT | Performed by: INTERNAL MEDICINE

## 2022-04-24 PROCEDURE — 82330 ASSAY OF CALCIUM: CPT

## 2022-04-24 PROCEDURE — 85610 PROTHROMBIN TIME: CPT

## 2022-04-24 PROCEDURE — 85384 FIBRINOGEN ACTIVITY: CPT

## 2022-04-24 PROCEDURE — 80048 BASIC METABOLIC PNL TOTAL CA: CPT

## 2022-04-24 PROCEDURE — 84132 ASSAY OF SERUM POTASSIUM: CPT

## 2022-04-24 PROCEDURE — 2580000003 HC RX 258: Performed by: THORACIC SURGERY (CARDIOTHORACIC VASCULAR SURGERY)

## 2022-04-24 PROCEDURE — 2100000001 HC CVICU R&B

## 2022-04-24 PROCEDURE — 83605 ASSAY OF LACTIC ACID: CPT

## 2022-04-24 PROCEDURE — 83735 ASSAY OF MAGNESIUM: CPT

## 2022-04-24 PROCEDURE — 6370000000 HC RX 637 (ALT 250 FOR IP): Performed by: THORACIC SURGERY (CARDIOTHORACIC VASCULAR SURGERY)

## 2022-04-24 PROCEDURE — 85730 THROMBOPLASTIN TIME PARTIAL: CPT

## 2022-04-24 PROCEDURE — 87186 SC STD MICRODIL/AGAR DIL: CPT

## 2022-04-24 PROCEDURE — 85027 COMPLETE CBC AUTOMATED: CPT

## 2022-04-24 PROCEDURE — 2700000000 HC OXYGEN THERAPY PER DAY

## 2022-04-24 PROCEDURE — 99291 CRITICAL CARE FIRST HOUR: CPT | Performed by: INTERNAL MEDICINE

## 2022-04-24 PROCEDURE — 85014 HEMATOCRIT: CPT

## 2022-04-24 PROCEDURE — 2500000003 HC RX 250 WO HCPCS

## 2022-04-24 PROCEDURE — 85300 ANTITHROMBIN III ACTIVITY: CPT

## 2022-04-24 PROCEDURE — 31622 DX BRONCHOSCOPE/WASH: CPT | Performed by: INTERNAL MEDICINE

## 2022-04-24 PROCEDURE — 94640 AIRWAY INHALATION TREATMENT: CPT

## 2022-04-24 PROCEDURE — 85347 COAGULATION TIME ACTIVATED: CPT

## 2022-04-24 PROCEDURE — 6360000002 HC RX W HCPCS: Performed by: THORACIC SURGERY (CARDIOTHORACIC VASCULAR SURGERY)

## 2022-04-24 PROCEDURE — 87184 SC STD DISK METHOD PER PLATE: CPT

## 2022-04-24 PROCEDURE — 82947 ASSAY GLUCOSE BLOOD QUANT: CPT

## 2022-04-24 PROCEDURE — 85390 FIBRINOLYSINS SCREEN I&R: CPT

## 2022-04-24 PROCEDURE — 2709999900 HC NON-CHARGEABLE SUPPLY

## 2022-04-24 PROCEDURE — 85576 BLOOD PLATELET AGGREGATION: CPT

## 2022-04-24 PROCEDURE — 33948 ECMO/ECLS DAILY MGMT-VENOUS: CPT

## 2022-04-24 PROCEDURE — 82803 BLOOD GASES ANY COMBINATION: CPT

## 2022-04-24 RX ORDER — ROCURONIUM BROMIDE 10 MG/ML
50 INJECTION, SOLUTION INTRAVENOUS ONCE
Status: COMPLETED | OUTPATIENT
Start: 2022-04-24 | End: 2022-04-24

## 2022-04-24 RX ORDER — FUROSEMIDE 10 MG/ML
20 INJECTION INTRAMUSCULAR; INTRAVENOUS ONCE
Status: COMPLETED | OUTPATIENT
Start: 2022-04-24 | End: 2022-04-24

## 2022-04-24 RX ORDER — FUROSEMIDE 10 MG/ML
20 INJECTION INTRAMUSCULAR; INTRAVENOUS DAILY
Status: DISCONTINUED | OUTPATIENT
Start: 2022-04-25 | End: 2022-05-02

## 2022-04-24 RX ORDER — OXYCODONE HYDROCHLORIDE 5 MG/1
10 TABLET ORAL EVERY 4 HOURS
Status: DISCONTINUED | OUTPATIENT
Start: 2022-04-24 | End: 2022-04-30

## 2022-04-24 RX ORDER — POLYETHYLENE GLYCOL 3350 17 G/17G
17 POWDER, FOR SOLUTION ORAL DAILY
Status: DISCONTINUED | OUTPATIENT
Start: 2022-04-24 | End: 2022-05-10 | Stop reason: HOSPADM

## 2022-04-24 RX ORDER — OXYCODONE HYDROCHLORIDE 5 MG/1
10 TABLET ORAL EVERY 4 HOURS
Status: CANCELLED | OUTPATIENT
Start: 2022-04-24

## 2022-04-24 RX ORDER — LIDOCAINE HYDROCHLORIDE 10 MG/ML
5 INJECTION, SOLUTION EPIDURAL; INFILTRATION; INTRACAUDAL; PERINEURAL ONCE
Status: DISCONTINUED | OUTPATIENT
Start: 2022-04-24 | End: 2022-05-10 | Stop reason: HOSPADM

## 2022-04-24 RX ORDER — LIDOCAINE HYDROCHLORIDE 10 MG/ML
INJECTION, SOLUTION INFILTRATION; PERINEURAL
Status: COMPLETED
Start: 2022-04-24 | End: 2022-04-24

## 2022-04-24 RX ADMIN — INSULIN LISPRO 2 UNITS: 100 INJECTION, SOLUTION INTRAVENOUS; SUBCUTANEOUS at 17:38

## 2022-04-24 RX ADMIN — Medication 30 UNITS: at 05:30

## 2022-04-24 RX ADMIN — OXYCODONE HYDROCHLORIDE 10 MG: 5 TABLET ORAL at 07:38

## 2022-04-24 RX ADMIN — Medication 6 MG/HR: at 04:17

## 2022-04-24 RX ADMIN — POLYVINYL ALCOHOL 1 DROP: 14 SOLUTION/ DROPS OPHTHALMIC at 21:37

## 2022-04-24 RX ADMIN — POLYVINYL ALCOHOL 1 DROP: 14 SOLUTION/ DROPS OPHTHALMIC at 15:30

## 2022-04-24 RX ADMIN — METHYLPREDNISOLONE SODIUM SUCCINATE 40 MG: 40 INJECTION, POWDER, FOR SOLUTION INTRAMUSCULAR; INTRAVENOUS at 17:29

## 2022-04-24 RX ADMIN — Medication 200 MCG/HR: at 10:07

## 2022-04-24 RX ADMIN — CALCIUM GLUCONATE 1000 MG: 20 INJECTION, SOLUTION INTRAVENOUS at 22:38

## 2022-04-24 RX ADMIN — POLYETHYLENE GLYCOL 3350 17 G: 17 POWDER, FOR SOLUTION ORAL at 11:34

## 2022-04-24 RX ADMIN — INSULIN LISPRO 2 UNITS: 100 INJECTION, SOLUTION INTRAVENOUS; SUBCUTANEOUS at 06:21

## 2022-04-24 RX ADMIN — CEFTRIAXONE SODIUM 1000 MG: 1 INJECTION, POWDER, FOR SOLUTION INTRAMUSCULAR; INTRAVENOUS at 00:25

## 2022-04-24 RX ADMIN — POLYVINYL ALCOHOL 1 DROP: 14 SOLUTION/ DROPS OPHTHALMIC at 04:48

## 2022-04-24 RX ADMIN — DILTIAZEM HYDROCHLORIDE 30 MG: 30 TABLET ORAL at 17:29

## 2022-04-24 RX ADMIN — POLYVINYL ALCOHOL 1 DROP: 14 SOLUTION/ DROPS OPHTHALMIC at 08:55

## 2022-04-24 RX ADMIN — ARGATROBAN 4.2 MCG/KG/MIN: 250 INJECTION INTRAVENOUS at 04:48

## 2022-04-24 RX ADMIN — OXYCODONE HYDROCHLORIDE 10 MG: 5 TABLET ORAL at 15:30

## 2022-04-24 RX ADMIN — DILTIAZEM HYDROCHLORIDE 30 MG: 30 TABLET ORAL at 11:34

## 2022-04-24 RX ADMIN — Medication 30 UNITS: at 05:40

## 2022-04-24 RX ADMIN — FENTANYL CITRATE 25 MCG: 50 INJECTION INTRAMUSCULAR; INTRAVENOUS at 05:49

## 2022-04-24 RX ADMIN — FUROSEMIDE 20 MG: 10 INJECTION, SOLUTION INTRAMUSCULAR; INTRAVENOUS at 10:38

## 2022-04-24 RX ADMIN — Medication 30 UNITS: at 05:15

## 2022-04-24 RX ADMIN — IPRATROPIUM BROMIDE AND ALBUTEROL SULFATE 1 AMPULE: .5; 2.5 SOLUTION RESPIRATORY (INHALATION) at 08:18

## 2022-04-24 RX ADMIN — METOPROLOL TARTRATE 5 MG: 1 INJECTION, SOLUTION INTRAVENOUS at 12:40

## 2022-04-24 RX ADMIN — IPRATROPIUM BROMIDE AND ALBUTEROL SULFATE 1 AMPULE: .5; 2.5 SOLUTION RESPIRATORY (INHALATION) at 15:30

## 2022-04-24 RX ADMIN — AZITHROMYCIN MONOHYDRATE 500 MG: 500 INJECTION, POWDER, LYOPHILIZED, FOR SOLUTION INTRAVENOUS at 13:39

## 2022-04-24 RX ADMIN — MIDAZOLAM 2 MG: 1 INJECTION INTRAMUSCULAR; INTRAVENOUS at 10:35

## 2022-04-24 RX ADMIN — OXYCODONE HYDROCHLORIDE 10 MG: 5 TABLET ORAL at 23:55

## 2022-04-24 RX ADMIN — IPRATROPIUM BROMIDE AND ALBUTEROL SULFATE 1 AMPULE: .5; 2.5 SOLUTION RESPIRATORY (INHALATION) at 19:43

## 2022-04-24 RX ADMIN — IPRATROPIUM BROMIDE AND ALBUTEROL SULFATE 1 AMPULE: .5; 2.5 SOLUTION RESPIRATORY (INHALATION) at 03:24

## 2022-04-24 RX ADMIN — METHYLPREDNISOLONE SODIUM SUCCINATE 40 MG: 40 INJECTION, POWDER, FOR SOLUTION INTRAMUSCULAR; INTRAVENOUS at 01:42

## 2022-04-24 RX ADMIN — DOCUSATE SODIUM 100 MG: 50 LIQUID ORAL at 11:30

## 2022-04-24 RX ADMIN — DOCUSATE SODIUM 100 MG: 50 LIQUID ORAL at 21:37

## 2022-04-24 RX ADMIN — IPRATROPIUM BROMIDE AND ALBUTEROL SULFATE 1 AMPULE: .5; 2.5 SOLUTION RESPIRATORY (INHALATION) at 22:49

## 2022-04-24 RX ADMIN — IPRATROPIUM BROMIDE AND ALBUTEROL SULFATE 1 AMPULE: .5; 2.5 SOLUTION RESPIRATORY (INHALATION) at 11:40

## 2022-04-24 RX ADMIN — ROCURONIUM BROMIDE 50 MG: 10 INJECTION INTRAVENOUS at 10:23

## 2022-04-24 RX ADMIN — INSULIN LISPRO 2 UNITS: 100 INJECTION, SOLUTION INTRAVENOUS; SUBCUTANEOUS at 00:14

## 2022-04-24 RX ADMIN — LIDOCAINE HYDROCHLORIDE 8 ML: 10 INJECTION, SOLUTION INFILTRATION; PERINEURAL at 10:26

## 2022-04-24 RX ADMIN — OXYCODONE HYDROCHLORIDE 10 MG: 5 TABLET ORAL at 10:37

## 2022-04-24 RX ADMIN — ARGATROBAN 4.2 MCG/KG/MIN: 250 INJECTION INTRAVENOUS at 12:15

## 2022-04-24 RX ADMIN — ARGATROBAN 4.2 MCG/KG/MIN: 250 INJECTION INTRAVENOUS at 20:42

## 2022-04-24 RX ADMIN — ARGATROBAN 4.2 MCG/KG/MIN: 50 INJECTION INTRAVENOUS at 00:12

## 2022-04-24 RX ADMIN — OXYCODONE HYDROCHLORIDE 10 MG: 5 TABLET ORAL at 19:44

## 2022-04-24 RX ADMIN — FENTANYL CITRATE 25 MCG: 50 INJECTION INTRAMUSCULAR; INTRAVENOUS at 08:06

## 2022-04-24 RX ADMIN — ARGATROBAN 4.2 MCG/KG/MIN: 50 INJECTION INTRAVENOUS at 01:41

## 2022-04-24 RX ADMIN — ARGATROBAN 4.2 MCG/KG/MIN: 250 INJECTION INTRAVENOUS at 04:44

## 2022-04-24 RX ADMIN — Medication 30 UNITS: at 05:20

## 2022-04-24 RX ADMIN — Medication 10 MG/HR: at 15:51

## 2022-04-24 RX ADMIN — SODIUM CHLORIDE, PRESERVATIVE FREE 40 MG: 5 INJECTION INTRAVENOUS at 08:54

## 2022-04-24 RX ADMIN — DILTIAZEM HYDROCHLORIDE 30 MG: 30 TABLET ORAL at 06:41

## 2022-04-24 RX ADMIN — DILTIAZEM HYDROCHLORIDE 30 MG: 30 TABLET ORAL at 00:25

## 2022-04-24 RX ADMIN — METHYLPREDNISOLONE SODIUM SUCCINATE 40 MG: 40 INJECTION, POWDER, FOR SOLUTION INTRAMUSCULAR; INTRAVENOUS at 08:55

## 2022-04-24 RX ADMIN — ARGATROBAN 4.2 MCG/KG/MIN: 50 INJECTION INTRAVENOUS at 03:08

## 2022-04-24 ASSESSMENT — PULMONARY FUNCTION TESTS
PIF_VALUE: 23
PIF_VALUE: 22

## 2022-04-24 NOTE — PROGRESS NOTES
Ecmo End of Shift Note:       Cannulation date/time: 4/22/2022 at 1005  ECMO type: VV  Cannula sizes/locations: 23 Fr Return RFV, 25 Fr Drainage LFV  Flow ordered at: 4-6lpm       Currently flowing at: 4.4       FIO2 at: 100       Sweep at: 2.5  Delta pressure: 23-24  Clot burden:       Oxygenator: 3 o'clock, 9 o'clock, 12 o'clck       Circuit: None  Anticoagulation: Argatroban Infusion at 4.2 mcg/kg/min; therapeutic-next PTT 2200  Products given:        PRBC's: 0       PLT's: 0       FFP: 0       Cryo: 0       Albumin: 0  Bronched bedside per Dr Tamiko Fry today-no plugs, just sputum-culture sent and already growing bacteria, 20mg IV Lasix daily started, micah changed to sched q4hr, no need to wean sedation more at this time at pt follows commands on-also concern for worsening bronchospasm

## 2022-04-24 NOTE — OP NOTE
[address]                  [facility] [address]                                OPERATIVE REPORT    PATIENT NAME: Monica Or                    :        2000  MED REC NO:   [id number]                             ROOM:       1  ACCOUNT NO:   [de-identified]                           ADMIT DATE: 2022  PROVIDER:     Keli Yang MD    DATE OF PROCEDURE:  2022    PRIMARY ATTENDING SURGEON:  Keli Yang MD    OTHER ASSISTANT:  Included Phu Salazar MD    PREOPERATIVE DIAGNOSES:  Severe ARDS and acute hypoxemia, hypercarbia,  acute-on-chronic respiratory failure, status asthmaticus, and rhinovirus  infection. POSTOPERATIVE DIAGNOSES:  Severe ARDS and acute hypoxemia, hypercarbia,  acute-on-chronic respiratory failure, status asthmaticus, and rhinovirus  infection. PROCEDURES:  Ultrasound-guided bilateral femoral venous access,  placement of right femoral 23-American VV-ECMO return cannula, placement  of left femoral vein 25-American VV-ECMO drainage cannula, connection to  ECMO circuit, initiation of ECMO support, and SUMAYA. COMPLICATIONS:  None. CONDITION:  Stable. DISPOSITION:  To CVICU. ESTIMATED BLOOD LOSS:  Not applicable. ANESTHESIA:  General endotracheal with Dr. Michael Trevino. INDICATIONS FOR SURGERY:  The patient is a young patient from [facility] originally on whom we were consulted for possible ECMO  support. The initial diagnosis was believed to be status asthmaticus,  but she was later found to be positive for rhinovirus. I initially  accepted her with the intention of placing her on ECMO for the inability  to oxygenate and ventilate.   Upon arrival, she was assessed by Dr. Asa Bautista and myself and was found to be improving and her blood gas at  that time was essentially normal.  She recovered over the course of the  next 24 hours and then in the subsequent 24 hours completely fell apart. Her gases were terrible and her x-ray was concerning. Her P/F ratio  fell to less than 100 and she again had the inability to ventilate and  oxygenate. We therefore regrouped with Dr. Paty Zimmerman and the Pulmonary  team and a decision was made in a group fashion for the initiation of  VV-ECMO support. She was taken to the hybrid operating room with the  consent of she and her family for urgent ECMO cannulation on the morning  of Friday, 04/22/2022. FINDINGS AT SURGERY:  Using SUMAYA guidance and ultrasound guidance, we  successfully identified and uneventfully cannulated the bilateral  femoral venous systems with the placement of the above-mentioned  [employer] drainage and ECMO return cannulas. The ECMO drainage cannula  was placed through the left femoral vein and parked in the retrohepatic  inferior vena cava below the diaphragm. The return cannula was placed  in the body of the right atrium and this was confirmed by both  fluoroscopy and by SUMAYA guidance. The tips of both cannulas were placed  8 cm apart as described by 's recommendation. We connected  to the ECMO circuit uneventfully and ECMO was initiated uneventfully. There was no recirculation and the cannulas remained in good position  and were secured in place. She was transported to the CVICU in stable  condition. SURGERY IN DETAIL:  The patient was identified in her bed in the CVICU  and was transported to the hybrid operating room, where she was induced  for general endotracheal anesthesia without difficulty. After the  appropriate placement of lines and access, the time-out was performed  and documented. The operation began with ultrasound-guided access of  the right femoral artery by Dr. Micheal Vazquez while I STARR wired her already  present left central line in the left femoral vein to a 7-Sammarinese working  sheath.   Dr. Micheal Vazquez likewise placed a 7-Sammarinese sheath to the right  femoral vein and using an Amplatz catheter as a working catheter  accessed the right atrium and using SUMAYA and fluoroscopy guidance  successfully placed the return 23-Serbian cannula. I used the same  Amplatz wire on the left side and working through the 7-Serbian sheath up  sized, dilated and placed a 25-Serbian drainage cannula in the  retrohepatic IVC as described above. With the confirmation confirmed, I  de-aired the ECMO circuit and I bled back both cannulas and connected to  the ECMO circuit successfully. At this time, an ECMO support was  initiated and I was pleased with the support. We then secured the  cannulas in place and confirmed confirmation again. She was then  successfully transported to the CVICU in stable condition. JENNY Oliveros MD    D: 04/23/2022 13:13:33       T: 04/23/2022 21:32:06     [email or web address]  Job#: [id number]     Doc#: [id number]    CC:

## 2022-04-24 NOTE — PLAN OF CARE
Problem: Gas Exchange - Impaired:  Goal: Levels of oxygenation will improve  Description: Levels of oxygenation will improve  4/24/2022 0917 by Collette Ricker, RN  Outcome: Progressing  4/24/2022 0659 by Carolynn Gu RN  Outcome: Progressing     Problem:  Activity:  Goal: Ability to tolerate increased activity will improve  Description: Ability to tolerate increased activity will improve  4/24/2022 0917 by Collette Ricker, RN  Outcome: Progressing  4/24/2022 0659 by Carolynn Gu RN  Outcome: Progressing     Problem: Cardiac:  Goal: Hemodynamic stability will improve  Description: Hemodynamic stability will improve  4/24/2022 0917 by Collette Ricker, RN  Outcome: Progressing  4/24/2022 0659 by Carolynn Gu RN  Outcome: Progressing     Problem: Respiratory:  Goal: Ability to maintain a clear airway will improve  Description: Ability to maintain a clear airway will improve  4/24/2022 0917 by Collette Ricker, RN  Outcome: Progressing  4/24/2022 0659 by Carolynn Gu RN  Outcome: Progressing  Goal: Ability to maintain adequate ventilation will improve  Description: Ability to maintain adequate ventilation will improve  4/24/2022 0917 by Collette Ricker, RN  Outcome: Progressing  4/24/2022 0659 by Carolynn Gu RN  Outcome: Progressing  Goal: Complications related to the disease process, condition or treatment will be avoided or minimized  Description: Complications related to the disease process, condition or treatment will be avoided or minimized  4/24/2022 0917 by Collette Ricker, RN  Outcome: Progressing  4/24/2022 0659 by Carolynn Gu RN  Outcome: Progressing     Problem: Skin Integrity:  Goal: Risk for impaired skin integrity will decrease  Description: Risk for impaired skin integrity will decrease  4/24/2022 0917 by Collette Ricker, RN  Outcome: Progressing  4/24/2022 0659 by Carolynn Gu RN  Outcome: Progressing     Problem: OXYGENATION/RESPIRATORY FUNCTION  Goal: Patient will maintain patent airway  4/24/2022 0917 by Marylene Dimes, RN  Outcome: Progressing  4/24/2022 0659 by Job Lawrence RN  Outcome: Progressing  Goal: Patient will achieve/maintain normal respiratory rate/effort  Description: Respiratory rate and effort will be within normal limits for the patient  4/24/2022 0917 by Marylene Dimes, RN  Outcome: Progressing  4/24/2022 0659 by Job Lawrence RN  Outcome: Progressing     Problem: MECHANICAL VENTILATION  Goal: Patient will maintain patent airway  4/24/2022 0917 by Marylene Dimes, RN  Outcome: Progressing  4/24/2022 0659 by Job Lawrence RN  Outcome: Progressing  Goal: Oral health is maintained or improved  4/24/2022 0917 by Marylene Dimes, RN  Outcome: Progressing  4/24/2022 0659 by Job Lawrence RN  Outcome: Progressing  Goal: ET tube will be managed safely  4/24/2022 0917 by Marylene Dimes, RN  Outcome: Progressing  4/24/2022 0659 by Job Lawrence RN  Outcome: Progressing     Problem: Nutrition  Goal: Optimal nutrition therapy  4/24/2022 0917 by Marylene Dimes, RN  Outcome: Progressing  4/24/2022 0659 by Job Lawrence RN  Outcome: Progressing     Problem: Discharge Planning  Goal: Discharge to home or other facility with appropriate resources  4/24/2022 0917 by Marylene Dimes, RN  Outcome: Progressing  4/24/2022 0659 by Job Lawrence RN  Outcome: Progressing     Problem: Genitourinary - Adult  Goal: Urinary catheter remains patent  4/24/2022 0917 by Marylene Dimes, RN  Outcome: Progressing  4/24/2022 0659 by Job Lawrence RN  Outcome: Progressing     Problem: Neurosensory - Adult  Goal: Achieves stable or improved neurological status  4/24/2022 0917 by Marylene Dimes, RN  Outcome: Progressing  4/24/2022 0659 by Job Lawrence RN  Outcome: Progressing  Goal: Achieves maximal functionality and self care  4/24/2022 0917 by Marylene Dimes, RN  Outcome: Progressing  4/24/2022 0659 by Job Lawrence RN  Outcome: Progressing     Problem: Safety - Medical Restraint  Goal: Remains free of injury from restraints (Restraint for Interference with Medical Device)  Description: INTERVENTIONS:  1. Determine that other, less restrictive measures have been tried or would not be effective before applying the restraint  2. Evaluate the patient's condition at the time of restraint application  3. Inform patient/family regarding the reason for restraint  4. Q2H: Monitor safety, psychosocial status, comfort, nutrition and hydration  4/24/2022 0917 by Joanne Umana RN  Outcome: Progressing  4/24/2022 0659 by Lilo Stafford RN  Outcome: Progressing     Problem: Pain  Goal: Verbalizes/displays adequate comfort level or baseline comfort level  4/24/2022 0917 by Joanne Umana RN  Outcome: Progressing  4/24/2022 0659 by Lilo Stafford RN  Outcome: Progressing     Problem: Safety - Adult  Goal: Free from fall injury  4/24/2022 0917 by Joanne Umana RN  Outcome: Progressing  4/24/2022 0659 by Lilo Stafford RN  Outcome: Progressing     Problem: ABCDS Injury Assessment  Goal: Absence of physical injury  4/24/2022 0917 by Joanne Umana RN  Outcome: Progressing  4/24/2022 0659 by Lilo Stafford RN  Outcome: Progressing     Problem: Skin/Tissue Integrity  Goal: Absence of new skin breakdown  Description: 1. Monitor for areas of redness and/or skin breakdown  2. Assess vascular access sites hourly  3. Every 4-6 hours minimum:  Change oxygen saturation probe site  4. Every 4-6 hours:  If on nasal continuous positive airway pressure, respiratory therapy assess nares and determine need for appliance change or resting period.   4/24/2022 0917 by Joanne Umana RN  Outcome: Progressing  4/24/2022 0659 by Lilo Stafford RN  Outcome: Progressing

## 2022-04-24 NOTE — PROGRESS NOTES
ECMO DAILY ROUNDING NOTE     Patient:  Shelli Fulton  MRN: [id number]  Admit date: 4/18/2022  Primary Care Physician: Alex Rizvi MD  Consulting Physician: Bernie Crawford MD  CODE Status: Full Code  LOS: 6    [x] Team present at bedside   [x] Family updated    INDICATION:   Respiratory Failure, status asthmaticus    CANNULATION:  VenoVeno ECMO cannulation on 4/22/22  Bilateral femoral cannulas  TREATMENT GOALS:  PUMP  FLOW: 3-5L/min  pH: 7.35-7.45   SvO2: >70%  SaO2:>97%  Sedation : RASS -1 to+1    CURRENT ECMO PARAMETERS:  PUMP Patient on ECMO: On  Hours on ECMO: 45  Pump Flow (L/min): 4.51 LPM  Pump Speed (Rotations/min): 3000 RPM  ECMO Mode: V/V  Pump Mode: Cardiohelp   SWEEP GAS Sweep Flow (L/min): 4 LPM   FiO2 (%): 100 %   CIRCUIT PRESSURES Inlet Pressure (Bladder): -85 mmHg  Pre-Membrane Pressure: 145 mmHg  Post-Membrane Pressure: 122 mmHg  Delta P: 23 mmHg  SVO2 (%): 71.2 %  ECMO blood flow temperature: 98.4 °F (36.9 °C)  CVP (mmHg): 9 mmHg   CIRCUIT CHECK Heat Exchg.  Water Temp Set: 36.9  Heat Exchanger Water Temp Actual: 36.8  Heat Exchanger Water Level: Full  Unused Pigtails Cleared: Done  System Plugged to Red Outlet: Done  Emergency Backup in Use: No  Hand Crank Available: Yes  Backup Unit Blood Avail: Done  Cart Checked and Stocked: Done  Pump Battery Charged: 100 Volts  Pressure Monitors Zeroed: Done (on initiation of ecmo)  Pressure Limits Checked: Done  Circuit Number: 1  Back Up Circuit: Done  Back Up Console/Motor: Done  Emergency O2 Tank Available: Done  Circuits and Cannulation Sites: Done  CMAG Flow Probe Repositioned: Done  High/Low flow alarm set?: Yes  High/Low temp alarm set?: Yes  High/Low venous alarm set?: Yes  Pre-MO alarm limit: 300  Post-MO alarm limit: 300     CURRENT VENTILATOR SETTINGS:  Vent Information  Ventilator ID: [id number]  Vent Mode: AC/PC     RECENT LABS:  ABG Recent Labs     04/23/22  2340 04/24/22  0347 04/24/22  0516 04/24/22  0527 04/24/22  9227 POCPH 7.387 7.375 7.497* 7.453* 7.421   POCPCO2 54.8* 57.0* 37.9 44.3 49.4*   POCPO2 61.5* 62.2* 390.2* 49.2* 58.5*   POCHCO3 33.0* 33.3* 29.3* 31.0* 32.1*   EYAU9QDY 90* 90* 100* 86* 90*      VBG Recent Labs     04/22/22  1350 04/23/22  1857 04/24/22  0503   PHVEN 7.456* 7.361 7.439*   IYZ9KFE 43.7 56.3* 42.9   QIK9MOA 30.7* 31.9* 29.1*   E6VMBJLQ 72 72 74      CBC Recent Labs     04/23/22  0338 04/23/22  1008 04/23/22  1627 04/23/22 2203 04/24/22  0344   WBC 25.8* 27.3* 24.3* 23.9* 23.5*   HGB 10.1* 10.5* 10.3* 10.1* 9.8*   HCT 32.7* 33.8* 33.2* 33.8* 33.2*    273 254 243 244      COAGS Recent Labs     04/23/22  0338 04/23/22  0553 04/23/22  0830 04/23/22  1008 04/23/22  1111 04/23/22  1352 04/23/22  1627 04/23/22  2203 04/24/22  0344   INR 1.9  --   --  2.7  --   --  3.5 3.4 3.6   PROTIME 19.7*  --   --  26.9*  --   --  33.5* 32.8* 35.1*   APTT 44.6*   < >   < >  --  46.0* 48.4* 53.4* 55.3* 58.7*    < > = values in this interval not displayed. TEG Recent Labs     04/22/22 2219 04/23/22  1008 04/23/22 2203   HEPTHERAPY None UNKNOWN UNKNOWN   REACTTIMETEG 4.4* 8.8 9.8   KINETICSTEG 1.1 1.8 1.8   ANGLETEG 73.6* 66.5 66.0   MAXCLOTTEG 68.9 70.7* 69.8   MM39XRL 0.0 0.0 0.0   EPLTEG 0.0 0.0 0.0      BMP Recent Labs     04/22/22  1146 04/23/22  0338 04/23/22  0810 04/23/22  1008 04/23/22  1627 04/23/22  2203 04/24/22  0344   NA  --  139  --  140 140 144 140   K  --  5.4*  --  5.4* 5.2 5.5* 5.6*   CL  --  102  --  104 103 107 104   CO2  --  27  --  30 30 31 30   BUN  --  36*  --  33* 31* 30* 28*   CREATININE   < > 0.86 1.06 0.64 0.63 0.58 0.50   GLUCOSE  --  155*  --  145* 150* 141* 143*   MG  --  3.0*  --  2.8* 2.9* 2.9* 2.8*    < > = values in this interval not displayed.       LFT Recent Labs     04/22/22  1636 04/23/22  0338 04/23/22  1627 04/24/22  0344   PROT 5.8* 5.8* 5.9* 5.7*   LABALBU 3.5 3.3* 3.4* 3.2*   ALT 69* 98* 92* 87*   AST 52* 64* 39* 36*   ALKPHOS 49 50 51 47   BILITOT 0.58 0.46 0.55 0. 44      INFLAMMATORY MARKERS No results for input(s): CRP, FERRITIN, LDH in the last 72 hours. Invalid input(s):  ESR   CARDIAC No results for input(s): CKTOTAL, CKMB, CKMBINDEX, TROPONINI, BNP, PROBNP in the last 72 hours. Invalid input(s): TROPONIN, HSTROP   LACTIC ACID No results for input(s): LACTA in the last 72 hours. TRIGLYCERIDE No results for input(s): TRIG in the last 72 hours.      ASSESSMENT AND SYSTEM BASED PLAN (MEDICAL DECISION MAKING):    Neurologic                                                  [x] Sedation/paralytic requirement reviewed                              [x] Neurological assessment; not done, patient sedated with propofol and fentanyl currently     Cardiovascular                               [x] Cannulas secured, dsg dry and intact, no drainage or hematoma noted                             [x] ECMO Pump flow/pressures reviewed                             [x] Telemetry reviewed                             [x] Hemodynamic parameters stable    Pulmonary                             [x] Blood gases (Patient/Circuit) reviewed                             [x] Sweep/ settings reviewed                             [x] Vent Settings reviewed                             [x] Chest X-ray reviewed    Genitourinary                               [x] Intake/Output reviewed                             [x] Need for continuous IV fluids assessed, recommended diuresis with Lasix                             [x] Need for diuresis/renal replacement therapy reviewed    Gastrointestinal                              [x] GI Prophylaxis reviewed                              [x] Enteral nutrition reviewed    Hematology                             [x] Anticoagulation: Argatroban                            [x] Reviewed CBC, coagulation profile, ACT, TEG and platelet count                            [x] Transfusion needs reviewed                    Infectious disease                           [x] Reviewed T-max, white count and cultures results                           [x] Antimicrobials reviewed; continue ceftriaxone    Endocrine                            [x] Reviewed glycemic control                           [x] Reviewed need for steroids; continue Solu-Medrol    Others                           [x] Reviewed need for restrains                           [x] Reviewed need for lines/drains/invasive devices                           [x] Skin/Wound care                           [x] Reviewed current Medications list/orders                           [x] Reviewed goals of care                   OVERALL CONDITION:   Stable.   Wean sweep as elvin   TV is improved     Lilo Das MD  Pulmonary & Critical care Medicine  4/24/2022

## 2022-04-24 NOTE — PROCEDURES
Sherry Rose is a 23 y.o. female patient. 1. CHIDI (obstructive sleep apnea)      No past medical history on file. Blood pressure (!) 154/70, pulse 104, temperature 98.2 °F (36.8 °C), temperature source Bladder, resp. rate 10, height 5' 3\" (1.6 m), weight (!) 302 lb 1.6 oz (137 kg), SpO2 91 %. Procedures  Bronchoscopy Inpatient Procedure Note    Date of Procedure: 4/24/2022    Pre-op Diagnosis: atelectasis     Post-op Diagnosis: no mucous plugs     Bronchoscopist: Raiza Herrera MD      Anesthesia: See MAr    Procedure: Flexible fiberoptic bronchoscopy    Estimated Blood Loss: none     Complications: None    Indications and History      (Please see today's progress notes for the latest issues,  physical exam and lab data)    Consent to Procedure  The risks, benefits, complications, treatment options and expected outcomes were discussed with the patient and/or POA  The possibilities of reaction to medication, pulmonary aspiration, perforation of a viscus, bleeding, failure to diagnose a condition and creating a complication requiring transfusion or operation were discussed with the patient who freely signed the consent. Description of Procedure  The patient was intubated on mechanical ventilation and placed on 100% oxygen. Yokasta Mendoza was monitored by the Critical Nursing and Respiratory therapy staff and the standard ICU monitoring devices. Sherry Rose and the procedure were verified as Flexible Fiberoptic Bronchoscopy. A Time Out was held and the above information confirmed. The patient was placed in the appropriate position. The patient was sedated using see mar and 50mg rocuronium given . The bronchoscope was then passed into the trachea via the ET tube. Topical Lidocaine 1% solution 6 ml  Left lung and 3 ml right lung .  After careful inspection of the tracheal, the bronchoscope was sequentially passed into all segments of the left and right endobronchial trees to the second and/or third divisions. Endobronchial findings    Trachea: distal  tracheal no stenosis. Normal mucosa and Sharp  Sabra  Normal mucosa and Sharp    Right Main Stem Bronchus  Normal mucosa, Sharp and  Thin small sec clear  , no mucous plugs . No endobronchial mass   Right Upper Lobe Bronchi Normal mucosa, Sharp and  Thin small sec clear  , no mucous plugs . No endobronchial mass   Right Middle Lobe Bronchi  Normal mucosa, Sharp and  Thin small sec clear  , no mucous plugs . No endobronchial mass   Right Lower Lobe Bronchi (including the Superior segment)  Normal mucosa, Sharp and  Thin small sec clear  , no mucous plugs . No endobronchial mass     Left Main Stem Bronchus Normal mucosa, Sharp and  Thin small sec clear  , no mucous plugs . No endobronchial mass   Left Upper Lobe Bronchus, Upper Division Normal mucosa, Sharp and  Thin small sec clear  , no mucous plugs . No endobronchial mass   Left Upper Lobe Bronchus, Lingula  Normal mucosa, Sharp and  Thin small sec clear  , no mucous plugs . No endobronchial mass   Left Lower Lobe Bronchus (including the Superior segment)  Normal mucosa, Sharp and  Thin small sec clear  , no mucous plugs .  No endobronchial mass     Specimens Taken    [place] -  Amount - 40 ml    Location - bronchial - bacterial culture     Electronically signed by Bismark Ureña MD on 4/24/2022 at 1:32 PM  Bismark Ureña MD  4/24/2022

## 2022-04-24 NOTE — PROGRESS NOTES
[facility]  Internal Medicine Teaching Residency Program  Inpatient Daily Progress Note  ______________________________________________________________________________    Patient: Vero Precise  YOB: 2000   RKA:[id number]    Acct: [de-identified]     Room: [address] [place] [address]  Admit date: 4/18/2022  Today's date: 04/24/22  Number of days in the hospital: 6    SUBJECTIVE   Admitting Diagnosis: Acute asthma exacerbation  CC: shortness of breath     Pt examined at bedside. Chart & results reviewed. No acute events overnight. Patient remains hemodynamically stable on ECMO along with mechanical ventilation.   - Patient follows commands off sedation.   -Urine output 1.9 L in 24hours   - potassium 5.6 this morning, repeat potassium 4.9  -Ventilator setting at rest with ECMO  -Sedation: Versed, ketamine and fentanyl   -analgesic fentanyl   -  no pressors required;  heart rate in 90s  on Toprol-XL per cardiology   -Pulm clinic following for vent management Dr. Justina Ervin performed: Bronchoscopy today at bedside, no mucous plugging were found.   -Sinew IV steroids, Rocephin and azithromycin.   -Continue argatroban infusion with ECMO  -ECMO management per CT surgery  -Labs reviewed: BMP and CBC within normal limits; WBC 21.9<--23.5 likely from steroid IV use)    ROS:  Unable to assess due to patient being on ventilator and sedation. BRIEF HISTORY     22 y. o. female with a past medical history of bronchial asthma who was admitted to [facility] with an acute exacerbation causing increasing shortness of breath.  Patient was found to be hypoxemic with hypercarbic respiratory failure requiring intubation and mechanical ventilation.  Patient was transferred to [facility] for further elevation with possibility of ECMO.  On arrival patient was found to be on a bicarb drip due to presumed respiratory acidosis but this was discontinued due to to worsening of the hypercarbia.  Additionally patient was started on Nimbex due to the necessity for paralyzation as she was breathing over the vent.  Pulmonology and cardiothoracic surgery were consulted- plan is for ECMO. In the MICU, patient remained stable, had an episode of dark emesis which turned out to be bilious output, Hb remained stable. She was hypertensive, started on lopressor IV 5 mg PRN. Had left IJ placed which was malpositioned, removed and then CVC in left femoral was placed overnight. She was hypoxic, requiring higher FiO2 of 80%, PEEP 16, RR 32, , blood gas showed pH 7.449, pCO2 41.7, pCO2 59. 1. treated with solumedrol, bronchodilators, CXR showed no pneumothorax or pleural effusion.   On  overnight, patient became more hypoxic with increasing oxygen requirement, increasing wheezing on exam. It was determined to go with ECMO canulation on . OBJECTIVE     Vital Signs:  BP (!) 148/66   Pulse 90   Temp 98.2 °F (36.8 °C) (Bladder)   Resp 13   Ht 5' 3\" (1.6 m)   Wt (!) 302 lb 1.6 oz (137 kg)   SpO2 92%   BMI 53.51 kg/m²     Temp (24hrs), Av.4 °F (36.9 °C), Min:98.2 °F (36.8 °C), Max:98.4 °F (36.9 °C)    In: 1684.2   Out:  [Urine:]    Physical Exam:  Constitutional: This is a well developed, well nourished, Greater than 40 - Morbid Obesity / Extreme Obesity / Grade III 23y.o. year old female who is on ECMO. EENT:  PERRLA. No conjunctival injections. Septum was midline, mucosa was without erythema, exudates or cobblestoning. No thrush was noted. Neck: Supple without thyromegaly. No elevated JVP. Trachea was midline. Respiratory: decreased breath sounds bilaterally   Cardiovascular: Regular without murmur, clicks, gallops or rubs. Abdomen: Slightly rounded and soft without organomegaly. No rebound, rigidity or guarding was appreciated. Lymphatic: No lymphadenopathy. Musculoskeletal: Normal curvature of the spine. No gross muscle weakness.     Extremities:  No lower extremity edema, ulcerations, tenderness, varicosities or erythema. Muscle size, tone and strength are normal.  No involuntary movements are noted. Skin:  Warm and dry. Good color, turgor and pigmentation. No lesions or scars.   No cyanosis or clubbing  Neurological/Psychiatric: The patient's general behavior, level of consciousness, thought content and emotional status is normal.        Medications:  Scheduled Medications:    heparin flush (PF)  3 mL IntraVENous Q12H    heparin flush (PF)  3 mL IntraVENous Q12H    heparin flush (PF)  3 mL IntraVENous Q12H    heparin flush (PF)  3 mL IntraVENous Q12H    albumin human  25 g IntraVENous Once    pantoprazole (PROTONIX) 40 mg injection  40 mg IntraVENous Daily    cefTRIAXone (ROCEPHIN) IV  1,000 mg IntraVENous Q24H    azithromycin  500 mg IntraVENous Q24H    methylPREDNISolone  40 mg IntraVENous Q8H    dilTIAZem  30 mg Oral 4 times per day    polyvinyl alcohol  1 drop Both Eyes Q6H    ipratropium-albuterol  1 ampule Inhalation Q4H    insulin lispro  0-12 Units SubCUTAneous Q6H    sodium chloride flush  5-40 mL IntraVENous 2 times per day     Continuous Infusions:    argatroban infusion 4.2 mcg/kg/min (04/24/22 0448)    ketamine (KETALAR) 2 mg/mL infusion for analgosedation 0.2 mg/kg/hr (04/24/22 0400)    midazolam 6 mg/hr (04/24/22 0417)    fentaNYL 50 mcg/mL 200 mcg/hr (04/24/22 0400)    cisatracurium (NIMBEX) infusion Stopped (04/20/22 1330)    dextrose      sodium chloride 6 mL/hr at 04/22/22 1528     PRN Medicationsalbuterol, 2.5 mg, Q4H PRN  oxyCODONE, 10 mg, Q4H PRN  heparin flush (PF), 3 mL, PRN  calcium gluconate-NaCl, 1,000 mg, PRN  fentanNYL, 25 mcg, Q1H PRN  midazolam, 2 mg, Q2H PRN  metoprolol, 5 mg, Q6H PRN  glucose, 15 g, PRN  dextrose, 12.5 g, PRN  glucagon (rDNA), 1 mg, PRN  dextrose, 100 mL/hr, PRN  sodium chloride flush, 5-40 mL, PRN  sodium chloride, , PRN  ondansetron, 4 mg, Q8H PRN   Or  ondansetron, 4 mg, Q6H PRN  polyethylene glycol, 17 g, Daily PRN  acetaminophen, 650 mg, Q6H PRN   Or  acetaminophen, 650 mg, Q6H PRN        Diagnostic Labs:  CBC:   Recent Labs     04/23/22 1627 04/23/22 2203 04/24/22  0344   WBC 24.3* 23.9* 23.5*   RBC 3.70* 3.69* 3.58*   HGB 10.3* 10.1* 9.8*   HCT 33.2* 33.8* 33.2*   MCV 89.7 91.6 92.7   RDW 13.5 13.6 13.6    243 244     BMP:   Recent Labs     04/23/22 1627 04/23/22 2203 04/24/22  0344    144 140   K 5.2 5.5* 5.6*    107 104   CO2 30 31 30   BUN 31* 30* 28*   CREATININE 0.63 0.58 0.50     BNP: No results for input(s): BNP in the last 72 hours. PT/INR:   Recent Labs     04/23/22 1627 04/23/22 2203 04/24/22  0344   PROTIME 33.5* 32.8* 35.1*   INR 3.5 3.4 3.6     APTT:   Recent Labs     04/23/22 1627 04/23/22 2203 04/24/22  0344   APTT 53.4* 55.3* 58.7*     CARDIAC ENZYMES: No results for input(s): CKMB, CKMBINDEX, TROPONINI in the last 72 hours. Invalid input(s): CKTOTAL;3  FASTING LIPID PANEL:No results found for: CHOL, HDL, TRIG  LIVER PROFILE:   Recent Labs     04/23/22 0338 04/23/22 1627 04/24/22  0344   AST 64* 39* 36*   ALT 98* 92* 87*   BILIDIR 0.24 0.24 0.17   BILITOT 0.46 0.55 0.44   ALKPHOS 50 51 47      MICROBIOLOGY:   Lab Results   Component Value Date/Time    CULTURE NO GROWTH 2 DAYS 04/21/2022 03:07 PM    CULTURE NO GROWTH 2 DAYS 04/21/2022 03:07 PM       Imaging:    XR CHEST (SINGLE VIEW FRONTAL)    Result Date: 4/22/2022  1. Support tubes and lines are unchanged. 2. Stable bibasilar lung infiltrates, right side greater than left. This may be related to atelectasis and/or pleural effusion. Superimposed pneumonia cannot be excluded. XR CHEST (SINGLE VIEW FRONTAL)    Result Date: 4/21/2022  Suboptimal position left IJ catheter. Consider removal and replacement or repositioning.      XR CHEST (SINGLE VIEW FRONTAL)    Result Date: 4/18/2022  Diffuse interstitial opacities with alveolar/consolidative opacities at the bases favoring multifocal airspace disease. XR CHEST PORTABLE    Result Date: 4/23/2022  Lines and tubes are unchanged. Low lung volumes. Cardiomegaly. XR CHEST PORTABLE    Result Date: 4/22/2022  Endotracheal tube tip is in the right main bronchus and should be retracted approximately 4-5 cm. Tips of ECMO cannulas project in the expected location of the IVC. Findings were discussed with Zohra Gomez RN (the patient's ICU nurse) at 2:26 pm on 4/22/2022. XR CHEST PORTABLE    Result Date: 4/22/2022  1. Endotracheal tube tip is now 1 cm above the soto. Recommend retraction by an additional 2.5 cm for optimal positioning. 2.  Improved aeration in the left lung with continued atelectatic/airspace infiltrates. XR CHEST PORTABLE    Result Date: 4/21/2022  1. On today's exam the endotracheal tube tip is positioned 1.5 cm above the soto. Recommend retraction by an additional 2 cm for optimal positioning. 2.  Interval development of right lower lobe airspace disease. This could represent atelectasis     XR CHEST PORTABLE    Result Date: 4/19/2022  1. Endotracheal and enteric tubes as above. 2. Diffuse interstitial opacities throughout both lungs, right greater than left. No new focal airspace consolidation. Follow-up is recommended to document resolution. ASSESSMENT & PLAN     ASSESSMENT / PLAN:     Principal Problem:    Acute asthma exacerbation  Active Problems:    Severe persistent asthma with status asthmaticus    History of seizures    Hypercapnic respiratory failure (HCC)    Endotracheally intubated    On mechanically assisted ventilation (HCC)    Diastolic dysfunction  Resolved Problems:    * No resolved hospital problems. *      1. Acute hypoxic hypercapnic respiratory failure on mechanical ventilation and s/p ECMO cannulation on 4/22/22 secondary asthma exacerbation: Continue SoluMedrol 40 mg IV q8 hours, Continue IV Rocephin and IV Azithromycin until 4/24.  Duoneb q4 hours and Albuterol as needed. Pulm. Critical care following for ventilator management.     2. Troponin elevation type II demand ischemia: Continue Cardizem 30 mg q6 hours per cardiology. Cardiology to follow from a distance, once extubated, will contact cardiology for stress test prior to discharge due to apical hypokinesis on echo. Troponin flat trend.      3. Sinus tachycardia:- Controlled Continue Cardizem 30 mg q6 hours per Cardiology.         Diet: On tube feeds  DVT ppx : On Argatroban infusion  GI ppx: Protonix      PT/OT: Consulted  Discharge Planning / SW:  consulted, will follow up once patient is extubated and ECMo cannulation removed and Yana Leyva MD  Internal Medicine Resident, PGY-1  [address], [place]  4/24/2022, 7:37 AM

## 2022-04-24 NOTE — PROGRESS NOTES
PULMONARY & CRITICAL CARE MEDICINE PROGRESS NOTE     Patient:  Jeanie De León  MRN: [id number]  Admit date: 2022  Primary Care Physician: Lisbeth Calle MD  Consulting Physician: Zahraa Almeida MD  CODE Status: Full Code  LOS: 6     SUBJECTIVE     CHIEF COMPLAINT/REASON FOR INITIAL CONSULT: Acute respiratory failure    BRIEF HOSPITAL COURSE:   The patient is a 23 y.o. female with past medical history of asthma was initially admitted to [facility] with acute exacerbation. She was initially put on nonrebreather, subsequently required intubation and initiation of mechanical ventilation due to worsening respiratory status. Patient was also on bicarb drip which was discontinued on arrival to [address] [place] [address].  She is sedated, paralyzed and mechanically ventilated. ABG shows improvement in respiratory acidosis and oxygenation. INTERVAL HISTORY:  22  Currently on VV ECMO.   Rest settings on the ventilator- tv better   Opening eyes  Moving extremities  Sedation with Versed , ketamine and fentanyl  Urine output noted   X-ray chest reviewed left lower lobe atelectasis , right basal atelectasis   Unobtainable from patient due to sedation/mechanical ventilation    OBJECTIVE     VENTILATOR SETTINGS:  Vent Information  Ventilator ID: [id number]  Vent Mode: AC/PC     PaO2/FiO2 RATIO:  Recent Labs     22  0743   POCPO2 58.5*      FiO2 : 40 %     VITAL SIGNS:   LAST:  BP (!) 148/96   Pulse 87   Temp 98.2 °F (36.8 °C) (Bladder)   Resp 10   Ht 5' 3\" (1.6 m)   Wt (!) 302 lb 1.6 oz (137 kg)   SpO2 93%   BMI 53.51 kg/m²   8-24 HR RANGE:  TEMP Temp  Av.3 °F (36.8 °C)  Min: 98.2 °F (36.8 °C)  Max: 98.4 °F (83.1 °C)   BP Systolic (34NVE), OLI:390 , Min:135 , DQJ:030      Diastolic (15HTC), NSB:14, Min:56, Max:96     PULSE Pulse  Av.2  Min: 71  Max: 91   RR Resp  Avg: 10.7  Min: 10  Max: 13   O2 SAT SpO2  Av.9 %  Min: 91 %  Max: 94 %   OXYGEN DELIVERY No data recorded Exam  Morbid obesity  Endotracheal tube in place  Poor air entry bilaterally with rhonchi and wheezing.   Abdomen soft obese nontender  Right NG right nasal passage  Lower extremity edema  Bilateral femoral cannulas  Upper extremity noted    DATA REVIEW     Medications:  Scheduled Meds:   heparin flush (PF)  3 mL IntraVENous Q12H    heparin flush (PF)  3 mL IntraVENous Q12H    heparin flush (PF)  3 mL IntraVENous Q12H    heparin flush (PF)  3 mL IntraVENous Q12H    albumin human  25 g IntraVENous Once    pantoprazole (PROTONIX) 40 mg injection  40 mg IntraVENous Daily    cefTRIAXone (ROCEPHIN) IV  1,000 mg IntraVENous Q24H    azithromycin  500 mg IntraVENous Q24H    methylPREDNISolone  40 mg IntraVENous Q8H    dilTIAZem  30 mg Oral 4 times per day    polyvinyl alcohol  1 drop Both Eyes Q6H    ipratropium-albuterol  1 ampule Inhalation Q4H    insulin lispro  0-12 Units SubCUTAneous Q6H    sodium chloride flush  5-40 mL IntraVENous 2 times per day     Continuous Infusions:   argatroban infusion 4.2 mcg/kg/min (04/24/22 0448)    ketamine (KETALAR) 2 mg/mL infusion for analgosedation 0.2 mg/kg/hr (04/24/22 0400)    midazolam 6 mg/hr (04/24/22 0417)    fentaNYL 50 mcg/mL 200 mcg/hr (04/24/22 0400)    cisatracurium (NIMBEX) infusion Stopped (04/20/22 1330)    dextrose      sodium chloride 6 mL/hr at 04/22/22 1528       INPUT/OUTPUT:  In: 1684.2 [I.V.:958.2; NG/GT:426]  Out: 1990 [NXQHD:7893]  Date 04/24/22 0000 - 04/24/22 2359   Shift 8918-2801 1357-0972 0949-7223 24 Hour Total   INTAKE   I.V.(mL/kg) 401.2(2.9)   401.2(2.9)   NG/GT(mL/kg) 222(1.6)   222(1.6)   IV Piggyback(mL/kg) 50(0.4)   50(0.4)   Shift Total(mL/kg) 673.2(4.9)   673.2(4.9)   OUTPUT   Urine(mL/kg/hr) 530(0.5)   530   Shift Total(mL/kg) 530(3.9)   530(3.9)   Weight (kg) 137 137 137 137        LABS:  ABGs:   Recent Labs     04/23/22  2340 04/24/22  0347 04/24/22  0516 04/24/22  0527 04/24/22  0743   POCPH 7.387 7.375 7.497* 7.453* 7.421   POCPCO2 54.8* 57.0* 37.9 44.3 49.4*   POCPO2 61.5* 62.2* 390.2* 49.2* 58.5*   POCHCO3 33.0* 33.3* 29.3* 31.0* 32.1*   ZQTU5LGU 90* 90* 100* 86* 90*     CBC:   Recent Labs     04/22/22  0511 04/22/22  1151 04/23/22  0338 04/23/22  1008 04/23/22 1627 04/23/22 2203 04/24/22  0344   WBC 17.7*   < > 25.8* 27.3* 24.3* 23.9* 23.5*   HGB 12.0   < > 10.1* 10.5* 10.3* 10.1* 9.8*   HCT 38.3   < > 32.7* 33.8* 33.2* 33.8* 33.2*   MCV 87.4   < > 89.8 89.7 89.7 91.6 92.7      < > 269 273 254 243 244   LYMPHOPCT 5*  --   --   --   --   --   --    RBC 4.38   < > 3.64* 3.77* 3.70* 3.69* 3.58*   MCH 27.4   < > 27.7 27.9 27.8 27.4 27.4   MCHC 31.3   < > 30.9 31.1 31.0 29.9 29.5   RDW 13.4   < > 13.5 13.6 13.5 13.6 13.6    < > = values in this interval not displayed. CRP:   No results for input(s): CRP in the last 72 hours. LDH:   No results for input(s): LDH in the last 72 hours. BMP:   Recent Labs     04/22/22  1146 04/23/22  0338 04/23/22  0810 04/23/22  1008 04/23/22 1627 04/23/22 2203 04/24/22  0344   NA  --  139  --  140 140 144 140   K  --  5.4*  --  5.4* 5.2 5.5* 5.6*   CL  --  102  --  104 103 107 104   CO2  --  27  --  30 30 31 30   BUN  --  36*  --  33* 31* 30* 28*   CREATININE   < > 0.86 1.06 0.64 0.63 0.58 0.50   GLUCOSE  --  155*  --  145* 150* 141* 143*    < > = values in this interval not displayed.      Liver Function Test:   Recent Labs     04/22/22  1636 04/23/22  0338 04/23/22  1627 04/24/22  0344   PROT 5.8* 5.8* 5.9* 5.7*   LABALBU 3.5 3.3* 3.4* 3.2*   ALT 69* 98* 92* 87*   AST 52* 64* 39* 36*   ALKPHOS 49 50 51 47   BILITOT 0.58 0.46 0.55 0.44     Coagulation Profile:   Recent Labs     04/23/22  0338 04/23/22  0553 04/23/22  0830 04/23/22  1008 04/23/22  1111 04/23/22  1352 04/23/22  1627 04/23/22  2203 04/24/22  0344   INR 1.9  --   --  2.7  --   --  3.5 3.4 3.6   PROTIME 19.7*  --   --  26.9*  --   --  33.5* 32.8* 35.1*   APTT 44.6*   < >   < >  --  46.0* 48.4* 53.4* 55.3* 58.7*    < > = values in this interval not displayed. D-Dimer:  No results for input(s): DDIMER in the last 72 hours. Lactic Acid:  No results for input(s): LACTA in the last 72 hours. Cardiac Enzymes:  No results for input(s): CKTOTAL, CKMB, CKMBINDEX, TROPONINI in the last 72 hours. Invalid input(s): TROPONIN, HSTROP  BNP/ProBNP:   No results for input(s): BNP, PROBNP in the last 72 hours. Triglycerides:  No results for input(s): TRIG in the last 72 hours. Microbiology:  Urine Culture:  No components found for: CURINE  Blood Culture:  No components found for: CBLOOD, CFUNGUSBL  Sputum Culture:  No components found for: CSPUTUM  Recent Labs     04/21/22  1507   [facility] . BLOOD  . BLOOD   SPECIAL 6ML  R HAND 20ML   CULTURE NO GROWTH 2 DAYS  NO GROWTH 2 DAYS     Recent Labs     04/21/22  1507   SPECDESC . BLOOD  . BLOOD   SPECIAL 6ML  R HAND 20ML   CULTURE NO GROWTH 2 DAYS  NO GROWTH 2 DAYS        Pathology:    Radiology Reports:  XR CHEST PORTABLE   Final Result   Lines and tubes are unchanged. Low lung volumes. Cardiomegaly. XR CHEST PORTABLE   Final Result   1. Endotracheal tube tip is now 1 cm above the soto. Recommend retraction   by an additional 2.5 cm for optimal positioning. 2.  Improved aeration in the left lung with continued atelectatic/airspace   infiltrates. XR CHEST PORTABLE   Final Result   Endotracheal tube tip is in the right main bronchus and should be retracted   approximately 4-5 cm. Tips of ECMO cannulas project in the expected location of the IVC. Findings were discussed with Zohra Gomez RN (the patient's ICU nurse)   at 2:26 pm on 4/22/2022. XR CHEST (SINGLE VIEW FRONTAL)   Final Result   1. Support tubes and lines are unchanged. 2. Stable bibasilar lung infiltrates, right side greater than left. This may   be related to atelectasis and/or pleural effusion. Superimposed pneumonia   cannot be excluded.          XR CHEST (SINGLE VIEW FRONTAL)   Final Result   Suboptimal position left IJ catheter. Consider removal and replacement or   repositioning. XR CHEST PORTABLE   Final Result   1. On today's exam the endotracheal tube tip is positioned 1.5 cm above the   soto. Recommend retraction by an additional 2 cm for optimal positioning. 2.  Interval development of right lower lobe airspace disease. This could   represent atelectasis         XR CHEST PORTABLE   Final Result   1. Endotracheal and enteric tubes as above. 2. Diffuse interstitial opacities throughout both lungs, right greater than   left. No new focal airspace consolidation. Follow-up is recommended to   document resolution. XR CHEST (SINGLE VIEW FRONTAL)   Final Result   Diffuse interstitial opacities with alveolar/consolidative opacities at the   bases favoring multifocal airspace disease. XR CHEST PORTABLE    (Results Pending)   XR CHEST PORTABLE    (Results Pending)        Echocardiogram:   Results for orders placed during the hospital encounter of 04/18/22    ECHO Complete 2D W Doppler W Color      Summary  Left ventricle is normal in size, global left ventricular systolic function  is preserved, estimated ejection fraction is 50%. There appears to be some apical hypokinesis, in the apical views. Evidence of diastolic dysfunction. Trivial mitral regurgitation. Trivial pulmonic insufficiency. IVC dilated but unable to assess respiratory collapse due to patient on  ventilator.        ASSESSMENT AND PLAN     Assessment:    // Acute hypoxic/hypercapnic respiratory failure due to status asthmaticus, on VV ECMO/mechanical ventilation  // Acute respiratory acidosis,  // Rhino/enterovirus infection  // Leukocytosis  // Hyperglycemia  // Elevated troponin  // Morbid obesity    Plan:    I personally interviewed/examined the patient; reviewed interval history, interpreted all available radiographic and laboratory data at the time of service.  Continue sedation with Versed , fentanyl and   ketamine if needed.  Restraints renewed.  Continue IV steroids.  Continue bronchodilator therapy.  Patient continues to be in bronchospasm but TV is better 140 ml    Bronchoscopy done today - no mucous plugging , thin clear sec    Continue VV ECMO.  Blood pressure is stable.   20 mg IV Lasix daily    Start tube feeds.  Monitor endotracheal secretions    Obtain X-ray chest daily   Continue to monitor I/O with a goal of even/negative fluid balance   Stress ulcer prophylaxis   Chemical DVT prophylaxis; not indicated patient on systemic anticoagulation-with argatroban   Antimicrobials reviewed; continue ceftriaxone/azithromycin   Glycemic control appropriate; sliding scale insulin   Skin/wound care reviewed with the nursing staff   Physical/occupational therapy    Discussed with ECMO staff, nursing staff. D/w primary team   Family updated   The patient remains critically ill with illness/injury that acutely impairs one or more vital organ systems, such that there is a high probability of imminent or life threatening deterioration in the patient's condition. Critical care time of 40 minutes was spent (excluding procedures), in coordination of care during bedside rounds and discussion of patient care in detail, and recommendations of the team were adopted in the plan. Necessity of all invasive devices was also confirmed. Yesy Ayers MD  Pulmonary and Critical Care Medicine           4/24/2022, 8:25 AM    Please note that this chart was generated using voice recognition Dragon dictation software. Although every effort was made to ensure the accuracy of this automated transcription, some errors in transcription may have occurred.

## 2022-04-24 NOTE — PLAN OF CARE
Problem: Safety - Medical Restraint  Goal: Remains free of injury from restraints (Restraint for Interference with Medical Device)  Description: INTERVENTIONS:  1. Determine that other, less restrictive measures have been tried or would not be effective before applying the restraint  2. Evaluate the patient's condition at the time of restraint application  3. Inform patient/family regarding the reason for restraint  4.  Q2H: Monitor safety, psychosocial status, comfort, nutrition and hydration  Outcome: Progressing     Problem: ABCDS Injury Assessment  Goal: Absence of physical injury  Outcome: Progressing     Problem: MECHANICAL VENTILATION  Goal: Patient will maintain patent airway  Outcome: Progressing  Goal: Oral health is maintained or improved  Outcome: Progressing  Goal: ET tube will be managed safely  Outcome: Progressing     Problem: Respiratory:  Goal: Ability to maintain a clear airway will improve  Description: Ability to maintain a clear airway will improve  Outcome: Progressing  Goal: Ability to maintain adequate ventilation will improve  Description: Ability to maintain adequate ventilation will improve  Outcome: Progressing  Goal: Complications related to the disease process, condition or treatment will be avoided or minimized  Description: Complications related to the disease process, condition or treatment will be avoided or minimized  Outcome: Progressing

## 2022-04-24 NOTE — PROGRESS NOTES
BRONCHOSCOPE RIGID INTUB 4 REG 5/2.2 MM 23.6 IN [facility] DISP      Bronchoscopy was performed at bedside by Dr. Jeanmarie Key.

## 2022-04-24 NOTE — PROGRESS NOTES
[facility] End of Shift Note:       Cannulation date/time: Friday April 22nd, 2022 at 1005 in the [facility] OR        w/Dr. Correa and Dr. Ester Lemon. ECMO type: V-V  Cannula sizes/locations: 23 FR. Return-RT. Femoral Vein, 25 FR. Drainage-LT. Femoral Vein.   Flow ordered at:       Currently flowing at: 4.4       FIO2 at: 100       Sweep at: 4  Delta pressure: 23  Clot burden:        Oxygenator: 3, 9, and 12 O'clock       Circuit: None  Anticoagulation: Argatroban gtt at 4.2 mcg/kg/min (35.2 ml/hr)  Products given:        PRBC's: None       PLT's: None       FFP: None       Cryo: None       Albumin: None

## 2022-04-25 ENCOUNTER — APPOINTMENT (OUTPATIENT)
Dept: GENERAL RADIOLOGY | Age: 22
DRG: 003 | End: 2022-04-25
Attending: INTERNAL MEDICINE
Payer: COMMERCIAL

## 2022-04-25 LAB
ACTIVATED CLOTTING TIME: 246 SEC (ref 79–149)
ACTIVATED CLOTTING TIME: 280 SEC (ref 79–149)
ALBUMIN SERPL-MCNC: 3.1 G/DL (ref 3.5–5.2)
ALBUMIN SERPL-MCNC: 3.1 G/DL (ref 3.5–5.2)
ALBUMIN/GLOBULIN RATIO: 1.3 (ref 1–2.5)
ALBUMIN/GLOBULIN RATIO: 1.3 (ref 1–2.5)
ALLEN TEST: ABNORMAL
ALP BLD-CCNC: 46 U/L (ref 35–104)
ALP BLD-CCNC: 48 U/L (ref 35–104)
ALT SERPL-CCNC: 83 U/L (ref 5–33)
ALT SERPL-CCNC: 88 U/L (ref 5–33)
ANGLE TEG: 61 DEG (ref 53–72)
ANGLE TEG: 67.5 DEG (ref 53–72)
ANGLE TEG: 67.6 DEG (ref 53–72)
ANION GAP SERPL CALCULATED.3IONS-SCNC: 6 MMOL/L (ref 9–17)
ANION GAP SERPL CALCULATED.3IONS-SCNC: 6 MMOL/L (ref 9–17)
ANION GAP SERPL CALCULATED.3IONS-SCNC: 7 MMOL/L (ref 9–17)
ANION GAP SERPL CALCULATED.3IONS-SCNC: 8 MMOL/L (ref 9–17)
AST SERPL-CCNC: 24 U/L
AST SERPL-CCNC: 27 U/L
BILIRUB SERPL-MCNC: 0.32 MG/DL (ref 0.3–1.2)
BILIRUB SERPL-MCNC: 0.38 MG/DL (ref 0.3–1.2)
BILIRUBIN DIRECT: 0.15 MG/DL
BILIRUBIN DIRECT: 0.15 MG/DL
BILIRUBIN, INDIRECT: 0.17 MG/DL (ref 0–1)
BILIRUBIN, INDIRECT: 0.23 MG/DL (ref 0–1)
BUN BLDV-MCNC: 30 MG/DL (ref 6–20)
BUN BLDV-MCNC: 30 MG/DL (ref 6–20)
BUN BLDV-MCNC: 31 MG/DL (ref 6–20)
BUN BLDV-MCNC: 33 MG/DL (ref 6–20)
CALCIUM IONIZED: 1.12 MMOL/L (ref 1.13–1.33)
CALCIUM IONIZED: 1.14 MMOL/L (ref 1.13–1.33)
CALCIUM IONIZED: 1.17 MMOL/L (ref 1.13–1.33)
CALCIUM IONIZED: 1.18 MMOL/L (ref 1.13–1.33)
CALCIUM SERPL-MCNC: 7.9 MG/DL (ref 8.6–10.4)
CALCIUM SERPL-MCNC: 8.1 MG/DL (ref 8.6–10.4)
CALCIUM SERPL-MCNC: 8.2 MG/DL (ref 8.6–10.4)
CALCIUM SERPL-MCNC: 8.2 MG/DL (ref 8.6–10.4)
CHLORIDE BLD-SCNC: 104 MMOL/L (ref 98–107)
CHLORIDE BLD-SCNC: 105 MMOL/L (ref 98–107)
CHLORIDE BLD-SCNC: 106 MMOL/L (ref 98–107)
CHLORIDE BLD-SCNC: 108 MMOL/L (ref 98–107)
CO2: 27 MMOL/L (ref 20–31)
CO2: 30 MMOL/L (ref 20–31)
CREAT SERPL-MCNC: 0.44 MG/DL (ref 0.5–0.9)
CREAT SERPL-MCNC: 0.48 MG/DL (ref 0.5–0.9)
CREAT SERPL-MCNC: 0.53 MG/DL (ref 0.5–0.9)
CREAT SERPL-MCNC: 0.56 MG/DL (ref 0.5–0.9)
EPL-TEG: 0 % (ref 0–15)
FIBRINOGEN: 115 MG/DL (ref 140–420)
FIBRINOGEN: 116 MG/DL (ref 140–420)
FIBRINOGEN: 124 MG/DL (ref 140–420)
FIBRINOGEN: <80 MG/DL (ref 140–420)
FIO2: 100
FIO2: 40
GFR AFRICAN AMERICAN: >60 ML/MIN
GFR NON-AFRICAN AMERICAN: >60 ML/MIN
GFR SERPL CREATININE-BSD FRML MDRD: ABNORMAL ML/MIN/{1.73_M2}
GLUCOSE BLD-MCNC: 144 MG/DL (ref 70–99)
GLUCOSE BLD-MCNC: 144 MG/DL (ref 74–100)
GLUCOSE BLD-MCNC: 147 MG/DL (ref 65–105)
GLUCOSE BLD-MCNC: 153 MG/DL (ref 65–105)
GLUCOSE BLD-MCNC: 159 MG/DL (ref 74–100)
GLUCOSE BLD-MCNC: 164 MG/DL (ref 70–99)
GLUCOSE BLD-MCNC: 166 MG/DL (ref 74–100)
GLUCOSE BLD-MCNC: 168 MG/DL (ref 74–100)
GLUCOSE BLD-MCNC: 170 MG/DL (ref 70–99)
GLUCOSE BLD-MCNC: 181 MG/DL (ref 70–99)
GLUCOSE BLD-MCNC: 190 MG/DL (ref 74–100)
GLUCOSE BLD-MCNC: NORMAL MG/DL (ref 74–100)
HCO3 VENOUS: 29.3 MMOL/L (ref 22–29)
HCO3 VENOUS: 32.6 MMOL/L (ref 22–29)
HCO3 VENOUS: 32.6 MMOL/L (ref 22–29)
HCT VFR BLD CALC: 30.5 % (ref 36.3–47.1)
HCT VFR BLD CALC: 30.5 % (ref 36.3–47.1)
HCT VFR BLD CALC: 31.3 % (ref 36.3–47.1)
HCT VFR BLD CALC: 32.6 % (ref 36.3–47.1)
HEMOGLOBIN, FREE, PLASMA: 3.8 MG/DL (ref 0–9.7)
HEMOGLOBIN, FREE, PLASMA: 8.2 MG/DL (ref 0–9.7)
HEMOGLOBIN: 9.2 G/DL (ref 11.9–15.1)
HEMOGLOBIN: 9.2 G/DL (ref 11.9–15.1)
HEMOGLOBIN: 9.6 G/DL (ref 11.9–15.1)
HEMOGLOBIN: 9.6 G/DL (ref 11.9–15.1)
HEPARIN THERAPY: ABNORMAL
HEPARIN THERAPY: NORMAL
HEPARIN THERAPY: NORMAL
INR BLD: 3
INR BLD: 3.1
INR BLD: 3.3
INR BLD: 3.5
KINETICS TEG: 1.6 MIN (ref 1–3)
KINETICS TEG: 1.7 MIN (ref 1–3)
KINETICS TEG: 2.2 MIN (ref 1–3)
LACTIC ACID, WHOLE BLOOD: 0.9 MMOL/L (ref 0.7–2.1)
LACTIC ACID, WHOLE BLOOD: 1.1 MMOL/L (ref 0.7–2.1)
LACTIC ACID, WHOLE BLOOD: 1.2 MMOL/L (ref 0.7–2.1)
LACTIC ACID, WHOLE BLOOD: 1.2 MMOL/L (ref 0.7–2.1)
LY30 (LYSIS) TEG: 0 % (ref 0–8)
MA (MAX CLOT) TEG: 63.4 MM (ref 50–70)
MA (MAX CLOT) TEG: 67.2 MM (ref 50–70)
MA (MAX CLOT) TEG: 69.1 MM (ref 50–70)
MAGNESIUM: 2.3 MG/DL (ref 1.6–2.6)
MAGNESIUM: 2.3 MG/DL (ref 1.6–2.6)
MAGNESIUM: 2.4 MG/DL (ref 1.6–2.6)
MAGNESIUM: 2.6 MG/DL (ref 1.6–2.6)
MCH RBC QN AUTO: 27.4 PG (ref 25.2–33.5)
MCH RBC QN AUTO: 27.5 PG (ref 25.2–33.5)
MCH RBC QN AUTO: 27.6 PG (ref 25.2–33.5)
MCH RBC QN AUTO: 27.9 PG (ref 25.2–33.5)
MCHC RBC AUTO-ENTMCNC: 29.4 G/DL (ref 28.4–34.8)
MCHC RBC AUTO-ENTMCNC: 30.2 G/DL (ref 28.4–34.8)
MCHC RBC AUTO-ENTMCNC: 30.2 G/DL (ref 28.4–34.8)
MCHC RBC AUTO-ENTMCNC: 30.7 G/DL (ref 28.4–34.8)
MCV RBC AUTO: 91 FL (ref 82.6–102.9)
MCV RBC AUTO: 91.3 FL (ref 82.6–102.9)
MCV RBC AUTO: 91.6 FL (ref 82.6–102.9)
MCV RBC AUTO: 92.9 FL (ref 82.6–102.9)
MODE: ABNORMAL
NRBC AUTOMATED: 0.2 PER 100 WBC
NRBC AUTOMATED: 0.3 PER 100 WBC
O2 DEVICE/FLOW/%: ABNORMAL
O2 SAT, VEN: 71 % (ref 60–85)
O2 SAT, VEN: 74 % (ref 60–85)
O2 SAT, VEN: 79 % (ref 60–85)
PARTIAL THROMBOPLASTIN TIME: 52.6 SEC (ref 20.5–30.5)
PARTIAL THROMBOPLASTIN TIME: 54.1 SEC (ref 20.5–30.5)
PARTIAL THROMBOPLASTIN TIME: 55.4 SEC (ref 20.5–30.5)
PARTIAL THROMBOPLASTIN TIME: 56.6 SEC (ref 20.5–30.5)
PCO2, VEN: 51.8 MM HG (ref 41–51)
PCO2, VEN: 53.5 MM HG (ref 41–51)
PCO2, VEN: 58.8 MM HG (ref 41–51)
PDW BLD-RTO: 13.8 % (ref 11.8–14.4)
PERFORMING LOCATION: ABNORMAL
PERFORMING LOCATION: NORMAL
PERFORMING LOCATION: NORMAL
PH VENOUS: 7.35 (ref 7.32–7.43)
PH VENOUS: 7.36 (ref 7.32–7.43)
PH VENOUS: 7.39 (ref 7.32–7.43)
PLATELET # BLD: 180 K/UL (ref 138–453)
PLATELET # BLD: 185 K/UL (ref 138–453)
PLATELET # BLD: 197 K/UL (ref 138–453)
PLATELET # BLD: 214 K/UL (ref 138–453)
PMV BLD AUTO: 10 FL (ref 8.1–13.5)
PMV BLD AUTO: 9.6 FL (ref 8.1–13.5)
PMV BLD AUTO: 9.7 FL (ref 8.1–13.5)
PMV BLD AUTO: 9.8 FL (ref 8.1–13.5)
PO2, VEN: 39.2 MM HG (ref 30–50)
PO2, VEN: 40.7 MM HG (ref 30–50)
PO2, VEN: 46.9 MM HG (ref 30–50)
POC HCO3: 29.4 MMOL/L (ref 21–28)
POC HCO3: 31 MMOL/L (ref 21–28)
POC HCO3: 31.7 MMOL/L (ref 21–28)
POC HCO3: 31.9 MMOL/L (ref 21–28)
POC HCO3: 32.3 MMOL/L (ref 21–28)
POC HCO3: 32.5 MMOL/L (ref 21–28)
POC HCO3: 32.5 MMOL/L (ref 21–28)
POC HCO3: 34.7 MMOL/L (ref 21–28)
POC HCO3: 34.9 MMOL/L (ref 21–28)
POC HCO3: 35.6 MMOL/L (ref 21–28)
POC HEMATOCRIT: 28 % (ref 36–46)
POC HEMATOCRIT: 32 % (ref 36–46)
POC HEMOGLOBIN: 10.9 G/DL (ref 12–16)
POC HEMOGLOBIN: 9.4 G/DL (ref 12–16)
POC O2 SATURATION: 100 % (ref 94–98)
POC O2 SATURATION: 100 % (ref 94–98)
POC O2 SATURATION: 91 % (ref 94–98)
POC O2 SATURATION: 91 % (ref 94–98)
POC O2 SATURATION: 92 % (ref 94–98)
POC O2 SATURATION: 92 % (ref 94–98)
POC O2 SATURATION: 93 % (ref 94–98)
POC O2 SATURATION: 94 % (ref 94–98)
POC PCO2: 50.2 MM HG (ref 35–48)
POC PCO2: 50.3 MM HG (ref 35–48)
POC PCO2: 50.5 MM HG (ref 35–48)
POC PCO2: 51 MM HG (ref 35–48)
POC PCO2: 52.2 MM HG (ref 35–48)
POC PCO2: 54.2 MM HG (ref 35–48)
POC PCO2: 54.4 MM HG (ref 35–48)
POC PCO2: 56.3 MM HG (ref 35–48)
POC PCO2: 58.6 MM HG (ref 35–48)
POC PCO2: 63.9 MM HG (ref 35–48)
POC PH: 7.34 (ref 7.35–7.45)
POC PH: 7.35 (ref 7.35–7.45)
POC PH: 7.36 (ref 7.35–7.45)
POC PH: 7.37 (ref 7.35–7.45)
POC PH: 7.4 (ref 7.35–7.45)
POC PH: 7.4 (ref 7.35–7.45)
POC PH: 7.41 (ref 7.35–7.45)
POC PH: 7.42 (ref 7.35–7.45)
POC PO2: 416.8 MM HG (ref 83–108)
POC PO2: 438.5 MM HG (ref 83–108)
POC PO2: 62.2 MM HG (ref 83–108)
POC PO2: 63.6 MM HG (ref 83–108)
POC PO2: 66.7 MM HG (ref 83–108)
POC PO2: 68.3 MM HG (ref 83–108)
POC PO2: 69 MM HG (ref 83–108)
POC PO2: 70.6 MM HG (ref 83–108)
POC PO2: 73 MM HG (ref 83–108)
POC PO2: 73.7 MM HG (ref 83–108)
POSITIVE BASE EXCESS, ART: 10 (ref 0–3)
POSITIVE BASE EXCESS, ART: 3 (ref 0–3)
POSITIVE BASE EXCESS, ART: 5 (ref 0–3)
POSITIVE BASE EXCESS, ART: 5 (ref 0–3)
POSITIVE BASE EXCESS, ART: 6 (ref 0–3)
POSITIVE BASE EXCESS, ART: 7 (ref 0–3)
POSITIVE BASE EXCESS, ART: 7 (ref 0–3)
POSITIVE BASE EXCESS, ART: 9 (ref 0–3)
POSITIVE BASE EXCESS, VEN: 3 (ref 0–3)
POSITIVE BASE EXCESS, VEN: 6 (ref 0–3)
POSITIVE BASE EXCESS, VEN: 7 (ref 0–3)
POTASSIUM SERPL-SCNC: 5 MMOL/L (ref 3.7–5.3)
POTASSIUM SERPL-SCNC: 5 MMOL/L (ref 3.7–5.3)
POTASSIUM SERPL-SCNC: 5.1 MMOL/L (ref 3.7–5.3)
POTASSIUM SERPL-SCNC: 5.3 MMOL/L (ref 3.7–5.3)
PROTHROMBIN TIME: 29.6 SEC (ref 9.1–12.3)
PROTHROMBIN TIME: 30.4 SEC (ref 9.1–12.3)
PROTHROMBIN TIME: 32.1 SEC (ref 9.1–12.3)
PROTHROMBIN TIME: 34.1 SEC (ref 9.1–12.3)
RBC # BLD: 3.33 M/UL (ref 3.95–5.11)
RBC # BLD: 3.34 M/UL (ref 3.95–5.11)
RBC # BLD: 3.44 M/UL (ref 3.95–5.11)
RBC # BLD: 3.51 M/UL (ref 3.95–5.11)
REACTION TIME TEG: 10.6 MIN (ref 5–10)
REACTION TIME TEG: 8.8 MIN (ref 5–10)
REACTION TIME TEG: 9.1 MIN (ref 5–10)
SAMPLE SITE: ABNORMAL
SODIUM BLD-SCNC: 140 MMOL/L (ref 135–144)
SODIUM BLD-SCNC: 141 MMOL/L (ref 135–144)
SODIUM BLD-SCNC: 142 MMOL/L (ref 135–144)
SODIUM BLD-SCNC: 144 MMOL/L (ref 135–144)
TOTAL PROTEIN: 5.4 G/DL (ref 6.4–8.3)
TOTAL PROTEIN: 5.5 G/DL (ref 6.4–8.3)
WBC # BLD: 22.6 K/UL (ref 3.5–11.3)
WBC # BLD: 24.3 K/UL (ref 3.5–11.3)
WBC # BLD: 25.1 K/UL (ref 3.5–11.3)
WBC # BLD: 25.6 K/UL (ref 3.5–11.3)

## 2022-04-25 PROCEDURE — 80076 HEPATIC FUNCTION PANEL: CPT

## 2022-04-25 PROCEDURE — 36430 TRANSFUSION BLD/BLD COMPNT: CPT

## 2022-04-25 PROCEDURE — 33948 ECMO/ECLS DAILY MGMT-VENOUS: CPT | Performed by: INTERNAL MEDICINE

## 2022-04-25 PROCEDURE — 2100000001 HC CVICU R&B

## 2022-04-25 PROCEDURE — 94003 VENT MGMT INPAT SUBQ DAY: CPT

## 2022-04-25 PROCEDURE — 85347 COAGULATION TIME ACTIVATED: CPT

## 2022-04-25 PROCEDURE — 85610 PROTHROMBIN TIME: CPT

## 2022-04-25 PROCEDURE — 82947 ASSAY GLUCOSE BLOOD QUANT: CPT

## 2022-04-25 PROCEDURE — 83051 HEMOGLOBIN PLASMA: CPT

## 2022-04-25 PROCEDURE — 2500000003 HC RX 250 WO HCPCS: Performed by: INTERNAL MEDICINE

## 2022-04-25 PROCEDURE — 6370000000 HC RX 637 (ALT 250 FOR IP): Performed by: INTERNAL MEDICINE

## 2022-04-25 PROCEDURE — 83605 ASSAY OF LACTIC ACID: CPT

## 2022-04-25 PROCEDURE — 6360000002 HC RX W HCPCS: Performed by: THORACIC SURGERY (CARDIOTHORACIC VASCULAR SURGERY)

## 2022-04-25 PROCEDURE — 6360000002 HC RX W HCPCS: Performed by: INTERNAL MEDICINE

## 2022-04-25 PROCEDURE — 85390 FIBRINOLYSINS SCREEN I&R: CPT

## 2022-04-25 PROCEDURE — 2580000003 HC RX 258: Performed by: INTERNAL MEDICINE

## 2022-04-25 PROCEDURE — 33948 ECMO/ECLS DAILY MGMT-VENOUS: CPT

## 2022-04-25 PROCEDURE — P9012 CRYOPRECIPITATE EACH UNIT: HCPCS

## 2022-04-25 PROCEDURE — 2700000000 HC OXYGEN THERAPY PER DAY

## 2022-04-25 PROCEDURE — 85730 THROMBOPLASTIN TIME PARTIAL: CPT

## 2022-04-25 PROCEDURE — 82330 ASSAY OF CALCIUM: CPT

## 2022-04-25 PROCEDURE — 71045 X-RAY EXAM CHEST 1 VIEW: CPT

## 2022-04-25 PROCEDURE — 80048 BASIC METABOLIC PNL TOTAL CA: CPT

## 2022-04-25 PROCEDURE — 83735 ASSAY OF MAGNESIUM: CPT

## 2022-04-25 PROCEDURE — 2580000003 HC RX 258: Performed by: THORACIC SURGERY (CARDIOTHORACIC VASCULAR SURGERY)

## 2022-04-25 PROCEDURE — 85576 BLOOD PLATELET AGGREGATION: CPT

## 2022-04-25 PROCEDURE — 94640 AIRWAY INHALATION TREATMENT: CPT

## 2022-04-25 PROCEDURE — 85300 ANTITHROMBIN III ACTIVITY: CPT

## 2022-04-25 PROCEDURE — 99233 SBSQ HOSP IP/OBS HIGH 50: CPT | Performed by: INTERNAL MEDICINE

## 2022-04-25 PROCEDURE — 86927 PLASMA FRESH FROZEN: CPT

## 2022-04-25 PROCEDURE — 99291 CRITICAL CARE FIRST HOUR: CPT | Performed by: INTERNAL MEDICINE

## 2022-04-25 PROCEDURE — C9113 INJ PANTOPRAZOLE SODIUM, VIA: HCPCS | Performed by: INTERNAL MEDICINE

## 2022-04-25 PROCEDURE — 85027 COMPLETE CBC AUTOMATED: CPT

## 2022-04-25 PROCEDURE — 85384 FIBRINOGEN ACTIVITY: CPT

## 2022-04-25 PROCEDURE — 94761 N-INVAS EAR/PLS OXIMETRY MLT: CPT

## 2022-04-25 RX ORDER — SODIUM CHLORIDE 9 MG/ML
INJECTION, SOLUTION INTRAVENOUS PRN
Status: DISCONTINUED | OUTPATIENT
Start: 2022-04-25 | End: 2022-05-01

## 2022-04-25 RX ORDER — CHLORDIAZEPOXIDE HYDROCHLORIDE 25 MG/1
50 CAPSULE, GELATIN COATED ORAL 3 TIMES DAILY
Status: DISCONTINUED | OUTPATIENT
Start: 2022-04-25 | End: 2022-04-26

## 2022-04-25 RX ORDER — INSULIN LISPRO 100 [IU]/ML
0-12 INJECTION, SOLUTION INTRAVENOUS; SUBCUTANEOUS EVERY 6 HOURS
Status: DISCONTINUED | OUTPATIENT
Start: 2022-04-25 | End: 2022-04-28

## 2022-04-25 RX ADMIN — Medication 30 UNITS: at 06:05

## 2022-04-25 RX ADMIN — FENTANYL CITRATE 25 MCG: 50 INJECTION INTRAMUSCULAR; INTRAVENOUS at 13:08

## 2022-04-25 RX ADMIN — ARGATROBAN 4.2 MCG/KG/MIN: 250 INJECTION INTRAVENOUS at 04:04

## 2022-04-25 RX ADMIN — CHLORDIAZEPOXIDE HYDROCHLORIDE 50 MG: 25 CAPSULE ORAL at 15:59

## 2022-04-25 RX ADMIN — DILTIAZEM HYDROCHLORIDE 30 MG: 30 TABLET ORAL at 06:49

## 2022-04-25 RX ADMIN — CHLORDIAZEPOXIDE HYDROCHLORIDE 50 MG: 25 CAPSULE ORAL at 20:33

## 2022-04-25 RX ADMIN — INSULIN LISPRO 2 UNITS: 100 INJECTION, SOLUTION INTRAVENOUS; SUBCUTANEOUS at 17:02

## 2022-04-25 RX ADMIN — DILTIAZEM HYDROCHLORIDE 30 MG: 30 TABLET ORAL at 11:28

## 2022-04-25 RX ADMIN — MIDAZOLAM 2 MG: 1 INJECTION INTRAMUSCULAR; INTRAVENOUS at 19:31

## 2022-04-25 RX ADMIN — IPRATROPIUM BROMIDE AND ALBUTEROL SULFATE 1 AMPULE: .5; 2.5 SOLUTION RESPIRATORY (INHALATION) at 03:04

## 2022-04-25 RX ADMIN — OXYCODONE HYDROCHLORIDE 10 MG: 5 TABLET ORAL at 11:28

## 2022-04-25 RX ADMIN — Medication 200 MCG/HR: at 10:00

## 2022-04-25 RX ADMIN — METHYLPREDNISOLONE SODIUM SUCCINATE 40 MG: 40 INJECTION, POWDER, FOR SOLUTION INTRAMUSCULAR; INTRAVENOUS at 09:25

## 2022-04-25 RX ADMIN — DOCUSATE SODIUM 100 MG: 50 LIQUID ORAL at 20:33

## 2022-04-25 RX ADMIN — OXYCODONE HYDROCHLORIDE 10 MG: 5 TABLET ORAL at 07:35

## 2022-04-25 RX ADMIN — MIDAZOLAM 2 MG: 1 INJECTION INTRAMUSCULAR; INTRAVENOUS at 15:30

## 2022-04-25 RX ADMIN — METOPROLOL TARTRATE 5 MG: 1 INJECTION, SOLUTION INTRAVENOUS at 12:30

## 2022-04-25 RX ADMIN — FENTANYL CITRATE 25 MCG: 50 INJECTION INTRAMUSCULAR; INTRAVENOUS at 06:20

## 2022-04-25 RX ADMIN — POLYVINYL ALCOHOL 1 DROP: 14 SOLUTION/ DROPS OPHTHALMIC at 04:44

## 2022-04-25 RX ADMIN — IPRATROPIUM BROMIDE AND ALBUTEROL SULFATE 1 AMPULE: .5; 2.5 SOLUTION RESPIRATORY (INHALATION) at 20:25

## 2022-04-25 RX ADMIN — FENTANYL CITRATE 25 MCG: 50 INJECTION INTRAMUSCULAR; INTRAVENOUS at 20:01

## 2022-04-25 RX ADMIN — INSULIN LISPRO 2 UNITS: 100 INJECTION, SOLUTION INTRAVENOUS; SUBCUTANEOUS at 06:29

## 2022-04-25 RX ADMIN — INSULIN LISPRO 2 UNITS: 100 INJECTION, SOLUTION INTRAVENOUS; SUBCUTANEOUS at 02:03

## 2022-04-25 RX ADMIN — Medication 10 MG/HR: at 04:02

## 2022-04-25 RX ADMIN — FUROSEMIDE 20 MG: 10 INJECTION, SOLUTION INTRAMUSCULAR; INTRAVENOUS at 08:00

## 2022-04-25 RX ADMIN — SODIUM CHLORIDE: 9 INJECTION, SOLUTION INTRAVENOUS at 04:08

## 2022-04-25 RX ADMIN — INSULIN LISPRO 2 UNITS: 100 INJECTION, SOLUTION INTRAVENOUS; SUBCUTANEOUS at 10:12

## 2022-04-25 RX ADMIN — OXYCODONE HYDROCHLORIDE 10 MG: 5 TABLET ORAL at 19:36

## 2022-04-25 RX ADMIN — SODIUM CHLORIDE, PRESERVATIVE FREE 40 MG: 5 INJECTION INTRAVENOUS at 07:59

## 2022-04-25 RX ADMIN — POLYVINYL ALCOHOL 1 DROP: 14 SOLUTION/ DROPS OPHTHALMIC at 15:14

## 2022-04-25 RX ADMIN — Medication 30 UNITS: at 18:04

## 2022-04-25 RX ADMIN — Medication 200 MCG/HR: at 14:10

## 2022-04-25 RX ADMIN — FENTANYL CITRATE 25 MCG: 50 INJECTION INTRAMUSCULAR; INTRAVENOUS at 15:28

## 2022-04-25 RX ADMIN — METHYLPREDNISOLONE SODIUM SUCCINATE 40 MG: 40 INJECTION, POWDER, FOR SOLUTION INTRAMUSCULAR; INTRAVENOUS at 17:02

## 2022-04-25 RX ADMIN — OXYCODONE HYDROCHLORIDE 10 MG: 5 TABLET ORAL at 23:30

## 2022-04-25 RX ADMIN — IPRATROPIUM BROMIDE AND ALBUTEROL SULFATE 1 AMPULE: .5; 2.5 SOLUTION RESPIRATORY (INHALATION) at 08:06

## 2022-04-25 RX ADMIN — Medication 30 UNITS: at 18:02

## 2022-04-25 RX ADMIN — Medication 30 UNITS: at 06:06

## 2022-04-25 RX ADMIN — Medication 30 UNITS: at 18:03

## 2022-04-25 RX ADMIN — OXYCODONE HYDROCHLORIDE 10 MG: 5 TABLET ORAL at 15:14

## 2022-04-25 RX ADMIN — ARGATROBAN 4.2 MCG/KG/MIN: 250 INJECTION INTRAVENOUS at 11:55

## 2022-04-25 RX ADMIN — Medication 10 MG/HR: at 22:37

## 2022-04-25 RX ADMIN — INSULIN LISPRO 2 UNITS: 100 INJECTION, SOLUTION INTRAVENOUS; SUBCUTANEOUS at 22:06

## 2022-04-25 RX ADMIN — OXYCODONE HYDROCHLORIDE 10 MG: 5 TABLET ORAL at 04:44

## 2022-04-25 RX ADMIN — DILTIAZEM HYDROCHLORIDE 30 MG: 30 TABLET ORAL at 23:30

## 2022-04-25 RX ADMIN — METHYLPREDNISOLONE SODIUM SUCCINATE 40 MG: 40 INJECTION, POWDER, FOR SOLUTION INTRAMUSCULAR; INTRAVENOUS at 02:24

## 2022-04-25 RX ADMIN — ARGATROBAN 4.2 MCG/KG/MIN: 250 INJECTION INTRAVENOUS at 19:40

## 2022-04-25 RX ADMIN — POLYVINYL ALCOHOL 1 DROP: 14 SOLUTION/ DROPS OPHTHALMIC at 20:37

## 2022-04-25 RX ADMIN — CEFTRIAXONE SODIUM 1000 MG: 1 INJECTION, POWDER, FOR SOLUTION INTRAMUSCULAR; INTRAVENOUS at 01:24

## 2022-04-25 RX ADMIN — DILTIAZEM HYDROCHLORIDE 30 MG: 30 TABLET ORAL at 17:02

## 2022-04-25 RX ADMIN — Medication 200 MCG/HR: at 00:32

## 2022-04-25 RX ADMIN — IPRATROPIUM BROMIDE AND ALBUTEROL SULFATE 1 AMPULE: .5; 2.5 SOLUTION RESPIRATORY (INHALATION) at 23:48

## 2022-04-25 RX ADMIN — Medication 30 UNITS: at 06:07

## 2022-04-25 RX ADMIN — KETAMINE HYDROCHLORIDE 0.2 MG/KG/HR: 50 INJECTION INTRAMUSCULAR; INTRAVENOUS at 11:58

## 2022-04-25 RX ADMIN — Medication 10 MG/HR: at 11:57

## 2022-04-25 RX ADMIN — DILTIAZEM HYDROCHLORIDE 30 MG: 30 TABLET ORAL at 01:24

## 2022-04-25 RX ADMIN — POLYVINYL ALCOHOL 1 DROP: 14 SOLUTION/ DROPS OPHTHALMIC at 08:02

## 2022-04-25 RX ADMIN — IPRATROPIUM BROMIDE AND ALBUTEROL SULFATE 1 AMPULE: .5; 2.5 SOLUTION RESPIRATORY (INHALATION) at 15:37

## 2022-04-25 RX ADMIN — IPRATROPIUM BROMIDE AND ALBUTEROL SULFATE 1 AMPULE: .5; 2.5 SOLUTION RESPIRATORY (INHALATION) at 11:56

## 2022-04-25 ASSESSMENT — PAIN SCALES - WONG BAKER
WONGBAKER_NUMERICALRESPONSE: 2
WONGBAKER_NUMERICALRESPONSE: 6
WONGBAKER_NUMERICALRESPONSE: 0
WONGBAKER_NUMERICALRESPONSE: 2
WONGBAKER_NUMERICALRESPONSE: 6
WONGBAKER_NUMERICALRESPONSE: 0

## 2022-04-25 ASSESSMENT — PULMONARY FUNCTION TESTS
PIF_VALUE: 23
PIF_VALUE: 24
PIF_VALUE: 24
PIF_VALUE: 22
PIF_VALUE: 24
PIF_VALUE: 23
PIF_VALUE: 24
PIF_VALUE: 23
PIF_VALUE: 22
PIF_VALUE: 24
PIF_VALUE: 24
PIF_VALUE: 23
PIF_VALUE: 23
PIF_VALUE: 24
PIF_VALUE: 22
PIF_VALUE: 23
PIF_VALUE: 24
PIF_VALUE: 23
PIF_VALUE: 24
PIF_VALUE: 24
PIF_VALUE: 23
PIF_VALUE: 23
PIF_VALUE: 24
PIF_VALUE: 23
PIF_VALUE: 24
PIF_VALUE: 23
PIF_VALUE: 24
PIF_VALUE: 23
PIF_VALUE: 24
PIF_VALUE: 23
PIF_VALUE: 24
PIF_VALUE: 23
PIF_VALUE: 24

## 2022-04-25 ASSESSMENT — PAIN SCALES - GENERAL: PAINLEVEL_OUTOF10: 2

## 2022-04-25 NOTE — PROGRESS NOTES
Comprehensive Nutrition Assessment    Type and Reason for Visit:  Reassess    Nutrition Recommendations/Plan:   1. Continue current tube feeding at this time. Will continue to monitor TF tolerance/adequacy and labs-modify TF as needed. Malnutrition Assessment:  Malnutrition Status: At risk for malnutrition    Context:  Acute Illness     Findings of the 6 clinical characteristics of malnutrition:  Energy Intake:  Mild decrease in energy intake\  Weight Loss:  Unable to assess     Body Fat Loss:  No significant body fat loss     Muscle Mass Loss:  No significant muscle mass loss    Fluid Accumulation:  Mild (to moderate) Extremities,Generalized   Strength:  Not Performed    Nutrition Assessment:    Pt remains on vent, ECMO. Peptide Based High Protein TF at 55 mL/hr and tolerating well per RN. Last BM noted: today, 4/25/22. Labs reviewed: K 5 mmol/L. Meds reviewed. Nutrition Related Findings:    Meds/labs reviewed Wound Type: None       Current Nutrition Intake & Therapies:    Average Meal Intake: NPO  Average Supplements Intake: NPO  ADULT TUBE FEEDING; Nasogastric; Peptide Based High Protein; Continuous; 10; Yes; 10; Q 4 hours; 55; 30; Q 4 hours  Current Tube Feeding (TF) Orders:  · Feeding Route: Nasogastric  · Formula: Peptide Based High Protein  · Schedule: Continuous  · Feeding Regimen: 55 ml/hr  · Additives/Modulars: Protein (once daily ordered)  · Current TF & Flush Orders Provides: Peptide Based High Protein formula at 55 mL/hr =1320 kcal and 115 g pro/day  · Goal TF & Flush Orders Provides: Peptide Based High Protein formula at 55 mL/hr =1320 kcal and 115 g pro/day    Additional Calorie Sources:  · None      Anthropometric Measures:  Height: 5' 3\" (160 cm)  Ideal Body Weight (IBW): 115 lbs (52 kg)    Admission Body Weight: 293 lb 10.4 oz (133.2 kg)  Current Body Weight: 300 lb 4.8 oz (136.2 kg), 261.1 % IBW.  Weight Source: Bed Scale  Current BMI (kg/m2): 53.2                          BMI Categories: Obese Class 3 (BMI 40.0 or greater)    Estimated Daily Nutrient Needs:  Energy Requirements Based On: Kcal/kg  Weight Used for Energy Requirements: Ideal  Energy (kcal/day): 1300 kcal/day  Weight Used for Protein Requirements: Ideal  Protein (g/day): 100 g pro/day  Method Used for Fluid Requirements: ml/Kg  Fluid (ml/day): per MD    Nutrition Diagnosis:   · Inadequate oral intake related to impaired respiratory function as evidenced by NPO or clear liquid status due to medical condition,nutrition support - enteral nutrition      Nutrition Interventions:   Food and/or Nutrient Delivery: Continue Current Tube Feeding  Nutrition Education/Counseling: No recommendation at this time  Coordination of Nutrition Care: Continue to monitor while inpatient       Goals:  Previous Goal Met: Goal(s) Achieved  Goals: Meet at least 75% of estimated needs       Nutrition Monitoring and Evaluation:   Behavioral-Environmental Outcomes: None Identified  Food/Nutrient Intake Outcomes: Enteral Nutrition Intake/Tolerance  Physical Signs/Symptoms Outcomes: Biochemical Data,Fluid Status or Edema,Nutrition Focused Physical Findings,Skin,Weight    Discharge Planning:     Too soon to determine     [address], LD  Contact: [phone]

## 2022-04-25 NOTE — PROGRESS NOTES
PULMONARY & CRITICAL CARE MEDICINE PROGRESS NOTE     Patient:  Jessica Gonsales  MRN: [id number]  Admit date: 2022  Primary Care Physician: Chance Jernigan MD  Consulting Physician: Wyatt Betancourt MD  CODE Status: Full Code  LOS: 7     SUBJECTIVE     CHIEF COMPLAINT/REASON FOR INITIAL CONSULT: Acute respiratory failure    BRIEF HOSPITAL COURSE:   The patient is a 23 y.o. female with past medical history of asthma was initially admitted to [facility] with acute exacerbation. She was initially put on nonrebreather, subsequently required intubation and initiation of mechanical ventilation due to worsening respiratory status. Patient was also on bicarb drip which was discontinued on arrival to [place].  She is sedated, paralyzed and mechanically ventilated. ABG shows improvement in respiratory acidosis and oxygenation. INTERVAL HISTORY:  22  Currently on VV ECMO.   Rest settings on the ventilator-tidal volumes being maintained  Opening eyes  Moving extremities  Sedated on the ventilator  Urine output noted and patient is in negative fluid balance of 400 mL    OBJECTIVE     VENTILATOR SETTINGS:  Vent Information  Ventilator ID: [id number]  Vent Mode: AC/PC     PaO2/FiO2 RATIO:  Recent Labs     22  0752   POCPO2 70.6*      FiO2 : 40 %     VITAL SIGNS:   LAST:  BP (!) 141/64   Pulse 90   Temp 98.4 °F (36.9 °C)   Resp 12   Ht 5' 3\" (1.6 m)   Wt (!) 300 lb 4.8 oz (136.2 kg)   SpO2 95%   BMI 53.20 kg/m²   8-24 HR RANGE:  TEMP Temp  Av.4 °F (36.9 °C)  Min: 98.4 °F (36.9 °C)  Max: 98.4 °F (44.8 °C)   BP Systolic (08LMK), MJH:959 , Min:141 , FVA:107      Diastolic (04FXR), QNP:86, Min:58, Max:75     PULSE Pulse  Av  Min: 68  Max: 108   RR Resp  Av.3  Min: 11  Max: 15   O2 SAT SpO2  Av.6 %  Min: 93 %  Max: 95 %   OXYGEN DELIVERY No data recorded      Exam  Morbid obesity  Endotracheal tube in place  No subcutaneous air in the neck  Poor air entry bilaterally without rhonchi and wheezing.   Abdomen soft obese nontender  Lower extremity edema present  Bilateral femoral cannulas  Upper extremity noted    DATA REVIEW     Medications:  Scheduled Meds:   lidocaine PF  5 mL Tracheal Tube Once    oxyCODONE  10 mg Oral Q4H    docusate  100 mg Oral BID    polyethylene glycol  17 g Oral Daily    furosemide  20 mg IntraVENous Daily    heparin flush (PF)  3 mL IntraVENous Q12H    heparin flush (PF)  3 mL IntraVENous Q12H    heparin flush (PF)  3 mL IntraVENous Q12H    heparin flush (PF)  3 mL IntraVENous Q12H    albumin human  25 g IntraVENous Once    pantoprazole (PROTONIX) 40 mg injection  40 mg IntraVENous Daily    methylPREDNISolone  40 mg IntraVENous Q8H    dilTIAZem  30 mg Oral 4 times per day    polyvinyl alcohol  1 drop Both Eyes Q6H    ipratropium-albuterol  1 ampule Inhalation Q4H    insulin lispro  0-12 Units SubCUTAneous Q6H    sodium chloride flush  5-40 mL IntraVENous 2 times per day     Continuous Infusions:   argatroban infusion 4.2 mcg/kg/min (04/25/22 0733)    ketamine (KETALAR) 2 mg/mL infusion for analgosedation 0.2 mg/kg/hr (04/25/22 0733)    midazolam 10 mg/hr (04/25/22 0733)    fentaNYL 50 mcg/mL 200 mcg/hr (04/25/22 0733)    dextrose      sodium chloride 10 mL/hr at 04/25/22 0733       INPUT/OUTPUT:  In: 3029.9 [I.V.:1513.2; NG/GT:1217]  Out: 2690 [Urine:2390]  Date 04/25/22 0000 - 04/25/22 2359   Shift 9051-7970 6348-5493 5585-7172 24 Hour Total   INTAKE   I.V.(mL/kg) 659.4(4.8)   659.4(4.8)   NG/GT(mL/kg) 428(3.1)   428(3.1)   IV Piggyback(mL/kg) 50(0.4)   50(0.4)   Shift Total(mL/kg) 1137.4(8.4)   1137.4(8.4)   OUTPUT   Urine(mL/kg/hr) 595(0.5)   595   Shift Total(mL/kg) 595(4.4)   595(4.4)   Weight (kg) 136.2 136.2 136.2 136.2        LABS:  ABGs:   Recent Labs     04/24/22 2013 04/25/22  0007 04/25/22  0359 04/25/22  0555 04/25/22  0752   POCPH 7.346* 7.352 7.359 7.370 7.344*   POCPCO2 59.2* 58.6* 56.3* 51.0* 63.9* POCPO2 76.7* 66.7* 73.7* 416.8* 70.6*   POCHCO3 32.4* 32.5* 31.7* 29.4* 34.7*   JINE4GYJ 94 91* 94 100* 92*     CBC:   Recent Labs     04/24/22  0344 04/24/22  0955 04/24/22  1549 04/24/22  2213 04/25/22  0402   WBC 23.5* 21.9* 23.6* 26.9* 25.6*   HGB 9.8* 9.6* 9.7* 10.1* 9.6*   HCT 33.2* 32.1* 32.7* 34.9* 32.6*   MCV 92.7 92.2 92.1 93.8 92.9    223 226 232 214   RBC 3.58* 3.48* 3.55* 3.72* 3.51*   MCH 27.4 27.6 27.3 27.2 27.4   MCHC 29.5 29.9 29.7 28.9 29.4   RDW 13.6 13.7 13.7 13.7 13.8     CRP:   No results for input(s): CRP in the last 72 hours. LDH:   No results for input(s): LDH in the last 72 hours. BMP:   Recent Labs     04/24/22  0344 04/24/22  0344 04/24/22  0844 04/24/22  0955 04/24/22  1549 04/24/22  2213 04/25/22  0402     --   --  139 141 140 140   K 5.6*   < > 4.9 5.1 4.9 5.3 5.3     --   --  104 105 103 104   CO2 30  --   --  30 29 30 30   BUN 28*  --   --  30* 28* 30* 30*   CREATININE 0.50  --   --  0.49* 0.57 0.54 0.53   GLUCOSE 143*  --   --  151* 187* 168* 164*    < > = values in this interval not displayed. Liver Function Test:   Recent Labs     04/23/22  0338 04/23/22  1627 04/24/22  0344 04/24/22  1549 04/25/22  0402   PROT 5.8* 5.9* 5.7* 5.5* 5.4*   LABALBU 3.3* 3.4* 3.2* 3.2* 3.1*   ALT 98* 92* 87* 95* 88*   AST 64* 39* 36* 37* 27   ALKPHOS 50 51 47 48 46   BILITOT 0.46 0.55 0.44 0.41 0.32     Coagulation Profile:   Recent Labs     04/24/22  0344 04/24/22  0955 04/24/22  1549 04/24/22  2213 04/25/22  0402   INR 3.6 3.7 3.3 3.1 3.3   PROTIME 35.1* 35.3* 32.3* 30.1* 32.1*   APTT 58.7* 58.7* 59.1* 58.7* 56.6*     D-Dimer:  No results for input(s): DDIMER in the last 72 hours. Lactic Acid:  No results for input(s): LACTA in the last 72 hours. Cardiac Enzymes:  No results for input(s): CKTOTAL, CKMB, CKMBINDEX, TROPONINI in the last 72 hours.     Invalid input(s): TROPONIN, HSTROP  BNP/ProBNP:   No results for input(s): BNP, PROBNP in the last 72 hours.  Triglycerides:  No results for input(s): TRIG in the last 72 hours. Microbiology:  Urine Culture:  No components found for: CURINE  Blood Culture:  No components found for: CBLOOD, CFUNGUSBL  Sputum Culture:  No components found for: CSPUTUM  Recent Labs     04/24/22  1141   SPECDESC . BRONCHIAL WASHINGS   CULTURE PENDING     Recent Labs     04/24/22  1141   SPECDESC . BRONCHIAL WASHINGS   CULTURE PENDING        Pathology:    Radiology Reports:  XR CHEST PORTABLE   Final Result   Stable chest x-ray compared to 04/24/2022. XR CHEST PORTABLE   Final Result   1. Stable support tubes and line. 2. Low lung volumes. Crowding of vessels from low lung volumes results in   apparent opacification at the right cardiophrenic angle. XR CHEST PORTABLE   Final Result   Lines and tubes are unchanged. Low lung volumes. Cardiomegaly. XR CHEST PORTABLE   Final Result   1. Endotracheal tube tip is now 1 cm above the soto. Recommend retraction   by an additional 2.5 cm for optimal positioning. 2.  Improved aeration in the left lung with continued atelectatic/airspace   infiltrates. XR CHEST PORTABLE   Final Result   Endotracheal tube tip is in the right main bronchus and should be retracted   approximately 4-5 cm. Tips of ECMO cannulas project in the expected location of the IVC. Findings were discussed with Laron Danielson RN (the patient's ICU nurse)   at 2:26 pm on 4/22/2022. XR CHEST (SINGLE VIEW FRONTAL)   Final Result   1. Support tubes and lines are unchanged. 2. Stable bibasilar lung infiltrates, right side greater than left. This may   be related to atelectasis and/or pleural effusion. Superimposed pneumonia   cannot be excluded. XR CHEST (SINGLE VIEW FRONTAL)   Final Result   Suboptimal position left IJ catheter. Consider removal and replacement or   repositioning. XR CHEST PORTABLE   Final Result   1.   On today's exam the endotracheal tube tip is positioned 1.5 cm above the   soto. Recommend retraction by an additional 2 cm for optimal positioning. 2.  Interval development of right lower lobe airspace disease. This could   represent atelectasis         XR CHEST PORTABLE   Final Result   1. Endotracheal and enteric tubes as above. 2. Diffuse interstitial opacities throughout both lungs, right greater than   left. No new focal airspace consolidation. Follow-up is recommended to   document resolution. XR CHEST (SINGLE VIEW FRONTAL)   Final Result   Diffuse interstitial opacities with alveolar/consolidative opacities at the   bases favoring multifocal airspace disease. XR CHEST PORTABLE    (Results Pending)        Echocardiogram:   Results for orders placed during the hospital encounter of 04/18/22    ECHO Complete 2D W Doppler W Color      Summary  Left ventricle is normal in size, global left ventricular systolic function  is preserved, estimated ejection fraction is 50%. There appears to be some apical hypokinesis, in the apical views. Evidence of diastolic dysfunction. Trivial mitral regurgitation. Trivial pulmonic insufficiency. IVC dilated but unable to assess respiratory collapse due to patient on  ventilator. ASSESSMENT AND PLAN     Assessment:    // Acute hypoxic/hypercapnic respiratory failure due to status asthmaticus, on VV ECMO/mechanical ventilation  //Metabolic alkalosis, post hypercarbic state  // Rhino/enterovirus infection  // Leukocytosis, stable  // Hyperglycemia, acceptable  // Elevated troponin  // Morbid obesity  //Anemia, stable    Plan:    I personally interviewed/examined the patient; reviewed interval history, interpreted all available radiographic and laboratory data at the time of service.  Continue sedation   Restraints renewed.  Continue IV steroids.  Continue bronchodilator therapy.    Patient continues to be in bronchospasm but TV is better 140 ml  Bronchoscopy done on 4/24/2022- no mucous plugging , thin clear sec    Continue VV ECMO.  Blood pressure is stable.   20 mg IV Lasix daily    Start tube feeds.  Monitor endotracheal secretions    Obtain X-ray chest daily-Chest x-ray was reviewed and no changes from before   Continue to monitor I/O with a goal of even/negative fluid balance   Stress ulcer prophylaxis-Protonix   Chemical DVT prophylaxis; not indicated patient on systemic anticoagulation-with argatroban   Antimicrobials reviewed; continue ceftriaxone/azithromycin   Glycemic control appropriate; sliding scale insulin   Skin/wound care reviewed with the nursing staff   Physical/occupational therapy    Discussed with ECMO staff, nursing staff. D/w primary team   Family updated   The patient remains critically ill with illness/injury that acutely impairs one or more vital organ systems, such that there is a high probability of imminent or life threatening deterioration in the patient's condition. Critical care time of 40 minutes was spent (excluding procedures), in coordination of care during bedside rounds and discussion of patient care in detail, and recommendations of the team were adopted in the plan. Necessity of all invasive devices was also confirmed. Courtney Ibarra MD  Pulmonary and Critical Care Medicine           4/25/2022, 8:32 AM    Please note that this chart was generated using voice recognition Dragon dictation software. Although every effort was made to ensure the accuracy of this automated transcription, some errors in transcription may have occurred.

## 2022-04-25 NOTE — PLAN OF CARE
BRONCHOSPASM/BRONCHOCONSTRICTION     [x]         IMPROVE AERATION/BREATH SOUNDS  [x]   ADMINISTER BRONCHODILATOR THERAPY AS APPROPRIATE  [x]   ASSESS BREATH SOUNDS  []   IMPLEMENT AEROSOL/MDI PROTOCOL  [x]   PATIENT EDUCATION AS NEEDED     Problem: OXYGENATION/RESPIRATORY FUNCTION  Goal: Patient will maintain patent airway  Outcome: Ongoing  Goal: Patient will achieve/maintain normal respiratory rate/effort  Respiratory rate and effort will be within normal limits for the patient  Outcome: Ongoing    Problem: MECHANICAL VENTILATION  Goal: Patient will maintain patent airway  Outcome: Ongoing  Goal: Oral health is maintained or improved  Outcome: Ongoing  Goal: ET tube will be managed safely  Outcome: Ongoing  Goal: Ability to express needs and understand communication  Outcome: Ongoing  Goal: Mobility/activity is maintained at optimum level for patient  Outcome: Ongoing    Problem: ASPIRATION PRECAUTIONS  Goal: Patients risk of aspiration is minimized  Outcome: Ongoing    Problem: SKIN INTEGRITY  Goal: Skin integrity is maintained or improved  Outcome: Ongoing

## 2022-04-25 NOTE — PROGRESS NOTES
[facility] End of Shift Note:         Cannulation date/time: 4/22/2022 at 1005  ECMO type: VV  Cannula sizes/locations: 23 Fr Return RFV, 25 Fr Drainage LFV  Flow ordered at: 4-6 lpm       Currently flowing at: 4.5-4.6 (RPM 3000)       FIO2 at: 100       Sweep at: 3  Delta pressure: 23  Clot burden:       Oxygenator: 3 o'clock, 9 o'clock, 12 o'clock       Circuit: None  Anticoagulation: Argatroban Infusion at 4.2 mcg/kg/min; therapeutic-next PTT 2200  Products given:        PRBC's: 0       PLT's: 0       FFP: 0       Cryo: 0       Albumin: 0  Increased agitation and BP today; not as easily consolable. Added 50mg librium PO tid.

## 2022-04-25 NOTE — PROGRESS NOTES
[facility] End of Shift Note:       Cannulation date/time:4/22/2022 @1005  ECMO type:V-V  Cannula sizes/locations:23 Fr Return RVF, 25 FR Drainage LFV  Flow ordered at:4-6       Currently flowing at:4.49       FIO2 at:100       Sweep at:2.5  Delta pressure:23-24  Clot burden:       Oxygenator:3,9 and 12 o'clock position       Circuit:none  Anticoagulation: Argatroban infusion at 4.2mcg/kg/min  Products given: none       PRBC's:0       PLT's:0       FFP:0       Cryo:0       Albumin:0

## 2022-04-25 NOTE — PLAN OF CARE
Problem: Gas Exchange - Impaired:  Goal: Levels of oxygenation will improve  Description: Levels of oxygenation will improve  4/25/2022 0745 by Jaime Hi RN  Outcome: Progressing  4/25/2022 0632 by Carolynn Gu RN  Outcome: Progressing     Problem:  Activity:  Goal: Ability to tolerate increased activity will improve  Description: Ability to tolerate increased activity will improve  4/25/2022 0745 by Jaime Hi RN  Outcome: Progressing  4/25/2022 0632 by Carolynn Gu RN  Outcome: Progressing     Problem: Cardiac:  Goal: Hemodynamic stability will improve  Description: Hemodynamic stability will improve  4/25/2022 0745 by Jaime Hi RN  Outcome: Progressing  4/25/2022 0632 by Carolynn Gu RN  Outcome: Progressing     Problem: Respiratory:  Goal: Ability to maintain a clear airway will improve  Description: Ability to maintain a clear airway will improve  4/25/2022 0745 by Jaime Hi RN  Outcome: Progressing  4/25/2022 0632 by Carolynn Gu RN  Outcome: Progressing  Goal: Ability to maintain adequate ventilation will improve  Description: Ability to maintain adequate ventilation will improve  4/25/2022 0745 by Jaime Hi RN  Outcome: Progressing  4/25/2022 0632 by Carolynn Gu RN  Outcome: Progressing  Goal: Complications related to the disease process, condition or treatment will be avoided or minimized  Description: Complications related to the disease process, condition or treatment will be avoided or minimized  4/25/2022 0745 by Jaime Hi RN  Outcome: Progressing  4/25/2022 0632 by Carolynn Gu RN  Outcome: Progressing     Problem: Skin Integrity:  Goal: Risk for impaired skin integrity will decrease  Description: Risk for impaired skin integrity will decrease  4/25/2022 0745 by Jaime Hi RN  Outcome: Progressing  4/25/2022 0632 by Carolynn Gu RN  Outcome: Progressing     Problem: OXYGENATION/RESPIRATORY FUNCTION  Goal: Patient will maintain patent airway  4/25/2022 0745 by Madhu Camarena RN  Outcome: Progressing  4/25/2022 0632 by Paige Nazario RN  Outcome: Progressing  Goal: Patient will achieve/maintain normal respiratory rate/effort  Description: Respiratory rate and effort will be within normal limits for the patient  4/25/2022 0745 by Madhu Camarena RN  Outcome: Progressing  4/25/2022 1326 by Paige Nazario RN  Outcome: Progressing     Problem: MECHANICAL VENTILATION  Goal: Patient will maintain patent airway  4/25/2022 0745 by Madhu Camarena RN  Outcome: Progressing  4/25/2022 0632 by Paige Nazario RN  Outcome: Progressing  Goal: Oral health is maintained or improved  4/25/2022 0745 by Madhu Camarena RN  Outcome: Progressing  4/25/2022 0632 by Paige Nazario RN  Outcome: Progressing  Goal: ET tube will be managed safely  4/25/2022 0745 by Madhu Camarena RN  Outcome: Progressing  4/25/2022 0632 by Paige Nazario RN  Outcome: Progressing     Problem: Nutrition  Goal: Optimal nutrition therapy  4/25/2022 0745 by Madhu Camarena RN  Outcome: Progressing  4/25/2022 0632 by Paige Nazario RN  Outcome: Progressing     Problem: Discharge Planning  Goal: Discharge to home or other facility with appropriate resources  4/25/2022 0745 by Madhu Camarena RN  Outcome: Progressing  4/25/2022 4501 by Paige Nazario RN  Outcome: Progressing     Problem: Genitourinary - Adult  Goal: Urinary catheter remains patent  4/25/2022 0745 by Madhu Camarena RN  Outcome: Progressing  4/25/2022 0632 by Paige Nazario RN  Outcome: Progressing     Problem: Neurosensory - Adult  Goal: Achieves stable or improved neurological status  4/25/2022 0745 by Madhu Camarena RN  Outcome: Progressing  4/25/2022 0632 by Paige Nazario RN  Outcome: Progressing  Goal: Achieves maximal functionality and self care  4/25/2022 0745 by Madhu Camarena RN  Outcome: Progressing  4/25/2022 8434 by Mehdi Aburto RN  Outcome: Progressing     Problem: Safety - Medical Restraint  Goal: Remains free of injury from restraints (Restraint for Interference with Medical Device)  Description: INTERVENTIONS:  1. Determine that other, less restrictive measures have been tried or would not be effective before applying the restraint  2. Evaluate the patient's condition at the time of restraint application  3. Inform patient/family regarding the reason for restraint  4. Q2H: Monitor safety, psychosocial status, comfort, nutrition and hydration  4/25/2022 0745 by Wiley Francois RN  Outcome: Progressing  4/25/2022 0632 by Mehdi Aburto RN  Outcome: Progressing     Problem: Pain  Goal: Verbalizes/displays adequate comfort level or baseline comfort level  4/25/2022 0745 by Wiley Francois RN  Outcome: Progressing  4/25/2022 0632 by Mehdi Aburto RN  Outcome: Progressing     Problem: Safety - Adult  Goal: Free from fall injury  4/25/2022 0745 by Wiley Francois RN  Outcome: Progressing  4/25/2022 0632 by Mehdi Aburto RN  Outcome: Progressing     Problem: ABCDS Injury Assessment  Goal: Absence of physical injury  4/25/2022 0745 by Wiley Francois RN  Outcome: Progressing  4/25/2022 0632 by Mehdi Aburto RN  Outcome: Progressing     Problem: Skin/Tissue Integrity  Goal: Absence of new skin breakdown  Description: 1. Monitor for areas of redness and/or skin breakdown  2. Assess vascular access sites hourly  3. Every 4-6 hours minimum:  Change oxygen saturation probe site  4. Every 4-6 hours:  If on nasal continuous positive airway pressure, respiratory therapy assess nares and determine need for appliance change or resting period.   4/25/2022 0745 by Wiley Francois RN  Outcome: Progressing  4/25/2022 0194 by Mehdi Aburto RN  Outcome: Progressing

## 2022-04-25 NOTE — ANESTHESIA POSTPROCEDURE EVALUATION
Department of Anesthesiology  Postprocedure Note    Patient: Star Green  MRN: [id number]  [place]: 2000  Date of evaluation: 4/25/2022  Time:  7:19 AM     Procedure Summary     Date: 04/22/22 Room / Location: [address] [place] [address]    Anesthesia Start: 5391 Anesthesia Stop: 9193    Procedure: [address] 459 23 ON THE RIGHT 25 LEFT FEMORALS (N/A Groin) Diagnosis: (ECMO)    Surgeons: Steven Yang MD Responsible Provider: Ran Lopez MD    Anesthesia Type: general ASA Status: 4          Anesthesia Type: general    Anthony Phase I:      Anthony Phase II:      Last vitals: Reviewed and per EMR flowsheets.        Anesthesia Post Evaluation    Patient location during evaluation: ICU  Patient participation: complete - patient cannot participate  Level of consciousness: sedated and ventilated  Airway patency: patent  Nausea & Vomiting: no nausea and no vomiting  Complications: no  Cardiovascular status: blood pressure returned to baseline  Respiratory status: ventilator  Hydration status: stable

## 2022-04-25 NOTE — CARE COORDINATION
Transitional Planning    Per report patient is still intubated and starting ECMO. May need LTACH or SNF, follow for needs.

## 2022-04-25 NOTE — PROGRESS NOTES
[facility]  Internal Medicine Teaching Residency Program  Inpatient Daily Progress Note  ______________________________________________________________________________    Patient: Bishnu Gilbert  YOB: 2000   JXB:[id number]    Acct: [de-identified]     Room: [address] [place] [address]  Admit date: 4/18/2022  Today's date: 04/25/22  Number of days in the hospital: 7    SUBJECTIVE   Admitting Diagnosis: Acute asthma exacerbation     CC: shortness of breath    Pt examined at bedside. Chart & results reviewed. Patient remained hemodynamically stable, without pressors. Heart rate 70s 200s  -Currently on ECMO and mechanical ventilator  -Patient follows commands off sedation  -Sedation: Fentanyl, ketamine and Versed  -Pressors off  -Ventilator at rest settings  -ECMO management per cardiothoracic surgery  -Ventilator management per pulmonary critical care  -Currently on Cardizem 30 mg every 6 hours for sinus tachycardia  -Pulmonology following: Bronc was done yesterday, no mucous plug found, secretions were removed. Sent for respiratory cultures  - Currently on Lasix 20 mg IV daily  - Uout: 2.5L/24 hours  - Completed Rocephin (7 days) and Azithromycin (day 6)  -Blood glucose: 140s to 160s  -Labs reviewed: Potassium 5.3, BUN 30, ionized calcium 1.18, ALT 88 from 95, WBC 25.6<--26.9; hemoglobin 9.6,     ROS:  Low to assess due to patient being ventilated and on sedation    BRIEF HISTORY     22 y. o. female with a past medical history of bronchial asthma who was admitted to [facility] with an acute exacerbation causing increasing shortness of breath.  Patient was found to be hypoxemic with hypercarbic respiratory failure requiring intubation and mechanical ventilation.  Patient was transferred to [facility] for further elevation with possibility of ECMO.  On arrival patient was found to be on a bicarb drip due to presumed respiratory acidosis but this was discontinued due to to worsening of the hypercarbia.  Additionally patient was started on Nimbex due to the necessity for paralyzation as she was breathing over the vent.  Pulmonology and cardiothoracic surgery were consulted- plan is for ECMO. In the MICU, patient remained stable, had an episode of dark emesis which turned out to be bilious output, Hb remained stable. She was hypertensive, started on lopressor IV 5 mg PRN. Had left IJ placed which was malpositioned, removed and then CVC in left femoral was placed overnight. She was hypoxic, requiring higher FiO2 of 80%, PEEP 16, RR 32, , blood gas showed pH 7.449, pCO2 41.7, pCO2 59. 1. treated with solumedrol, bronchodilators, CXR showed no pneumothorax or pleural effusion.   On  overnight, patient became more hypoxic with increasing oxygen requirement, increasing wheezing on exam. It was determined to go with ECMO canulation on . OBJECTIVE     Vital Signs:  BP (!) 146/58   Pulse 108   Temp 98.4 °F (36.9 °C)   Resp 12   Ht 5' 3\" (1.6 m)   Wt (!) 300 lb 4.8 oz (136.2 kg)   SpO2 95%   BMI 53.20 kg/m²     Temp (24hrs), Av.4 °F (36.9 °C), Min:98.4 °F (36.9 °C), Max:98.4 °F (36.9 °C)    In: 3029.9   Out: 2690 [Urine:2390]    Physical Exam:  Constitutional: This is a well developed, well nourished, Greater than 40 - Morbid Obesity / Extreme Obesity / Grade III 23y.o. year old female who is on ECMO. EENT:  PERRLA. No conjunctival injections. Septum was midline, mucosa was without erythema, exudates or cobblestoning. No thrush was noted. Neck: Supple without thyromegaly. No elevated JVP. Trachea was midline. Respiratory: decreased breath sounds bilaterally   Cardiovascular: Regular without murmur, clicks, gallops or rubs. Abdomen: Slightly rounded and soft without organomegaly. No rebound, rigidity or guarding was appreciated. Lymphatic: No lymphadenopathy. Musculoskeletal: Normal curvature of the spine.   No gross muscle weakness. Extremities:  No lower extremity edema, ulcerations, tenderness, varicosities or erythema. Muscle size, tone and strength are normal.  No involuntary movements are noted. Skin:  Warm and dry. Good color, turgor and pigmentation. No lesions or scars.   No cyanosis or clubbing  Neurological/Psychiatric: The patient's general behavior, level of consciousness, thought content and emotional status is normal.        Medications:  Scheduled Medications:    lidocaine PF  5 mL Tracheal Tube Once    oxyCODONE  10 mg Oral Q4H    docusate  100 mg Oral BID    polyethylene glycol  17 g Oral Daily    furosemide  20 mg IntraVENous Daily    heparin flush (PF)  3 mL IntraVENous Q12H    heparin flush (PF)  3 mL IntraVENous Q12H    heparin flush (PF)  3 mL IntraVENous Q12H    heparin flush (PF)  3 mL IntraVENous Q12H    albumin human  25 g IntraVENous Once    pantoprazole (PROTONIX) 40 mg injection  40 mg IntraVENous Daily    methylPREDNISolone  40 mg IntraVENous Q8H    dilTIAZem  30 mg Oral 4 times per day    polyvinyl alcohol  1 drop Both Eyes Q6H    ipratropium-albuterol  1 ampule Inhalation Q4H    insulin lispro  0-12 Units SubCUTAneous Q6H    sodium chloride flush  5-40 mL IntraVENous 2 times per day     Continuous Infusions:    argatroban infusion 4.2 mcg/kg/min (04/25/22 0733)    ketamine (KETALAR) 2 mg/mL infusion for analgosedation 0.2 mg/kg/hr (04/25/22 0733)    midazolam 10 mg/hr (04/25/22 0733)    fentaNYL 50 mcg/mL 200 mcg/hr (04/25/22 0733)    dextrose      sodium chloride 10 mL/hr at 04/25/22 0733     PRN Medicationsalbuterol, 2.5 mg, Q4H PRN  heparin flush (PF), 3 mL, PRN  fentanNYL, 25 mcg, Q1H PRN  midazolam, 2 mg, Q2H PRN  metoprolol, 5 mg, Q6H PRN  glucose, 15 g, PRN  dextrose, 12.5 g, PRN  glucagon (rDNA), 1 mg, PRN  dextrose, 100 mL/hr, PRN  sodium chloride flush, 5-40 mL, PRN  sodium chloride, , PRN  ondansetron, 4 mg, Q8H PRN   Or  ondansetron, 4 mg, Q6H PRN  acetaminophen, 650 mg, Q6H PRN   Or  acetaminophen, 650 mg, Q6H PRN        Diagnostic Labs:  CBC:   Recent Labs     04/24/22  1549 04/24/22 2213 04/25/22  0402   WBC 23.6* 26.9* 25.6*   RBC 3.55* 3.72* 3.51*   HGB 9.7* 10.1* 9.6*   HCT 32.7* 34.9* 32.6*   MCV 92.1 93.8 92.9   RDW 13.7 13.7 13.8    232 214     BMP:   Recent Labs     04/24/22  1549 04/24/22 2213 04/25/22  0402    140 140   K 4.9 5.3 5.3    103 104   CO2 29 30 30   BUN 28* 30* 30*   CREATININE 0.57 0.54 0.53     BNP: No results for input(s): BNP in the last 72 hours. PT/INR:   Recent Labs     04/24/22  1549 04/24/22 2213 04/25/22  0402   PROTIME 32.3* 30.1* 32.1*   INR 3.3 3.1 3.3     APTT:   Recent Labs     04/24/22  1549 04/24/22 2213 04/25/22  0402   APTT 59.1* 58.7* 56.6*     CARDIAC ENZYMES: No results for input(s): CKMB, CKMBINDEX, TROPONINI in the last 72 hours. Invalid input(s): CKTOTAL;3  FASTING LIPID PANEL:No results found for: CHOL, HDL, TRIG  LIVER PROFILE:   Recent Labs     04/24/22  0344 04/24/22  1549 04/25/22  0402   AST 36* 37* 27   ALT 87* 95* 88*   BILIDIR 0.17 0.16 0.15   BILITOT 0.44 0.41 0.32   ALKPHOS 47 48 55      MICROBIOLOGY:   Lab Results   Component Value Date/Time    CULTURE PENDING 04/24/2022 11:41 AM       Imaging:    XR CHEST (SINGLE VIEW FRONTAL)    Result Date: 4/22/2022  1. Support tubes and lines are unchanged. 2. Stable bibasilar lung infiltrates, right side greater than left. This may be related to atelectasis and/or pleural effusion. Superimposed pneumonia cannot be excluded. XR CHEST (SINGLE VIEW FRONTAL)    Result Date: 4/21/2022  Suboptimal position left IJ catheter. Consider removal and replacement or repositioning. XR CHEST (SINGLE VIEW FRONTAL)    Result Date: 4/18/2022  Diffuse interstitial opacities with alveolar/consolidative opacities at the bases favoring multifocal airspace disease.      XR CHEST PORTABLE    Result Date: 4/25/2022  Stable chest x-ray compared to 04/24/2022. XR CHEST PORTABLE    Result Date: 4/24/2022  1. Stable support tubes and line. 2. Low lung volumes. Crowding of vessels from low lung volumes results in apparent opacification at the right cardiophrenic angle. XR CHEST PORTABLE    Result Date: 4/23/2022  Lines and tubes are unchanged. Low lung volumes. Cardiomegaly. XR CHEST PORTABLE    Result Date: 4/22/2022  Endotracheal tube tip is in the right main bronchus and should be retracted approximately 4-5 cm. Tips of ECMO cannulas project in the expected location of the IVC. Findings were discussed with Serena Meckel, RN (the patient's ICU nurse) at 2:26 pm on 4/22/2022. XR CHEST PORTABLE    Result Date: 4/22/2022  1. Endotracheal tube tip is now 1 cm above the soto. Recommend retraction by an additional 2.5 cm for optimal positioning. 2.  Improved aeration in the left lung with continued atelectatic/airspace infiltrates. XR CHEST PORTABLE    Result Date: 4/21/2022  1. On today's exam the endotracheal tube tip is positioned 1.5 cm above the soto. Recommend retraction by an additional 2 cm for optimal positioning. 2.  Interval development of right lower lobe airspace disease. This could represent atelectasis     XR CHEST PORTABLE    Result Date: 4/19/2022  1. Endotracheal and enteric tubes as above. 2. Diffuse interstitial opacities throughout both lungs, right greater than left. No new focal airspace consolidation. Follow-up is recommended to document resolution. ASSESSMENT & PLAN     ASSESSMENT / PLAN:     Principal Problem:    Acute asthma exacerbation  Active Problems:    Severe persistent asthma with status asthmaticus    CHIDI (obstructive sleep apnea)    History of seizures    Hypercapnic respiratory failure (HCC)    Endotracheally intubated    On mechanically assisted ventilation (HCC)    Diastolic dysfunction  Resolved Problems:    * No resolved hospital problems. *    1.  Acute hypoxic hypercapnic respiratory failure on mechanical ventilation and s/p ECMO cannulation on 4/22/22 secondary asthma exacerbation: Continue SoluMedrol 40 mg IV q8 hours, Completed IV Rocephin (7 days) and IV Azithromycin (6 days). Duoneb q4 hours and Albuterol as needed. Continue Lasix 20 mg IV daily, diuresing well. Pulm. Critical care following for ventilator management.     2. Troponin elevation type II demand ischemia: Continue Cardizem 30 mg q6 hours per cardiology. Cardiology to follow from a distance, once extubated, will contact cardiology for stress test prior to discharge due to apical hypokinesis on echo. Troponin flat trend.      3. Sinus tachycardia:- Controlled Continue Cardizem 30 mg q6 hours per Cardiology.         Diet: On tube feeds  DVT ppx : On Argatroban infusion  GI ppx: Protonix      PT/OT: Consulted  Discharge Planning / SW:  consulted, will follow up once patient is extubated and ECMO cannulation removed and Monserrat Woodall MD  Internal Medicine Resident, PGY-1  [address], [place]  4/25/2022, 7:59 AM

## 2022-04-25 NOTE — PROGRESS NOTES
ECMO DAILY ROUNDING NOTE     Patient:  Arabella Gilbert  MRN: [id number]  Admit date: 4/18/2022  Primary Care Physician: Amol Bland MD  Consulting Physician: Cristino Saravia MD  CODE Status: Full Code  LOS: 7    [x] Team present at bedside   [x] Family updated    INDICATION:   Respiratory Failure, status asthmaticus    CANNULATION:  VenoVeno ECMO cannulation on 4/22/22  Bilateral femoral cannulas  TREATMENT GOALS:  PUMP  FLOW: 3-5L/min  pH: 7.35-7.45   SvO2: >70%  SaO2:>97%  Sedation : RASS -1 to+1    CURRENT ECMO PARAMETERS:  PUMP Patient on ECMO: On  Hours on ECMO: 70  Pump Flow (L/min): 4.58 LPM  Pump Speed (Rotations/min): 3000 RPM  ECMO Mode: V/V  Pump Mode: Cardiohelp   SWEEP GAS Sweep Flow (L/min): 2.5 LPM   FiO2 (%): 100 %   CIRCUIT PRESSURES Inlet Pressure (Bladder): -86 mmHg  Pre-Membrane Pressure: 148 mmHg  Post-Membrane Pressure: 124 mmHg  Delta P: 24 mmHg  SVO2 (%): 78.1 %  ECMO blood flow temperature: 98.2 °F (36.8 °C)  CVP (mmHg): 9 mmHg   CIRCUIT CHECK Heat Exchg.  Water Temp Set: 36.9  Heat Exchanger Water Temp Actual: 36.8  Heat Exchanger Water Level: Full  Unused Pigtails Cleared: Done  System Plugged to Red Outlet: Done  Emergency Backup in Use: No  Hand Crank Available: Yes  Backup Unit Blood Avail: Done  Cart Checked and Stocked: Done  Pump Battery Charged: 100 Volts  Pressure Monitors Zeroed: Done  Pressure Limits Checked: Done  Circuit Number: 1  Back Up Circuit: Done  Back Up Console/Motor: Done  Emergency O2 Tank Available: Done  Circuits and Cannulation Sites: Done  CMAG Flow Probe Repositioned: Done  High/Low flow alarm set?: Yes  High/Low temp alarm set?: Yes  High/Low venous alarm set?: Yes  Pre-MO alarm limit: 300  Post-MO alarm limit: 300     CURRENT VENTILATOR SETTINGS:  Vent Information  Ventilator ID: [id number]  Vent Mode: AC/PC     RECENT LABS:  ABG Recent Labs     04/24/22 2013 04/25/22  0007 04/25/22  0359 04/25/22  0555 04/25/22  0752   POCPH 7.346* 7.352 7.359 7.370 7.344*   POCPCO2 59.2* 58.6* 56.3* 51.0* 63.9*   POCPO2 76.7* 66.7* 73.7* 416.8* 70.6*   POCHCO3 32.4* 32.5* 31.7* 29.4* 34.7*   BFRW8XJB 94 91* 94 100* 92*      VBG Recent Labs     04/22/22  1350 04/23/22  1857 04/24/22  0503 04/24/22  1833 04/25/22  0544   PHVEN 7.456* 7.361 7.439* 7.345 7. 353   FPQ4YVT 43.7 56.3* 42.9 61.1* 58.8*   DMJ8XZV 30.7* 31.9* 29.1* 33.4* 32.6*   B8UFQKAD 72 72 74 76 79      CBC Recent Labs     04/24/22  0344 04/24/22  0955 04/24/22  1549 04/24/22 2213 04/25/22  0402   WBC 23.5* 21.9* 23.6* 26.9* 25.6*   HGB 9.8* 9.6* 9.7* 10.1* 9.6*   HCT 33.2* 32.1* 32.7* 34.9* 32.6*    223 226 232 214      COAGS Recent Labs     04/24/22  0344 04/24/22  0955 04/24/22  1549 04/24/22  2213 04/25/22  0402   INR 3.6 3.7 3.3 3.1 3.3   PROTIME 35.1* 35.3* 32.3* 30.1* 32.1*   APTT 58.7* 58.7* 59.1* 58.7* 56.6*      TEG Recent Labs     04/23/22  2203 04/24/22  0955 04/24/22 2213   HEPTHERAPY UNKNOWN UNKNOWN None   REACTTIMETEG 9.8 12.2* 9.1   KINETICSTEG 1.8 2.0 1.7   ANGLETEG 66.0 64.7 67.6   MAXCLOTTEG 69.8 69.0 67.2   RP02QWC 0.0 0.0 0.0   EPLTEG 0.0 0.0 0.0      BMP Recent Labs     04/24/22  0344 04/24/22  0344 04/24/22  0844 04/24/22  0955 04/24/22  1549 04/24/22  2213 04/25/22  0402     --   --  139 141 140 140   K 5.6*   < > 4.9 5.1 4.9 5.3 5.3     --   --  104 105 103 104   CO2 30  --   --  30 29 30 30   BUN 28*  --   --  30* 28* 30* 30*   CREATININE 0.50  --   --  0.49* 0.57 0.54 0.53   GLUCOSE 143*  --   --  151* 187* 168* 164*   MG 2.8*  --   --  2.6 2.6 2.7* 2.6    < > = values in this interval not displayed.       LFT Recent Labs     04/23/22  0338 04/23/22  1627 04/24/22  0344 04/24/22  1549 04/25/22  0402   PROT 5.8* 5.9* 5.7* 5.5* 5.4*   LABALBU 3.3* 3.4* 3.2* 3.2* 3.1*   ALT 98* 92* 87* 95* 88*   AST 64* 39* 36* 37* 27   ALKPHOS 50 51 47 48 46   BILITOT 0.46 0.55 0.44 0.41 0.32      INFLAMMATORY MARKERS No results for input(s): CRP, FERRITIN, LDH in the last 72 hours.    Invalid input(s):  ESR   CARDIAC No results for input(s): CKTOTAL, CKMB, CKMBINDEX, TROPONINI, BNP, PROBNP in the last 72 hours. Invalid input(s): TROPONIN, HSTROP   LACTIC ACID No results for input(s): LACTA in the last 72 hours. TRIGLYCERIDE No results for input(s): TRIG in the last 72 hours.      ASSESSMENT AND SYSTEM BASED PLAN (MEDICAL DECISION MAKING):    Neurologic                                                  [x] Sedation/paralytic requirement reviewed                              [x] Neurological assessment; not done, patient sedated with propofol and fentanyl currently     Cardiovascular                               [x] Cannulas secured, dsg dry and intact, no drainage or hematoma noted                             [x] ECMO Pump flow/pressures reviewed                             [x] Telemetry reviewed                             [x] Hemodynamic parameters stable    Pulmonary                             [x] Blood gases (Patient/Circuit) reviewed                             [x] Sweep/ settings reviewed                             [x] Vent Settings reviewed                             [x] Chest X-ray reviewed    Genitourinary                               [x] Intake/Output reviewed                             [x] Need for continuous IV fluids assessed, recommended diuresis with Lasix                             [x] Need for diuresis/renal replacement therapy reviewed    Gastrointestinal                              [x] GI Prophylaxis reviewed                              [x] Enteral nutrition reviewed    Hematology                             [x] Anticoagulation: Argatroban                            [x] Reviewed CBC, coagulation profile, ACT, TEG and platelet count                            [x] Transfusion needs reviewed                    Infectious disease                           [x] Reviewed T-max, white count and cultures results                           [x] Antimicrobials reviewed; continue ceftriaxone    Endocrine                            [x] Reviewed glycemic control                           [x] Reviewed need for steroids; continue Solu-Medrol    Others                           [x] Reviewed need for restrains                           [x] Reviewed need for lines/drains/invasive devices                           [x] Skin/Wound care                           [x] Reviewed current Medications list/orders                           [x] Reviewed goals of care                   OVERALL CONDITION:   Stable.   Patient had to have increasing the speed because of increasing carbon dioxide  Tidal volumes are being maintained  Chest x-ray without any infiltrates    Elizabeth Schneider MD  Pulmonary & Critical care Medicine  4/25/2022

## 2022-04-25 NOTE — PLAN OF CARE
Problem: Safety - Medical Restraint  Goal: Remains free of injury from restraints (Restraint for Interference with Medical Device)  Description: INTERVENTIONS:  1. Determine that other, less restrictive measures have been tried or would not be effective before applying the restraint  2. Evaluate the patient's condition at the time of restraint application  3. Inform patient/family regarding the reason for restraint  4.  Q2H: Monitor safety, psychosocial status, comfort, nutrition and hydration  Outcome: Progressing     Problem: Respiratory:  Goal: Ability to maintain a clear airway will improve  Description: Ability to maintain a clear airway will improve  Outcome: Progressing  Goal: Ability to maintain adequate ventilation will improve  Description: Ability to maintain adequate ventilation will improve  Outcome: Progressing  Goal: Complications related to the disease process, condition or treatment will be avoided or minimized  Description: Complications related to the disease process, condition or treatment will be avoided or minimized  Outcome: Progressing     Problem: OXYGENATION/RESPIRATORY FUNCTION  Goal: Patient will maintain patent airway  Outcome: Progressing  Goal: Patient will achieve/maintain normal respiratory rate/effort  Description: Respiratory rate and effort will be within normal limits for the patient  Outcome: Progressing     Problem: Safety - Adult  Goal: Free from fall injury  Outcome: Progressing

## 2022-04-26 ENCOUNTER — APPOINTMENT (OUTPATIENT)
Dept: GENERAL RADIOLOGY | Age: 22
DRG: 003 | End: 2022-04-26
Attending: INTERNAL MEDICINE
Payer: COMMERCIAL

## 2022-04-26 LAB
ABO/RH: NORMAL
ACTIVATED CLOTTING TIME: 271 SEC (ref 79–149)
ACTIVATED CLOTTING TIME: 318 SEC (ref 79–149)
ALBUMIN SERPL-MCNC: 3.3 G/DL (ref 3.5–5.2)
ALBUMIN SERPL-MCNC: 3.3 G/DL (ref 3.5–5.2)
ALBUMIN/GLOBULIN RATIO: 1.3 (ref 1–2.5)
ALBUMIN/GLOBULIN RATIO: 1.3 (ref 1–2.5)
ALLEN TEST: ABNORMAL
ALP BLD-CCNC: 53 U/L (ref 35–104)
ALP BLD-CCNC: 54 U/L (ref 35–104)
ALT SERPL-CCNC: 82 U/L (ref 5–33)
ALT SERPL-CCNC: 83 U/L (ref 5–33)
ANGLE TEG: 66.8 DEG (ref 53–72)
ANGLE TEG: 66.8 DEG (ref 53–72)
ANION GAP SERPL CALCULATED.3IONS-SCNC: 6 MMOL/L (ref 9–17)
ANION GAP SERPL CALCULATED.3IONS-SCNC: 6 MMOL/L (ref 9–17)
ANION GAP SERPL CALCULATED.3IONS-SCNC: 7 MMOL/L (ref 9–17)
ANION GAP SERPL CALCULATED.3IONS-SCNC: 9 MMOL/L (ref 9–17)
ANTIBODY SCREEN: NEGATIVE
ARM BAND NUMBER: NORMAL
AST SERPL-CCNC: 21 U/L
AST SERPL-CCNC: 28 U/L
AT-III ACTIVITY: 121 % (ref 83–122)
AT-III ACTIVITY: 121 % (ref 83–122)
AT-III ACTIVITY: 132 % (ref 83–122)
BILIRUB SERPL-MCNC: 0.46 MG/DL (ref 0.3–1.2)
BILIRUB SERPL-MCNC: 0.47 MG/DL (ref 0.3–1.2)
BILIRUBIN DIRECT: 0.16 MG/DL
BILIRUBIN DIRECT: 0.16 MG/DL
BILIRUBIN, INDIRECT: 0.3 MG/DL (ref 0–1)
BILIRUBIN, INDIRECT: 0.31 MG/DL (ref 0–1)
BLD PROD TYP BPU: NORMAL
BLOOD BANK BLOOD PRODUCT EXPIRATION DATE: NORMAL
BLOOD BANK ISBT PRODUCT BLOOD TYPE: 6200
BLOOD BANK PRODUCT CODE: NORMAL
BLOOD BANK UNIT TYPE AND RH: NORMAL
BPU ID: NORMAL
BUN BLDV-MCNC: 29 MG/DL (ref 6–20)
BUN BLDV-MCNC: 30 MG/DL (ref 6–20)
CALCIUM IONIZED: 1.11 MMOL/L (ref 1.13–1.33)
CALCIUM IONIZED: 1.12 MMOL/L (ref 1.13–1.33)
CALCIUM IONIZED: 1.15 MMOL/L (ref 1.13–1.33)
CALCIUM IONIZED: 1.15 MMOL/L (ref 1.13–1.33)
CALCIUM SERPL-MCNC: 8.2 MG/DL (ref 8.6–10.4)
CALCIUM SERPL-MCNC: 8.3 MG/DL (ref 8.6–10.4)
CHLORIDE BLD-SCNC: 103 MMOL/L (ref 98–107)
CHLORIDE BLD-SCNC: 105 MMOL/L (ref 98–107)
CHLORIDE BLD-SCNC: 105 MMOL/L (ref 98–107)
CHLORIDE BLD-SCNC: 107 MMOL/L (ref 98–107)
CO2: 29 MMOL/L (ref 20–31)
CREAT SERPL-MCNC: 0.43 MG/DL (ref 0.5–0.9)
CREAT SERPL-MCNC: 0.45 MG/DL (ref 0.5–0.9)
CULTURE: NORMAL
CULTURE: NORMAL
DISPENSE STATUS BLOOD BANK: NORMAL
EPL-TEG: 0 % (ref 0–15)
EPL-TEG: 0 % (ref 0–15)
EXPIRATION DATE: NORMAL
FIBRINOGEN: 122 MG/DL (ref 140–420)
FIBRINOGEN: 126 MG/DL (ref 140–420)
FIBRINOGEN: 127 MG/DL (ref 140–420)
FIBRINOGEN: 131 MG/DL (ref 140–420)
FIO2: 100
FIO2: 100
FIO2: 40
GFR AFRICAN AMERICAN: >60 ML/MIN
GFR NON-AFRICAN AMERICAN: >60 ML/MIN
GFR SERPL CREATININE-BSD FRML MDRD: ABNORMAL ML/MIN/{1.73_M2}
GLUCOSE BLD-MCNC: 152 MG/DL (ref 74–100)
GLUCOSE BLD-MCNC: 156 MG/DL (ref 70–99)
GLUCOSE BLD-MCNC: 158 MG/DL (ref 74–100)
GLUCOSE BLD-MCNC: 160 MG/DL (ref 74–100)
GLUCOSE BLD-MCNC: 164 MG/DL (ref 74–100)
GLUCOSE BLD-MCNC: 168 MG/DL (ref 70–99)
GLUCOSE BLD-MCNC: 174 MG/DL (ref 70–99)
GLUCOSE BLD-MCNC: 175 MG/DL (ref 70–99)
GLUCOSE BLD-MCNC: 180 MG/DL (ref 74–100)
GLUCOSE BLD-MCNC: 220 MG/DL (ref 74–100)
HCO3 VENOUS: 28.4 MMOL/L (ref 22–29)
HCO3 VENOUS: 30.7 MMOL/L (ref 22–29)
HCT VFR BLD CALC: 31.1 % (ref 36.3–47.1)
HCT VFR BLD CALC: 32.1 % (ref 36.3–47.1)
HCT VFR BLD CALC: 32.2 % (ref 36.3–47.1)
HCT VFR BLD CALC: 32.2 % (ref 36.3–47.1)
HEMOGLOBIN, FREE, PLASMA: 2.6 MG/DL (ref 0–9.7)
HEMOGLOBIN, FREE, PLASMA: 30 MG/DL (ref 0–9.7)
HEMOGLOBIN: 10 G/DL (ref 11.9–15.1)
HEMOGLOBIN: 10.1 G/DL (ref 11.9–15.1)
HEMOGLOBIN: 9.5 G/DL (ref 11.9–15.1)
HEMOGLOBIN: 9.9 G/DL (ref 11.9–15.1)
HEPARIN THERAPY: NORMAL
HEPARIN THERAPY: NORMAL
INR BLD: 2.8
INR BLD: 2.9
INR BLD: 2.9
INR BLD: 3
KINETICS TEG: 1.6 MIN (ref 1–3)
KINETICS TEG: 1.7 MIN (ref 1–3)
LACTIC ACID, WHOLE BLOOD: 1 MMOL/L (ref 0.7–2.1)
LACTIC ACID, WHOLE BLOOD: 1 MMOL/L (ref 0.7–2.1)
LACTIC ACID, WHOLE BLOOD: 1.1 MMOL/L (ref 0.7–2.1)
LACTIC ACID, WHOLE BLOOD: 1.2 MMOL/L (ref 0.7–2.1)
LY30 (LYSIS) TEG: 0 % (ref 0–8)
LY30 (LYSIS) TEG: 0 % (ref 0–8)
Lab: NORMAL
Lab: NORMAL
MA (MAX CLOT) TEG: 65.9 MM (ref 50–70)
MA (MAX CLOT) TEG: 66.9 MM (ref 50–70)
MAGNESIUM: 2.2 MG/DL (ref 1.6–2.6)
MAGNESIUM: 2.2 MG/DL (ref 1.6–2.6)
MAGNESIUM: 2.3 MG/DL (ref 1.6–2.6)
MAGNESIUM: 2.5 MG/DL (ref 1.6–2.6)
MCH RBC QN AUTO: 27.8 PG (ref 25.2–33.5)
MCH RBC QN AUTO: 28 PG (ref 25.2–33.5)
MCH RBC QN AUTO: 28.2 PG (ref 25.2–33.5)
MCH RBC QN AUTO: 28.3 PG (ref 25.2–33.5)
MCHC RBC AUTO-ENTMCNC: 30.5 G/DL (ref 28.4–34.8)
MCHC RBC AUTO-ENTMCNC: 30.7 G/DL (ref 28.4–34.8)
MCHC RBC AUTO-ENTMCNC: 31.2 G/DL (ref 28.4–34.8)
MCHC RBC AUTO-ENTMCNC: 31.4 G/DL (ref 28.4–34.8)
MCV RBC AUTO: 89.2 FL (ref 82.6–102.9)
MCV RBC AUTO: 90.7 FL (ref 82.6–102.9)
MCV RBC AUTO: 90.9 FL (ref 82.6–102.9)
MCV RBC AUTO: 92 FL (ref 82.6–102.9)
MODE: ABNORMAL
NRBC AUTOMATED: 0.1 PER 100 WBC
NRBC AUTOMATED: 0.2 PER 100 WBC
O2 DEVICE/FLOW/%: ABNORMAL
O2 SAT, VEN: 72 % (ref 60–85)
O2 SAT, VEN: 78 % (ref 60–85)
PARTIAL THROMBOPLASTIN TIME: 51.3 SEC (ref 20.5–30.5)
PARTIAL THROMBOPLASTIN TIME: 52.3 SEC (ref 20.5–30.5)
PARTIAL THROMBOPLASTIN TIME: 57 SEC (ref 20.5–30.5)
PARTIAL THROMBOPLASTIN TIME: 57.5 SEC (ref 20.5–30.5)
PCO2, VEN: 52.7 MM HG (ref 41–51)
PCO2, VEN: 54.3 MM HG (ref 41–51)
PDW BLD-RTO: 13.9 % (ref 11.8–14.4)
PDW BLD-RTO: 14 % (ref 11.8–14.4)
PDW BLD-RTO: 14 % (ref 11.8–14.4)
PDW BLD-RTO: 14.2 % (ref 11.8–14.4)
PERFORMING LOCATION: NORMAL
PERFORMING LOCATION: NORMAL
PH VENOUS: 7.34 (ref 7.32–7.43)
PH VENOUS: 7.36 (ref 7.32–7.43)
PLATELET # BLD: 185 K/UL (ref 138–453)
PLATELET # BLD: 188 K/UL (ref 138–453)
PLATELET # BLD: 189 K/UL (ref 138–453)
PLATELET # BLD: 194 K/UL (ref 138–453)
PMV BLD AUTO: 10 FL (ref 8.1–13.5)
PMV BLD AUTO: 10 FL (ref 8.1–13.5)
PMV BLD AUTO: 10.2 FL (ref 8.1–13.5)
PMV BLD AUTO: 9.7 FL (ref 8.1–13.5)
PO2, VEN: 41.1 MM HG (ref 30–50)
PO2, VEN: 45.2 MM HG (ref 30–50)
POC HCO3: 27.7 MMOL/L (ref 21–28)
POC HCO3: 30.9 MMOL/L (ref 21–28)
POC HCO3: 31 MMOL/L (ref 21–28)
POC HCO3: 31.1 MMOL/L (ref 21–28)
POC HCO3: 31.2 MMOL/L (ref 21–28)
POC HCO3: 31.3 MMOL/L (ref 21–28)
POC HCO3: 32.3 MMOL/L (ref 21–28)
POC HCO3: 32.3 MMOL/L (ref 21–28)
POC HCO3: 32.4 MMOL/L (ref 21–28)
POC HCO3: 33.3 MMOL/L (ref 21–28)
POC HCO3: 34 MMOL/L (ref 21–28)
POC HEMATOCRIT: 28 % (ref 36–46)
POC HEMOGLOBIN: 9.4 G/DL (ref 12–16)
POC O2 SATURATION: 100 % (ref 94–98)
POC O2 SATURATION: 100 % (ref 94–98)
POC O2 SATURATION: 88 % (ref 94–98)
POC O2 SATURATION: 89 % (ref 94–98)
POC O2 SATURATION: 92 % (ref 94–98)
POC O2 SATURATION: 94 % (ref 94–98)
POC O2 SATURATION: 95 % (ref 94–98)
POC PCO2: 46.3 MM HG (ref 35–48)
POC PCO2: 48 MM HG (ref 35–48)
POC PCO2: 51.8 MM HG (ref 35–48)
POC PCO2: 52 MM HG (ref 35–48)
POC PCO2: 52.3 MM HG (ref 35–48)
POC PCO2: 53.1 MM HG (ref 35–48)
POC PCO2: 54 MM HG (ref 35–48)
POC PCO2: 54.1 MM HG (ref 35–48)
POC PCO2: 57.2 MM HG (ref 35–48)
POC PCO2: 59.6 MM HG (ref 35–48)
POC PCO2: 60.7 MM HG (ref 35–48)
POC PH: 7.35 (ref 7.35–7.45)
POC PH: 7.36 (ref 7.35–7.45)
POC PH: 7.37 (ref 7.35–7.45)
POC PH: 7.38 (ref 7.35–7.45)
POC PH: 7.39 (ref 7.35–7.45)
POC PH: 7.4 (ref 7.35–7.45)
POC PH: 7.43 (ref 7.35–7.45)
POC PO2: 433.4 MM HG (ref 83–108)
POC PO2: 464.8 MM HG (ref 83–108)
POC PO2: 55.4 MM HG (ref 83–108)
POC PO2: 57.2 MM HG (ref 83–108)
POC PO2: 67.7 MM HG (ref 83–108)
POC PO2: 69.9 MM HG (ref 83–108)
POC PO2: 70 MM HG (ref 83–108)
POC PO2: 70.7 MM HG (ref 83–108)
POC PO2: 74.5 MM HG (ref 83–108)
POC PO2: 75.7 MM HG (ref 83–108)
POC PO2: 77.1 MM HG (ref 83–108)
POC POTASSIUM: 5.4 MMOL/L (ref 3.5–4.5)
POSITIVE BASE EXCESS, ART: 2 (ref 0–3)
POSITIVE BASE EXCESS, ART: 5 (ref 0–3)
POSITIVE BASE EXCESS, ART: 6 (ref 0–3)
POSITIVE BASE EXCESS, ART: 7 (ref 0–3)
POSITIVE BASE EXCESS, VEN: 2 (ref 0–3)
POSITIVE BASE EXCESS, VEN: 4 (ref 0–3)
POTASSIUM SERPL-SCNC: 4.9 MMOL/L (ref 3.7–5.3)
POTASSIUM SERPL-SCNC: 5.1 MMOL/L (ref 3.7–5.3)
PROTHROMBIN TIME: 27.8 SEC (ref 9.1–12.3)
PROTHROMBIN TIME: 28 SEC (ref 9.1–12.3)
PROTHROMBIN TIME: 28.1 SEC (ref 9.1–12.3)
PROTHROMBIN TIME: 29.3 SEC (ref 9.1–12.3)
RBC # BLD: 3.42 M/UL (ref 3.95–5.11)
RBC # BLD: 3.5 M/UL (ref 3.95–5.11)
RBC # BLD: 3.54 M/UL (ref 3.95–5.11)
RBC # BLD: 3.61 M/UL (ref 3.95–5.11)
REACTION TIME TEG: 10 MIN (ref 5–10)
REACTION TIME TEG: 9.6 MIN (ref 5–10)
SAMPLE SITE: ABNORMAL
SODIUM BLD-SCNC: 140 MMOL/L (ref 135–144)
SODIUM BLD-SCNC: 140 MMOL/L (ref 135–144)
SODIUM BLD-SCNC: 141 MMOL/L (ref 135–144)
SODIUM BLD-SCNC: 143 MMOL/L (ref 135–144)
SPECIMEN DESCRIPTION: NORMAL
SPECIMEN DESCRIPTION: NORMAL
TOTAL PROTEIN: 5.8 G/DL (ref 6.4–8.3)
TOTAL PROTEIN: 5.9 G/DL (ref 6.4–8.3)
TRANSFUSION STATUS: NORMAL
UNIT DIVISION: 0
UNIT ISSUE DATE/TIME: NORMAL
WBC # BLD: 26.7 K/UL (ref 3.5–11.3)
WBC # BLD: 28.6 K/UL (ref 3.5–11.3)
WBC # BLD: 30.1 K/UL (ref 3.5–11.3)
WBC # BLD: 30.9 K/UL (ref 3.5–11.3)

## 2022-04-26 PROCEDURE — 85027 COMPLETE CBC AUTOMATED: CPT

## 2022-04-26 PROCEDURE — 85390 FIBRINOLYSINS SCREEN I&R: CPT

## 2022-04-26 PROCEDURE — 2580000003 HC RX 258: Performed by: INTERNAL MEDICINE

## 2022-04-26 PROCEDURE — 6370000000 HC RX 637 (ALT 250 FOR IP): Performed by: INTERNAL MEDICINE

## 2022-04-26 PROCEDURE — 86850 RBC ANTIBODY SCREEN: CPT

## 2022-04-26 PROCEDURE — 6360000002 HC RX W HCPCS: Performed by: INTERNAL MEDICINE

## 2022-04-26 PROCEDURE — 85300 ANTITHROMBIN III ACTIVITY: CPT

## 2022-04-26 PROCEDURE — 2500000003 HC RX 250 WO HCPCS: Performed by: INTERNAL MEDICINE

## 2022-04-26 PROCEDURE — 82330 ASSAY OF CALCIUM: CPT

## 2022-04-26 PROCEDURE — 83735 ASSAY OF MAGNESIUM: CPT

## 2022-04-26 PROCEDURE — 86901 BLOOD TYPING SEROLOGIC RH(D): CPT

## 2022-04-26 PROCEDURE — 6360000002 HC RX W HCPCS: Performed by: THORACIC SURGERY (CARDIOTHORACIC VASCULAR SURGERY)

## 2022-04-26 PROCEDURE — C9113 INJ PANTOPRAZOLE SODIUM, VIA: HCPCS | Performed by: INTERNAL MEDICINE

## 2022-04-26 PROCEDURE — 85384 FIBRINOGEN ACTIVITY: CPT

## 2022-04-26 PROCEDURE — 2100000001 HC CVICU R&B

## 2022-04-26 PROCEDURE — 83051 HEMOGLOBIN PLASMA: CPT

## 2022-04-26 PROCEDURE — 2580000003 HC RX 258: Performed by: THORACIC SURGERY (CARDIOTHORACIC VASCULAR SURGERY)

## 2022-04-26 PROCEDURE — 71045 X-RAY EXAM CHEST 1 VIEW: CPT

## 2022-04-26 PROCEDURE — A4216 STERILE WATER/SALINE, 10 ML: HCPCS | Performed by: INTERNAL MEDICINE

## 2022-04-26 PROCEDURE — 99291 CRITICAL CARE FIRST HOUR: CPT | Performed by: INTERNAL MEDICINE

## 2022-04-26 PROCEDURE — 82947 ASSAY GLUCOSE BLOOD QUANT: CPT

## 2022-04-26 PROCEDURE — 94640 AIRWAY INHALATION TREATMENT: CPT

## 2022-04-26 PROCEDURE — 2700000000 HC OXYGEN THERAPY PER DAY

## 2022-04-26 PROCEDURE — 85610 PROTHROMBIN TIME: CPT

## 2022-04-26 PROCEDURE — 85014 HEMATOCRIT: CPT

## 2022-04-26 PROCEDURE — 85576 BLOOD PLATELET AGGREGATION: CPT

## 2022-04-26 PROCEDURE — 85730 THROMBOPLASTIN TIME PARTIAL: CPT

## 2022-04-26 PROCEDURE — 86900 BLOOD TYPING SEROLOGIC ABO: CPT

## 2022-04-26 PROCEDURE — 80076 HEPATIC FUNCTION PANEL: CPT

## 2022-04-26 PROCEDURE — 99233 SBSQ HOSP IP/OBS HIGH 50: CPT | Performed by: INTERNAL MEDICINE

## 2022-04-26 PROCEDURE — 33948 ECMO/ECLS DAILY MGMT-VENOUS: CPT | Performed by: INTERNAL MEDICINE

## 2022-04-26 PROCEDURE — 83605 ASSAY OF LACTIC ACID: CPT

## 2022-04-26 PROCEDURE — 80048 BASIC METABOLIC PNL TOTAL CA: CPT

## 2022-04-26 PROCEDURE — 94003 VENT MGMT INPAT SUBQ DAY: CPT

## 2022-04-26 PROCEDURE — 33948 ECMO/ECLS DAILY MGMT-VENOUS: CPT

## 2022-04-26 PROCEDURE — 93308 TTE F-UP OR LMTD: CPT

## 2022-04-26 PROCEDURE — 85347 COAGULATION TIME ACTIVATED: CPT

## 2022-04-26 RX ORDER — CHLORDIAZEPOXIDE HYDROCHLORIDE 25 MG/1
50 CAPSULE, GELATIN COATED ORAL EVERY 6 HOURS SCHEDULED
Status: DISCONTINUED | OUTPATIENT
Start: 2022-04-26 | End: 2022-04-30

## 2022-04-26 RX ADMIN — INSULIN LISPRO 4 UNITS: 100 INJECTION, SOLUTION INTRAVENOUS; SUBCUTANEOUS at 12:11

## 2022-04-26 RX ADMIN — ACETAMINOPHEN 650 MG: 325 TABLET ORAL at 07:56

## 2022-04-26 RX ADMIN — DILTIAZEM HYDROCHLORIDE 30 MG: 30 TABLET ORAL at 23:38

## 2022-04-26 RX ADMIN — POLYVINYL ALCOHOL 1 DROP: 14 SOLUTION/ DROPS OPHTHALMIC at 20:33

## 2022-04-26 RX ADMIN — CHLORDIAZEPOXIDE HYDROCHLORIDE 50 MG: 25 CAPSULE ORAL at 17:19

## 2022-04-26 RX ADMIN — OXYCODONE HYDROCHLORIDE 10 MG: 5 TABLET ORAL at 03:32

## 2022-04-26 RX ADMIN — IPRATROPIUM BROMIDE AND ALBUTEROL SULFATE 1 AMPULE: .5; 2.5 SOLUTION RESPIRATORY (INHALATION) at 03:15

## 2022-04-26 RX ADMIN — ARGATROBAN 4.2 MCG/KG/MIN: 250 INJECTION INTRAVENOUS at 10:45

## 2022-04-26 RX ADMIN — MIDAZOLAM 2 MG: 1 INJECTION INTRAMUSCULAR; INTRAVENOUS at 14:43

## 2022-04-26 RX ADMIN — ARGATROBAN 4.2 MCG/KG/MIN: 250 INJECTION INTRAVENOUS at 18:47

## 2022-04-26 RX ADMIN — FENTANYL CITRATE 25 MCG: 50 INJECTION INTRAMUSCULAR; INTRAVENOUS at 03:35

## 2022-04-26 RX ADMIN — Medication 200 MCG/HR: at 23:38

## 2022-04-26 RX ADMIN — Medication 30 UNITS: at 18:30

## 2022-04-26 RX ADMIN — FENTANYL CITRATE 25 MCG: 50 INJECTION INTRAMUSCULAR; INTRAVENOUS at 06:25

## 2022-04-26 RX ADMIN — Medication 30 UNITS: at 18:31

## 2022-04-26 RX ADMIN — Medication 200 MCG/HR: at 01:27

## 2022-04-26 RX ADMIN — Medication 30 UNITS: at 06:41

## 2022-04-26 RX ADMIN — OXYCODONE HYDROCHLORIDE 10 MG: 5 TABLET ORAL at 07:56

## 2022-04-26 RX ADMIN — IPRATROPIUM BROMIDE AND ALBUTEROL SULFATE 1 AMPULE: .5; 2.5 SOLUTION RESPIRATORY (INHALATION) at 15:58

## 2022-04-26 RX ADMIN — Medication 200 MCG/HR: at 10:31

## 2022-04-26 RX ADMIN — POLYVINYL ALCOHOL 1 DROP: 14 SOLUTION/ DROPS OPHTHALMIC at 15:47

## 2022-04-26 RX ADMIN — OXYCODONE HYDROCHLORIDE 10 MG: 5 TABLET ORAL at 23:46

## 2022-04-26 RX ADMIN — DILTIAZEM HYDROCHLORIDE 30 MG: 30 TABLET ORAL at 11:16

## 2022-04-26 RX ADMIN — POLYVINYL ALCOHOL 1 DROP: 14 SOLUTION/ DROPS OPHTHALMIC at 07:53

## 2022-04-26 RX ADMIN — MIDAZOLAM 2 MG: 1 INJECTION INTRAMUSCULAR; INTRAVENOUS at 05:53

## 2022-04-26 RX ADMIN — OXYCODONE HYDROCHLORIDE 10 MG: 5 TABLET ORAL at 15:00

## 2022-04-26 RX ADMIN — METHYLPREDNISOLONE SODIUM SUCCINATE 40 MG: 40 INJECTION, POWDER, FOR SOLUTION INTRAMUSCULAR; INTRAVENOUS at 07:53

## 2022-04-26 RX ADMIN — OXYCODONE HYDROCHLORIDE 10 MG: 5 TABLET ORAL at 11:15

## 2022-04-26 RX ADMIN — Medication 10 MG/HR: at 19:56

## 2022-04-26 RX ADMIN — Medication 10 MG/HR: at 09:08

## 2022-04-26 RX ADMIN — SODIUM CHLORIDE, PRESERVATIVE FREE 20 ML: 5 INJECTION INTRAVENOUS at 08:11

## 2022-04-26 RX ADMIN — CHLORDIAZEPOXIDE HYDROCHLORIDE 50 MG: 25 CAPSULE ORAL at 07:56

## 2022-04-26 RX ADMIN — METHYLPREDNISOLONE SODIUM SUCCINATE 40 MG: 40 INJECTION, POWDER, FOR SOLUTION INTRAMUSCULAR; INTRAVENOUS at 17:19

## 2022-04-26 RX ADMIN — IPRATROPIUM BROMIDE AND ALBUTEROL SULFATE 1 AMPULE: .5; 2.5 SOLUTION RESPIRATORY (INHALATION) at 20:03

## 2022-04-26 RX ADMIN — DILTIAZEM HYDROCHLORIDE 30 MG: 30 TABLET ORAL at 17:19

## 2022-04-26 RX ADMIN — CHLORDIAZEPOXIDE HYDROCHLORIDE 50 MG: 25 CAPSULE ORAL at 23:38

## 2022-04-26 RX ADMIN — INSULIN LISPRO 2 UNITS: 100 INJECTION, SOLUTION INTRAVENOUS; SUBCUTANEOUS at 23:36

## 2022-04-26 RX ADMIN — IPRATROPIUM BROMIDE AND ALBUTEROL SULFATE 1 AMPULE: .5; 2.5 SOLUTION RESPIRATORY (INHALATION) at 08:18

## 2022-04-26 RX ADMIN — FUROSEMIDE 20 MG: 10 INJECTION, SOLUTION INTRAMUSCULAR; INTRAVENOUS at 07:52

## 2022-04-26 RX ADMIN — INSULIN LISPRO 2 UNITS: 100 INJECTION, SOLUTION INTRAVENOUS; SUBCUTANEOUS at 04:09

## 2022-04-26 RX ADMIN — POLYVINYL ALCOHOL 1 DROP: 14 SOLUTION/ DROPS OPHTHALMIC at 03:38

## 2022-04-26 RX ADMIN — ARGATROBAN 4.2 MCG/KG/MIN: 250 INJECTION INTRAVENOUS at 02:45

## 2022-04-26 RX ADMIN — DILTIAZEM HYDROCHLORIDE 30 MG: 30 TABLET ORAL at 06:16

## 2022-04-26 RX ADMIN — Medication 30 UNITS: at 18:29

## 2022-04-26 RX ADMIN — SODIUM CHLORIDE, PRESERVATIVE FREE 40 MG: 5 INJECTION INTRAVENOUS at 07:53

## 2022-04-26 RX ADMIN — IPRATROPIUM BROMIDE AND ALBUTEROL SULFATE 1 AMPULE: .5; 2.5 SOLUTION RESPIRATORY (INHALATION) at 11:21

## 2022-04-26 RX ADMIN — OXYCODONE HYDROCHLORIDE 10 MG: 5 TABLET ORAL at 19:57

## 2022-04-26 RX ADMIN — METHYLPREDNISOLONE SODIUM SUCCINATE 40 MG: 40 INJECTION, POWDER, FOR SOLUTION INTRAMUSCULAR; INTRAVENOUS at 01:23

## 2022-04-26 RX ADMIN — POLYETHYLENE GLYCOL 3350 17 G: 17 POWDER, FOR SOLUTION ORAL at 07:59

## 2022-04-26 RX ADMIN — CHLORDIAZEPOXIDE HYDROCHLORIDE 50 MG: 25 CAPSULE ORAL at 11:15

## 2022-04-26 RX ADMIN — IPRATROPIUM BROMIDE AND ALBUTEROL SULFATE 1 AMPULE: .5; 2.5 SOLUTION RESPIRATORY (INHALATION) at 23:28

## 2022-04-26 RX ADMIN — INSULIN LISPRO 2 UNITS: 100 INJECTION, SOLUTION INTRAVENOUS; SUBCUTANEOUS at 17:26

## 2022-04-26 ASSESSMENT — PULMONARY FUNCTION TESTS
PIF_VALUE: 24
PIF_VALUE: 22
PIF_VALUE: 23
PIF_VALUE: 22
PIF_VALUE: 22
PIF_VALUE: 23
PIF_VALUE: 22
PIF_VALUE: 23
PIF_VALUE: 23
PIF_VALUE: 24
PIF_VALUE: 24
PIF_VALUE: 22
PIF_VALUE: 22
PIF_VALUE: 23
PIF_VALUE: 24
PIF_VALUE: 22
PIF_VALUE: 23
PIF_VALUE: 30
PIF_VALUE: 22
PIF_VALUE: 24
PIF_VALUE: 23
PIF_VALUE: 23
PIF_VALUE: 27
PIF_VALUE: 23
PIF_VALUE: 22
PIF_VALUE: 22
PIF_VALUE: 23
PIF_VALUE: 23
PIF_VALUE: 24
PIF_VALUE: 23

## 2022-04-26 ASSESSMENT — PAIN SCALES - WONG BAKER
WONGBAKER_NUMERICALRESPONSE: 0
WONGBAKER_NUMERICALRESPONSE: 2
WONGBAKER_NUMERICALRESPONSE: 0
WONGBAKER_NUMERICALRESPONSE: 8
WONGBAKER_NUMERICALRESPONSE: 2
WONGBAKER_NUMERICALRESPONSE: 0
WONGBAKER_NUMERICALRESPONSE: 4
WONGBAKER_NUMERICALRESPONSE: 0

## 2022-04-26 ASSESSMENT — PAIN SCALES - GENERAL: PAINLEVEL_OUTOF10: 8

## 2022-04-26 NOTE — PLAN OF CARE
Problem: Gas Exchange - Impaired:  Goal: Levels of oxygenation will improve  Description: Levels of oxygenation will improve  4/26/2022 0845 by Katlyn Angeles RN  Outcome: Progressing  4/25/2022 2108 by Iliana Matthews RN  Outcome: Progressing     Problem:  Activity:  Goal: Ability to tolerate increased activity will improve  Description: Ability to tolerate increased activity will improve  4/26/2022 0845 by Katlyn Angeles RN  Outcome: Progressing  4/25/2022 2108 by Iliana Matthews RN  Outcome: Progressing     Problem: Cardiac:  Goal: Hemodynamic stability will improve  Description: Hemodynamic stability will improve  4/26/2022 0845 by Katlyn Angeles RN  Outcome: Progressing  4/25/2022 2108 by Iliana Matthews RN  Outcome: Progressing     Problem: Respiratory:  Goal: Ability to maintain a clear airway will improve  Description: Ability to maintain a clear airway will improve  4/26/2022 0845 by Katlyn Angeles RN  Outcome: Progressing  4/25/2022 2108 by Iliana Matthews RN  Outcome: Progressing  Goal: Ability to maintain adequate ventilation will improve  Description: Ability to maintain adequate ventilation will improve  4/26/2022 0845 by Katlyn Angeles RN  Outcome: Progressing  4/25/2022 2108 by Iliana Matthews RN  Outcome: Progressing  Goal: Complications related to the disease process, condition or treatment will be avoided or minimized  Description: Complications related to the disease process, condition or treatment will be avoided or minimized  4/26/2022 0845 by Katlyn Angeles RN  Outcome: Progressing  4/25/2022 2108 by Iliana Matthews RN  Outcome: Progressing     Problem: Skin Integrity:  Goal: Risk for impaired skin integrity will decrease  Description: Risk for impaired skin integrity will decrease  4/26/2022 0845 by aKtlyn Angeles RN  Outcome: Progressing  4/25/2022 2108 by Iliana Matthews RN  Outcome: Progressing     Problem: OXYGENATION/RESPIRATORY FUNCTION  Goal: Patient will maintain patent airway  4/26/2022 7409 by Carolina Loera RN  Outcome: Progressing  4/25/2022 2108 by Dirk King RN  Outcome: Progressing  Goal: Patient will achieve/maintain normal respiratory rate/effort  Description: Respiratory rate and effort will be within normal limits for the patient  4/26/2022 0845 by Carolina Loera RN  Outcome: Progressing  4/25/2022 2108 by Dirk King RN  Outcome: Progressing     Problem: MECHANICAL VENTILATION  Goal: Patient will maintain patent airway  4/26/2022 0845 by Carolina Loera RN  Outcome: Progressing  4/25/2022 2108 by Dirk King RN  Outcome: Progressing  Goal: Oral health is maintained or improved  4/26/2022 0845 by Carolina Loera RN  Outcome: Progressing  4/25/2022 2108 by Dirk King RN  Outcome: Progressing  Goal: ET tube will be managed safely  4/26/2022 0845 by Carolina Loera RN  Outcome: Progressing  4/25/2022 2108 by Dirk King RN  Outcome: Progressing     Problem: Nutrition  Goal: Optimal nutrition therapy  4/26/2022 0845 by Carolina Loera RN  Outcome: Progressing  4/25/2022 2108 by Dirk King RN  Outcome: Progressing     Problem: Discharge Planning  Goal: Discharge to home or other facility with appropriate resources  4/26/2022 0845 by Carolina Loera RN  Outcome: Progressing  4/25/2022 2108 by Dirk King RN  Outcome: Progressing     Problem: Genitourinary - Adult  Goal: Urinary catheter remains patent  4/26/2022 0845 by Carolina Loera RN  Outcome: Progressing  4/25/2022 2108 by Dirk King RN  Outcome: Progressing     Problem: Neurosensory - Adult  Goal: Achieves stable or improved neurological status  4/26/2022 0845 by Carolina Loera RN  Outcome: Progressing  4/25/2022 2108 by Dirk King RN  Outcome: Progressing  Goal: Achieves maximal functionality and self care  4/26/2022 0845 by Carolina Loera RN  Outcome: Progressing  4/25/2022 2108 by Dirk King RN  Outcome: Progressing     Problem: Safety - Medical Restraint  Goal: Remains free of injury from restraints (Restraint for Interference with Medical Device)  Description: INTERVENTIONS:  1. Determine that other, less restrictive measures have been tried or would not be effective before applying the restraint  2. Evaluate the patient's condition at the time of restraint application  3. Inform patient/family regarding the reason for restraint  4. Q2H: Monitor safety, psychosocial status, comfort, nutrition and hydration  4/26/2022 0845 by Casandra Mcarthur RN  Outcome: Progressing  4/25/2022 2108 by Thiago Clements RN  Outcome: Progressing     Problem: Pain  Goal: Verbalizes/displays adequate comfort level or baseline comfort level  4/26/2022 0845 by Casandra Mcarthur RN  Outcome: Progressing  4/25/2022 2108 by Thiago Clements RN  Outcome: Progressing     Problem: Safety - Adult  Goal: Free from fall injury  4/26/2022 0845 by Casandra Mcarthur RN  Outcome: Progressing  4/25/2022 2108 by Thiago Clements RN  Outcome: Progressing     Problem: ABCDS Injury Assessment  Goal: Absence of physical injury  4/26/2022 0845 by Casandra Mcarthur RN  Outcome: Progressing  4/25/2022 2108 by Thiago Clements RN  Outcome: Progressing     Problem: Skin/Tissue Integrity  Goal: Absence of new skin breakdown  Description: 1. Monitor for areas of redness and/or skin breakdown  2. Assess vascular access sites hourly  3. Every 4-6 hours minimum:  Change oxygen saturation probe site  4. Every 4-6 hours:  If on nasal continuous positive airway pressure, respiratory therapy assess nares and determine need for appliance change or resting period.   4/26/2022 0845 by Casandra Mcarthur RN  Outcome: Progressing  4/25/2022 2108 by Thiago Clements RN  Outcome: Progressing

## 2022-04-26 NOTE — PROGRESS NOTES
[facility]  Internal Medicine Teaching Residency Program  Inpatient Daily Progress Note  ______________________________________________________________________________    Patient: Jeanie Welch  YOB: 2000   EAB:[id number]    Acct: [de-identified]     Room: [address] [place] [address]  Admit date: 4/18/2022  Today's date: 04/26/22  Number of days in the hospital: 8    SUBJECTIVE   Admitting Diagnosis: Acute asthma exacerbation     CC: Shortness of breath    Pt examined at bedside. Chart & results reviewed. No acute events overnight. Patient is hemodynamically stable, hypertensive, without pressors requirement. - Patient follows commands off sedation  - Patient was restless overnight, Librium 50 mg q6 hours; was added by Pulmonology/Critical care. - Sedation: On Fentanyl, Ketamine, Versed. - Pressors: off  - Paralytics: off  - On Argatroban infusion per ECMO protocol  - ECMO management per CT surgery, Sweep 2.5; FiO2 100%  - Ventilator setting- on rest setting  - Urine output: 3.2 L/24 hours; on daily Lasix 20 mg IV per Pulm/critical care  - Blood glucose controlled  - Tube feeds at goal; 1-2 BM yesterday   - labs reviewed: Cr 1.12<--1.15; BMP wnl; WBC 28.6<--26.7 (on IV steroids);       ROS:  Unable to assess due to patient being ventilated and on sedation. BRIEF HISTORY     22 y. o. female with a past medical history of bronchial asthma who was admitted to [facility] with an acute exacerbation causing increasing shortness of breath.  Patient was found to be hypoxemic with hypercarbic respiratory failure requiring intubation and mechanical ventilation.  Patient was transferred to [facility] for further elevation with possibility of ECMO.  On arrival patient was found to be on a bicarb drip due to presumed respiratory acidosis but this was discontinued due to to worsening of the hypercarbia.  Additionally patient was started on Nimbex due to the necessity for paralyzation as she was breathing over the vent.  Pulmonology and cardiothoracic surgery were consulted- plan is for ECMO. In the MICU, patient remained stable, had an episode of dark emesis which turned out to be bilious output, Hb remained stable. She was hypertensive, started on lopressor IV 5 mg PRN. Had left IJ placed which was malpositioned, removed and then CVC in left femoral was placed overnight. She was hypoxic, requiring higher FiO2 of 80%, PEEP 16, RR 32, , blood gas showed pH 7.449, pCO2 41.7, pCO2 59. 1. treated with solumedrol, bronchodilators, CXR showed no pneumothorax or pleural effusion.   On  overnight, patient became more hypoxic with increasing oxygen requirement, increasing wheezing on exam. It was determined to go with ECMO canulation on . OBJECTIVE     Vital Signs:  BP (!) 147/61   Pulse 89   Temp 98.8 °F (37.1 °C) (Bladder)   Resp 12   Ht 5' 3\" (1.6 m)   Wt (!) 300 lb 4.8 oz (136.2 kg)   SpO2 94%   BMI 53.20 kg/m²     Temp (24hrs), Av.5 °F (36.9 °C), Min:98.2 °F (36.8 °C), Max:98.8 °F (37.1 °C)    In: 3933.2   Out: 3640 [Urine:3640]    Physical Exam:  Constitutional: This is a well developed, well nourished, Greater than 40 - Morbid Obesity / Extreme Obesity / Grade III 23y.o. year old female who is on ECMO. EENT:  PERRLA. No conjunctival injections. Septum was midline, mucosa was without erythema, exudates or cobblestoning. No thrush was noted. Neck: Supple without thyromegaly. No elevated JVP. Trachea was midline. Respiratory: decreased breath sounds bilaterally   Cardiovascular: Regular without murmur, clicks, gallops or rubs. Abdomen: Slightly rounded and soft without organomegaly. No rebound, rigidity or guarding was appreciated. Lymphatic: No lymphadenopathy. Musculoskeletal: Normal curvature of the spine. No gross muscle weakness. Extremities:  No lower extremity edema, ulcerations, tenderness, varicosities or erythema. Muscle size, tone and strength are normal.  No involuntary movements are noted. Skin:  Warm and dry. Good color, turgor and pigmentation. No lesions or scars.   No cyanosis or clubbing  Neurological/Psychiatric: Follows commands off sedation         Medications:  Scheduled Medications:    chlordiazePOXIDE  50 mg Oral 4 times per day    insulin lispro  0-12 Units SubCUTAneous Q6H    lidocaine PF  5 mL Tracheal Tube Once    oxyCODONE  10 mg Oral Q4H    docusate  100 mg Oral BID    polyethylene glycol  17 g Oral Daily    furosemide  20 mg IntraVENous Daily    heparin flush (PF)  3 mL IntraVENous Q12H    heparin flush (PF)  3 mL IntraVENous Q12H    heparin flush (PF)  3 mL IntraVENous Q12H    heparin flush (PF)  3 mL IntraVENous Q12H    albumin human  25 g IntraVENous Once    pantoprazole (PROTONIX) 40 mg injection  40 mg IntraVENous Daily    methylPREDNISolone  40 mg IntraVENous Q8H    dilTIAZem  30 mg Oral 4 times per day    polyvinyl alcohol  1 drop Both Eyes Q6H    ipratropium-albuterol  1 ampule Inhalation Q4H    sodium chloride flush  5-40 mL IntraVENous 2 times per day     Continuous Infusions:    sodium chloride      argatroban infusion 4.2 mcg/kg/min (04/26/22 1200)    ketamine (KETALAR) 2 mg/mL infusion for analgosedation 0.2 mg/kg/hr (04/26/22 1200)    midazolam 10 mg/hr (04/26/22 1200)    fentaNYL 50 mcg/mL 200 mcg/hr (04/26/22 1200)    dextrose      sodium chloride 10 mL/hr at 04/26/22 1200     PRN Medicationssodium chloride, , PRN  albuterol, 2.5 mg, Q4H PRN  heparin flush (PF), 3 mL, PRN  fentanNYL, 25 mcg, Q1H PRN  midazolam, 2 mg, Q2H PRN  metoprolol, 5 mg, Q6H PRN  glucose, 15 g, PRN  dextrose, 12.5 g, PRN  glucagon (rDNA), 1 mg, PRN  dextrose, 100 mL/hr, PRN  sodium chloride flush, 5-40 mL, PRN  sodium chloride, , PRN  ondansetron, 4 mg, Q8H PRN   Or  ondansetron, 4 mg, Q6H PRN  acetaminophen, 650 mg, Q6H PRN   Or  acetaminophen, 650 mg, Q6H PRN        Diagnostic Labs:  CBC:   Recent Labs     04/25/22 2201 04/26/22  0412 04/26/22  1022   WBC 22.6* 26.7* 28.6*   RBC 3.33* 3.50* 3.54*   HGB 9.2* 9.9* 10.0*   HCT 30.5* 32.2* 32.1*   MCV 91.6 92.0 90.7   RDW 13.8 14.0 13.9    188 189     BMP:   Recent Labs     04/25/22 2201 04/26/22  0412 04/26/22  1022    140 141   K 5.0 5.1 4.9    105 103   CO2 27 29 29   BUN 30* 30* 29*   CREATININE 0.44* 0.45* 0.43*     BNP: No results for input(s): BNP in the last 72 hours. PT/INR:   Recent Labs     04/25/22 2201 04/26/22  0412 04/26/22  1022   PROTIME 29.6* 28.0* 28.1*   INR 3.0 2.9 2.9     APTT:   Recent Labs     04/25/22 2201 04/26/22  0412 04/26/22  1022   APTT 52.6* 51.3* 52.3*     CARDIAC ENZYMES: No results for input(s): CKMB, CKMBINDEX, TROPONINI in the last 72 hours. Invalid input(s): CKTOTAL;3  FASTING LIPID PANEL:No results found for: CHOL, HDL, TRIG  LIVER PROFILE:   Recent Labs     04/25/22  0402 04/25/22  1638 04/26/22  0412   AST 27 24 21   ALT 88* 83* 82*   BILIDIR 0.15 0.15 0.16   BILITOT 0.32 0.38 0.46   ALKPHOS 46 48 53      MICROBIOLOGY:   Lab Results   Component Value Date/Time    CULTURE CULTURE IN PROGRESS 04/24/2022 11:41 AM       Imaging:    XR CHEST (SINGLE VIEW FRONTAL)    Result Date: 4/22/2022  1. Support tubes and lines are unchanged. 2. Stable bibasilar lung infiltrates, right side greater than left. This may be related to atelectasis and/or pleural effusion. Superimposed pneumonia cannot be excluded. XR CHEST (SINGLE VIEW FRONTAL)    Result Date: 4/21/2022  Suboptimal position left IJ catheter. Consider removal and replacement or repositioning. XR CHEST PORTABLE    Result Date: 4/26/2022  Relatively stable cardiopulmonary status     XR CHEST PORTABLE    Result Date: 4/25/2022  Stable chest x-ray compared to 04/24/2022. XR CHEST PORTABLE    Result Date: 4/24/2022  1. Stable support tubes and line. 2. Low lung volumes.   Crowding of vessels from low lung volumes results in apparent opacification at the right cardiophrenic angle. XR CHEST PORTABLE    Result Date: 4/23/2022  Lines and tubes are unchanged. Low lung volumes. Cardiomegaly. XR CHEST PORTABLE    Result Date: 4/22/2022  Endotracheal tube tip is in the right main bronchus and should be retracted approximately 4-5 cm. Tips of ECMO cannulas project in the expected location of the IVC. Findings were discussed with Daryl Keane RN (the patient's ICU nurse) at 2:26 pm on 4/22/2022. XR CHEST PORTABLE    Result Date: 4/22/2022  1. Endotracheal tube tip is now 1 cm above the soto. Recommend retraction by an additional 2.5 cm for optimal positioning. 2.  Improved aeration in the left lung with continued atelectatic/airspace infiltrates. XR CHEST PORTABLE    Result Date: 4/21/2022  1. On today's exam the endotracheal tube tip is positioned 1.5 cm above the soto. Recommend retraction by an additional 2 cm for optimal positioning. 2.  Interval development of right lower lobe airspace disease. This could represent atelectasis       ASSESSMENT & PLAN     ASSESSMENT / PLAN:     Principal Problem:    Acute asthma exacerbation  Active Problems:    Severe persistent asthma with status asthmaticus    CHIDI (obstructive sleep apnea)    History of seizures    Hypercapnic respiratory failure (HCC)    Endotracheally intubated    On mechanically assisted ventilation (HCC)    Diastolic dysfunction  Resolved Problems:    * No resolved hospital problems. *    1. Acute hypoxic hypercapnic respiratory failure on mechanical ventilation and s/p ECMO cannulation on 4/22/22 secondary asthma exacerbation: Continue SoluMedrol 40 mg IV q8 hours, Completed IV Rocephin (7 days) and IV Azithromycin (6 days). Duoneb q4 hours and Albuterol as needed. Continue Lasix 20 mg IV daily, diuresing well. Pulm. Critical care following for ventilator management. Librium 50 mg q6 hours.      2.  Troponin elevation type II demand ischemia: Continue Cardizem 30 mg q6 hours per cardiology. Cardiology to follow from a distance, once extubated, will contact cardiology for stress test prior to discharge due to apical hypokinesis on echo. Troponin flat trend.      3. Sinus tachycardia:- Controlled Continue Cardizem 30 mg q6 hours per Cardiology.         Diet: On tube feeds at goal  DVT ppx : On Argatroban infusion  GI ppx: Protonix      PT/OT: Consulted  Discharge Planning / SW:  consulted, will follow up once patient is extubated and ECMO cannulation removed and stable        Malena Larry MD  Internal Medicine Resident, PGY-1  [address], [place]  4/26/2022, 12:17 PM

## 2022-04-26 NOTE — PROGRESS NOTES
PULMONARY & CRITICAL CARE MEDICINE PROGRESS NOTE     Patient:  Ashley Wasserman  MRN: [id number]  Admit date: 2022  Primary Care Physician: Cherry Sow MD  Consulting Physician: Ciro Pallas, MD  CODE Status: Full Code  LOS: 8     SUBJECTIVE     CHIEF COMPLAINT/REASON FOR INITIAL CONSULT: Acute respiratory failure    BRIEF HOSPITAL COURSE:   The patient is a 23 y.o. female with past medical history of asthma was initially admitted to [facility] with acute exacerbation. She was initially put on nonrebreather, subsequently required intubation and initiation of mechanical ventilation due to worsening respiratory status. Patient was also on bicarb drip which was discontinued on arrival to [place].  She is sedated, paralyzed and mechanically ventilated. ABG shows improvement in respiratory acidosis and oxygenation. INTERVAL HISTORY:  22  Currently on VV ECMO.   Rest settings on the ventilator-tidal volumes being maintained  Opening eyes  Moving extremities  Sedated on the ventilator  Urine output noted and patient is in a positive fluid balance of 197 mL    OBJECTIVE     VENTILATOR SETTINGS:  Vent Information  Ventilator ID: [id number]  Vent Mode: AC/PC     PaO2/FiO2 RATIO:  Recent Labs     22  0623   POCPO2 433.4*      FiO2 : 40 %     VITAL SIGNS:   LAST:  /74   Pulse 90   Temp 98.4 °F (36.9 °C) (Bladder)   Resp 21   Ht 5' 3\" (1.6 m)   Wt (!) 300 lb 4.8 oz (136.2 kg)   SpO2 95%   BMI 53.20 kg/m²   8-24 HR RANGE:  TEMP Temp  Av.4 °F (36.9 °C)  Min: 98.2 °F (36.8 °C)  Max: 98.8 °F (01.5 °C)   BP Systolic (39ETU), HOS:646 , Min:136 , JPT:019      Diastolic (41MSQ), FKD:60, Min:64, Max:85     PULSE Pulse  Av.9  Min: 66  Max: 95   RR Resp  Av.4  Min: 10  Max: 21   O2 SAT SpO2  Av.1 %  Min: 94 %  Max: 96 %   OXYGEN DELIVERY No data recorded      Exam  Morbid obesity  Endotracheal tube in place  No subcutaneous air in the neck  Poor air entry bilaterally without rhonchi and wheezing. Abdomen soft obese nontender  Lower extremity edema present  Bilateral femoral cannulas  Upper extremity noted    DATA REVIEW     Medications:  Scheduled Meds:   insulin lispro  0-12 Units SubCUTAneous Q6H    chlordiazePOXIDE  50 mg Oral TID    lidocaine PF  5 mL Tracheal Tube Once    oxyCODONE  10 mg Oral Q4H    docusate  100 mg Oral BID    polyethylene glycol  17 g Oral Daily    furosemide  20 mg IntraVENous Daily    heparin flush (PF)  3 mL IntraVENous Q12H    heparin flush (PF)  3 mL IntraVENous Q12H    heparin flush (PF)  3 mL IntraVENous Q12H    heparin flush (PF)  3 mL IntraVENous Q12H    albumin human  25 g IntraVENous Once    pantoprazole (PROTONIX) 40 mg injection  40 mg IntraVENous Daily    methylPREDNISolone  40 mg IntraVENous Q8H    dilTIAZem  30 mg Oral 4 times per day    polyvinyl alcohol  1 drop Both Eyes Q6H    ipratropium-albuterol  1 ampule Inhalation Q4H    sodium chloride flush  5-40 mL IntraVENous 2 times per day     Continuous Infusions:   sodium chloride      argatroban infusion 4.2 mcg/kg/min (04/26/22 0700)    ketamine (KETALAR) 2 mg/mL infusion for analgosedation 0.2 mg/kg/hr (04/26/22 0700)    midazolam 10 mg/hr (04/26/22 0700)    fentaNYL 50 mcg/mL 200 mcg/hr (04/26/22 0700)    dextrose      sodium chloride 10 mL/hr at 04/26/22 0700       INPUT/OUTPUT:  In: 3255.4 [I.V.:1738.4; Blood:92; NG/GT:1425]  Out: 1423 [Urine:2615]  Date 04/26/22 0000 - 04/26/22 2359   Shift 7883-7467 3218-5376 7565-7580 24 Hour Total   INTAKE   I.V.(mL/kg) 515.1(3.8)   515.1(3.8)   NG/GT(mL/kg) 506(3.7)   506(3.7)   Shift Total(mL/kg) 1021. 1(7.5)   1021. 1(7.5)   OUTPUT   Urine(mL/kg/hr) 510   510   Shift Total(mL/kg) 510(3.7)   510(3.7)   Weight (kg) 136.2 136.2 136.2 136.2        LABS:  ABGs:   Recent Labs     04/25/22 1957 04/25/22 2058 04/26/22  0001 04/26/22 0402 04/26/22  0623   POCPH 7.408 7.399 7.346* 7.385 7.369   POCPCO2 50.5* 50.2* 60.7* 54.0* 48.0   POCPO2 68.3* 73.0* 70.7* 67.7* 433.4*   POCHCO3 31.9* 31.0* 33.3* 32.3* 27.7   TGIQ5HXL 93* 94 92* 92* 100*     CBC:   Recent Labs     04/25/22  0402 04/25/22  1011 04/25/22  1638 04/25/22 2201 04/26/22 0412   WBC 25.6* 25.1* 24.3* 22.6* 26.7*   HGB 9.6* 9.2* 9.6* 9.2* 9.9*   HCT 32.6* 30.5* 31.3* 30.5* 32.2*   MCV 92.9 91.3 91.0 91.6 92.0    197 185 180 188   RBC 3.51* 3.34* 3.44* 3.33* 3.50*   MCH 27.4 27.5 27.9 27.6 28.3   MCHC 29.4 30.2 30.7 30.2 30.7   RDW 13.8 13.8 13.8 13.8 14.0     CRP:   No results for input(s): CRP in the last 72 hours. LDH:   No results for input(s): LDH in the last 72 hours. BMP:   Recent Labs     04/25/22  0402 04/25/22  1011 04/25/22  1638 04/25/22 2201 04/26/22 0412    142 144 141 140   K 5.3 5.0 5.1 5.0 5.1    105 108* 106 105   CO2 30 30 30 27 29   BUN 30* 31* 33* 30* 30*   CREATININE 0.53 0.56 0.48* 0.44* 0.45*   GLUCOSE 164* 144* 181* 170* 174*     Liver Function Test:   Recent Labs     04/24/22  0344 04/24/22  1549 04/25/22  0402 04/25/22 1638 04/26/22 0412   PROT 5.7* 5.5* 5.4* 5.5* 5.8*   LABALBU 3.2* 3.2* 3.1* 3.1* 3.3*   ALT 87* 95* 88* 83* 82*   AST 36* 37* 27 24 21   ALKPHOS 47 48 46 48 53   BILITOT 0.44 0.41 0.32 0.38 0.46     Coagulation Profile:   Recent Labs     04/25/22  0402 04/25/22  1011 04/25/22  1638 04/25/22  2201 04/26/22  0412   INR 3.3 3.5 3.1 3.0 2.9   PROTIME 32.1* 34.1* 30.4* 29.6* 28.0*   APTT 56.6* 55.4* 54.1* 52.6* 51.3*     D-Dimer:  No results for input(s): DDIMER in the last 72 hours. Lactic Acid:  No results for input(s): LACTA in the last 72 hours. Cardiac Enzymes:  No results for input(s): CKTOTAL, CKMB, CKMBINDEX, TROPONINI in the last 72 hours. Invalid input(s): TROPONIN, HSTROP  BNP/ProBNP:   No results for input(s): BNP, PROBNP in the last 72 hours. Triglycerides:  No results for input(s): TRIG in the last 72 hours.      Microbiology:  Urine Culture:  No components found for: CURINE  Blood Culture:  No components found for: CBLOOD, CFUNGUSBL  Sputum Culture:  No components found for: CSPUTUM  Recent Labs     04/24/22  1141   SPECDESC . BRONCHIAL WASHINGS   CULTURE CULTURE IN PROGRESS     Recent Labs     04/24/22  1141   SPECDESC . BRONCHIAL WASHINGS   CULTURE CULTURE IN PROGRESS        Pathology:    Radiology Reports:  XR CHEST PORTABLE   Final Result   Stable chest x-ray compared to 04/24/2022. XR CHEST PORTABLE   Final Result   1. Stable support tubes and line. 2. Low lung volumes. Crowding of vessels from low lung volumes results in   apparent opacification at the right cardiophrenic angle. XR CHEST PORTABLE   Final Result   Lines and tubes are unchanged. Low lung volumes. Cardiomegaly. XR CHEST PORTABLE   Final Result   1. Endotracheal tube tip is now 1 cm above the soto. Recommend retraction   by an additional 2.5 cm for optimal positioning. 2.  Improved aeration in the left lung with continued atelectatic/airspace   infiltrates. XR CHEST PORTABLE   Final Result   Endotracheal tube tip is in the right main bronchus and should be retracted   approximately 4-5 cm. Tips of ECMO cannulas project in the expected location of the IVC. Findings were discussed with Anatoliy Nash RN (the patient's ICU nurse)   at 2:26 pm on 4/22/2022. XR CHEST (SINGLE VIEW FRONTAL)   Final Result   1. Support tubes and lines are unchanged. 2. Stable bibasilar lung infiltrates, right side greater than left. This may   be related to atelectasis and/or pleural effusion. Superimposed pneumonia   cannot be excluded. XR CHEST (SINGLE VIEW FRONTAL)   Final Result   Suboptimal position left IJ catheter. Consider removal and replacement or   repositioning. XR CHEST PORTABLE   Final Result   1. On today's exam the endotracheal tube tip is positioned 1.5 cm above the   soto.   Recommend retraction by an additional 2 cm for optimal positioning. 2.  Interval development of right lower lobe airspace disease. This could   represent atelectasis         XR CHEST PORTABLE   Final Result   1. Endotracheal and enteric tubes as above. 2. Diffuse interstitial opacities throughout both lungs, right greater than   left. No new focal airspace consolidation. Follow-up is recommended to   document resolution. XR CHEST (SINGLE VIEW FRONTAL)   Final Result   Diffuse interstitial opacities with alveolar/consolidative opacities at the   bases favoring multifocal airspace disease. XR CHEST PORTABLE    (Results Pending)   XR CHEST PORTABLE    (Results Pending)        Echocardiogram:   Results for orders placed during the hospital encounter of 04/18/22    ECHO Complete 2D W Doppler W Color      Summary  Left ventricle is normal in size, global left ventricular systolic function  is preserved, estimated ejection fraction is 50%. There appears to be some apical hypokinesis, in the apical views. Evidence of diastolic dysfunction. Trivial mitral regurgitation. Trivial pulmonic insufficiency. IVC dilated but unable to assess respiratory collapse due to patient on  ventilator. ASSESSMENT AND PLAN     Assessment:    // Acute hypoxic/hypercapnic respiratory failure due to status asthmaticus, on VV ECMO/mechanical ventilation  //Chronic respiratory acidosis  // Rhino/enterovirus infection  // Leukocytosis, stable  // Hyperglycemia, acceptable  // Elevated troponin  // Morbid obesity  //Anemia, stable    Plan:    I personally interviewed/examined the patient; reviewed interval history, interpreted all available radiographic and laboratory data at the time of service.  Obtain venous Dopplers and an echocardiogram with bubble study to evaluate other reasons for hypoxemia suggest venous thromboembolic disease and intracardiac shunt   Continue sedation   Restraints renewed.  Continue IV steroids.    Continue bronchodilator therapy.  Patient's TV is better at 237 ml    Bronchoscopy done on 4/24/2022- no mucous plugging , thin clear sec    Continue VV ECMO.  Blood pressure is stable.   20 mg IV Lasix daily    Start tube feeds.  Monitor endotracheal secretions    Obtain X-ray chest daily-Chest x-ray was reviewed and no changes from before   Continue to monitor I/O with a goal of even/negative fluid balance   Stress ulcer prophylaxis-Protonix   Chemical DVT prophylaxis; not indicated patient on systemic anticoagulation-with argatroban   Antimicrobials reviewed; continue ceftriaxone/azithromycin   Glycemic control appropriate; sliding scale insulin   Skin/wound care reviewed with the nursing staff   Physical/occupational therapy    Discussed with ECMO staff, nursing staff. D/w primary team   Family updated   The patient remains critically ill with illness/injury that acutely impairs one or more vital organ systems, such that there is a high probability of imminent or life threatening deterioration in the patient's condition. Critical care time of 40 minutes was spent (excluding procedures), in coordination of care during bedside rounds and discussion of patient care in detail, and recommendations of the team were adopted in the plan. Necessity of all invasive devices was also confirmed. Lluvia Vasques MD  Pulmonary and Critical Care Medicine           4/26/2022, 7:55 AM    Please note that this chart was generated using voice recognition Dragon dictation software. Although every effort was made to ensure the accuracy of this automated transcription, some errors in transcription may have occurred.

## 2022-04-26 NOTE — PLAN OF CARE
Problem: Gas Exchange - Impaired:  Goal: Levels of oxygenation will improve  Description: Levels of oxygenation will improve  4/26/2022 1943 by Jess Barajas RN  Outcome: Progressing  4/26/2022 0845 by Nanette Lugo RN  Outcome: Progressing     Problem:  Activity:  Goal: Ability to tolerate increased activity will improve  Description: Ability to tolerate increased activity will improve  4/26/2022 1943 by Jess Barajas RN  Outcome: Progressing  4/26/2022 0845 by Nanette Lugo RN  Outcome: Progressing     Problem: Cardiac:  Goal: Hemodynamic stability will improve  Description: Hemodynamic stability will improve  4/26/2022 1943 by Jess Barajas RN  Outcome: Progressing  4/26/2022 0845 by Nanette Lugo RN  Outcome: Progressing     Problem: Respiratory:  Goal: Ability to maintain a clear airway will improve  Description: Ability to maintain a clear airway will improve  4/26/2022 1943 by Jess Barajas RN  Outcome: Progressing  4/26/2022 0845 by Nanette Lugo RN  Outcome: Progressing  Goal: Ability to maintain adequate ventilation will improve  Description: Ability to maintain adequate ventilation will improve  4/26/2022 1943 by Jess Barajas RN  Outcome: Progressing  4/26/2022 0845 by Nanette Lugo RN  Outcome: Progressing  Goal: Complications related to the disease process, condition or treatment will be avoided or minimized  Description: Complications related to the disease process, condition or treatment will be avoided or minimized  4/26/2022 1943 by Jess Barajas RN  Outcome: Progressing  4/26/2022 0845 by Nanette Lugo RN  Outcome: Progressing     Problem: Skin Integrity:  Goal: Risk for impaired skin integrity will decrease  Description: Risk for impaired skin integrity will decrease  4/26/2022 1943 by Jess Barajas RN  Outcome: Progressing  4/26/2022 0845 by Nanette Lugo RN  Outcome: Progressing     Problem: OXYGENATION/RESPIRATORY FUNCTION  Goal: Patient will maintain patent airway  4/26/2022 1943 by Sanjay Delacruz RN  Outcome: Progressing  4/26/2022 0845 by Santi Bates RN  Outcome: Progressing  Goal: Patient will achieve/maintain normal respiratory rate/effort  Description: Respiratory rate and effort will be within normal limits for the patient  4/26/2022 1943 by Sanjay Delacruz RN  Outcome: Progressing  4/26/2022 0845 by Santi Bates RN  Outcome: Progressing     Problem: MECHANICAL VENTILATION  Goal: Patient will maintain patent airway  4/26/2022 1943 by Sanjay Delacruz RN  Outcome: Progressing  4/26/2022 0845 by Santi Bates RN  Outcome: Progressing  Goal: Oral health is maintained or improved  4/26/2022 1943 by Sanjay Delacruz RN  Outcome: Progressing  4/26/2022 0845 by Santi Bates RN  Outcome: Progressing  Goal: ET tube will be managed safely  4/26/2022 1943 by Sanjay Delacruz RN  Outcome: Progressing  4/26/2022 0845 by Santi Bates RN  Outcome: Progressing     Problem: Nutrition  Goal: Optimal nutrition therapy  4/26/2022 1943 by Sanjay Delacruz RN  Outcome: Progressing  4/26/2022 0845 by Santi Bates RN  Outcome: Progressing     Problem: Discharge Planning  Goal: Discharge to home or other facility with appropriate resources  4/26/2022 1943 by Sanjay Delacruz RN  Outcome: Progressing  4/26/2022 0845 by Santi Bates RN  Outcome: Progressing     Problem: Genitourinary - Adult  Goal: Urinary catheter remains patent  4/26/2022 1943 by Sanjay Delacruz RN  Outcome: Progressing  4/26/2022 0845 by Santi Bates RN  Outcome: Progressing     Problem: Neurosensory - Adult  Goal: Achieves stable or improved neurological status  4/26/2022 1943 by Sanjay Delacruz RN  Outcome: Progressing  4/26/2022 0845 by Santi Bates RN  Outcome: Progressing  Goal: Achieves maximal functionality and self care  4/26/2022 1943 by Sanjay Delacruz RN  Outcome: Progressing  4/26/2022 0845 by Santi Bates RN  Outcome: Progressing     Problem: Safety - Medical Restraint  Goal: Remains free of injury from restraints (Restraint for Interference with Medical Device)  Description: INTERVENTIONS:  1. Determine that other, less restrictive measures have been tried or would not be effective before applying the restraint  2. Evaluate the patient's condition at the time of restraint application  3. Inform patient/family regarding the reason for restraint  4. Q2H: Monitor safety, psychosocial status, comfort, nutrition and hydration  4/26/2022 1943 by Maury Macedo RN  Outcome: Progressing  4/26/2022 0845 by Baudilio Kennedy RN  Outcome: Progressing     Problem: Pain  Goal: Verbalizes/displays adequate comfort level or baseline comfort level  4/26/2022 1943 by Maury Macedo RN  Outcome: Progressing  4/26/2022 0845 by Baudilio Kennedy RN  Outcome: Progressing     Problem: Safety - Adult  Goal: Free from fall injury  4/26/2022 1943 by Maury Macedo RN  Outcome: Progressing  4/26/2022 0845 by Baudilio Kennedy RN  Outcome: Progressing     Problem: ABCDS Injury Assessment  Goal: Absence of physical injury  4/26/2022 1943 by Maury Macedo RN  Outcome: Progressing  4/26/2022 0845 by Baudilio Kennedy RN  Outcome: Progressing     Problem: Skin/Tissue Integrity  Goal: Absence of new skin breakdown  Description: 1. Monitor for areas of redness and/or skin breakdown  2. Assess vascular access sites hourly  3. Every 4-6 hours minimum:  Change oxygen saturation probe site  4. Every 4-6 hours:  If on nasal continuous positive airway pressure, respiratory therapy assess nares and determine need for appliance change or resting period.   4/26/2022 1943 by Maury Macedo RN  Outcome: Progressing  4/26/2022 0845 by Baudilio Kennedy RN  Outcome: Progressing

## 2022-04-26 NOTE — PROGRESS NOTES
ECMO DAILY ROUNDING NOTE     Patient:  Brie Lopez  MRN: [id number]  Admit date: 4/18/2022  Primary Care Physician: Noble Fernandez MD  Consulting Physician: Jordi Young MD  CODE Status: Full Code  LOS: 8    [x] Team present at bedside   [x] Family updated    INDICATION:   Respiratory Failure, status asthmaticus    CANNULATION:  VenoVeno ECMO cannulation on 4/22/22  Bilateral femoral cannulas  TREATMENT GOALS:  PUMP  FLOW: 3-5L/min  pH: 7.35-7.45   SvO2: >70%  SaO2:>97%  Sedation : RASS -1 to+1    CURRENT ECMO PARAMETERS:  PUMP Patient on ECMO: On  Hours on ECMO: 93  Pump Flow (L/min): 4.32 LPM  Pump Speed (Rotations/min): 3000 RPM  ECMO Mode: V/V  Pump Mode: Cardiohelp   SWEEP GAS Sweep Flow (L/min): 2.5 LPM   FiO2 (%): 100 %   CIRCUIT PRESSURES Inlet Pressure (Bladder): -83 mmHg  Pre-Membrane Pressure: 142 mmHg  Post-Membrane Pressure: 120 mmHg  Delta P: 22 mmHg  SVO2 (%): 73.7 %  ECMO blood flow temperature: 98.4 °F (36.9 °C)  CVP (mmHg): 9 mmHg   CIRCUIT CHECK Heat Exchg.  Water Temp Set: 36.9  Heat Exchanger Water Temp Actual: 36.9  Heat Exchanger Water Level: Full  Unused Pigtails Cleared: Done  System Plugged to Red Outlet: Done  Emergency Backup in Use: No  Hand Crank Available: Yes  Backup Unit Blood Avail: Done  Cart Checked and Stocked: Done  Pump Battery Charged: 100 Volts  Pressure Monitors Zeroed: Done  Pressure Limits Checked: Done  Circuit Number: 1  Back Up Circuit: Done  Back Up Console/Motor: Done  Emergency O2 Tank Available: Done  Circuits and Cannulation Sites: Done  CMAG Flow Probe Repositioned: Not done  High/Low flow alarm set?: Yes  High/Low temp alarm set?: Yes  High/Low venous alarm set?: Yes  Pre-MO alarm limit: 300  Post-MO alarm limit: 300     CURRENT VENTILATOR SETTINGS:  Vent Information  Ventilator ID: [id number]  Vent Mode: AC/PC     RECENT LABS:  ABG Recent Labs     04/25/22 1957 04/25/22 2058 04/26/22  0001 04/26/22 0402 04/26/22  0623   POCPH 7.408 7.103 7.346* 7.385 7.369   POCPCO2 50.5* 50.2* 60.7* 54.0* 48.0   POCPO2 68.3* 73.0* 70.7* 67.7* 433.4*   POCHCO3 31.9* 31.0* 33.3* 32.3* 27.7   AWJP2ZIE 93* 94 92* 92* 100*      VBG Recent Labs     04/24/22  0503 04/24/22  1833 04/25/22  0544 04/25/22  1809 04/26/22  0615   PHVEN 7.439* 7.345 7.353 7.394 7.339   MFI8KUH 42.9 61.1* 58.8* 53.5* 52.7*   CEP1WEX 29.1* 33.4* 32.6* 32.6* 28.4   Z1IZOVTK 74 76 79 74 72      CBC Recent Labs     04/25/22  0402 04/25/22  1011 04/25/22  1638 04/25/22  2201 04/26/22  0412   WBC 25.6* 25.1* 24.3* 22.6* 26.7*   HGB 9.6* 9.2* 9.6* 9.2* 9.9*   HCT 32.6* 30.5* 31.3* 30.5* 32.2*    197 185 180 188      COAGS Recent Labs     04/25/22  0402 04/25/22  1011 04/25/22  1638 04/25/22  2201 04/26/22  0412   INR 3.3 3.5 3.1 3.0 2.9   PROTIME 32.1* 34.1* 30.4* 29.6* 28.0*   APTT 56.6* 55.4* 54.1* 52.6* 51.3*      TEG Recent Labs     04/24/22  2213 04/25/22  1011 04/25/22  2201   HEPTHERAPY None None UNKNOWN   REACTTIMETEG 9.1 10.6* 8.8   KINETICSTEG 1.7 2.2 1.6   ANGLETEG 67.6 61.0 67.5   MAXCLOTTEG 67.2 69.1 63.4   PJ16KYS 0.0 0.0 0.0   EPLTEG 0.0 0.0 0.0      BMP Recent Labs     04/25/22  0402 04/25/22  1011 04/25/22  1638 04/25/22  2201 04/26/22  0412    142 144 141 140   K 5.3 5.0 5.1 5.0 5.1    105 108* 106 105   CO2 30 30 30 27 29   BUN 30* 31* 33* 30* 30*   CREATININE 0.53 0.56 0.48* 0.44* 0.45*   GLUCOSE 164* 144* 181* 170* 174*   MG 2.6 2.3 2.4 2.3 2.3      LFT Recent Labs     04/24/22  0344 04/24/22  1549 04/25/22  0402 04/25/22  1638 04/26/22  0412   PROT 5.7* 5.5* 5.4* 5.5* 5.8*   LABALBU 3.2* 3.2* 3.1* 3.1* 3.3*   ALT 87* 95* 88* 83* 82*   AST 36* 37* 27 24 21   ALKPHOS 47 48 46 48 53   BILITOT 0.44 0.41 0.32 0.38 0.46      INFLAMMATORY MARKERS No results for input(s): CRP, FERRITIN, LDH in the last 72 hours. Invalid input(s):  ESR   CARDIAC No results for input(s): CKTOTAL, CKMB, CKMBINDEX, TROPONINI, BNP, PROBNP in the last 72 hours.     Invalid input(s): TROPONIN, HSTROP   LACTIC ACID No results for input(s): LACTA in the last 72 hours. TRIGLYCERIDE No results for input(s): TRIG in the last 72 hours.      ASSESSMENT AND SYSTEM BASED PLAN (MEDICAL DECISION MAKING):    Neurologic                                                  [x] Sedation/paralytic requirement reviewed                              [x] Neurological assessment; not done, patient sedated with propofol and fentanyl currently     Cardiovascular                               [x] Cannulas secured, dsg dry and intact, no drainage or hematoma noted                             [x] ECMO Pump flow/pressures reviewed                             [x] Telemetry reviewed                             [x] Hemodynamic parameters stable    Pulmonary                             [x] Blood gases (Patient/Circuit) reviewed                             [x] Sweep/ settings reviewed                             [x] Vent Settings reviewed                             [x] Chest X-ray reviewed    Genitourinary                               [x] Intake/Output reviewed                             [x] Need for continuous IV fluids assessed, recommended diuresis with Lasix                             [x] Need for diuresis/renal replacement therapy reviewed    Gastrointestinal                              [x] GI Prophylaxis reviewed                              [x] Enteral nutrition reviewed    Hematology                             [x] Anticoagulation: Argatroban                            [x] Reviewed CBC, coagulation profile, ACT, TEG and platelet count                            [x] Transfusion needs reviewed                    Infectious disease                           [x] Reviewed T-max, white count and cultures results                           [x] Antimicrobials reviewed; continue ceftriaxone    Endocrine                            [x] Reviewed glycemic control                           [x] Reviewed need for steroids; continue Solu-Medrol    Others                           [x] Reviewed need for restrains                           [x] Reviewed need for lines/drains/invasive devices                           [x] Skin/Wound care                           [x] Reviewed current Medications list/orders                           [x] Reviewed goals of care                   OVERALL CONDITION:   Stable.   Patient had to have increasing the sweep because of increasing carbon dioxide, but it was decreased today  Tidal volumes are being maintained  Chest x-ray without any changes    Ashley Caputo MD  Pulmonary & Critical care Medicine  4/26/2022

## 2022-04-26 NOTE — PLAN OF CARE
Problem: Gas Exchange - Impaired:  Goal: Levels of oxygenation will improve  Description: Levels of oxygenation will improve  4/25/2022 2108 by Nguyễn Zaidi RN  Outcome: Progressing  4/25/2022 0745 by Shannan العراقي RN  Outcome: Progressing     Problem:  Activity:  Goal: Ability to tolerate increased activity will improve  Description: Ability to tolerate increased activity will improve  4/25/2022 2108 by Nguyễn Zaidi RN  Outcome: Progressing  4/25/2022 0745 by Shannan العراقي RN  Outcome: Progressing     Problem: Cardiac:  Goal: Hemodynamic stability will improve  Description: Hemodynamic stability will improve  4/25/2022 2108 by Nguyễn Zaidi RN  Outcome: Progressing  4/25/2022 0745 by Shannan العراقي RN  Outcome: Progressing     Problem: Respiratory:  Goal: Ability to maintain a clear airway will improve  Description: Ability to maintain a clear airway will improve  4/25/2022 2108 by Nguyễn Zaidi RN  Outcome: Progressing  4/25/2022 0745 by Shannan العراقي RN  Outcome: Progressing  Goal: Ability to maintain adequate ventilation will improve  Description: Ability to maintain adequate ventilation will improve  4/25/2022 2108 by Nguyễn Zaidi RN  Outcome: Progressing  4/25/2022 0745 by Shannan العراقي RN  Outcome: Progressing  Goal: Complications related to the disease process, condition or treatment will be avoided or minimized  Description: Complications related to the disease process, condition or treatment will be avoided or minimized  4/25/2022 2108 by Nguyễn Zaidi RN  Outcome: Progressing  4/25/2022 0745 by Shannan العراقي RN  Outcome: Progressing     Problem: Skin Integrity:  Goal: Risk for impaired skin integrity will decrease  Description: Risk for impaired skin integrity will decrease  4/25/2022 2108 by Nguyễn Zaidi RN  Outcome: Progressing  4/25/2022 0745 by Shannan العراقي RN  Outcome: Progressing     Problem: OXYGENATION/RESPIRATORY FUNCTION  Goal: Patient will maintain patent airway  4/25/2022 2108 by Georgina Paige RN  Outcome: Progressing  4/25/2022 0745 by Reynaldo Martinez RN  Outcome: Progressing  Goal: Patient will achieve/maintain normal respiratory rate/effort  Description: Respiratory rate and effort will be within normal limits for the patient  4/25/2022 2108 by Georgina Paige RN  Outcome: Progressing  4/25/2022 0745 by Reynaldo Martinez RN  Outcome: Progressing     Problem: MECHANICAL VENTILATION  Goal: Patient will maintain patent airway  4/25/2022 2108 by Georgina Paige RN  Outcome: Progressing  4/25/2022 0745 by Reynaldo Martinez RN  Outcome: Progressing  Goal: Oral health is maintained or improved  4/25/2022 2108 by Georgina Paige RN  Outcome: Progressing  4/25/2022 0745 by Reynaldo Martinez RN  Outcome: Progressing  Goal: ET tube will be managed safely  4/25/2022 2108 by Georgina Paige RN  Outcome: Progressing  4/25/2022 0745 by Reynaldo Martinez RN  Outcome: Progressing     Problem: Nutrition  Goal: Optimal nutrition therapy  4/25/2022 2108 by Georgina Paige RN  Outcome: Progressing  4/25/2022 0745 by Reynaldo Martinez RN  Outcome: Progressing     Problem: Discharge Planning  Goal: Discharge to home or other facility with appropriate resources  4/25/2022 2108 by Georgina Paige RN  Outcome: Progressing  4/25/2022 0745 by Reynaldo Martinez RN  Outcome: Progressing     Problem: Genitourinary - Adult  Goal: Urinary catheter remains patent  4/25/2022 2108 by Georgina Paige RN  Outcome: Progressing  4/25/2022 0745 by Reynaldo Martinez RN  Outcome: Progressing     Problem: Neurosensory - Adult  Goal: Achieves stable or improved neurological status  4/25/2022 2108 by Georgina Paige RN  Outcome: Progressing  4/25/2022 0745 by Reynaldo Martinez RN  Outcome: Progressing  Goal: Achieves maximal functionality and self care  4/25/2022 2108 by Georgina Paige RN  Outcome: Progressing  4/25/2022 0745 by Vishnu Murrell RN  Outcome: Progressing     Problem: Safety - Medical Restraint  Goal: Remains free of injury from restraints (Restraint for Interference with Medical Device)  Description: INTERVENTIONS:  1. Determine that other, less restrictive measures have been tried or would not be effective before applying the restraint  2. Evaluate the patient's condition at the time of restraint application  3. Inform patient/family regarding the reason for restraint  4. Q2H: Monitor safety, psychosocial status, comfort, nutrition and hydration  4/25/2022 2108 by Prabha Vizcarra RN  Outcome: Progressing  4/25/2022 0745 by Vishnu Murrell RN  Outcome: Progressing     Problem: Pain  Goal: Verbalizes/displays adequate comfort level or baseline comfort level  4/25/2022 2108 by Prabha Vizcarra RN  Outcome: Progressing  4/25/2022 0745 by Vishnu Murrell RN  Outcome: Progressing     Problem: Safety - Adult  Goal: Free from fall injury  4/25/2022 2108 by Prabha Vizcarra RN  Outcome: Progressing  4/25/2022 0745 by Vishnu Murrell RN  Outcome: Progressing     Problem: ABCDS Injury Assessment  Goal: Absence of physical injury  4/25/2022 2108 by Prabha Vizcarra RN  Outcome: Progressing  4/25/2022 0745 by Vishnu Murrell RN  Outcome: Progressing     Problem: Skin/Tissue Integrity  Goal: Absence of new skin breakdown  Description: 1. Monitor for areas of redness and/or skin breakdown  2. Assess vascular access sites hourly  3. Every 4-6 hours minimum:  Change oxygen saturation probe site  4. Every 4-6 hours:  If on nasal continuous positive airway pressure, respiratory therapy assess nares and determine need for appliance change or resting period.   4/25/2022 2108 by Prabha Vizcarra RN  Outcome: Progressing  4/25/2022 0745 by Vishnu Murrell RN  Outcome: Progressing

## 2022-04-26 NOTE — PROGRESS NOTES
[facility] End of Shift Note:       Cannulation date/time: 4/22/2022 at 1005  ECMO type: VV  Cannula sizes/locations: 25 Fr Drainage LFV, 23 Fr Return RFV  Flow ordered at: 4-6 lpm       Currently flowing at: 4.3-4.4       FIO2 at: 100       Sweep at: 2  Delta pressure: 22-23  Clot burden:       Oxygenator: 3, 9, 12 O' Clock Positions       Circuit: Around 3/8th Connector Drainage Side Cannula Pre Mo- Fibrin  Anticoagulation: Argatroban Gtt 4.2 mcg/kg/min-therapeutic-next PTT 2200  Products given:        PRBC's: 0       PLT's: 0       FFP: 0       Cryo: 0       Albumin: 0

## 2022-04-26 NOTE — PROGRESS NOTES
[facility] End of Shift Note:       Cannulation date/time:4/22/2022 @ 1005  ECMO type:VV  Cannula sizes/locations:23 Fr return RFV, 25 Fr Drainage LFV  Flow ordered at:4-6       Currently flowing at:4.38       FIO2 at:100       Sweep at:2.5  Delta pressure:22  Clot burden:       Oxygenator:3,9, and 12 o'clock position       Circuit:none  Anticoagulation: Argatroban infusion @ 4.2 mcg/kg/min  Products given:        PRBC's:0       PLT's:0       FFP:0       Cryo:1       Albumin:0

## 2022-04-27 ENCOUNTER — APPOINTMENT (OUTPATIENT)
Dept: GENERAL RADIOLOGY | Age: 22
DRG: 003 | End: 2022-04-27
Attending: INTERNAL MEDICINE
Payer: COMMERCIAL

## 2022-04-27 LAB
ACTIVATED CLOTTING TIME: 233 SEC (ref 79–149)
ACTIVATED CLOTTING TIME: 250 SEC (ref 79–149)
ALBUMIN SERPL-MCNC: 3.3 G/DL (ref 3.5–5.2)
ALBUMIN SERPL-MCNC: 3.4 G/DL (ref 3.5–5.2)
ALBUMIN/GLOBULIN RATIO: 1.4 (ref 1–2.5)
ALBUMIN/GLOBULIN RATIO: 1.5 (ref 1–2.5)
ALP BLD-CCNC: 48 U/L (ref 35–104)
ALP BLD-CCNC: 50 U/L (ref 35–104)
ALT SERPL-CCNC: 68 U/L (ref 5–33)
ALT SERPL-CCNC: 75 U/L (ref 5–33)
ANGLE TEG: 61.7 DEG (ref 53–72)
ANGLE TEG: 65.4 DEG (ref 53–72)
ANION GAP SERPL CALCULATED.3IONS-SCNC: 4 MMOL/L (ref 9–17)
ANION GAP SERPL CALCULATED.3IONS-SCNC: 6 MMOL/L (ref 9–17)
ANION GAP SERPL CALCULATED.3IONS-SCNC: 6 MMOL/L (ref 9–17)
AST SERPL-CCNC: 17 U/L
AST SERPL-CCNC: 18 U/L
AT-III ACTIVITY: 116 % (ref 83–122)
BILIRUB SERPL-MCNC: 0.4 MG/DL (ref 0.3–1.2)
BILIRUB SERPL-MCNC: 0.45 MG/DL (ref 0.3–1.2)
BILIRUBIN DIRECT: 0.1 MG/DL
BILIRUBIN DIRECT: 0.13 MG/DL
BILIRUBIN, INDIRECT: 0.3 MG/DL (ref 0–1)
BILIRUBIN, INDIRECT: 0.32 MG/DL (ref 0–1)
BUN BLDV-MCNC: 29 MG/DL (ref 6–20)
BUN BLDV-MCNC: 29 MG/DL (ref 6–20)
BUN BLDV-MCNC: 30 MG/DL (ref 6–20)
CALCIUM IONIZED: 1.1 MMOL/L (ref 1.13–1.33)
CALCIUM IONIZED: 1.14 MMOL/L (ref 1.13–1.33)
CALCIUM IONIZED: 1.17 MMOL/L (ref 1.13–1.33)
CALCIUM IONIZED: 1.19 MMOL/L (ref 1.13–1.33)
CALCIUM SERPL-MCNC: 8.2 MG/DL (ref 8.6–10.4)
CALCIUM SERPL-MCNC: 8.3 MG/DL (ref 8.6–10.4)
CALCIUM SERPL-MCNC: 8.3 MG/DL (ref 8.6–10.4)
CHLORIDE BLD-SCNC: 104 MMOL/L (ref 98–107)
CHLORIDE BLD-SCNC: 105 MMOL/L (ref 98–107)
CHLORIDE BLD-SCNC: 106 MMOL/L (ref 98–107)
CO2: 29 MMOL/L (ref 20–31)
CO2: 30 MMOL/L (ref 20–31)
CO2: 31 MMOL/L (ref 20–31)
CREAT SERPL-MCNC: 0.42 MG/DL (ref 0.5–0.9)
CREAT SERPL-MCNC: 0.45 MG/DL (ref 0.5–0.9)
CREAT SERPL-MCNC: 0.46 MG/DL (ref 0.5–0.9)
EPL-TEG: 0 % (ref 0–15)
EPL-TEG: 0 % (ref 0–15)
FIBRINOGEN: 110 MG/DL (ref 140–420)
FIBRINOGEN: 117 MG/DL (ref 140–420)
FIBRINOGEN: 133 MG/DL (ref 140–420)
FIBRINOGEN: 146 MG/DL (ref 140–420)
FIO2: 100
GFR AFRICAN AMERICAN: >60 ML/MIN
GFR NON-AFRICAN AMERICAN: >60 ML/MIN
GFR SERPL CREATININE-BSD FRML MDRD: ABNORMAL ML/MIN/{1.73_M2}
GLUCOSE BLD-MCNC: 155 MG/DL (ref 65–105)
GLUCOSE BLD-MCNC: 160 MG/DL (ref 70–99)
GLUCOSE BLD-MCNC: 170 MG/DL (ref 74–100)
GLUCOSE BLD-MCNC: 173 MG/DL (ref 70–99)
GLUCOSE BLD-MCNC: 173 MG/DL (ref 74–100)
GLUCOSE BLD-MCNC: 176 MG/DL (ref 70–99)
GLUCOSE BLD-MCNC: 176 MG/DL (ref 74–100)
GLUCOSE BLD-MCNC: 177 MG/DL (ref 74–100)
GLUCOSE BLD-MCNC: 179 MG/DL (ref 74–100)
GLUCOSE BLD-MCNC: 179 MG/DL (ref 74–100)
GLUCOSE BLD-MCNC: 184 MG/DL (ref 74–100)
GLUCOSE BLD-MCNC: 188 MG/DL (ref 74–100)
HCO3 VENOUS: 30 MMOL/L (ref 22–29)
HCO3 VENOUS: 30.5 MMOL/L (ref 22–29)
HCT VFR BLD CALC: 29.8 % (ref 36.3–47.1)
HCT VFR BLD CALC: 31.7 % (ref 36.3–47.1)
HCT VFR BLD CALC: 32.4 % (ref 36.3–47.1)
HCT VFR BLD CALC: 33.1 % (ref 36.3–47.1)
HEMOGLOBIN: 9.1 G/DL (ref 11.9–15.1)
HEMOGLOBIN: 9.6 G/DL (ref 11.9–15.1)
HEMOGLOBIN: 9.7 G/DL (ref 11.9–15.1)
HEMOGLOBIN: 9.9 G/DL (ref 11.9–15.1)
HEPARIN THERAPY: ABNORMAL
HEPARIN THERAPY: NORMAL
INR BLD: 2.7
INR BLD: 3
INR BLD: 3.1
INR BLD: 3.3
KINETICS TEG: 1.8 MIN (ref 1–3)
KINETICS TEG: 2.2 MIN (ref 1–3)
LACTIC ACID, WHOLE BLOOD: 0.9 MMOL/L (ref 0.7–2.1)
LACTIC ACID, WHOLE BLOOD: 1.1 MMOL/L (ref 0.7–2.1)
LACTIC ACID, WHOLE BLOOD: 1.2 MMOL/L (ref 0.7–2.1)
LACTIC ACID, WHOLE BLOOD: 1.4 MMOL/L (ref 0.7–2.1)
LY30 (LYSIS) TEG: 0 % (ref 0–8)
LY30 (LYSIS) TEG: 0 % (ref 0–8)
MA (MAX CLOT) TEG: 64.4 MM (ref 50–70)
MA (MAX CLOT) TEG: 64.5 MM (ref 50–70)
MAGNESIUM: 2.1 MG/DL (ref 1.6–2.6)
MAGNESIUM: 2.2 MG/DL (ref 1.6–2.6)
MAGNESIUM: 2.3 MG/DL (ref 1.6–2.6)
MCH RBC QN AUTO: 27.6 PG (ref 25.2–33.5)
MCH RBC QN AUTO: 28 PG (ref 25.2–33.5)
MCH RBC QN AUTO: 28 PG (ref 25.2–33.5)
MCH RBC QN AUTO: 28.1 PG (ref 25.2–33.5)
MCHC RBC AUTO-ENTMCNC: 29.6 G/DL (ref 28.4–34.8)
MCHC RBC AUTO-ENTMCNC: 29.9 G/DL (ref 28.4–34.8)
MCHC RBC AUTO-ENTMCNC: 30.5 G/DL (ref 28.4–34.8)
MCHC RBC AUTO-ENTMCNC: 30.6 G/DL (ref 28.4–34.8)
MCV RBC AUTO: 91.6 FL (ref 82.6–102.9)
MCV RBC AUTO: 92 FL (ref 82.6–102.9)
MCV RBC AUTO: 93.1 FL (ref 82.6–102.9)
MCV RBC AUTO: 93.5 FL (ref 82.6–102.9)
NRBC AUTOMATED: 0 PER 100 WBC
NRBC AUTOMATED: 0.1 PER 100 WBC
NRBC AUTOMATED: 0.1 PER 100 WBC
NRBC AUTOMATED: 0.2 PER 100 WBC
O2 DEVICE/FLOW/%: ABNORMAL
O2 SAT, VEN: 71 % (ref 60–85)
O2 SAT, VEN: 74 % (ref 60–85)
PARTIAL THROMBOPLASTIN TIME: 51.4 SEC (ref 20.5–30.5)
PARTIAL THROMBOPLASTIN TIME: 54.6 SEC (ref 20.5–30.5)
PARTIAL THROMBOPLASTIN TIME: 55.9 SEC (ref 20.5–30.5)
PARTIAL THROMBOPLASTIN TIME: 58.7 SEC (ref 20.5–30.5)
PARTIAL THROMBOPLASTIN TIME: 59.8 SEC (ref 20.5–30.5)
PARTIAL THROMBOPLASTIN TIME: 61 SEC (ref 20.5–30.5)
PCO2, VEN: 51.2 MM HG (ref 41–51)
PCO2, VEN: 54.7 MM HG (ref 41–51)
PDW BLD-RTO: 14.1 % (ref 11.8–14.4)
PDW BLD-RTO: 14.3 % (ref 11.8–14.4)
PDW BLD-RTO: 14.3 % (ref 11.8–14.4)
PDW BLD-RTO: 14.4 % (ref 11.8–14.4)
PERFORMING LOCATION: ABNORMAL
PERFORMING LOCATION: NORMAL
PH VENOUS: 7.36 (ref 7.32–7.43)
PH VENOUS: 7.38 (ref 7.32–7.43)
PLATELET # BLD: 159 K/UL (ref 138–453)
PLATELET # BLD: 168 K/UL (ref 138–453)
PLATELET # BLD: 181 K/UL (ref 138–453)
PLATELET # BLD: 185 K/UL (ref 138–453)
PMV BLD AUTO: 10 FL (ref 8.1–13.5)
PMV BLD AUTO: 10.1 FL (ref 8.1–13.5)
PO2, VEN: 40.1 MM HG (ref 30–50)
PO2, VEN: 41.3 MM HG (ref 30–50)
POC HCO3: 29.5 MMOL/L (ref 21–28)
POC HCO3: 29.8 MMOL/L (ref 21–28)
POC HCO3: 30.4 MMOL/L (ref 21–28)
POC HCO3: 30.6 MMOL/L (ref 21–28)
POC HCO3: 31 MMOL/L (ref 21–28)
POC HCO3: 31.1 MMOL/L (ref 21–28)
POC HCO3: 31.3 MMOL/L (ref 21–28)
POC HCO3: 31.4 MMOL/L (ref 21–28)
POC HCO3: 31.8 MMOL/L (ref 21–28)
POC HEMATOCRIT: 29 % (ref 36–46)
POC HEMOGLOBIN: 9.9 G/DL (ref 12–16)
POC O2 SATURATION: 100 % (ref 94–98)
POC O2 SATURATION: 100 % (ref 94–98)
POC O2 SATURATION: 93 % (ref 94–98)
POC O2 SATURATION: 94 % (ref 94–98)
POC O2 SATURATION: 95 % (ref 94–98)
POC O2 SATURATION: 97 % (ref 94–98)
POC PCO2: 46.7 MM HG (ref 35–48)
POC PCO2: 48.3 MM HG (ref 35–48)
POC PCO2: 51.2 MM HG (ref 35–48)
POC PCO2: 52.2 MM HG (ref 35–48)
POC PCO2: 52.6 MM HG (ref 35–48)
POC PCO2: 53.4 MM HG (ref 35–48)
POC PCO2: 55.7 MM HG (ref 35–48)
POC PCO2: 56.2 MM HG (ref 35–48)
POC PCO2: 56.3 MM HG (ref 35–48)
POC PH: 7.35 (ref 7.35–7.45)
POC PH: 7.37 (ref 7.35–7.45)
POC PH: 7.37 (ref 7.35–7.45)
POC PH: 7.38 (ref 7.35–7.45)
POC PH: 7.39 (ref 7.35–7.45)
POC PH: 7.4 (ref 7.35–7.45)
POC PH: 7.41 (ref 7.35–7.45)
POC PO2: 428.1 MM HG (ref 83–108)
POC PO2: 450.7 MM HG (ref 83–108)
POC PO2: 68.8 MM HG (ref 83–108)
POC PO2: 74.7 MM HG (ref 83–108)
POC PO2: 76.3 MM HG (ref 83–108)
POC PO2: 77.6 MM HG (ref 83–108)
POC PO2: 78.2 MM HG (ref 83–108)
POC PO2: 79.7 MM HG (ref 83–108)
POC PO2: 96.7 MM HG (ref 83–108)
POSITIVE BASE EXCESS, ART: 4 (ref 0–3)
POSITIVE BASE EXCESS, ART: 5 (ref 0–3)
POSITIVE BASE EXCESS, ART: 6 (ref 0–3)
POSITIVE BASE EXCESS, VEN: 4 (ref 0–3)
POSITIVE BASE EXCESS, VEN: 4 (ref 0–3)
POTASSIUM SERPL-SCNC: 4.9 MMOL/L (ref 3.7–5.3)
POTASSIUM SERPL-SCNC: 4.9 MMOL/L (ref 3.7–5.3)
POTASSIUM SERPL-SCNC: 5 MMOL/L (ref 3.7–5.3)
PROTHROMBIN TIME: 26.9 SEC (ref 9.1–12.3)
PROTHROMBIN TIME: 28.9 SEC (ref 9.1–12.3)
PROTHROMBIN TIME: 29.8 SEC (ref 9.1–12.3)
PROTHROMBIN TIME: 31.8 SEC (ref 9.1–12.3)
RBC # BLD: 3.24 M/UL (ref 3.95–5.11)
RBC # BLD: 3.46 M/UL (ref 3.95–5.11)
RBC # BLD: 3.48 M/UL (ref 3.95–5.11)
RBC # BLD: 3.54 M/UL (ref 3.95–5.11)
REACTION TIME TEG: 14 MIN (ref 5–10)
REACTION TIME TEG: 9.8 MIN (ref 5–10)
SAMPLE SITE: ABNORMAL
SODIUM BLD-SCNC: 139 MMOL/L (ref 135–144)
SODIUM BLD-SCNC: 140 MMOL/L (ref 135–144)
SODIUM BLD-SCNC: 142 MMOL/L (ref 135–144)
TOTAL PROTEIN: 5.6 G/DL (ref 6.4–8.3)
TOTAL PROTEIN: 5.6 G/DL (ref 6.4–8.3)
WBC # BLD: 31.3 K/UL (ref 3.5–11.3)
WBC # BLD: 32.1 K/UL (ref 3.5–11.3)
WBC # BLD: 32.8 K/UL (ref 3.5–11.3)
WBC # BLD: 34.4 K/UL (ref 3.5–11.3)

## 2022-04-27 PROCEDURE — 6370000000 HC RX 637 (ALT 250 FOR IP): Performed by: INTERNAL MEDICINE

## 2022-04-27 PROCEDURE — 2500000003 HC RX 250 WO HCPCS: Performed by: INTERNAL MEDICINE

## 2022-04-27 PROCEDURE — 33948 ECMO/ECLS DAILY MGMT-VENOUS: CPT

## 2022-04-27 PROCEDURE — 85014 HEMATOCRIT: CPT

## 2022-04-27 PROCEDURE — 6360000002 HC RX W HCPCS: Performed by: INTERNAL MEDICINE

## 2022-04-27 PROCEDURE — 80048 BASIC METABOLIC PNL TOTAL CA: CPT

## 2022-04-27 PROCEDURE — 94640 AIRWAY INHALATION TREATMENT: CPT

## 2022-04-27 PROCEDURE — 85390 FIBRINOLYSINS SCREEN I&R: CPT

## 2022-04-27 PROCEDURE — 85347 COAGULATION TIME ACTIVATED: CPT

## 2022-04-27 PROCEDURE — 2700000000 HC OXYGEN THERAPY PER DAY

## 2022-04-27 PROCEDURE — 82330 ASSAY OF CALCIUM: CPT

## 2022-04-27 PROCEDURE — 85576 BLOOD PLATELET AGGREGATION: CPT

## 2022-04-27 PROCEDURE — 85384 FIBRINOGEN ACTIVITY: CPT

## 2022-04-27 PROCEDURE — 71045 X-RAY EXAM CHEST 1 VIEW: CPT

## 2022-04-27 PROCEDURE — 2580000003 HC RX 258: Performed by: INTERNAL MEDICINE

## 2022-04-27 PROCEDURE — 85610 PROTHROMBIN TIME: CPT

## 2022-04-27 PROCEDURE — 99233 SBSQ HOSP IP/OBS HIGH 50: CPT | Performed by: INTERNAL MEDICINE

## 2022-04-27 PROCEDURE — 85027 COMPLETE CBC AUTOMATED: CPT

## 2022-04-27 PROCEDURE — C9113 INJ PANTOPRAZOLE SODIUM, VIA: HCPCS | Performed by: INTERNAL MEDICINE

## 2022-04-27 PROCEDURE — 83605 ASSAY OF LACTIC ACID: CPT

## 2022-04-27 PROCEDURE — 85300 ANTITHROMBIN III ACTIVITY: CPT

## 2022-04-27 PROCEDURE — 85730 THROMBOPLASTIN TIME PARTIAL: CPT

## 2022-04-27 PROCEDURE — 94003 VENT MGMT INPAT SUBQ DAY: CPT

## 2022-04-27 PROCEDURE — 93970 EXTREMITY STUDY: CPT

## 2022-04-27 PROCEDURE — 80076 HEPATIC FUNCTION PANEL: CPT

## 2022-04-27 PROCEDURE — 82803 BLOOD GASES ANY COMBINATION: CPT

## 2022-04-27 PROCEDURE — 6360000002 HC RX W HCPCS: Performed by: THORACIC SURGERY (CARDIOTHORACIC VASCULAR SURGERY)

## 2022-04-27 PROCEDURE — 83735 ASSAY OF MAGNESIUM: CPT

## 2022-04-27 PROCEDURE — 83051 HEMOGLOBIN PLASMA: CPT

## 2022-04-27 PROCEDURE — 33948 ECMO/ECLS DAILY MGMT-VENOUS: CPT | Performed by: INTERNAL MEDICINE

## 2022-04-27 PROCEDURE — 2580000003 HC RX 258: Performed by: THORACIC SURGERY (CARDIOTHORACIC VASCULAR SURGERY)

## 2022-04-27 PROCEDURE — 2100000001 HC CVICU R&B

## 2022-04-27 PROCEDURE — 99291 CRITICAL CARE FIRST HOUR: CPT | Performed by: INTERNAL MEDICINE

## 2022-04-27 PROCEDURE — 94761 N-INVAS EAR/PLS OXIMETRY MLT: CPT

## 2022-04-27 RX ORDER — BUDESONIDE 0.5 MG/2ML
0.5 INHALANT ORAL 2 TIMES DAILY
Status: DISCONTINUED | OUTPATIENT
Start: 2022-04-27 | End: 2022-05-10 | Stop reason: HOSPADM

## 2022-04-27 RX ORDER — ARGATROBAN 1 MG/ML
.0625-1 INJECTION, SOLUTION INTRAVENOUS CONTINUOUS
Status: DISCONTINUED | OUTPATIENT
Start: 2022-04-27 | End: 2022-04-28 | Stop reason: SDUPTHER

## 2022-04-27 RX ADMIN — INSULIN LISPRO 2 UNITS: 100 INJECTION, SOLUTION INTRAVENOUS; SUBCUTANEOUS at 12:39

## 2022-04-27 RX ADMIN — Medication 30 UNITS: at 19:00

## 2022-04-27 RX ADMIN — DILTIAZEM HYDROCHLORIDE 30 MG: 30 TABLET ORAL at 05:10

## 2022-04-27 RX ADMIN — IPRATROPIUM BROMIDE AND ALBUTEROL SULFATE 1 AMPULE: .5; 2.5 SOLUTION RESPIRATORY (INHALATION) at 19:58

## 2022-04-27 RX ADMIN — POLYVINYL ALCOHOL 1 DROP: 14 SOLUTION/ DROPS OPHTHALMIC at 08:09

## 2022-04-27 RX ADMIN — BUDESONIDE 500 MCG: 0.5 INHALANT RESPIRATORY (INHALATION) at 19:58

## 2022-04-27 RX ADMIN — Medication 30 UNITS: at 05:30

## 2022-04-27 RX ADMIN — METHYLPREDNISOLONE SODIUM SUCCINATE 40 MG: 40 INJECTION, POWDER, FOR SOLUTION INTRAMUSCULAR; INTRAVENOUS at 17:53

## 2022-04-27 RX ADMIN — OXYCODONE HYDROCHLORIDE 10 MG: 5 TABLET ORAL at 03:25

## 2022-04-27 RX ADMIN — DILTIAZEM HYDROCHLORIDE 30 MG: 30 TABLET ORAL at 23:15

## 2022-04-27 RX ADMIN — Medication 30 UNITS: at 06:15

## 2022-04-27 RX ADMIN — Medication 10 MG/HR: at 06:20

## 2022-04-27 RX ADMIN — MIDAZOLAM 2 MG: 1 INJECTION INTRAMUSCULAR; INTRAVENOUS at 08:59

## 2022-04-27 RX ADMIN — METHYLPREDNISOLONE SODIUM SUCCINATE 40 MG: 40 INJECTION, POWDER, FOR SOLUTION INTRAMUSCULAR; INTRAVENOUS at 02:19

## 2022-04-27 RX ADMIN — IPRATROPIUM BROMIDE AND ALBUTEROL SULFATE 1 AMPULE: .5; 2.5 SOLUTION RESPIRATORY (INHALATION) at 15:23

## 2022-04-27 RX ADMIN — OXYCODONE HYDROCHLORIDE 10 MG: 5 TABLET ORAL at 12:19

## 2022-04-27 RX ADMIN — Medication 30 UNITS: at 06:00

## 2022-04-27 RX ADMIN — DILTIAZEM HYDROCHLORIDE 30 MG: 30 TABLET ORAL at 17:54

## 2022-04-27 RX ADMIN — METOPROLOL TARTRATE 5 MG: 1 INJECTION, SOLUTION INTRAVENOUS at 14:56

## 2022-04-27 RX ADMIN — Medication 30 UNITS: at 17:52

## 2022-04-27 RX ADMIN — FENTANYL CITRATE 25 MCG: 50 INJECTION INTRAMUSCULAR; INTRAVENOUS at 03:22

## 2022-04-27 RX ADMIN — ARGATROBAN 4.2 MCG/KG/MIN: 250 INJECTION INTRAVENOUS at 02:29

## 2022-04-27 RX ADMIN — INSULIN LISPRO 2 UNITS: 100 INJECTION, SOLUTION INTRAVENOUS; SUBCUTANEOUS at 05:12

## 2022-04-27 RX ADMIN — Medication 30 UNITS: at 18:08

## 2022-04-27 RX ADMIN — CHLORDIAZEPOXIDE HYDROCHLORIDE 50 MG: 25 CAPSULE ORAL at 12:24

## 2022-04-27 RX ADMIN — DILTIAZEM HYDROCHLORIDE 30 MG: 30 TABLET ORAL at 12:21

## 2022-04-27 RX ADMIN — Medication 200 MCG/HR: at 13:23

## 2022-04-27 RX ADMIN — IPRATROPIUM BROMIDE AND ALBUTEROL SULFATE 1 AMPULE: .5; 2.5 SOLUTION RESPIRATORY (INHALATION) at 07:57

## 2022-04-27 RX ADMIN — SODIUM CHLORIDE, PRESERVATIVE FREE 10 ML: 5 INJECTION INTRAVENOUS at 19:53

## 2022-04-27 RX ADMIN — CHLORDIAZEPOXIDE HYDROCHLORIDE 50 MG: 25 CAPSULE ORAL at 23:15

## 2022-04-27 RX ADMIN — MIDAZOLAM 2 MG: 1 INJECTION INTRAMUSCULAR; INTRAVENOUS at 23:27

## 2022-04-27 RX ADMIN — SODIUM CHLORIDE, PRESERVATIVE FREE 10 ML: 5 INJECTION INTRAVENOUS at 08:10

## 2022-04-27 RX ADMIN — IPRATROPIUM BROMIDE AND ALBUTEROL SULFATE 1 AMPULE: .5; 2.5 SOLUTION RESPIRATORY (INHALATION) at 11:47

## 2022-04-27 RX ADMIN — KETAMINE HYDROCHLORIDE 0.2 MG/KG/HR: 50 INJECTION INTRAMUSCULAR; INTRAVENOUS at 05:21

## 2022-04-27 RX ADMIN — CHLORDIAZEPOXIDE HYDROCHLORIDE 50 MG: 25 CAPSULE ORAL at 05:10

## 2022-04-27 RX ADMIN — POLYVINYL ALCOHOL 1 DROP: 14 SOLUTION/ DROPS OPHTHALMIC at 16:36

## 2022-04-27 RX ADMIN — SODIUM CHLORIDE, PRESERVATIVE FREE 40 MG: 5 INJECTION INTRAVENOUS at 08:08

## 2022-04-27 RX ADMIN — IPRATROPIUM BROMIDE AND ALBUTEROL SULFATE 1 AMPULE: .5; 2.5 SOLUTION RESPIRATORY (INHALATION) at 23:32

## 2022-04-27 RX ADMIN — IPRATROPIUM BROMIDE AND ALBUTEROL SULFATE 1 AMPULE: .5; 2.5 SOLUTION RESPIRATORY (INHALATION) at 03:24

## 2022-04-27 RX ADMIN — ARGATROBAN 4.2 MCG/KG/MIN: 250 INJECTION INTRAVENOUS at 10:47

## 2022-04-27 RX ADMIN — Medication 10 MG/HR: at 17:15

## 2022-04-27 RX ADMIN — OXYCODONE HYDROCHLORIDE 10 MG: 5 TABLET ORAL at 08:07

## 2022-04-27 RX ADMIN — POLYVINYL ALCOHOL 1 DROP: 14 SOLUTION/ DROPS OPHTHALMIC at 03:28

## 2022-04-27 RX ADMIN — METHYLPREDNISOLONE SODIUM SUCCINATE 40 MG: 40 INJECTION, POWDER, FOR SOLUTION INTRAMUSCULAR; INTRAVENOUS at 08:08

## 2022-04-27 RX ADMIN — CHLORDIAZEPOXIDE HYDROCHLORIDE 50 MG: 25 CAPSULE ORAL at 17:52

## 2022-04-27 RX ADMIN — ARGATROBAN 4.45 MCG/KG/MIN: 250 INJECTION INTRAVENOUS at 18:51

## 2022-04-27 RX ADMIN — MIDAZOLAM 2 MG: 1 INJECTION INTRAMUSCULAR; INTRAVENOUS at 03:01

## 2022-04-27 RX ADMIN — OXYCODONE HYDROCHLORIDE 10 MG: 5 TABLET ORAL at 16:36

## 2022-04-27 RX ADMIN — POLYVINYL ALCOHOL 1 DROP: 14 SOLUTION/ DROPS OPHTHALMIC at 20:49

## 2022-04-27 RX ADMIN — FUROSEMIDE 20 MG: 10 INJECTION, SOLUTION INTRAMUSCULAR; INTRAVENOUS at 08:10

## 2022-04-27 RX ADMIN — OXYCODONE HYDROCHLORIDE 10 MG: 5 TABLET ORAL at 19:52

## 2022-04-27 RX ADMIN — SODIUM CHLORIDE: 9 INJECTION, SOLUTION INTRAVENOUS at 05:18

## 2022-04-27 RX ADMIN — OXYCODONE HYDROCHLORIDE 10 MG: 5 TABLET ORAL at 23:15

## 2022-04-27 RX ADMIN — INSULIN LISPRO 2 UNITS: 100 INJECTION, SOLUTION INTRAVENOUS; SUBCUTANEOUS at 17:55

## 2022-04-27 RX ADMIN — Medication 30 UNITS: at 18:02

## 2022-04-27 RX ADMIN — Medication 30 UNITS: at 05:45

## 2022-04-27 ASSESSMENT — PAIN SCALES - WONG BAKER
WONGBAKER_NUMERICALRESPONSE: 0
WONGBAKER_NUMERICALRESPONSE: 6
WONGBAKER_NUMERICALRESPONSE: 0

## 2022-04-27 ASSESSMENT — PULMONARY FUNCTION TESTS
PIF_VALUE: 24
PIF_VALUE: 23
PIF_VALUE: 24
PIF_VALUE: 23
PIF_VALUE: 22
PIF_VALUE: 23
PIF_VALUE: 22
PIF_VALUE: 24
PIF_VALUE: 23
PIF_VALUE: 24
PIF_VALUE: 23

## 2022-04-27 NOTE — PROGRESS NOTES
[facility] End of Shift Note:       Cannulation date/time:4/22/22 @ 1005  ECMO type: V-V  Cannula sizes/locations: 25 Fr. LFV drain, 23 Fr. RFV return  Flow ordered at: 4-6 LPM       Currently flowing at: 4.3-4.5 LPM       FIO2 at: 100%       Sweep at: 2.5  Delta pressure: 22  Clot burden:       Oxygenator: 12, 3, 9 o clock positions       Circuit: Fibrin noted around 3/8 connector, pre-MO.   Anticoagulation: Argatroban @ 4.2 mcg/kg/min  Products given:        PRBC's: 0       PLT's: 0       FFP: 0       Cryo: 0       Albumin: 0

## 2022-04-27 NOTE — PROGRESS NOTES
ECMO DAILY ROUNDING NOTE     Patient:  Shamika Last  MRN: [id number]  Admit date: 4/18/2022  Primary Care Physician: Emil Norris MD  Consulting Physician: Cruz Garcia MD  CODE Status: Full Code  LOS: 9    [x] Team present at bedside   [x] Family updated    INDICATION:   Respiratory Failure, status asthmaticus    CANNULATION:  VenoVeno ECMO cannulation on 4/22/22  Bilateral femoral cannulas  TREATMENT GOALS:  PUMP  FLOW: 3-5L/min  pH: 7.35-7.45   SvO2: >70%  SaO2:>97%  Sedation : RASS -1 to+1    CURRENT ECMO PARAMETERS:  PUMP Patient on ECMO: On  Hours on ECMO: 120  Pump Flow (L/min): 4.47 LPM  Pump Speed (Rotations/min): 3000 RPM  ECMO Mode: V/V  Pump Mode: Cardiohelp   SWEEP GAS Sweep Flow (L/min): (S) 1.5 LPM   FiO2 (%): 100 %   CIRCUIT PRESSURES Inlet Pressure (Bladder): -78 mmHg  Pre-Membrane Pressure: 153 mmHg  Post-Membrane Pressure: 128 mmHg  Delta P: 22 mmHg  SVO2 (%): 76.3 %  ECMO blood flow temperature: 98.4 °F (36.9 °C)  CVP (mmHg): 6 mmHg   CIRCUIT CHECK Heat Exchg. Water Temp Set: 36.9  Heat Exchanger Water Temp Actual: 36.9  Heat Exchanger Water Level: Full  Unused Pigtails Cleared: Done  System Plugged to Red Outlet: Done  Emergency Backup in Use: No  Hand Crank Available: Yes  Backup Unit Blood Avail: Done  Cart Checked and Stocked: Done  Pump Battery Charged: 100 Volts  Pressure Monitors Zeroed: Done  Pressure Limits Checked: Done  Circuit Number: 1  Back Up Circuit: Done  Back Up Console/Motor: Done  Emergency O2 Tank Available: Done  Circuits and Cannulation Sites: Done  CMAG Flow Probe Repositioned: Not done  High/Low flow alarm set?: Yes  High/Low temp alarm set?: Yes  High/Low venous alarm set?: Yes  Pre-MO alarm limit: 300  Post-MO alarm limit: 300     CURRENT VENTILATOR SETTINGS:  Vent Information  Ventilator ID: [email or web address]  Vent Mode: AC/PC     RECENT LABS:  ABG Recent Labs     04/27/22  0404 04/27/22  0520 04/27/22  0642 04/27/22  0806 04/27/22  1008   POCPH 7.354 7. 98 Cady Price 7.394 7.374 7.379   POCPCO2 55.7* 46.7 48.3* 52.2* 52.6*   POCPO2 74.7* 76.3* 428.1* 77.6* 68.8*   POCHCO3 31.0* 29.8* 29.5* 30.4* 31.1*   ONMM9PKZ 94 95 100* 95 93*      VBG Recent Labs     04/25/22  0544 04/25/22  1809 04/26/22  0615 04/26/22  1819 04/27/22  0627   PHVEN 7.353 7.394 7.339 7.360 7.376   HVS8OJS 58.8* 53.5* 52.7* 54.3* 51.2*   TOF5QYW 32.6* 32.6* 28.4 30.7* 30.0*   D3TKTMPA 79 74 72 78 74      CBC Recent Labs     04/26/22  1022 04/26/22  1648 04/26/22  2144 04/27/22  0400 04/27/22  1013   WBC 28.6* 30.9* 30.1* 32.1* 32.8*   HGB 10.0* 10.1* 9.5* 9.9* 9.7*   HCT 32.1* 32.2* 31.1* 33.1* 31.7*    194 185 185 181      COAGS Recent Labs     04/26/22  1022 04/26/22  1648 04/26/22  2144 04/27/22  0400 04/27/22  1013   INR 2.9 2.8 3.0 3.0 2.7   PROTIME 28.1* 27.8* 29.3* 28.9* 26.9*   APTT 52.3* 57.5* 57.0* 54.6* 55.9*      TEG Recent Labs     04/25/22  2201 04/26/22  1022 04/26/22  2144   HEPTHERAPY UNKNOWN UNKNOWN None   REACTTIMETEG 8.8 10.0 9.6   KINETICSTEG 1.6 1.7 1.6   ANGLETEG 67.5 66.8 66.8   MAXCLOTTEG 63.4 66.9 65.9   WO64XER 0.0 0.0 0.0   EPLTEG 0.0 0.0 0.0      BMP Recent Labs     04/26/22  1022 04/26/22  1648 04/26/22  2144 04/27/22  0400 04/27/22  1013    143 140 139 142   K 4.9 4.9 4.9 4.9 4.9    107 105 104 106   CO2 29 29 29 29 30   BUN 29* 30* 30* 29* 29*   CREATININE 0.43* 0.43* 0.43* 0.46* 0.42*   GLUCOSE 168* 156* 175* 173* 176*   MG 2.2 2.5 2.2 2.3 2.1      LFT Recent Labs     04/25/22  0402 04/25/22  1638 04/26/22  0412 04/26/22  1648 04/27/22  0400   PROT 5.4* 5.5* 5.8* 5.9* 5.6*   LABALBU 3.1* 3.1* 3.3* 3.3* 3.4*   ALT 88* 83* 82* 83* 75*   AST 27 24 21 28 18   ALKPHOS 46 48 53 54 50   BILITOT 0.32 0.38 0.46 0.47 0.45      INFLAMMATORY MARKERS No results for input(s): CRP, FERRITIN, LDH in the last 72 hours. Invalid input(s):  ESR   CARDIAC No results for input(s): CKTOTAL, CKMB, CKMBINDEX, TROPONINI, BNP, PROBNP in the last 72 hours.     Invalid input(s): TROPONIN, HSTROP   LACTIC ACID No results for input(s): LACTA in the last 72 hours. TRIGLYCERIDE No results for input(s): TRIG in the last 72 hours.      ASSESSMENT AND SYSTEM BASED PLAN (MEDICAL DECISION MAKING):    Neurologic                                                  [x] Sedation/paralytic requirement reviewed                              [x] Neurological assessment; not done, patient sedated with propofol and fentanyl currently     Cardiovascular                               [x] Cannulas secured, dsg dry and intact, no drainage or hematoma noted                             [x] ECMO Pump flow/pressures reviewed                             [x] Telemetry reviewed                             [x] Hemodynamic parameters stable    Pulmonary                             [x] Blood gases (Patient/Circuit) reviewed                             [x] Sweep/ settings reviewed                             [x] Vent Settings reviewed                             [x] Chest X-ray reviewed    Genitourinary                               [x] Intake/Output reviewed                             [x] Need for continuous IV fluids assessed, recommended diuresis with Lasix                             [x] Need for diuresis/renal replacement therapy reviewed    Gastrointestinal                              [x] GI Prophylaxis reviewed                              [x] Enteral nutrition reviewed    Hematology                             [x] Anticoagulation: Argatroban                            [x] Reviewed CBC, coagulation profile, ACT, TEG and platelet count                            [x] Transfusion needs reviewed                    Infectious disease                           [x] Reviewed T-max, white count and cultures results                           [x] Antimicrobials reviewed; continue ceftriaxone    Endocrine                            [x] Reviewed glycemic control                           [x] Reviewed need for steroids; continue Solu-Medrol    Others                           [x] Reviewed need for restrains                           [x] Reviewed need for lines/drains/invasive devices                           [x] Skin/Wound care                           [x] Reviewed current Medications list/orders                           [x] Reviewed goals of care                   OVERALL CONDITION:   Stable.   Patient had decreased sleep today  Tidal volumes are being maintained in the 200s  Chest x-ray without any changes    Burke Heath MD  Pulmonary & Critical care Medicine  4/27/2022

## 2022-04-27 NOTE — PROGRESS NOTES
[facility]  Internal Medicine Teaching Residency Program  Inpatient Daily Progress Note  ______________________________________________________________________________    Patient: Laine Bloch  YOB: 2000   CCE:[id number]    Acct: [de-identified]     Room: [address] [place] [address]  Admit date: 4/18/2022  Today's date: 04/27/22  Number of days in the hospital: 9    SUBJECTIVE   Admitting Diagnosis: Acute asthma exacerbation     CC: Shortness of breath    Pt examined at bedside. Chart & results reviewed. No acute events overnight. Patient remained afebrile and hemodynamically stable. -Used to be on ECMO; ECMO managed by cardiothoracic surgery  -Currently, Sweep at 2.5 with FiO2 100%   -Blood pressure controlled; no pressors required   - HR 70s to 90s  - Blood glucose: 150s-220s  -Patient follows command off sedation  -Sedation: Currently on fentanyl, ketamine and Versed  -No pressors or paralytics require have not seen the patient in the a.m. d  -Currently on Librium 50 mg every 6 hours daily, Roxicodone 10 mg every 4 hours daily  -Continue on Solu-Medrol 40 mg IV every 8 hours per pulmonology  -Continue on Lasix 20 mg IV daily per pulmonology  -Urine output 2.2 L over 24 hours  - Labs reviewed: BMP wnl; WBC 32.1<--30.1; Hgb 9.9,     ROS:  Unable to assess due to patient being ventilated and on sedation. BRIEF HISTORY        23 y. o. female with a past medical history of bronchial asthma who was admitted to [facility] with an acute exacerbation causing increasing shortness of breath.  Patient was found to be hypoxemic with hypercarbic respiratory failure requiring intubation and mechanical ventilation.  Patient was transferred to [facility] for further elevation with possibility of ECMO. On arrival patient was found to be on a bicarb drip due to presumed respiratory acidosis but this was discontinued due to to worsening of the hypercarbia.  Additionally patient was started on Nimbex due to the necessity for paralyzation as she was breathing over the vent.  Pulmonology and cardiothoracic surgery were consulted- plan is for ECMO. In the MICU, patient remained stable, had an episode of dark emesis which turned out to be bilious output, Hb remained stable. She was hypertensive, started on lopressor IV 5 mg PRN. Had left IJ placed which was malpositioned, removed and then CVC in left femoral was placed overnight. She was hypoxic, requiring higher FiO2 of 80%, PEEP 16, RR 32, , blood gas showed pH 7.449, pCO2 41.7, pCO2 59. 1. treated with solumedrol, bronchodilators, CXR showed no pneumothorax or pleural effusion.   On  overnight, patient became more hypoxic with increasing oxygen requirement, increasing wheezing on exam. It was determined to go with ECMO canulation on . OBJECTIVE     Vital Signs:  /60   Pulse 74   Temp 98.6 °F (37 °C) (Bladder)   Resp 10   Ht 5' 3\" (1.6 m)   Wt (!) 300 lb 4.8 oz (136.2 kg)   SpO2 96%   BMI 53.20 kg/m²     Temp (24hrs), Av.6 °F (37 °C), Min:98.4 °F (36.9 °C), Max:98.8 °F (37.1 °C)    In: 4902.5   Out: 3125 [Urine:3125]    Physical Exam:  Constitutional: This is a well developed, well nourished, Greater than 40 - Morbid Obesity / Extreme Obesity / Grade III 23y.o. year old female who is on ECMO    EENT:  PERRLA. No conjunctival injections. Septum was midline, mucosa was without erythema, exudates or cobblestoning. No thrush was noted. Neck: Supple without thyromegaly. No elevated JVP. Trachea was midline. Respiratory: Decreased breath sounds bilaterally  Cardiovascular: Regular without murmur, clicks, gallops or rubs. Abdomen: Slightly rounded and soft without organomegaly. No rebound, rigidity or guarding was appreciated. Lymphatic: No lymphadenopathy. Musculoskeletal: Normal curvature of the spine. No gross muscle weakness.     Extremities:  No lower extremity edema, ulcerations, tenderness, varicosities or erythema. Muscle size, tone and strength are normal.  No involuntary movements are noted. Skin:  Warm and dry. Good color, turgor and pigmentation. No lesions or scars.   No cyanosis or clubbing  Neurological/Psychiatric: follows commands off sedation        Medications:  Scheduled Medications:    chlordiazePOXIDE  50 mg Oral 4 times per day    insulin lispro  0-12 Units SubCUTAneous Q6H    lidocaine PF  5 mL Tracheal Tube Once    oxyCODONE  10 mg Oral Q4H    docusate  100 mg Oral BID    polyethylene glycol  17 g Oral Daily    furosemide  20 mg IntraVENous Daily    heparin flush (PF)  3 mL IntraVENous Q12H    heparin flush (PF)  3 mL IntraVENous Q12H    heparin flush (PF)  3 mL IntraVENous Q12H    heparin flush (PF)  3 mL IntraVENous Q12H    albumin human  25 g IntraVENous Once    pantoprazole (PROTONIX) 40 mg injection  40 mg IntraVENous Daily    methylPREDNISolone  40 mg IntraVENous Q8H    dilTIAZem  30 mg Oral 4 times per day    polyvinyl alcohol  1 drop Both Eyes Q6H    ipratropium-albuterol  1 ampule Inhalation Q4H    sodium chloride flush  5-40 mL IntraVENous 2 times per day     Continuous Infusions:    sodium chloride      argatroban infusion 4.2 mcg/kg/min (04/27/22 0600)    ketamine (KETALAR) 2 mg/mL infusion for analgosedation 0.2 mg/kg/hr (04/27/22 0600)    midazolam 10 mg/hr (04/27/22 0620)    fentaNYL 50 mcg/mL 200 mcg/hr (04/27/22 0600)    dextrose      sodium chloride 10 mL/hr at 04/27/22 0600     PRN Medicationssodium chloride, , PRN  albuterol, 2.5 mg, Q4H PRN  heparin flush (PF), 3 mL, PRN  fentanNYL, 25 mcg, Q1H PRN  midazolam, 2 mg, Q2H PRN  metoprolol, 5 mg, Q6H PRN  glucose, 15 g, PRN  dextrose, 12.5 g, PRN  glucagon (rDNA), 1 mg, PRN  dextrose, 100 mL/hr, PRN  sodium chloride flush, 5-40 mL, PRN  sodium chloride, , PRN  ondansetron, 4 mg, Q8H PRN   Or  ondansetron, 4 mg, Q6H PRN  acetaminophen, 650 mg, Q6H PRN Or  acetaminophen, 650 mg, Q6H PRN        Diagnostic Labs:  CBC:   Recent Labs     04/26/22 1648 04/26/22 2144 04/27/22  0400   WBC 30.9* 30.1* 32.1*   RBC 3.61* 3.42* 3.54*   HGB 10.1* 9.5* 9.9*   HCT 32.2* 31.1* 33.1*   MCV 89.2 90.9 93.5   RDW 14.0 14.2 14.1    185 185     BMP:   Recent Labs     04/26/22 1648 04/26/22 2144 04/27/22  0400    140 139   K 4.9 4.9 4.9    105 104   CO2 29 29 29   BUN 30* 30* 29*   CREATININE 0.43* 0.43* 0.46*     BNP: No results for input(s): BNP in the last 72 hours. PT/INR:   Recent Labs     04/26/22 1648 04/26/22 2144 04/27/22  0400   PROTIME 27.8* 29.3* 28.9*   INR 2.8 3.0 3.0     APTT:   Recent Labs     04/26/22 1648 04/26/22 2144 04/27/22  0400   APTT 57.5* 57.0* 54.6*     CARDIAC ENZYMES: No results for input(s): CKMB, CKMBINDEX, TROPONINI in the last 72 hours. Invalid input(s): CKTOTAL;3  FASTING LIPID PANEL:No results found for: CHOL, HDL, TRIG  LIVER PROFILE:   Recent Labs     04/26/22 0412 04/26/22 1648 04/27/22  0400   AST 21 28 18   ALT 82* 83* 75*   BILIDIR 0.16 0.16 0.13   BILITOT 0.46 0.47 0.45   ALKPHOS 53 54 50      MICROBIOLOGY:   Lab Results   Component Value Date/Time    CULTURE CULTURE IN PROGRESS 04/24/2022 11:41 AM       Imaging:    XR CHEST (SINGLE VIEW FRONTAL)    Result Date: 4/22/2022  1. Support tubes and lines are unchanged. 2. Stable bibasilar lung infiltrates, right side greater than left. This may be related to atelectasis and/or pleural effusion. Superimposed pneumonia cannot be excluded. XR CHEST (SINGLE VIEW FRONTAL)    Result Date: 4/21/2022  Suboptimal position left IJ catheter. Consider removal and replacement or repositioning. XR CHEST PORTABLE    Result Date: 4/26/2022  Relatively stable cardiopulmonary status     XR CHEST PORTABLE    Result Date: 4/25/2022  Stable chest x-ray compared to 04/24/2022. XR CHEST PORTABLE    Result Date: 4/24/2022  1. Stable support tubes and line.  2. Low lung volumes. Crowding of vessels from low lung volumes results in apparent opacification at the right cardiophrenic angle. XR CHEST PORTABLE    Result Date: 4/23/2022  Lines and tubes are unchanged. Low lung volumes. Cardiomegaly. XR CHEST PORTABLE    Result Date: 4/22/2022  Endotracheal tube tip is in the right main bronchus and should be retracted approximately 4-5 cm. Tips of ECMO cannulas project in the expected location of the IVC. Findings were discussed with Gracie Webb RN (the patient's ICU nurse) at 2:26 pm on 4/22/2022. XR CHEST PORTABLE    Result Date: 4/22/2022  1. Endotracheal tube tip is now 1 cm above the soto. Recommend retraction by an additional 2.5 cm for optimal positioning. 2.  Improved aeration in the left lung with continued atelectatic/airspace infiltrates. XR CHEST PORTABLE    Result Date: 4/21/2022  1. On today's exam the endotracheal tube tip is positioned 1.5 cm above the soto. Recommend retraction by an additional 2 cm for optimal positioning. 2.  Interval development of right lower lobe airspace disease. This could represent atelectasis       ASSESSMENT & PLAN     ASSESSMENT / PLAN:     Principal Problem:    Acute asthma exacerbation  Active Problems:    Severe persistent asthma with status asthmaticus    CHIDI (obstructive sleep apnea)    History of seizures    Hypercapnic respiratory failure (HCC)    Endotracheally intubated    On mechanically assisted ventilation (HCC)    Diastolic dysfunction  Resolved Problems:    * No resolved hospital problems. *      1. Acute hypoxic hypercapnic respiratory failure on mechanical ventilation and s/p ECMO cannulation on 4/22/22 secondary asthma exacerbation: Continue SoluMedrol 40 mg IV q8 hours, Completed IV Rocephin (7 days) and IV Azithromycin (6 days). Duoneb q4 hours and Albuterol as needed. Continue Lasix 20 mg IV daily, diuresing well. Pulm. Critical care following for ventilator management. Librium 50 mg q6 hours.    2. Troponin elevation type II demand ischemia: Continue Cardizem 30 mg q6 hours per cardiology. Cardiology to follow from a distance, once extubated, will contact cardiology for stress test prior to discharge due to apical hypokinesis on echo. Troponin flat trend.      3. Sinus tachycardia:- Controlled Continue Cardizem 30 mg q6 hours per Cardiology.         Diet: On tube feeds at goal  DVT ppx : On Argatroban infusion  GI ppx: Protonix      PT/OT: Consulted  Discharge Planning / SW:  consulted, will follow up once patient is extubated and ECMO cannulation removed and Ebonie Zhang MD  Internal Medicine Resident, PGY-1  [facility];  [facility], [facility]  4/27/2022, 6:23 AM

## 2022-04-27 NOTE — PROGRESS NOTES
PULMONARY & CRITICAL CARE MEDICINE PROGRESS NOTE     Patient:  Sherry Rose  MRN: [id number]  Admit date: 2022  Primary Care Physician: Vick García MD  Consulting Physician: Rebecca Harris MD  CODE Status: Full Code  LOS: 9     SUBJECTIVE     CHIEF COMPLAINT/REASON FOR INITIAL CONSULT: Acute respiratory failure    BRIEF HOSPITAL COURSE:   The patient is a 23 y.o. female with past medical history of asthma was initially admitted to [facility] with acute exacerbation. She was initially put on nonrebreather, subsequently required intubation and initiation of mechanical ventilation due to worsening respiratory status. Patient was also on bicarb drip which was discontinued on arrival to [place].  She is sedated, paralyzed and mechanically ventilated. ABG shows improvement in respiratory acidosis and oxygenation. INTERVAL HISTORY:  22  Currently on VV ECMO.   Rest settings on the ventilator-tidal volumes being maintained  Opening eyes  Moving extremities  Sedated on the ventilator  Urine output noted and patient is in a positive fluid balance of 1.2 L    OBJECTIVE     VENTILATOR SETTINGS:  Vent Information  Ventilator ID: [id number]  Vent Mode: AC/PC     PaO2/FiO2 RATIO:  Recent Labs     22  1008   POCPO2 68.8*      FiO2 : 40 %     VITAL SIGNS:   LAST:  /65   Pulse 84   Temp 98.6 °F (37 °C) (Bladder)   Resp 13   Ht 5' 3\" (1.6 m)   Wt (!) 300 lb 4.8 oz (136.2 kg)   SpO2 95%   BMI 53.20 kg/m²   8-24 HR RANGE:  TEMP Temp  Av.6 °F (37 °C)  Min: 98.4 °F (36.9 °C)  Max: 98.8 °F (22.7 °C)   BP Systolic (31BOA), XXR:459 , Min:129 , BZB:228      Diastolic (31VXZ), SXL:71, Min:60, Max:89     PULSE Pulse  Av.1  Min: 69  Max: 104   RR Resp  Av.2  Min: 10  Max: 15   O2 SAT SpO2  Av.6 %  Min: 95 %  Max: 99 %   OXYGEN DELIVERY No data recorded      Exam  Morbid obesity  Endotracheal tube in place  No subcutaneous air in the neck  Good air entry bilaterally without rhonchi and wheezing. Abdomen soft obese nontender  Lower extremity edema present  Bilateral femoral cannulas  Upper extremity noted    DATA REVIEW     Medications:  Scheduled Meds:   chlordiazePOXIDE  50 mg Oral 4 times per day    insulin lispro  0-12 Units SubCUTAneous Q6H    lidocaine PF  5 mL Tracheal Tube Once    oxyCODONE  10 mg Oral Q4H    docusate  100 mg Oral BID    polyethylene glycol  17 g Oral Daily    furosemide  20 mg IntraVENous Daily    heparin flush (PF)  3 mL IntraVENous Q12H    heparin flush (PF)  3 mL IntraVENous Q12H    heparin flush (PF)  3 mL IntraVENous Q12H    heparin flush (PF)  3 mL IntraVENous Q12H    albumin human  25 g IntraVENous Once    pantoprazole (PROTONIX) 40 mg injection  40 mg IntraVENous Daily    methylPREDNISolone  40 mg IntraVENous Q8H    dilTIAZem  30 mg Oral 4 times per day    polyvinyl alcohol  1 drop Both Eyes Q6H    ipratropium-albuterol  1 ampule Inhalation Q4H    sodium chloride flush  5-40 mL IntraVENous 2 times per day     Continuous Infusions:   sodium chloride      argatroban infusion 4.199 mcg/kg/min (04/27/22 1047)    ketamine (KETALAR) 2 mg/mL infusion for analgosedation 0.2 mg/kg/hr (04/27/22 1000)    midazolam 10 mg/hr (04/27/22 1000)    fentaNYL 50 mcg/mL 200 mcg/hr (04/27/22 1000)    dextrose      sodium chloride 10 mL/hr at 04/27/22 1000       INPUT/OUTPUT:  In: 3493.9 [I.V.:1758.9; NG/GT:1735]  Out: 2560 [Urine:2560]  Date 04/27/22 0000 - 04/27/22 2359   Shift 4893-3390 2369-6160 9172-3169 24 Hour Total   INTAKE   I.V.(mL/kg) 450.9(3.3) 257.8(1.9)  708.7(5.2)   NG/GT(mL/kg) 472(3.5)   472(3.5)   Shift Total(mL/kg) 922. 9(6.8) 257.8(1.9)  1180.7(8.7)   OUTPUT   Urine(mL/kg/hr) 575(0.5) 225  800   Shift Total(mL/kg) 575(4.2) 225(1.7)  800(5.9)   Weight (kg) 136.2 136.2 136.2 136.2        LABS:  ABGs:   Recent Labs     04/27/22  0404 04/27/22  0520 04/27/22  0642 04/27/22  0806 04/27/22  1008   POCPH 7.354 7. 98 Cady Price 7.394 7.374 7.379   POCPCO2 55.7* 46.7 48.3* 52.2* 52.6*   POCPO2 74.7* 76.3* 428.1* 77.6* 68.8*   POCHCO3 31.0* 29.8* 29.5* 30.4* 31.1*   AGZM3MOA 94 95 100* 95 93*     CBC:   Recent Labs     04/26/22  1022 04/26/22  1648 04/26/22 2144 04/27/22  0400 04/27/22  1013   WBC 28.6* 30.9* 30.1* 32.1* 32.8*   HGB 10.0* 10.1* 9.5* 9.9* 9.7*   HCT 32.1* 32.2* 31.1* 33.1* 31.7*   MCV 90.7 89.2 90.9 93.5 91.6    194 185 185 181   RBC 3.54* 3.61* 3.42* 3.54* 3.46*   MCH 28.2 28.0 27.8 28.0 28.0   MCHC 31.2 31.4 30.5 29.9 30.6   RDW 13.9 14.0 14.2 14.1 14.3     CRP:   No results for input(s): CRP in the last 72 hours. LDH:   No results for input(s): LDH in the last 72 hours. BMP:   Recent Labs     04/26/22  1022 04/26/22  1648 04/26/22 2144 04/27/22  0400 04/27/22  1013    143 140 139 142   K 4.9 4.9 4.9 4.9 4.9    107 105 104 106   CO2 29 29 29 29 30   BUN 29* 30* 30* 29* 29*   CREATININE 0.43* 0.43* 0.43* 0.46* 0.42*   GLUCOSE 168* 156* 175* 173* 176*     Liver Function Test:   Recent Labs     04/25/22  0402 04/25/22  1638 04/26/22  0412 04/26/22  1648 04/27/22  0400   PROT 5.4* 5.5* 5.8* 5.9* 5.6*   LABALBU 3.1* 3.1* 3.3* 3.3* 3.4*   ALT 88* 83* 82* 83* 75*   AST 27 24 21 28 18   ALKPHOS 46 48 53 54 50   BILITOT 0.32 0.38 0.46 0.47 0.45     Coagulation Profile:   Recent Labs     04/26/22  1022 04/26/22  1648 04/26/22  2144 04/27/22  0400 04/27/22  1013   INR 2.9 2.8 3.0 3.0 2.7   PROTIME 28.1* 27.8* 29.3* 28.9* 26.9*   APTT 52.3* 57.5* 57.0* 54.6* 55.9*     D-Dimer:  No results for input(s): DDIMER in the last 72 hours. Lactic Acid:  No results for input(s): LACTA in the last 72 hours. Cardiac Enzymes:  No results for input(s): CKTOTAL, CKMB, CKMBINDEX, TROPONINI in the last 72 hours. Invalid input(s): TROPONIN, HSTROP  BNP/ProBNP:   No results for input(s): BNP, PROBNP in the last 72 hours. Triglycerides:  No results for input(s): TRIG in the last 72 hours. Microbiology:  Urine Culture:   No components found for: CURINE  Blood Culture:  No components found for: CBLOOD, CFUNGUSBL  Sputum Culture:  No components found for: CSPUTUM  Recent Labs     04/24/22  1141   SPECDESC . BRONCHIAL WASHINGS   CULTURE CULTURE IN PROGRESS     Recent Labs     04/24/22  1141   SPECDESC . BRONCHIAL WASHINGS   CULTURE CULTURE IN PROGRESS        Pathology:    Radiology Reports:  XR CHEST PORTABLE   Preliminary Result   1. Stable appropriate positions of support apparatus. 2. Stable small bilateral pleural effusions and bibasilar airspace disease. XR CHEST PORTABLE   Final Result   Relatively stable cardiopulmonary status         XR CHEST PORTABLE   Final Result   Stable chest x-ray compared to 04/24/2022. XR CHEST PORTABLE   Final Result   1. Stable support tubes and line. 2. Low lung volumes. Crowding of vessels from low lung volumes results in   apparent opacification at the right cardiophrenic angle. XR CHEST PORTABLE   Final Result   Lines and tubes are unchanged. Low lung volumes. Cardiomegaly. XR CHEST PORTABLE   Final Result   1. Endotracheal tube tip is now 1 cm above the soto. Recommend retraction   by an additional 2.5 cm for optimal positioning. 2.  Improved aeration in the left lung with continued atelectatic/airspace   infiltrates. XR CHEST PORTABLE   Final Result   Endotracheal tube tip is in the right main bronchus and should be retracted   approximately 4-5 cm. Tips of ECMO cannulas project in the expected location of the IVC. Findings were discussed with Kamlesh Louis RN (the patient's ICU nurse)   at 2:26 pm on 4/22/2022. XR CHEST (SINGLE VIEW FRONTAL)   Final Result   1. Support tubes and lines are unchanged. 2. Stable bibasilar lung infiltrates, right side greater than left. This may   be related to atelectasis and/or pleural effusion. Superimposed pneumonia   cannot be excluded.          XR CHEST (SINGLE VIEW FRONTAL) Final Result   Suboptimal position left IJ catheter. Consider removal and replacement or   repositioning. XR CHEST PORTABLE   Final Result   1. On today's exam the endotracheal tube tip is positioned 1.5 cm above the   soto. Recommend retraction by an additional 2 cm for optimal positioning. 2.  Interval development of right lower lobe airspace disease. This could   represent atelectasis         XR CHEST PORTABLE   Final Result   1. Endotracheal and enteric tubes as above. 2. Diffuse interstitial opacities throughout both lungs, right greater than   left. No new focal airspace consolidation. Follow-up is recommended to   document resolution. XR CHEST (SINGLE VIEW FRONTAL)   Final Result   Diffuse interstitial opacities with alveolar/consolidative opacities at the   bases favoring multifocal airspace disease. VL DUP LOWER EXTREMITY VENOUS BILATERAL    (Results Pending)   XR CHEST PORTABLE    (Results Pending)        Echocardiogram:   Results for orders placed during the hospital encounter of 04/18/22    ECHO Complete 2D W Doppler W Color      Summary  Left ventricle is normal in size, global left ventricular systolic function  is preserved, estimated ejection fraction is 50%. There appears to be some apical hypokinesis, in the apical views. Evidence of diastolic dysfunction. Trivial mitral regurgitation. Trivial pulmonic insufficiency. IVC dilated but unable to assess respiratory collapse due to patient on  ventilator.        ASSESSMENT AND PLAN     Assessment:    // Acute hypoxic/hypercapnic respiratory failure due to status asthmaticus, on VV ECMO/mechanical ventilation  //Acute on chronic respiratory acidosis with some compensation  // Rhino/enterovirus infection  // Leukocytosis, stable  // Hyperglycemia, acceptable  // Elevated troponin  // Morbid obesity  //Anemia, stable      Plan:    I personally interviewed/examined the patient; reviewed interval history, interpreted all available radiographic and laboratory data at the time of service.  Follow-up on the venous Dopplers   Echocardiogram with good LV function and no shunting through the septum   Continue sedation   Restraints renewed.  Continue IV steroids.  Continue bronchodilator therapy.  Patient's TV is better at 217 ml    Bronchoscopy done on 4/24/2022- no mucous plugging , thin clear sec    Continue VV ECMO.  Blood pressure is stable.   20 mg IV Lasix daily    Start tube feeds.  Monitor endotracheal secretions    Obtain X-ray chest daily-Chest x-ray was reviewed and no changes from before   Continue to monitor I/O with a goal of even/negative fluid balance   Stress ulcer prophylaxis-Protonix   Chemical DVT prophylaxis; not indicated patient on systemic anticoagulation-with argatroban   Antimicrobials reviewed; none currently   Glycemic control appropriate; sliding scale insulin   Skin/wound care reviewed with the nursing staff   Physical/occupational therapy    Discussed with ECMO staff, nursing staff. D/w primary team   Family updated   The patient remains critically ill with illness/injury that acutely impairs one or more vital organ systems, such that there is a high probability of imminent or life threatening deterioration in the patient's condition. Critical care time of 40 minutes was spent (excluding procedures), in coordination of care during bedside rounds and discussion of patient care in detail, and recommendations of the team were adopted in the plan. Necessity of all invasive devices was also confirmed. Hyun Chaves MD  Pulmonary and Critical Care Medicine           4/27/2022, 10:54 AM    Please note that this chart was generated using voice recognition Dragon dictation software. Although every effort was made to ensure the accuracy of this automated transcription, some errors in transcription may have occurred.

## 2022-04-27 NOTE — PROGRESS NOTES
Ecmo End of Shift Note:       Cannulation date/time: 4/22/22 @ 1005  ECMO type: V/V  Cannula sizes/locations: 25 Fr LFV drain / 23 Fr RFV return  Flow ordered at: 4-6 LPM       Currently flowing at: 4.34 LPM       FIO2 at: 100%       Sweep at: 1.5  Delta pressure: 22  Clot burden:       Oxygenator: 12, 3, 9 o'clock position       Circuit:Fibrin around 3/8 connector per MO  Anticoagulation: Argatroban increased to 4.45 mcg/kg/min  Products given:        PRBC's: 0       PLT's: 0       FFP: 0       Cryo: 0       Albumin: 0

## 2022-04-27 NOTE — PROGRESS NOTES
Dr Mario De La Cruz informed of patient's right femoral DVT. Patient already on argatroban- continue treatment.  Continue ecmo at this time, try and wean down sedation to see how patient tolerates

## 2022-04-27 NOTE — FLOWSHEET NOTE
This note also relates to the following rows which could not be included:  Pulse - Cannot attach notes to unvalidated device data        04/27/22 6760   Family/Significant Other Communication   Reason called pt's mom to update and discuss plan of care   Name Carlos Enrique Cheung   Relationship Parent   Response receptive-    Method of Communication Phone

## 2022-04-28 ENCOUNTER — APPOINTMENT (OUTPATIENT)
Dept: GENERAL RADIOLOGY | Age: 22
DRG: 003 | End: 2022-04-28
Attending: INTERNAL MEDICINE
Payer: COMMERCIAL

## 2022-04-28 LAB
ACTIVATED CLOTTING TIME: 245 SEC (ref 79–149)
ACTIVATED CLOTTING TIME: 314 SEC (ref 79–149)
ALBUMIN SERPL-MCNC: 3 G/DL (ref 3.5–5.2)
ALBUMIN SERPL-MCNC: 3.3 G/DL (ref 3.5–5.2)
ALBUMIN/GLOBULIN RATIO: 1.5 (ref 1–2.5)
ALBUMIN/GLOBULIN RATIO: 1.6 (ref 1–2.5)
ALP BLD-CCNC: 46 U/L (ref 35–104)
ALP BLD-CCNC: 48 U/L (ref 35–104)
ALT SERPL-CCNC: 57 U/L (ref 5–33)
ALT SERPL-CCNC: 61 U/L (ref 5–33)
ANGLE TEG: 61.7 DEG (ref 53–72)
ANGLE TEG: 62 DEG (ref 53–72)
ANION GAP SERPL CALCULATED.3IONS-SCNC: 5 MMOL/L (ref 9–17)
ANION GAP SERPL CALCULATED.3IONS-SCNC: 6 MMOL/L (ref 9–17)
ANION GAP SERPL CALCULATED.3IONS-SCNC: 8 MMOL/L (ref 9–17)
AST SERPL-CCNC: 17 U/L
AST SERPL-CCNC: 19 U/L
AT-III ACTIVITY: 111 % (ref 83–122)
BILIRUB SERPL-MCNC: 0.34 MG/DL (ref 0.3–1.2)
BILIRUB SERPL-MCNC: 0.37 MG/DL (ref 0.3–1.2)
BILIRUBIN DIRECT: 0.13 MG/DL
BILIRUBIN DIRECT: 0.14 MG/DL
BILIRUBIN, INDIRECT: 0.21 MG/DL (ref 0–1)
BILIRUBIN, INDIRECT: 0.23 MG/DL (ref 0–1)
BUN BLDV-MCNC: 28 MG/DL (ref 6–20)
BUN BLDV-MCNC: 29 MG/DL (ref 6–20)
BUN BLDV-MCNC: 29 MG/DL (ref 6–20)
BUN BLDV-MCNC: 31 MG/DL (ref 6–20)
BUN BLDV-MCNC: 31 MG/DL (ref 6–20)
CALCIUM IONIZED: 1.14 MMOL/L (ref 1.13–1.33)
CALCIUM IONIZED: 1.16 MMOL/L (ref 1.13–1.33)
CALCIUM IONIZED: 1.17 MMOL/L (ref 1.13–1.33)
CALCIUM IONIZED: 1.19 MMOL/L (ref 1.13–1.33)
CALCIUM SERPL-MCNC: 8.2 MG/DL (ref 8.6–10.4)
CALCIUM SERPL-MCNC: 8.2 MG/DL (ref 8.6–10.4)
CALCIUM SERPL-MCNC: 8.3 MG/DL (ref 8.6–10.4)
CHLORIDE BLD-SCNC: 101 MMOL/L (ref 98–107)
CHLORIDE BLD-SCNC: 102 MMOL/L (ref 98–107)
CHLORIDE BLD-SCNC: 104 MMOL/L (ref 98–107)
CO2: 28 MMOL/L (ref 20–31)
CO2: 29 MMOL/L (ref 20–31)
CO2: 30 MMOL/L (ref 20–31)
CREAT SERPL-MCNC: 0.35 MG/DL (ref 0.5–0.9)
CREAT SERPL-MCNC: 0.43 MG/DL (ref 0.5–0.9)
CREAT SERPL-MCNC: 0.43 MG/DL (ref 0.5–0.9)
CREAT SERPL-MCNC: 0.45 MG/DL (ref 0.5–0.9)
CREAT SERPL-MCNC: 0.48 MG/DL (ref 0.5–0.9)
EPL-TEG: 0 % (ref 0–15)
EPL-TEG: 0 % (ref 0–15)
FIBRINOGEN: 112 MG/DL (ref 140–420)
FIBRINOGEN: 115 MG/DL (ref 140–420)
FIBRINOGEN: <80 MG/DL (ref 140–420)
FIBRINOGEN: <80 MG/DL (ref 140–420)
FIO2: 100
FIO2: 21
FIO2: 50
GFR AFRICAN AMERICAN: >60 ML/MIN
GFR NON-AFRICAN AMERICAN: >60 ML/MIN
GFR SERPL CREATININE-BSD FRML MDRD: ABNORMAL ML/MIN/{1.73_M2}
GLUCOSE BLD-MCNC: 151 MG/DL (ref 70–99)
GLUCOSE BLD-MCNC: 156 MG/DL (ref 74–100)
GLUCOSE BLD-MCNC: 166 MG/DL (ref 74–100)
GLUCOSE BLD-MCNC: 172 MG/DL (ref 74–100)
GLUCOSE BLD-MCNC: 172 MG/DL (ref 74–100)
GLUCOSE BLD-MCNC: 176 MG/DL (ref 70–99)
GLUCOSE BLD-MCNC: 178 MG/DL (ref 70–99)
GLUCOSE BLD-MCNC: 179 MG/DL (ref 74–100)
GLUCOSE BLD-MCNC: 181 MG/DL (ref 70–99)
GLUCOSE BLD-MCNC: 182 MG/DL (ref 74–100)
GLUCOSE BLD-MCNC: 194 MG/DL (ref 74–100)
GLUCOSE BLD-MCNC: 199 MG/DL (ref 70–99)
GLUCOSE BLD-MCNC: 212 MG/DL (ref 74–100)
GLUCOSE BLD-MCNC: 213 MG/DL (ref 74–100)
HCO3 VENOUS: 27 MMOL/L (ref 22–29)
HCO3 VENOUS: 31.4 MMOL/L (ref 22–29)
HCT VFR BLD CALC: 29.1 % (ref 36.3–47.1)
HCT VFR BLD CALC: 29.9 % (ref 36.3–47.1)
HCT VFR BLD CALC: 30.5 % (ref 36.3–47.1)
HCT VFR BLD CALC: 30.9 % (ref 36.3–47.1)
HEMOGLOBIN, FREE, PLASMA: 4.6 MG/DL (ref 0–9.7)
HEMOGLOBIN, FREE, PLASMA: 6.9 MG/DL (ref 0–9.7)
HEMOGLOBIN: 8.7 G/DL (ref 11.9–15.1)
HEMOGLOBIN: 9.1 G/DL (ref 11.9–15.1)
HEMOGLOBIN: 9.3 G/DL (ref 11.9–15.1)
HEMOGLOBIN: 9.4 G/DL (ref 11.9–15.1)
HEPARIN THERAPY: ABNORMAL
HEPARIN THERAPY: ABNORMAL
INR BLD: 3.2
INR BLD: 3.5
INR BLD: 3.7
INR BLD: 3.7
KINETICS TEG: 2.2 MIN (ref 1–3)
KINETICS TEG: 2.2 MIN (ref 1–3)
LACTIC ACID, WHOLE BLOOD: 1.2 MMOL/L (ref 0.7–2.1)
LACTIC ACID, WHOLE BLOOD: 1.4 MMOL/L (ref 0.7–2.1)
LACTIC ACID, WHOLE BLOOD: 1.7 MMOL/L (ref 0.7–2.1)
LACTIC ACID, WHOLE BLOOD: 1.8 MMOL/L (ref 0.7–2.1)
LY30 (LYSIS) TEG: 0 % (ref 0–8)
LY30 (LYSIS) TEG: 0 % (ref 0–8)
MA (MAX CLOT) TEG: 65.2 MM (ref 50–70)
MA (MAX CLOT) TEG: 67 MM (ref 50–70)
MAGNESIUM: 2 MG/DL (ref 1.6–2.6)
MAGNESIUM: 2 MG/DL (ref 1.6–2.6)
MAGNESIUM: 2.1 MG/DL (ref 1.6–2.6)
MAGNESIUM: 2.1 MG/DL (ref 1.6–2.6)
MAGNESIUM: 2.2 MG/DL (ref 1.6–2.6)
MCH RBC QN AUTO: 27.7 PG (ref 25.2–33.5)
MCH RBC QN AUTO: 27.8 PG (ref 25.2–33.5)
MCH RBC QN AUTO: 28.1 PG (ref 25.2–33.5)
MCH RBC QN AUTO: 28.2 PG (ref 25.2–33.5)
MCHC RBC AUTO-ENTMCNC: 29.9 G/DL (ref 28.4–34.8)
MCHC RBC AUTO-ENTMCNC: 30.4 G/DL (ref 28.4–34.8)
MCHC RBC AUTO-ENTMCNC: 30.4 G/DL (ref 28.4–34.8)
MCHC RBC AUTO-ENTMCNC: 30.5 G/DL (ref 28.4–34.8)
MCV RBC AUTO: 91.3 FL (ref 82.6–102.9)
MCV RBC AUTO: 92.3 FL (ref 82.6–102.9)
MCV RBC AUTO: 92.7 FL (ref 82.6–102.9)
MCV RBC AUTO: 92.8 FL (ref 82.6–102.9)
NRBC AUTOMATED: 0.1 PER 100 WBC
O2 DEVICE/FLOW/%: ABNORMAL
O2 SAT, VEN: 69 % (ref 60–85)
O2 SAT, VEN: 71 % (ref 60–85)
PARTIAL THROMBOPLASTIN TIME: 53.7 SEC (ref 20.5–30.5)
PARTIAL THROMBOPLASTIN TIME: 57.1 SEC (ref 20.5–30.5)
PARTIAL THROMBOPLASTIN TIME: 58.1 SEC (ref 20.5–30.5)
PARTIAL THROMBOPLASTIN TIME: 58.4 SEC (ref 20.5–30.5)
PARTIAL THROMBOPLASTIN TIME: 61.9 SEC (ref 20.5–30.5)
PARTIAL THROMBOPLASTIN TIME: 62 SEC (ref 20.5–30.5)
PARTIAL THROMBOPLASTIN TIME: 63.3 SEC (ref 20.5–30.5)
PARTIAL THROMBOPLASTIN TIME: 64.8 SEC (ref 20.5–30.5)
PCO2, VEN: 47.2 MM HG (ref 41–51)
PCO2, VEN: 53.4 MM HG (ref 41–51)
PDW BLD-RTO: 14.1 % (ref 11.8–14.4)
PDW BLD-RTO: 14.2 % (ref 11.8–14.4)
PDW BLD-RTO: 14.2 % (ref 11.8–14.4)
PDW BLD-RTO: 14.3 % (ref 11.8–14.4)
PERFORMING LOCATION: ABNORMAL
PERFORMING LOCATION: ABNORMAL
PH VENOUS: 7.37 (ref 7.32–7.43)
PH VENOUS: 7.38 (ref 7.32–7.43)
PLATELET # BLD: 152 K/UL (ref 138–453)
PLATELET # BLD: 153 K/UL (ref 138–453)
PLATELET # BLD: 154 K/UL (ref 138–453)
PLATELET # BLD: 160 K/UL (ref 138–453)
PMV BLD AUTO: 10.1 FL (ref 8.1–13.5)
PMV BLD AUTO: 10.1 FL (ref 8.1–13.5)
PMV BLD AUTO: 10.3 FL (ref 8.1–13.5)
PMV BLD AUTO: 10.4 FL (ref 8.1–13.5)
PO2, VEN: 37.7 MM HG (ref 30–50)
PO2, VEN: 39.2 MM HG (ref 30–50)
POC HCO3: 28.5 MMOL/L (ref 21–28)
POC HCO3: 29.6 MMOL/L (ref 21–28)
POC HCO3: 30.5 MMOL/L (ref 21–28)
POC HCO3: 30.5 MMOL/L (ref 21–28)
POC HCO3: 30.6 MMOL/L (ref 21–28)
POC HCO3: 30.8 MMOL/L (ref 21–28)
POC HCO3: 31 MMOL/L (ref 21–28)
POC HCO3: 31.2 MMOL/L (ref 21–28)
POC HCO3: 31.3 MMOL/L (ref 21–28)
POC HCO3: 31.4 MMOL/L (ref 21–28)
POC HCO3: 32 MMOL/L (ref 21–28)
POC HCO3: 32.9 MMOL/L (ref 21–28)
POC HEMATOCRIT: 30 % (ref 36–46)
POC HEMOGLOBIN: 10 G/DL (ref 12–16)
POC O2 SATURATION: 100 % (ref 94–98)
POC O2 SATURATION: 86 % (ref 94–98)
POC O2 SATURATION: 89 % (ref 94–98)
POC O2 SATURATION: 92 % (ref 94–98)
POC O2 SATURATION: 94 % (ref 94–98)
POC O2 SATURATION: 94 % (ref 94–98)
POC O2 SATURATION: 95 % (ref 94–98)
POC O2 SATURATION: 96 % (ref 94–98)
POC O2 SATURATION: 96 % (ref 94–98)
POC O2 SATURATION: 97 % (ref 94–98)
POC PCO2: 49.2 MM HG (ref 35–48)
POC PCO2: 49.5 MM HG (ref 35–48)
POC PCO2: 50.1 MM HG (ref 35–48)
POC PCO2: 52.4 MM HG (ref 35–48)
POC PCO2: 52.5 MM HG (ref 35–48)
POC PCO2: 52.5 MM HG (ref 35–48)
POC PCO2: 53.3 MM HG (ref 35–48)
POC PCO2: 53.6 MM HG (ref 35–48)
POC PCO2: 55.2 MM HG (ref 35–48)
POC PCO2: 55.6 MM HG (ref 35–48)
POC PCO2: 56.6 MM HG (ref 35–48)
POC PCO2: 57.6 MM HG (ref 35–48)
POC PH: 7.35 (ref 7.35–7.45)
POC PH: 7.36 (ref 7.35–7.45)
POC PH: 7.37 (ref 7.35–7.45)
POC PH: 7.38 (ref 7.35–7.45)
POC PH: 7.39 (ref 7.35–7.45)
POC PH: 7.41 (ref 7.35–7.45)
POC PO2: 363.2 MM HG (ref 83–108)
POC PO2: 53 MM HG (ref 83–108)
POC PO2: 61.4 MM HG (ref 83–108)
POC PO2: 67 MM HG (ref 83–108)
POC PO2: 71.7 MM HG (ref 83–108)
POC PO2: 73.8 MM HG (ref 83–108)
POC PO2: 78.4 MM HG (ref 83–108)
POC PO2: 79.7 MM HG (ref 83–108)
POC PO2: 80.8 MM HG (ref 83–108)
POC PO2: 88.7 MM HG (ref 83–108)
POC PO2: 89.6 MM HG (ref 83–108)
POC PO2: 95.3 MM HG (ref 83–108)
POSITIVE BASE EXCESS, ART: 3 (ref 0–3)
POSITIVE BASE EXCESS, ART: 3 (ref 0–3)
POSITIVE BASE EXCESS, ART: 4 (ref 0–3)
POSITIVE BASE EXCESS, ART: 4 (ref 0–3)
POSITIVE BASE EXCESS, ART: 5 (ref 0–3)
POSITIVE BASE EXCESS, ART: 6 (ref 0–3)
POSITIVE BASE EXCESS, VEN: 1 (ref 0–3)
POSITIVE BASE EXCESS, VEN: 5 (ref 0–3)
POTASSIUM SERPL-SCNC: 4.5 MMOL/L (ref 3.7–5.3)
POTASSIUM SERPL-SCNC: 4.7 MMOL/L (ref 3.7–5.3)
POTASSIUM SERPL-SCNC: 4.7 MMOL/L (ref 3.7–5.3)
POTASSIUM SERPL-SCNC: 4.9 MMOL/L (ref 3.7–5.3)
POTASSIUM SERPL-SCNC: 4.9 MMOL/L (ref 3.7–5.3)
PROTHROMBIN TIME: 31.2 SEC (ref 9.1–12.3)
PROTHROMBIN TIME: 33.5 SEC (ref 9.1–12.3)
PROTHROMBIN TIME: 35.3 SEC (ref 9.1–12.3)
PROTHROMBIN TIME: 35.9 SEC (ref 9.1–12.3)
RBC # BLD: 3.14 M/UL (ref 3.95–5.11)
RBC # BLD: 3.24 M/UL (ref 3.95–5.11)
RBC # BLD: 3.33 M/UL (ref 3.95–5.11)
RBC # BLD: 3.34 M/UL (ref 3.95–5.11)
REACTION TIME TEG: 12.5 MIN (ref 5–10)
REACTION TIME TEG: 13.2 MIN (ref 5–10)
SAMPLE SITE: ABNORMAL
SODIUM BLD-SCNC: 136 MMOL/L (ref 135–144)
SODIUM BLD-SCNC: 136 MMOL/L (ref 135–144)
SODIUM BLD-SCNC: 137 MMOL/L (ref 135–144)
SODIUM BLD-SCNC: 138 MMOL/L (ref 135–144)
SODIUM BLD-SCNC: 138 MMOL/L (ref 135–144)
TOTAL PROTEIN: 5 G/DL (ref 6.4–8.3)
TOTAL PROTEIN: 5.4 G/DL (ref 6.4–8.3)
WBC # BLD: 29.3 K/UL (ref 3.5–11.3)
WBC # BLD: 31.4 K/UL (ref 3.5–11.3)
WBC # BLD: 34.4 K/UL (ref 3.5–11.3)
WBC # BLD: 38.1 K/UL (ref 3.5–11.3)

## 2022-04-28 PROCEDURE — 6360000002 HC RX W HCPCS: Performed by: INTERNAL MEDICINE

## 2022-04-28 PROCEDURE — 85390 FIBRINOLYSINS SCREEN I&R: CPT

## 2022-04-28 PROCEDURE — 94003 VENT MGMT INPAT SUBQ DAY: CPT

## 2022-04-28 PROCEDURE — 85610 PROTHROMBIN TIME: CPT

## 2022-04-28 PROCEDURE — 2500000003 HC RX 250 WO HCPCS

## 2022-04-28 PROCEDURE — 94761 N-INVAS EAR/PLS OXIMETRY MLT: CPT

## 2022-04-28 PROCEDURE — 6370000000 HC RX 637 (ALT 250 FOR IP): Performed by: INTERNAL MEDICINE

## 2022-04-28 PROCEDURE — 2100000001 HC CVICU R&B

## 2022-04-28 PROCEDURE — 99233 SBSQ HOSP IP/OBS HIGH 50: CPT | Performed by: INTERNAL MEDICINE

## 2022-04-28 PROCEDURE — 85014 HEMATOCRIT: CPT

## 2022-04-28 PROCEDURE — 83735 ASSAY OF MAGNESIUM: CPT

## 2022-04-28 PROCEDURE — 36430 TRANSFUSION BLD/BLD COMPNT: CPT

## 2022-04-28 PROCEDURE — 82947 ASSAY GLUCOSE BLOOD QUANT: CPT

## 2022-04-28 PROCEDURE — 33948 ECMO/ECLS DAILY MGMT-VENOUS: CPT

## 2022-04-28 PROCEDURE — 83605 ASSAY OF LACTIC ACID: CPT

## 2022-04-28 PROCEDURE — 6370000000 HC RX 637 (ALT 250 FOR IP)

## 2022-04-28 PROCEDURE — 85730 THROMBOPLASTIN TIME PARTIAL: CPT

## 2022-04-28 PROCEDURE — 80076 HEPATIC FUNCTION PANEL: CPT

## 2022-04-28 PROCEDURE — C9113 INJ PANTOPRAZOLE SODIUM, VIA: HCPCS | Performed by: INTERNAL MEDICINE

## 2022-04-28 PROCEDURE — 71045 X-RAY EXAM CHEST 1 VIEW: CPT

## 2022-04-28 PROCEDURE — 6360000002 HC RX W HCPCS

## 2022-04-28 PROCEDURE — 6360000002 HC RX W HCPCS: Performed by: THORACIC SURGERY (CARDIOTHORACIC VASCULAR SURGERY)

## 2022-04-28 PROCEDURE — 85027 COMPLETE CBC AUTOMATED: CPT

## 2022-04-28 PROCEDURE — 2500000003 HC RX 250 WO HCPCS: Performed by: STUDENT IN AN ORGANIZED HEALTH CARE EDUCATION/TRAINING PROGRAM

## 2022-04-28 PROCEDURE — 85300 ANTITHROMBIN III ACTIVITY: CPT

## 2022-04-28 PROCEDURE — 85576 BLOOD PLATELET AGGREGATION: CPT

## 2022-04-28 PROCEDURE — 2580000003 HC RX 258: Performed by: INTERNAL MEDICINE

## 2022-04-28 PROCEDURE — 86927 PLASMA FRESH FROZEN: CPT

## 2022-04-28 PROCEDURE — 85384 FIBRINOGEN ACTIVITY: CPT

## 2022-04-28 PROCEDURE — P9012 CRYOPRECIPITATE EACH UNIT: HCPCS

## 2022-04-28 PROCEDURE — 82330 ASSAY OF CALCIUM: CPT

## 2022-04-28 PROCEDURE — 2500000003 HC RX 250 WO HCPCS: Performed by: INTERNAL MEDICINE

## 2022-04-28 PROCEDURE — 94640 AIRWAY INHALATION TREATMENT: CPT

## 2022-04-28 PROCEDURE — 83051 HEMOGLOBIN PLASMA: CPT

## 2022-04-28 PROCEDURE — 85347 COAGULATION TIME ACTIVATED: CPT

## 2022-04-28 PROCEDURE — 33948 ECMO/ECLS DAILY MGMT-VENOUS: CPT | Performed by: INTERNAL MEDICINE

## 2022-04-28 PROCEDURE — 2580000003 HC RX 258: Performed by: THORACIC SURGERY (CARDIOTHORACIC VASCULAR SURGERY)

## 2022-04-28 PROCEDURE — 99291 CRITICAL CARE FIRST HOUR: CPT | Performed by: INTERNAL MEDICINE

## 2022-04-28 PROCEDURE — 82803 BLOOD GASES ANY COMBINATION: CPT

## 2022-04-28 PROCEDURE — 2700000000 HC OXYGEN THERAPY PER DAY

## 2022-04-28 PROCEDURE — 80048 BASIC METABOLIC PNL TOTAL CA: CPT

## 2022-04-28 PROCEDURE — 6360000002 HC RX W HCPCS: Performed by: STUDENT IN AN ORGANIZED HEALTH CARE EDUCATION/TRAINING PROGRAM

## 2022-04-28 RX ORDER — INSULIN LISPRO 100 [IU]/ML
0-12 INJECTION, SOLUTION INTRAVENOUS; SUBCUTANEOUS EVERY 4 HOURS
Status: DISCONTINUED | OUTPATIENT
Start: 2022-04-28 | End: 2022-05-02

## 2022-04-28 RX ORDER — METHYLPREDNISOLONE SODIUM SUCCINATE 40 MG/ML
40 INJECTION, POWDER, LYOPHILIZED, FOR SOLUTION INTRAMUSCULAR; INTRAVENOUS EVERY 12 HOURS
Status: DISCONTINUED | OUTPATIENT
Start: 2022-04-29 | End: 2022-05-02

## 2022-04-28 RX ORDER — HYDRALAZINE HYDROCHLORIDE 20 MG/ML
5 INJECTION INTRAMUSCULAR; INTRAVENOUS ONCE
Status: COMPLETED | OUTPATIENT
Start: 2022-04-28 | End: 2022-04-28

## 2022-04-28 RX ORDER — LABETALOL HYDROCHLORIDE 5 MG/ML
10 INJECTION, SOLUTION INTRAVENOUS ONCE
Status: COMPLETED | OUTPATIENT
Start: 2022-04-28 | End: 2022-04-28

## 2022-04-28 RX ADMIN — DILTIAZEM HYDROCHLORIDE 30 MG: 30 TABLET ORAL at 17:51

## 2022-04-28 RX ADMIN — OXYCODONE HYDROCHLORIDE 10 MG: 5 TABLET ORAL at 16:29

## 2022-04-28 RX ADMIN — ARGATROBAN 4.83 MCG/KG/MIN: 50 INJECTION INTRAVENOUS at 05:32

## 2022-04-28 RX ADMIN — Medication 150 MCG/HR: at 22:37

## 2022-04-28 RX ADMIN — CEFEPIME HYDROCHLORIDE 2000 MG: 2 INJECTION, POWDER, FOR SOLUTION INTRAVENOUS at 16:21

## 2022-04-28 RX ADMIN — ARGATROBAN 4.97 MCG/KG/MIN: 250 INJECTION, SOLUTION INTRAVENOUS at 17:50

## 2022-04-28 RX ADMIN — DILTIAZEM HYDROCHLORIDE 30 MG: 30 TABLET ORAL at 11:55

## 2022-04-28 RX ADMIN — Medication 8 MG/HR: at 12:15

## 2022-04-28 RX ADMIN — IPRATROPIUM BROMIDE AND ALBUTEROL SULFATE 1 AMPULE: .5; 2.5 SOLUTION RESPIRATORY (INHALATION) at 19:48

## 2022-04-28 RX ADMIN — IPRATROPIUM BROMIDE AND ALBUTEROL SULFATE 1 AMPULE: .5; 2.5 SOLUTION RESPIRATORY (INHALATION) at 16:23

## 2022-04-28 RX ADMIN — METOPROLOL TARTRATE 5 MG: 1 INJECTION, SOLUTION INTRAVENOUS at 07:53

## 2022-04-28 RX ADMIN — METHYLPREDNISOLONE SODIUM SUCCINATE 40 MG: 40 INJECTION, POWDER, FOR SOLUTION INTRAMUSCULAR; INTRAVENOUS at 19:00

## 2022-04-28 RX ADMIN — Medication 30 UNITS: at 17:45

## 2022-04-28 RX ADMIN — INSULIN LISPRO 4 UNITS: 100 INJECTION, SOLUTION INTRAVENOUS; SUBCUTANEOUS at 16:30

## 2022-04-28 RX ADMIN — ARGATROBAN 4.83 MCG/KG/MIN: 50 INJECTION INTRAVENOUS at 03:28

## 2022-04-28 RX ADMIN — IPRATROPIUM BROMIDE AND ALBUTEROL SULFATE 1 AMPULE: .5; 2.5 SOLUTION RESPIRATORY (INHALATION) at 23:22

## 2022-04-28 RX ADMIN — METHYLPREDNISOLONE SODIUM SUCCINATE 40 MG: 40 INJECTION, POWDER, FOR SOLUTION INTRAMUSCULAR; INTRAVENOUS at 09:09

## 2022-04-28 RX ADMIN — INSULIN LISPRO 2 UNITS: 100 INJECTION, SOLUTION INTRAVENOUS; SUBCUTANEOUS at 23:06

## 2022-04-28 RX ADMIN — ARGATROBAN 4.83 MCG/KG/MIN: 50 INJECTION INTRAVENOUS at 06:40

## 2022-04-28 RX ADMIN — Medication 30 UNITS: at 05:45

## 2022-04-28 RX ADMIN — Medication 30 UNITS: at 18:08

## 2022-04-28 RX ADMIN — POLYETHYLENE GLYCOL 3350 17 G: 17 POWDER, FOR SOLUTION ORAL at 09:35

## 2022-04-28 RX ADMIN — HYDRALAZINE HYDROCHLORIDE 5 MG: 20 INJECTION, SOLUTION INTRAMUSCULAR; INTRAVENOUS at 22:10

## 2022-04-28 RX ADMIN — Medication 10 MG/HR: at 04:01

## 2022-04-28 RX ADMIN — MIDAZOLAM 2 MG: 1 INJECTION INTRAMUSCULAR; INTRAVENOUS at 06:23

## 2022-04-28 RX ADMIN — ARGATROBAN 4.85 MCG/KG/MIN: 250 INJECTION, SOLUTION INTRAVENOUS at 10:59

## 2022-04-28 RX ADMIN — Medication 30 UNITS: at 18:15

## 2022-04-28 RX ADMIN — OXYCODONE HYDROCHLORIDE 10 MG: 5 TABLET ORAL at 23:03

## 2022-04-28 RX ADMIN — ARGATROBAN 4.7 MCG/KG/MIN: 50 INJECTION INTRAVENOUS at 02:22

## 2022-04-28 RX ADMIN — FUROSEMIDE 20 MG: 10 INJECTION, SOLUTION INTRAMUSCULAR; INTRAVENOUS at 09:01

## 2022-04-28 RX ADMIN — Medication 30 UNITS: at 18:18

## 2022-04-28 RX ADMIN — OXYCODONE HYDROCHLORIDE 10 MG: 5 TABLET ORAL at 08:01

## 2022-04-28 RX ADMIN — OXYCODONE HYDROCHLORIDE 10 MG: 5 TABLET ORAL at 04:02

## 2022-04-28 RX ADMIN — POLYVINYL ALCOHOL 1 DROP: 14 SOLUTION/ DROPS OPHTHALMIC at 09:09

## 2022-04-28 RX ADMIN — POLYVINYL ALCOHOL 1 DROP: 14 SOLUTION/ DROPS OPHTHALMIC at 16:29

## 2022-04-28 RX ADMIN — Medication 30 UNITS: at 06:15

## 2022-04-28 RX ADMIN — ARGATROBAN 4.85 MCG/KG/MIN: 50 INJECTION INTRAVENOUS at 08:05

## 2022-04-28 RX ADMIN — INSULIN LISPRO 2 UNITS: 100 INJECTION, SOLUTION INTRAVENOUS; SUBCUTANEOUS at 00:16

## 2022-04-28 RX ADMIN — INSULIN LISPRO 2 UNITS: 100 INJECTION, SOLUTION INTRAVENOUS; SUBCUTANEOUS at 11:57

## 2022-04-28 RX ADMIN — Medication 200 MCG/HR: at 01:06

## 2022-04-28 RX ADMIN — Medication 200 MCG/HR: at 09:37

## 2022-04-28 RX ADMIN — Medication 30 UNITS: at 06:00

## 2022-04-28 RX ADMIN — CHLORDIAZEPOXIDE HYDROCHLORIDE 50 MG: 25 CAPSULE ORAL at 18:24

## 2022-04-28 RX ADMIN — Medication 30 UNITS: at 18:00

## 2022-04-28 RX ADMIN — INSULIN LISPRO 2 UNITS: 100 INJECTION, SOLUTION INTRAVENOUS; SUBCUTANEOUS at 20:15

## 2022-04-28 RX ADMIN — METOPROLOL TARTRATE 5 MG: 1 INJECTION, SOLUTION INTRAVENOUS at 21:01

## 2022-04-28 RX ADMIN — IPRATROPIUM BROMIDE AND ALBUTEROL SULFATE 1 AMPULE: .5; 2.5 SOLUTION RESPIRATORY (INHALATION) at 03:09

## 2022-04-28 RX ADMIN — IPRATROPIUM BROMIDE AND ALBUTEROL SULFATE 1 AMPULE: .5; 2.5 SOLUTION RESPIRATORY (INHALATION) at 08:01

## 2022-04-28 RX ADMIN — OXYCODONE HYDROCHLORIDE 10 MG: 5 TABLET ORAL at 19:18

## 2022-04-28 RX ADMIN — FENTANYL CITRATE 25 MCG: 50 INJECTION INTRAMUSCULAR; INTRAVENOUS at 06:37

## 2022-04-28 RX ADMIN — Medication 30 UNITS: at 06:30

## 2022-04-28 RX ADMIN — SODIUM CHLORIDE, PRESERVATIVE FREE 40 MG: 5 INJECTION INTRAVENOUS at 08:01

## 2022-04-28 RX ADMIN — ARGATROBAN 4.86 MCG/KG/MIN: 50 INJECTION INTRAVENOUS at 09:22

## 2022-04-28 RX ADMIN — Medication 30 UNITS: at 18:14

## 2022-04-28 RX ADMIN — METHYLPREDNISOLONE SODIUM SUCCINATE 40 MG: 40 INJECTION, POWDER, FOR SOLUTION INTRAMUSCULAR; INTRAVENOUS at 02:49

## 2022-04-28 RX ADMIN — Medication 30 UNITS: at 18:16

## 2022-04-28 RX ADMIN — MIDAZOLAM 2 MG: 1 INJECTION INTRAMUSCULAR; INTRAVENOUS at 16:50

## 2022-04-28 RX ADMIN — SODIUM CHLORIDE, PRESERVATIVE FREE 10 ML: 5 INJECTION INTRAVENOUS at 08:06

## 2022-04-28 RX ADMIN — CHLORDIAZEPOXIDE HYDROCHLORIDE 50 MG: 25 CAPSULE ORAL at 05:36

## 2022-04-28 RX ADMIN — INSULIN LISPRO 4 UNITS: 100 INJECTION, SOLUTION INTRAVENOUS; SUBCUTANEOUS at 06:17

## 2022-04-28 RX ADMIN — METOPROLOL TARTRATE 5 MG: 1 INJECTION, SOLUTION INTRAVENOUS at 14:35

## 2022-04-28 RX ADMIN — CHLORDIAZEPOXIDE HYDROCHLORIDE 50 MG: 25 CAPSULE ORAL at 23:03

## 2022-04-28 RX ADMIN — Medication 10 MG: at 19:32

## 2022-04-28 RX ADMIN — SODIUM CHLORIDE, PRESERVATIVE FREE 10 ML: 5 INJECTION INTRAVENOUS at 21:05

## 2022-04-28 RX ADMIN — DILTIAZEM HYDROCHLORIDE 30 MG: 30 TABLET ORAL at 05:36

## 2022-04-28 RX ADMIN — ARGATROBAN 4.83 MCG/KG/MIN: 50 INJECTION INTRAVENOUS at 04:07

## 2022-04-28 RX ADMIN — POLYVINYL ALCOHOL 1 DROP: 14 SOLUTION/ DROPS OPHTHALMIC at 20:14

## 2022-04-28 RX ADMIN — CHLORDIAZEPOXIDE HYDROCHLORIDE 50 MG: 25 CAPSULE ORAL at 12:00

## 2022-04-28 RX ADMIN — POLYVINYL ALCOHOL 1 DROP: 14 SOLUTION/ DROPS OPHTHALMIC at 04:03

## 2022-04-28 RX ADMIN — MIDAZOLAM 2 MG: 1 INJECTION INTRAMUSCULAR; INTRAVENOUS at 19:27

## 2022-04-28 RX ADMIN — IPRATROPIUM BROMIDE AND ALBUTEROL SULFATE 1 AMPULE: .5; 2.5 SOLUTION RESPIRATORY (INHALATION) at 12:24

## 2022-04-28 RX ADMIN — OXYCODONE HYDROCHLORIDE 10 MG: 5 TABLET ORAL at 11:54

## 2022-04-28 RX ADMIN — MIDAZOLAM 2 MG: 1 INJECTION INTRAMUSCULAR; INTRAVENOUS at 22:37

## 2022-04-28 ASSESSMENT — PULMONARY FUNCTION TESTS
PIF_VALUE: 12
PIF_VALUE: 22
PIF_VALUE: 20
PIF_VALUE: 23

## 2022-04-28 NOTE — CARE COORDINATION
Placed call to pt's mother to discuss possible need for LTACH/SNF after hospitalization, left message, call back requested. Pt remains on ECMO, is intubated and sedated.

## 2022-04-28 NOTE — PLAN OF CARE
Problem: Gas Exchange - Impaired:  Goal: Levels of oxygenation will improve  Description: Levels of oxygenation will improve  Outcome: Progressing     Problem: Respiratory:  Goal: Ability to maintain a clear airway will improve  Description: Ability to maintain a clear airway will improve  Outcome: Progressing  Goal: Ability to maintain adequate ventilation will improve  Description: Ability to maintain adequate ventilation will improve  Outcome: Progressing  Goal: Complications related to the disease process, condition or treatment will be avoided or minimized  Description: Complications related to the disease process, condition or treatment will be avoided or minimized  Outcome: Progressing     Problem: Safety - Medical Restraint  Goal: Remains free of injury from restraints (Restraint for Interference with Medical Device)  Description: INTERVENTIONS:  1. Determine that other, less restrictive measures have been tried or would not be effective before applying the restraint  2. Evaluate the patient's condition at the time of restraint application  3. Inform patient/family regarding the reason for restraint  4. Q2H: Monitor safety, psychosocial status, comfort, nutrition and hydration  Outcome: Progressing     Problem: Nutrition  Goal: Optimal nutrition therapy  Outcome: Progressing     Problem: Skin/Tissue Integrity  Goal: Absence of new skin breakdown  Description: 1. Monitor for areas of redness and/or skin breakdown  2. Assess vascular access sites hourly  3. Every 4-6 hours minimum:  Change oxygen saturation probe site  4. Every 4-6 hours:  If on nasal continuous positive airway pressure, respiratory therapy assess nares and determine need for appliance change or resting period.   Outcome: Progressing     Problem: Safety - Adult  Goal: Free from fall injury  Outcome: Progressing     Problem: Pain  Goal: Verbalizes/displays adequate comfort level or baseline comfort level  Outcome: Progressing

## 2022-04-28 NOTE — CARE COORDINATION
Pt remains on ECMO, I/S, ECMO to possibly be dc'd next week, per quality flow rounds. Writer discussed possible need for LTACH with pt's parents, provided hospital LTACH list, unable to print list from pt's insurance as no LTACH facilities were listed, received LTACH choice of [facility]. Referral sent. [address] call to Devyn Masters at [facility] to f/u on referral, left message.

## 2022-04-28 NOTE — PROGRESS NOTES
Ecmo End of Shift Note:       Cannulation date/time: 4/22/2022 @ 1005  ECMO type: V/V  Cannula sizes/locations: 25 Fr drainage LFV / 23 Fr return  Flow ordered at:4-6 LPM       Currently flowing at:       FIO2 at: 50%       Sweep at: 0.5 L  Delta pressure: 23  Clot burden:        Oxygenator:12, 3, and 9 O'clock noted       Circuit: Fibrin noted around 3/8 connector Pre MO  Anticoagulation: Argatroban @ 4.975 mcg/kg/min  Products given:        PRBC's: 0       PLT's: 0       FFP: 0       Cryo: 0        Albumin: 0    Argatroban increased to 5.1 mcg/kg/min PTT Q2

## 2022-04-28 NOTE — PROGRESS NOTES
Comprehensive Nutrition Assessment    Type and Reason for Visit:  Reassess    Nutrition Recommendations/Plan:   Continue Current Tube Feeding at this time; Peptide Based High Protein at 55 mL/hr. Will continue to monitor TF tolerance/adequacy. Malnutrition Assessment:  Malnutrition Status: At risk for malnutrition  Context:  Acute Illness     Findings of the 6 clinical characteristics of malnutrition:  Energy Intake:  Mild decrease in energy intake  Weight Loss:  Unable to assess     Body Fat Loss:  No significant body fat loss     Muscle Mass Loss:  No significant muscle mass loss    Fluid Accumulation:  Mild to moderate, Extremities,Generalized   Strength:  Not Performed    Nutrition Assessment:    Pt remains on vent and ECMO. Peptide Based High Protein TF continues at goal rate of 55 mL/hr. Last BM noted: 4/26/22. Meds/labs reviewed. Nutrition Related Findings:    Meds/labs reviewed Wound Type: None       Current Nutrition Intake & Therapies:    Average Meal Intake: NPO  Average Supplements Intake: NPO  ADULT TUBE FEEDING; Nasogastric; Peptide Based High Protein; Continuous; 10; Yes; 10; Q 4 hours; 55; 30; Q 4 hours  Current Tube Feeding (TF) Orders:  · Feeding Route: Nasogastric  · Formula: Peptide Based High Protein  · Schedule: Continuous  · Feeding Regimen: 55 mL/hr  · Additives/Modulars: None  · Current TF & Flush Orders Provides: Peptide Based High Protein formula at 55 mL/hr =1320 kcal and 115 g pro/day    Additional Calorie Sources:  · None      Anthropometric Measures:  Height: 5' 3\" (160 cm)  Ideal Body Weight (IBW): 115 lbs (52 kg)    Admission Body Weight: 293 lb 10.4 oz (133.2 kg)  Current Body Weight: 300 lb 4.8 oz (136.2 kg), 261.1 % IBW.  Weight Source: Bed Scale  Current BMI (kg/m2): 53.2                          BMI Categories: Obese Class 3 (BMI 40.0 or greater)    Estimated Daily Nutrient Needs:  Energy Requirements Based On: Kcal/kg  Weight Used for Energy Requirements: Ideal  Energy (kcal/day): 1300 kcal/day  Weight Used for Protein Requirements: Ideal  Protein (g/day): 100 g pro/day  Method Used for Fluid Requirements: ml/Kg  Fluid (ml/day): per MD    Nutrition Diagnosis:   · Inadequate oral intake related to impaired respiratory function as evidenced by NPO or clear liquid status due to medical condition,nutrition support - enteral nutrition      Nutrition Interventions:   Food and/or Nutrient Delivery: Continue Current Tube Feeding  Nutrition Education/Counseling: No recommendation at this time  Coordination of Nutrition Care: Continue to monitor while inpatient       Goals:  Previous Goal Met: Goal(s) Achieved  Goals: Meet at least 75% of estimated needs       Nutrition Monitoring and Evaluation:   Behavioral-Environmental Outcomes: None Identified  Food/Nutrient Intake Outcomes: Enteral Nutrition Intake/Tolerance  Physical Signs/Symptoms Outcomes: Biochemical Data,Nutrition Focused Physical Findings,Skin,Weight    Discharge Planning:     Too soon to determine     [address], LD  Contact: [phone]

## 2022-04-28 NOTE — PLAN OF CARE
Problem: Safety - Medical Restraint  Goal: Remains free of injury from restraints (Restraint for Interference with Medical Device)  Description: INTERVENTIONS:  1. Determine that other, less restrictive measures have been tried or would not be effective before applying the restraint  2. Evaluate the patient's condition at the time of restraint application  3. Inform patient/family regarding the reason for restraint  4.  Q2H: Monitor safety, psychosocial status, comfort, nutrition and hydration  4/28/2022 1654 by Brett Garza RN  Outcome: Progressing  4/28/2022 0737 by Brett Garza RN  Outcome: Progressing

## 2022-04-28 NOTE — PROGRESS NOTES
[facility]  Internal Medicine Teaching Residency Program  Inpatient Daily Progress Note  ______________________________________________________________________________    Patient: Peace Monique  YOB: 2000   POW:[id number]    Acct: [de-identified]     Room: [address] [place] [address]  Admit date: 4/18/2022  Today's date: 04/28/22  Number of days in the hospital: 10    SUBJECTIVE   Admitting Diagnosis: Acute asthma exacerbation     CC: Shortness of breath    Pt examined at bedside. Chart & results reviewed. No acute events overnight. Patient remained hemodynamically stable on ECMO  Patient follows command off sedation.   - /54; HR 70s-100s;   - blood glucose: 170s-210s  - Changed medium dose sliding scale to q4 hours   - ECMO management per CT surgery team  - Sweep 1.5, FiO2 100%  - Ventilator management per Critical care/Pumonology; vent at rest setting  - Continue Roxicodone and Librium per Pulmonology. - Continue Lasix 20 mg IV daily pe Pulm. - Urine output: 2.0L/24 hours  - Sedation: Currently on fentanyl, ketamine and Versed  - 1 unit cryo given per ECMO protocol   - Labs reviewed: CBC and BMP wnl     ROS:  Unable to assess due to patient being sedated and intubated. BRIEF HISTORY     22 y. o. female with a past medical history of bronchial asthma who was admitted to [facility] with an acute exacerbation causing increasing shortness of breath.  Patient was found to be hypoxemic with hypercarbic respiratory failure requiring intubation and mechanical ventilation.  Patient was transferred to [facility] for further elevation with possibility of ECMO.  On arrival patient was found to be on a bicarb drip due to presumed respiratory acidosis but this was discontinued due to to worsening of the hypercarbia.  Additionally patient was started on Nimbex due to the necessity for paralyzation as she was breathing over the vent.  Pulmonology and cardiothoracic surgery were consulted- plan is for ECMO. In the MICU, patient remained stable, had an episode of dark emesis which turned out to be bilious output, Hb remained stable. She was hypertensive, started on lopressor IV 5 mg PRN. Had left IJ placed which was malpositioned, removed and then CVC in left femoral was placed overnight. She was hypoxic, requiring higher FiO2 of 80%, PEEP 16, RR 32, , blood gas showed pH 7.449, pCO2 41.7, pCO2 59. 1. treated with solumedrol, bronchodilators, CXR showed no pneumothorax or pleural effusion.   On  overnight, patient became more hypoxic with increasing oxygen requirement, increasing wheezing on exam. It was determined to go with ECMO canulation on . OBJECTIVE     Vital Signs:  BP (!) 161/78   Pulse 92   Temp 98.6 °F (37 °C)   Resp 16   Ht 5' 3\" (1.6 m)   Wt (!) 300 lb 4.8 oz (136.2 kg)   SpO2 98%   BMI 53.20 kg/m²     Temp (24hrs), Av.6 °F (37 °C), Min:98.4 °F (36.9 °C), Max:98.6 °F (37 °C)    In: 3435.4   Out:  [Urine:]    Physical Exam:  Constitutional: This is a well developed, well nourished, Greater than 40 - Morbid Obesity / Extreme Obesity / Grade III 23y.o. year old female who is on ECMO    EENT:  PERRLA. No conjunctival injections. Septum was midline, mucosa was without erythema, exudates or cobblestoning. No thrush was noted. Neck: Supple without thyromegaly. No elevated JVP. Trachea was midline. Respiratory: Decreased breath sounds bilaterally  Cardiovascular: Regular without murmur, clicks, gallops or rubs. Abdomen: Slightly rounded and soft without organomegaly. No rebound, rigidity or guarding was appreciated. Lymphatic: No lymphadenopathy. Musculoskeletal: Normal curvature of the spine. No gross muscle weakness. Extremities:  No lower extremity edema, ulcerations, tenderness, varicosities or erythema. Muscle size, tone and strength are normal.  No involuntary movements are noted.     Skin:  Warm and dry.  Good color, turgor and pigmentation. No lesions or scars.   No cyanosis or clubbing  Neurological/Psychiatric: follows commands off sedation     Medications:  Scheduled Medications:    budesonide  0.5 mg Nebulization BID    chlordiazePOXIDE  50 mg Oral 4 times per day    insulin lispro  0-12 Units SubCUTAneous Q6H    lidocaine PF  5 mL Tracheal Tube Once    oxyCODONE  10 mg Oral Q4H    docusate  100 mg Oral BID    polyethylene glycol  17 g Oral Daily    furosemide  20 mg IntraVENous Daily    heparin flush (PF)  3 mL IntraVENous Q12H    heparin flush (PF)  3 mL IntraVENous Q12H    heparin flush (PF)  3 mL IntraVENous Q12H    heparin flush (PF)  3 mL IntraVENous Q12H    albumin human  25 g IntraVENous Once    pantoprazole (PROTONIX) 40 mg injection  40 mg IntraVENous Daily    methylPREDNISolone  40 mg IntraVENous Q8H    dilTIAZem  30 mg Oral 4 times per day    polyvinyl alcohol  1 drop Both Eyes Q6H    ipratropium-albuterol  1 ampule Inhalation Q4H    sodium chloride flush  5-40 mL IntraVENous 2 times per day     Continuous Infusions:    argatroban infusion 4.825 mcg/kg/min (04/28/22 0700)    sodium chloride      ketamine (KETALAR) 2 mg/mL infusion for analgosedation 0.2 mg/kg/hr (04/28/22 0700)    midazolam 10 mg/hr (04/28/22 0700)    fentaNYL 50 mcg/mL 200 mcg/hr (04/28/22 0700)    dextrose      sodium chloride 10 mL/hr at 04/28/22 0700     PRN Medicationssodium chloride, , PRN  albuterol, 2.5 mg, Q4H PRN  heparin flush (PF), 3 mL, PRN  fentanNYL, 25 mcg, Q1H PRN  midazolam, 2 mg, Q2H PRN  metoprolol, 5 mg, Q6H PRN  glucose, 15 g, PRN  dextrose, 12.5 g, PRN  glucagon (rDNA), 1 mg, PRN  dextrose, 100 mL/hr, PRN  sodium chloride flush, 5-40 mL, PRN  sodium chloride, , PRN  ondansetron, 4 mg, Q8H PRN   Or  ondansetron, 4 mg, Q6H PRN  acetaminophen, 650 mg, Q6H PRN   Or  acetaminophen, 650 mg, Q6H PRN        Diagnostic Labs:  CBC:   Recent Labs     04/27/22  1604 04/27/22  2776 04/28/22  0410   WBC 34.4* 31.3* 29.3*   RBC 3.48* 3.24* 3.14*   HGB 9.6* 9.1* 8.7*   HCT 32.4* 29.8* 29.1*   MCV 93.1 92.0 92.7   RDW 14.4 14.3 14.3    159 154     BMP:   Recent Labs     04/27/22  1604 04/27/22 2209 04/28/22  0410    138 138   K 5.0 4.9 4.7    102 104   CO2 31 28 29   BUN 30* 31* 29*   CREATININE 0.45* 0.45* 0.43*     BNP: No results for input(s): BNP in the last 72 hours. PT/INR:   Recent Labs     04/27/22  1604 04/27/22 2209 04/28/22  0410   PROTIME 29.8* 31.8* 35.3*   INR 3.1 3.3 3.7     APTT:   Recent Labs     04/28/22  0203 04/28/22  0410 04/28/22  0501   APTT 53.7* 64.8* 61.9*     CARDIAC ENZYMES: No results for input(s): CKMB, CKMBINDEX, TROPONINI in the last 72 hours. Invalid input(s): CKTOTAL;3  FASTING LIPID PANEL:No results found for: CHOL, HDL, TRIG  LIVER PROFILE:   Recent Labs     04/27/22  0400 04/27/22  1604 04/28/22  0410   AST 18 17 17   ALT 75* 68* 57*   BILIDIR 0.13 0.10 0.13   BILITOT 0.45 0.40 0.34   ALKPHOS 50 48 46      MICROBIOLOGY:   Lab Results   Component Value Date/Time    CULTURE CULTURE IN PROGRESS 04/24/2022 11:41 AM       Imaging:    XR CHEST (SINGLE VIEW FRONTAL)    Result Date: 4/22/2022  1. Support tubes and lines are unchanged. 2. Stable bibasilar lung infiltrates, right side greater than left. This may be related to atelectasis and/or pleural effusion. Superimposed pneumonia cannot be excluded. XR CHEST (SINGLE VIEW FRONTAL)    Result Date: 4/21/2022  Suboptimal position left IJ catheter. Consider removal and replacement or repositioning. XR CHEST PORTABLE    Result Date: 4/28/2022  Stable exam     XR CHEST PORTABLE    Result Date: 4/27/2022  1. Stable appropriate positions of support apparatus. 2. Stable small bilateral pleural effusions and bibasilar airspace disease.      XR CHEST PORTABLE    Result Date: 4/26/2022  Relatively stable cardiopulmonary status     XR CHEST PORTABLE    Result Date: 4/25/2022  Stable chest x-ray compared to 04/24/2022. XR CHEST PORTABLE    Result Date: 4/24/2022  1. Stable support tubes and line. 2. Low lung volumes. Crowding of vessels from low lung volumes results in apparent opacification at the right cardiophrenic angle. XR CHEST PORTABLE    Result Date: 4/23/2022  Lines and tubes are unchanged. Low lung volumes. Cardiomegaly. XR CHEST PORTABLE    Result Date: 4/22/2022  Endotracheal tube tip is in the right main bronchus and should be retracted approximately 4-5 cm. Tips of ECMO cannulas project in the expected location of the IVC. Findings were discussed with Karissa Scott RN (the patient's ICU nurse) at 2:26 pm on 4/22/2022. XR CHEST PORTABLE    Result Date: 4/22/2022  1. Endotracheal tube tip is now 1 cm above the soto. Recommend retraction by an additional 2.5 cm for optimal positioning. 2.  Improved aeration in the left lung with continued atelectatic/airspace infiltrates. XR CHEST PORTABLE    Result Date: 4/21/2022  1. On today's exam the endotracheal tube tip is positioned 1.5 cm above the soto. Recommend retraction by an additional 2 cm for optimal positioning. 2.  Interval development of right lower lobe airspace disease. This could represent atelectasis       ASSESSMENT & PLAN     ASSESSMENT / PLAN:     Principal Problem:    Acute asthma exacerbation  Active Problems:    Severe persistent asthma with status asthmaticus    CHIDI (obstructive sleep apnea)    History of seizures    Hypercapnic respiratory failure (HCC)    Endotracheally intubated    On mechanically assisted ventilation (HCC)    Diastolic dysfunction  Resolved Problems:    * No resolved hospital problems. *      1. Acute hypoxic hypercapnic respiratory failure on mechanical ventilation and s/p ECMO cannulation on 4/22/22 secondary asthma exacerbation: Continue SoluMedrol 40 mg IV q8 hours, Completed IV Rocephin (7 days) and IV Azithromycin (6 days).  Duoneb q4 hours and Albuterol as needed. Continue Lasix 20 mg IV daily, diuresing well. Pulm. Critical care following for ventilator management. Librium 50 mg q6 hours.      2. Troponin elevation type II demand ischemia: Continue Cardizem 30 mg q6 hours per cardiology. Cardiology to follow from a distance, once extubated, will contact cardiology for stress test prior to discharge due to apical hypokinesis on echo. Troponin flat trend.      3. Sinus tachycardia:- Controlled Continue Cardizem 30 mg q6 hours per Cardiology.  PRN Metoprolol 5 mg IV for HR >110; hold for SBP<110    4. Steroid induced hyperglycemia: Blood glucose: 170s-210s; Medium dose sliding scale q4 hours; hypoglycemia protocol, monitor POCT glucose every 4 hours    5. Acute femoral vein DVT: Continue on Argatroban infusion, Pharmacy to dose to keep argatroban on therapeutic level         Diet: On tube feeds at goal  DVT ppx : On Argatroban infusion  GI ppx: Protonix      PT/OT: Consulted  Discharge Planning / SW:  consulted, will follow up once patient is extubated and ECMO cannulation removed and Yanira Rand MD  Internal Medicine Resident, PGY-1  [facility]  4/28/2022, 7:35 AM

## 2022-04-28 NOTE — PROGRESS NOTES
Ecmo End of Shift Note:       Cannulation date/time: 4/22/22 @ 1005  ECMO type: V-V  Cannula sizes/locations: 25 Fr Drainage LFV, 23 Fr Return RFV  Flow ordered at: 4-6 LPM       Currently flowing at: 4.4-4.6 LPM       FIO2 at: 100%       Sweep at: 1  Delta pressure: 22-23  Clot burden:       Oxygenator: 12, 3, & 9 o'clock noted (small)       Circuit: Fibrin noted around 3/8 connector, Pre MO  Anticoagulation: Argatroban @ 4.825 mcg/kg/min  Products given:        PRBC's: 0       PLT's: 0       FFP: 0       Cryo: 1       Albumin: 0    PTT therapeutic @ 0500, next PTT @ 1100. Fibrinogen <80, 1 unit cryo replaced @ 0600.

## 2022-04-28 NOTE — PLAN OF CARE
Problem: Safety - Medical Restraint  Goal: Remains free of injury from restraints (Restraint for Interference with Medical Device)  Description: INTERVENTIONS:  1. Determine that other, less restrictive measures have been tried or would not be effective before applying the restraint  2. Evaluate the patient's condition at the time of restraint application  3. Inform patient/family regarding the reason for restraint  4.  Q2H: Monitor safety, psychosocial status, comfort, nutrition and hydration  4/28/2022 0737 by Gavin Moser RN  Outcome: Progressing  4/28/2022 0045 by Isabelle Kitchen RN  Outcome: Progressing

## 2022-04-29 ENCOUNTER — APPOINTMENT (OUTPATIENT)
Dept: GENERAL RADIOLOGY | Age: 22
DRG: 003 | End: 2022-04-29
Attending: INTERNAL MEDICINE
Payer: COMMERCIAL

## 2022-04-29 LAB
ACTIVATED CLOTTING TIME: 297 SEC (ref 79–149)
ACTIVATED CLOTTING TIME: 301 SEC (ref 79–149)
ALBUMIN SERPL-MCNC: 3.4 G/DL (ref 3.5–5.2)
ALBUMIN SERPL-MCNC: 3.4 G/DL (ref 3.5–5.2)
ALBUMIN/GLOBULIN RATIO: 1.5 (ref 1–2.5)
ALBUMIN/GLOBULIN RATIO: 1.5 (ref 1–2.5)
ALP BLD-CCNC: 52 U/L (ref 35–104)
ALP BLD-CCNC: 53 U/L (ref 35–104)
ALT SERPL-CCNC: 64 U/L (ref 5–33)
ALT SERPL-CCNC: 64 U/L (ref 5–33)
ANGLE TEG: 58.5 DEG (ref 53–72)
ANGLE TEG: 61.6 DEG (ref 53–72)
ANGLE TEG: 62.6 DEG (ref 53–72)
ANION GAP SERPL CALCULATED.3IONS-SCNC: 6 MMOL/L (ref 9–17)
ANION GAP SERPL CALCULATED.3IONS-SCNC: 7 MMOL/L (ref 9–17)
ANION GAP SERPL CALCULATED.3IONS-SCNC: 8 MMOL/L (ref 9–17)
ANION GAP SERPL CALCULATED.3IONS-SCNC: 9 MMOL/L (ref 9–17)
AST SERPL-CCNC: 27 U/L
AST SERPL-CCNC: 30 U/L
AT-III ACTIVITY: 122 % (ref 83–122)
BILIRUB SERPL-MCNC: 0.56 MG/DL (ref 0.3–1.2)
BILIRUB SERPL-MCNC: 0.67 MG/DL (ref 0.3–1.2)
BILIRUBIN DIRECT: 0.15 MG/DL
BILIRUBIN DIRECT: 0.18 MG/DL
BILIRUBIN, INDIRECT: 0.41 MG/DL (ref 0–1)
BILIRUBIN, INDIRECT: 0.49 MG/DL (ref 0–1)
BLD PROD TYP BPU: NORMAL
BLOOD BANK BLOOD PRODUCT EXPIRATION DATE: NORMAL
BLOOD BANK ISBT PRODUCT BLOOD TYPE: 5100
BLOOD BANK PRODUCT CODE: NORMAL
BLOOD BANK UNIT TYPE AND RH: NORMAL
BPU ID: NORMAL
BUN BLDV-MCNC: 24 MG/DL (ref 6–20)
BUN BLDV-MCNC: 25 MG/DL (ref 6–20)
BUN BLDV-MCNC: 26 MG/DL (ref 6–20)
BUN BLDV-MCNC: 26 MG/DL (ref 6–20)
CALCIUM IONIZED: 1.1 MMOL/L (ref 1.13–1.33)
CALCIUM IONIZED: 1.12 MMOL/L (ref 1.13–1.33)
CALCIUM IONIZED: 1.13 MMOL/L (ref 1.13–1.33)
CALCIUM IONIZED: 1.17 MMOL/L (ref 1.13–1.33)
CALCIUM SERPL-MCNC: 8.1 MG/DL (ref 8.6–10.4)
CALCIUM SERPL-MCNC: 8.4 MG/DL (ref 8.6–10.4)
CALCIUM SERPL-MCNC: 8.6 MG/DL (ref 8.6–10.4)
CALCIUM SERPL-MCNC: 8.6 MG/DL (ref 8.6–10.4)
CHLORIDE BLD-SCNC: 100 MMOL/L (ref 98–107)
CHLORIDE BLD-SCNC: 100 MMOL/L (ref 98–107)
CHLORIDE BLD-SCNC: 101 MMOL/L (ref 98–107)
CHLORIDE BLD-SCNC: 98 MMOL/L (ref 98–107)
CO2: 27 MMOL/L (ref 20–31)
CO2: 29 MMOL/L (ref 20–31)
CREAT SERPL-MCNC: 0.37 MG/DL (ref 0.5–0.9)
CREAT SERPL-MCNC: 0.39 MG/DL (ref 0.5–0.9)
CREAT SERPL-MCNC: 0.42 MG/DL (ref 0.5–0.9)
CREAT SERPL-MCNC: 0.44 MG/DL (ref 0.5–0.9)
CULTURE: ABNORMAL
CULTURE: ABNORMAL
DIRECT EXAM: ABNORMAL
DIRECT EXAM: ABNORMAL
DISPENSE STATUS BLOOD BANK: NORMAL
EPL-TEG: 0 % (ref 0–15)
FIBRINOGEN: 110 MG/DL (ref 140–420)
FIBRINOGEN: 112 MG/DL (ref 140–420)
FIBRINOGEN: 116 MG/DL (ref 140–420)
FIBRINOGEN: <80 MG/DL (ref 140–420)
FIO2: 100
FIO2: 21
FIO2: 32
FIO2: 4
FIO2: 4
FIO2: 40
GFR AFRICAN AMERICAN: >60 ML/MIN
GFR NON-AFRICAN AMERICAN: >60 ML/MIN
GFR SERPL CREATININE-BSD FRML MDRD: ABNORMAL ML/MIN/{1.73_M2}
GLUCOSE BLD-MCNC: 161 MG/DL (ref 74–100)
GLUCOSE BLD-MCNC: 161 MG/DL (ref 74–100)
GLUCOSE BLD-MCNC: 162 MG/DL (ref 74–100)
GLUCOSE BLD-MCNC: 166 MG/DL (ref 70–99)
GLUCOSE BLD-MCNC: 172 MG/DL (ref 70–99)
GLUCOSE BLD-MCNC: 177 MG/DL (ref 70–99)
GLUCOSE BLD-MCNC: 178 MG/DL (ref 74–100)
GLUCOSE BLD-MCNC: 180 MG/DL (ref 74–100)
GLUCOSE BLD-MCNC: 182 MG/DL (ref 70–99)
GLUCOSE BLD-MCNC: 185 MG/DL (ref 74–100)
HCO3 VENOUS: 27.1 MMOL/L (ref 22–29)
HCO3 VENOUS: 30 MMOL/L (ref 22–29)
HCT VFR BLD CALC: 28.3 % (ref 36.3–47.1)
HCT VFR BLD CALC: 31.4 % (ref 36.3–47.1)
HCT VFR BLD CALC: 32.1 % (ref 36.3–47.1)
HCT VFR BLD CALC: 32.9 % (ref 36.3–47.1)
HEMOGLOBIN: 10 G/DL (ref 11.9–15.1)
HEMOGLOBIN: 10.4 G/DL (ref 11.9–15.1)
HEMOGLOBIN: 8.9 G/DL (ref 11.9–15.1)
HEMOGLOBIN: 9.7 G/DL (ref 11.9–15.1)
HEPARIN THERAPY: ABNORMAL
HEPARIN THERAPY: ABNORMAL
HEPARIN THERAPY: NORMAL
INR BLD: 3.3
INR BLD: 3.7
INR BLD: 4.1
INR BLD: 4.5
KINETICS TEG: 2 MIN (ref 1–3)
KINETICS TEG: 2 MIN (ref 1–3)
KINETICS TEG: 2.3 MIN (ref 1–3)
LACTIC ACID, WHOLE BLOOD: 1.2 MMOL/L (ref 0.7–2.1)
LACTIC ACID, WHOLE BLOOD: 1.3 MMOL/L (ref 0.7–2.1)
LACTIC ACID, WHOLE BLOOD: 1.6 MMOL/L (ref 0.7–2.1)
LACTIC ACID, WHOLE BLOOD: 1.8 MMOL/L (ref 0.7–2.1)
LY30 (LYSIS) TEG: 0 % (ref 0–8)
MA (MAX CLOT) TEG: 55.8 MM (ref 50–70)
MA (MAX CLOT) TEG: 62.1 MM (ref 50–70)
MA (MAX CLOT) TEG: 63.6 MM (ref 50–70)
MAGNESIUM: 1.9 MG/DL (ref 1.6–2.6)
MAGNESIUM: 2 MG/DL (ref 1.6–2.6)
MCH RBC QN AUTO: 27.4 PG (ref 25.2–33.5)
MCH RBC QN AUTO: 28.1 PG (ref 25.2–33.5)
MCH RBC QN AUTO: 28.3 PG (ref 25.2–33.5)
MCH RBC QN AUTO: 28.3 PG (ref 25.2–33.5)
MCHC RBC AUTO-ENTMCNC: 30.2 G/DL (ref 28.4–34.8)
MCHC RBC AUTO-ENTMCNC: 31.4 G/DL (ref 28.4–34.8)
MCHC RBC AUTO-ENTMCNC: 31.6 G/DL (ref 28.4–34.8)
MCHC RBC AUTO-ENTMCNC: 31.8 G/DL (ref 28.4–34.8)
MCV RBC AUTO: 89 FL (ref 82.6–102.9)
MCV RBC AUTO: 89.3 FL (ref 82.6–102.9)
MCV RBC AUTO: 89.6 FL (ref 82.6–102.9)
MCV RBC AUTO: 90.7 FL (ref 82.6–102.9)
NRBC AUTOMATED: 0.1 PER 100 WBC
NRBC AUTOMATED: 0.2 PER 100 WBC
O2 DEVICE/FLOW/%: ABNORMAL
O2 SAT, VEN: 64 % (ref 60–85)
O2 SAT, VEN: 77 % (ref 60–85)
PARTIAL THROMBOPLASTIN TIME: 57 SEC (ref 20.5–30.5)
PARTIAL THROMBOPLASTIN TIME: 57.4 SEC (ref 20.5–30.5)
PARTIAL THROMBOPLASTIN TIME: 59.2 SEC (ref 20.5–30.5)
PARTIAL THROMBOPLASTIN TIME: 59.8 SEC (ref 20.5–30.5)
PARTIAL THROMBOPLASTIN TIME: 63.1 SEC (ref 20.5–30.5)
PARTIAL THROMBOPLASTIN TIME: 68.6 SEC (ref 20.5–30.5)
PARTIAL THROMBOPLASTIN TIME: 72 SEC (ref 20.5–30.5)
PCO2, VEN: 43.4 MM HG (ref 41–51)
PCO2, VEN: 46.6 MM HG (ref 41–51)
PDW BLD-RTO: 14.3 % (ref 11.8–14.4)
PDW BLD-RTO: 14.3 % (ref 11.8–14.4)
PDW BLD-RTO: 14.4 % (ref 11.8–14.4)
PDW BLD-RTO: 14.6 % (ref 11.8–14.4)
PERFORMING LOCATION: ABNORMAL
PERFORMING LOCATION: ABNORMAL
PERFORMING LOCATION: NORMAL
PH VENOUS: 7.4 (ref 7.32–7.43)
PH VENOUS: 7.42 (ref 7.32–7.43)
PLATELET # BLD: 155 K/UL (ref 138–453)
PLATELET # BLD: 157 K/UL (ref 138–453)
PLATELET # BLD: 185 K/UL (ref 138–453)
PLATELET # BLD: ABNORMAL K/UL (ref 138–453)
PLATELET, FLUORESCENCE: 145 K/UL (ref 138–453)
PLATELET, IMMATURE FRACTION: 5.2 % (ref 1.1–10.3)
PMV BLD AUTO: 10.3 FL (ref 8.1–13.5)
PMV BLD AUTO: 10.5 FL (ref 8.1–13.5)
PMV BLD AUTO: 10.7 FL (ref 8.1–13.5)
PO2, VEN: 33.2 MM HG (ref 30–50)
PO2, VEN: 41.4 MM HG (ref 30–50)
POC HCO3: 27.1 MMOL/L (ref 21–28)
POC HCO3: 27.1 MMOL/L (ref 21–28)
POC HCO3: 28.3 MMOL/L (ref 21–28)
POC HCO3: 28.4 MMOL/L (ref 21–28)
POC HCO3: 29.1 MMOL/L (ref 21–28)
POC HCO3: 29.2 MMOL/L (ref 21–28)
POC HCO3: 29.4 MMOL/L (ref 21–28)
POC HCO3: 29.7 MMOL/L (ref 21–28)
POC HCO3: 29.7 MMOL/L (ref 21–28)
POC HCO3: 29.9 MMOL/L (ref 21–28)
POC HEMATOCRIT: 29 % (ref 36–46)
POC HEMOGLOBIN: 10 G/DL (ref 12–16)
POC O2 SATURATION: 75 % (ref 94–98)
POC O2 SATURATION: 89 % (ref 94–98)
POC O2 SATURATION: 94 % (ref 94–98)
POC O2 SATURATION: 95 % (ref 94–98)
POC O2 SATURATION: 95 % (ref 94–98)
POC O2 SATURATION: 96 % (ref 94–98)
POC O2 SATURATION: 97 % (ref 94–98)
POC PCO2: 40.4 MM HG (ref 35–48)
POC PCO2: 42.9 MM HG (ref 35–48)
POC PCO2: 43.4 MM HG (ref 35–48)
POC PCO2: 43.5 MM HG (ref 35–48)
POC PCO2: 44.1 MM HG (ref 35–48)
POC PCO2: 44.5 MM HG (ref 35–48)
POC PCO2: 44.9 MM HG (ref 35–48)
POC PCO2: 45.1 MM HG (ref 35–48)
POC PCO2: 47.8 MM HG (ref 35–48)
POC PCO2: 48.3 MM HG (ref 35–48)
POC PH: 7.39 (ref 7.35–7.45)
POC PH: 7.4 (ref 7.35–7.45)
POC PH: 7.4 (ref 7.35–7.45)
POC PH: 7.41 (ref 7.35–7.45)
POC PH: 7.42 (ref 7.35–7.45)
POC PH: 7.43 (ref 7.35–7.45)
POC PH: 7.44 (ref 7.35–7.45)
POC PH: 7.45 (ref 7.35–7.45)
POC PO2: 40.1 MM HG (ref 83–108)
POC PO2: 55.4 MM HG (ref 83–108)
POC PO2: 66.8 MM HG (ref 83–108)
POC PO2: 67.5 MM HG (ref 83–108)
POC PO2: 70.5 MM HG (ref 83–108)
POC PO2: 71.5 MM HG (ref 83–108)
POC PO2: 72.4 MM HG (ref 83–108)
POC PO2: 77.2 MM HG (ref 83–108)
POC PO2: 81.1 MM HG (ref 83–108)
POC PO2: 92.4 MM HG (ref 83–108)
POSITIVE BASE EXCESS, ART: 2 (ref 0–3)
POSITIVE BASE EXCESS, ART: 2 (ref 0–3)
POSITIVE BASE EXCESS, ART: 4 (ref 0–3)
POSITIVE BASE EXCESS, ART: 5 (ref 0–3)
POSITIVE BASE EXCESS, ART: 5 (ref 0–3)
POSITIVE BASE EXCESS, VEN: 2 (ref 0–3)
POSITIVE BASE EXCESS, VEN: 5 (ref 0–3)
POTASSIUM SERPL-SCNC: 4.3 MMOL/L (ref 3.7–5.3)
POTASSIUM SERPL-SCNC: 4.6 MMOL/L (ref 3.7–5.3)
POTASSIUM SERPL-SCNC: 4.7 MMOL/L (ref 3.7–5.3)
POTASSIUM SERPL-SCNC: 4.8 MMOL/L (ref 3.7–5.3)
PROTHROMBIN TIME: 31.6 SEC (ref 9.1–12.3)
PROTHROMBIN TIME: 35.2 SEC (ref 9.1–12.3)
PROTHROMBIN TIME: 38.7 SEC (ref 9.1–12.3)
PROTHROMBIN TIME: 42.1 SEC (ref 9.1–12.3)
RBC # BLD: 3.17 M/UL (ref 3.95–5.11)
RBC # BLD: 3.53 M/UL (ref 3.95–5.11)
RBC # BLD: 3.54 M/UL (ref 3.95–5.11)
RBC # BLD: 3.67 M/UL (ref 3.95–5.11)
REACTION TIME TEG: 10.1 MIN (ref 5–10)
REACTION TIME TEG: 10.7 MIN (ref 5–10)
REACTION TIME TEG: 9 MIN (ref 5–10)
SAMPLE SITE: ABNORMAL
SODIUM BLD-SCNC: 135 MMOL/L (ref 135–144)
SODIUM BLD-SCNC: 136 MMOL/L (ref 135–144)
SPECIMEN DESCRIPTION: ABNORMAL
TOTAL PROTEIN: 5.6 G/DL (ref 6.4–8.3)
TOTAL PROTEIN: 5.6 G/DL (ref 6.4–8.3)
TRANSFUSION STATUS: NORMAL
TRIGL SERPL-MCNC: 96 MG/DL
UNIT DIVISION: 0
UNIT ISSUE DATE/TIME: NORMAL
WBC # BLD: 27.9 K/UL (ref 3.5–11.3)
WBC # BLD: 36.3 K/UL (ref 3.5–11.3)
WBC # BLD: 37.3 K/UL (ref 3.5–11.3)
WBC # BLD: 40.2 K/UL (ref 3.5–11.3)

## 2022-04-29 PROCEDURE — 2700000000 HC OXYGEN THERAPY PER DAY

## 2022-04-29 PROCEDURE — C9113 INJ PANTOPRAZOLE SODIUM, VIA: HCPCS | Performed by: INTERNAL MEDICINE

## 2022-04-29 PROCEDURE — 94640 AIRWAY INHALATION TREATMENT: CPT

## 2022-04-29 PROCEDURE — 85384 FIBRINOGEN ACTIVITY: CPT

## 2022-04-29 PROCEDURE — 6360000002 HC RX W HCPCS: Performed by: INTERNAL MEDICINE

## 2022-04-29 PROCEDURE — 2580000003 HC RX 258: Performed by: INTERNAL MEDICINE

## 2022-04-29 PROCEDURE — 80076 HEPATIC FUNCTION PANEL: CPT

## 2022-04-29 PROCEDURE — 6370000000 HC RX 637 (ALT 250 FOR IP): Performed by: INTERNAL MEDICINE

## 2022-04-29 PROCEDURE — 6360000002 HC RX W HCPCS: Performed by: THORACIC SURGERY (CARDIOTHORACIC VASCULAR SURGERY)

## 2022-04-29 PROCEDURE — 82947 ASSAY GLUCOSE BLOOD QUANT: CPT

## 2022-04-29 PROCEDURE — 33948 ECMO/ECLS DAILY MGMT-VENOUS: CPT | Performed by: INTERNAL MEDICINE

## 2022-04-29 PROCEDURE — 82803 BLOOD GASES ANY COMBINATION: CPT

## 2022-04-29 PROCEDURE — 36415 COLL VENOUS BLD VENIPUNCTURE: CPT

## 2022-04-29 PROCEDURE — 6360000002 HC RX W HCPCS: Performed by: STUDENT IN AN ORGANIZED HEALTH CARE EDUCATION/TRAINING PROGRAM

## 2022-04-29 PROCEDURE — 85027 COMPLETE CBC AUTOMATED: CPT

## 2022-04-29 PROCEDURE — 2500000003 HC RX 250 WO HCPCS

## 2022-04-29 PROCEDURE — 87086 URINE CULTURE/COLONY COUNT: CPT

## 2022-04-29 PROCEDURE — 33948 ECMO/ECLS DAILY MGMT-VENOUS: CPT

## 2022-04-29 PROCEDURE — 94761 N-INVAS EAR/PLS OXIMETRY MLT: CPT

## 2022-04-29 PROCEDURE — 85576 BLOOD PLATELET AGGREGATION: CPT

## 2022-04-29 PROCEDURE — 82330 ASSAY OF CALCIUM: CPT

## 2022-04-29 PROCEDURE — 94003 VENT MGMT INPAT SUBQ DAY: CPT

## 2022-04-29 PROCEDURE — 85014 HEMATOCRIT: CPT

## 2022-04-29 PROCEDURE — 84478 ASSAY OF TRIGLYCERIDES: CPT

## 2022-04-29 PROCEDURE — 2100000001 HC CVICU R&B

## 2022-04-29 PROCEDURE — 6360000002 HC RX W HCPCS

## 2022-04-29 PROCEDURE — 6370000000 HC RX 637 (ALT 250 FOR IP)

## 2022-04-29 PROCEDURE — 83051 HEMOGLOBIN PLASMA: CPT

## 2022-04-29 PROCEDURE — 85055 RETICULATED PLATELET ASSAY: CPT

## 2022-04-29 PROCEDURE — A4216 STERILE WATER/SALINE, 10 ML: HCPCS | Performed by: INTERNAL MEDICINE

## 2022-04-29 PROCEDURE — 83735 ASSAY OF MAGNESIUM: CPT

## 2022-04-29 PROCEDURE — 85730 THROMBOPLASTIN TIME PARTIAL: CPT

## 2022-04-29 PROCEDURE — 99291 CRITICAL CARE FIRST HOUR: CPT | Performed by: INTERNAL MEDICINE

## 2022-04-29 PROCEDURE — 83605 ASSAY OF LACTIC ACID: CPT

## 2022-04-29 PROCEDURE — 99222 1ST HOSP IP/OBS MODERATE 55: CPT | Performed by: INTERNAL MEDICINE

## 2022-04-29 PROCEDURE — 2580000003 HC RX 258: Performed by: THORACIC SURGERY (CARDIOTHORACIC VASCULAR SURGERY)

## 2022-04-29 PROCEDURE — 85300 ANTITHROMBIN III ACTIVITY: CPT

## 2022-04-29 PROCEDURE — 85610 PROTHROMBIN TIME: CPT

## 2022-04-29 PROCEDURE — 99233 SBSQ HOSP IP/OBS HIGH 50: CPT | Performed by: INTERNAL MEDICINE

## 2022-04-29 PROCEDURE — 85347 COAGULATION TIME ACTIVATED: CPT

## 2022-04-29 PROCEDURE — 2580000003 HC RX 258: Performed by: STUDENT IN AN ORGANIZED HEALTH CARE EDUCATION/TRAINING PROGRAM

## 2022-04-29 PROCEDURE — 71045 X-RAY EXAM CHEST 1 VIEW: CPT

## 2022-04-29 PROCEDURE — 80048 BASIC METABOLIC PNL TOTAL CA: CPT

## 2022-04-29 PROCEDURE — 85390 FIBRINOLYSINS SCREEN I&R: CPT

## 2022-04-29 PROCEDURE — 87040 BLOOD CULTURE FOR BACTERIA: CPT

## 2022-04-29 RX ADMIN — IPRATROPIUM BROMIDE AND ALBUTEROL SULFATE 1 AMPULE: .5; 2.5 SOLUTION RESPIRATORY (INHALATION) at 11:29

## 2022-04-29 RX ADMIN — SODIUM CHLORIDE, PRESERVATIVE FREE 40 MG: 5 INJECTION INTRAVENOUS at 13:04

## 2022-04-29 RX ADMIN — INSULIN LISPRO 2 UNITS: 100 INJECTION, SOLUTION INTRAVENOUS; SUBCUTANEOUS at 13:05

## 2022-04-29 RX ADMIN — DOCUSATE SODIUM 100 MG: 50 LIQUID ORAL at 08:12

## 2022-04-29 RX ADMIN — DOCUSATE SODIUM 100 MG: 50 LIQUID ORAL at 20:09

## 2022-04-29 RX ADMIN — DILTIAZEM HYDROCHLORIDE 30 MG: 30 TABLET ORAL at 18:35

## 2022-04-29 RX ADMIN — OXYCODONE HYDROCHLORIDE 10 MG: 5 TABLET ORAL at 23:58

## 2022-04-29 RX ADMIN — CHLORDIAZEPOXIDE HYDROCHLORIDE 50 MG: 25 CAPSULE ORAL at 13:05

## 2022-04-29 RX ADMIN — IPRATROPIUM BROMIDE AND ALBUTEROL SULFATE 1 AMPULE: .5; 2.5 SOLUTION RESPIRATORY (INHALATION) at 15:27

## 2022-04-29 RX ADMIN — ARGATROBAN 5.1 MCG/KG/MIN: 250 INJECTION, SOLUTION INTRAVENOUS at 00:08

## 2022-04-29 RX ADMIN — Medication 30 UNITS: at 18:10

## 2022-04-29 RX ADMIN — OXYCODONE HYDROCHLORIDE 10 MG: 5 TABLET ORAL at 16:44

## 2022-04-29 RX ADMIN — ARGATROBAN 5.5 MCG/KG/MIN: 250 INJECTION, SOLUTION INTRAVENOUS at 19:11

## 2022-04-29 RX ADMIN — INSULIN LISPRO 3 UNITS: 100 INJECTION, SOLUTION INTRAVENOUS; SUBCUTANEOUS at 08:11

## 2022-04-29 RX ADMIN — METHYLPREDNISOLONE SODIUM SUCCINATE 40 MG: 40 INJECTION, POWDER, FOR SOLUTION INTRAMUSCULAR; INTRAVENOUS at 19:27

## 2022-04-29 RX ADMIN — DILTIAZEM HYDROCHLORIDE 30 MG: 30 TABLET ORAL at 05:43

## 2022-04-29 RX ADMIN — MEROPENEM 1000 MG: 1 INJECTION, POWDER, FOR SOLUTION INTRAVENOUS at 13:35

## 2022-04-29 RX ADMIN — Medication 30 UNITS: at 05:45

## 2022-04-29 RX ADMIN — Medication 30 UNITS: at 06:00

## 2022-04-29 RX ADMIN — SODIUM CHLORIDE, PRESERVATIVE FREE 10 ML: 5 INJECTION INTRAVENOUS at 08:12

## 2022-04-29 RX ADMIN — INSULIN LISPRO 2 UNITS: 100 INJECTION, SOLUTION INTRAVENOUS; SUBCUTANEOUS at 20:15

## 2022-04-29 RX ADMIN — ARGATROBAN 5.38 MCG/KG/MIN: 250 INJECTION, SOLUTION INTRAVENOUS at 13:14

## 2022-04-29 RX ADMIN — POLYVINYL ALCOHOL 1 DROP: 14 SOLUTION/ DROPS OPHTHALMIC at 16:45

## 2022-04-29 RX ADMIN — DILTIAZEM HYDROCHLORIDE 30 MG: 30 TABLET ORAL at 12:58

## 2022-04-29 RX ADMIN — Medication 30 UNITS: at 18:04

## 2022-04-29 RX ADMIN — CHLORDIAZEPOXIDE HYDROCHLORIDE 50 MG: 25 CAPSULE ORAL at 05:42

## 2022-04-29 RX ADMIN — METHYLPREDNISOLONE SODIUM SUCCINATE 40 MG: 40 INJECTION, POWDER, FOR SOLUTION INTRAMUSCULAR; INTRAVENOUS at 08:10

## 2022-04-29 RX ADMIN — POLYETHYLENE GLYCOL 3350 17 G: 17 POWDER, FOR SOLUTION ORAL at 08:12

## 2022-04-29 RX ADMIN — DILTIAZEM HYDROCHLORIDE 30 MG: 30 TABLET ORAL at 00:08

## 2022-04-29 RX ADMIN — MIDAZOLAM 2 MG: 1 INJECTION INTRAMUSCULAR; INTRAVENOUS at 06:03

## 2022-04-29 RX ADMIN — OXYCODONE HYDROCHLORIDE 10 MG: 5 TABLET ORAL at 03:03

## 2022-04-29 RX ADMIN — POLYVINYL ALCOHOL 1 DROP: 14 SOLUTION/ DROPS OPHTHALMIC at 20:16

## 2022-04-29 RX ADMIN — OXYCODONE HYDROCHLORIDE 10 MG: 5 TABLET ORAL at 12:58

## 2022-04-29 RX ADMIN — INSULIN LISPRO 2 UNITS: 100 INJECTION, SOLUTION INTRAVENOUS; SUBCUTANEOUS at 16:45

## 2022-04-29 RX ADMIN — OXYCODONE HYDROCHLORIDE 10 MG: 5 TABLET ORAL at 08:11

## 2022-04-29 RX ADMIN — SODIUM CHLORIDE: 9 INJECTION, SOLUTION INTRAVENOUS at 18:43

## 2022-04-29 RX ADMIN — Medication 8 MG/HR: at 01:47

## 2022-04-29 RX ADMIN — INSULIN LISPRO 2 UNITS: 100 INJECTION, SOLUTION INTRAVENOUS; SUBCUTANEOUS at 04:19

## 2022-04-29 RX ADMIN — METOPROLOL TARTRATE 5 MG: 1 INJECTION, SOLUTION INTRAVENOUS at 12:31

## 2022-04-29 RX ADMIN — IPRATROPIUM BROMIDE AND ALBUTEROL SULFATE 1 AMPULE: .5; 2.5 SOLUTION RESPIRATORY (INHALATION) at 08:08

## 2022-04-29 RX ADMIN — IPRATROPIUM BROMIDE AND ALBUTEROL SULFATE 1 AMPULE: .5; 2.5 SOLUTION RESPIRATORY (INHALATION) at 20:14

## 2022-04-29 RX ADMIN — ARGATROBAN 5.1 MCG/KG/MIN: 250 INJECTION, SOLUTION INTRAVENOUS at 06:04

## 2022-04-29 RX ADMIN — POLYVINYL ALCOHOL 1 DROP: 14 SOLUTION/ DROPS OPHTHALMIC at 03:04

## 2022-04-29 RX ADMIN — CEFEPIME HYDROCHLORIDE 2000 MG: 2 INJECTION, POWDER, FOR SOLUTION INTRAVENOUS at 03:03

## 2022-04-29 RX ADMIN — OXYCODONE HYDROCHLORIDE 10 MG: 5 TABLET ORAL at 20:05

## 2022-04-29 RX ADMIN — Medication 30 UNITS: at 17:57

## 2022-04-29 RX ADMIN — Medication 30 UNITS: at 05:30

## 2022-04-29 RX ADMIN — SODIUM CHLORIDE, PRESERVATIVE FREE 10 ML: 5 INJECTION INTRAVENOUS at 20:08

## 2022-04-29 RX ADMIN — METOPROLOL TARTRATE 5 MG: 1 INJECTION, SOLUTION INTRAVENOUS at 20:03

## 2022-04-29 RX ADMIN — BUDESONIDE 500 MCG: 0.5 INHALANT RESPIRATORY (INHALATION) at 08:08

## 2022-04-29 RX ADMIN — ACETAMINOPHEN 650 MG: 325 TABLET ORAL at 23:59

## 2022-04-29 RX ADMIN — Medication 30 UNITS: at 18:18

## 2022-04-29 RX ADMIN — CHLORDIAZEPOXIDE HYDROCHLORIDE 50 MG: 25 CAPSULE ORAL at 18:35

## 2022-04-29 RX ADMIN — POLYVINYL ALCOHOL 1 DROP: 14 SOLUTION/ DROPS OPHTHALMIC at 08:55

## 2022-04-29 RX ADMIN — FUROSEMIDE 20 MG: 10 INJECTION, SOLUTION INTRAMUSCULAR; INTRAVENOUS at 08:10

## 2022-04-29 RX ADMIN — METOPROLOL TARTRATE 5 MG: 1 INJECTION, SOLUTION INTRAVENOUS at 06:00

## 2022-04-29 RX ADMIN — IPRATROPIUM BROMIDE AND ALBUTEROL SULFATE 1 AMPULE: .5; 2.5 SOLUTION RESPIRATORY (INHALATION) at 03:23

## 2022-04-29 RX ADMIN — MIDAZOLAM 2 MG: 1 INJECTION INTRAMUSCULAR; INTRAVENOUS at 00:43

## 2022-04-29 RX ADMIN — ACETAMINOPHEN 650 MG: 325 TABLET ORAL at 08:10

## 2022-04-29 RX ADMIN — ACETAMINOPHEN 650 MG: 325 TABLET ORAL at 18:35

## 2022-04-29 ASSESSMENT — PAIN DESCRIPTION - DESCRIPTORS
DESCRIPTORS: ACHING
DESCRIPTORS: ACHING

## 2022-04-29 ASSESSMENT — PAIN DESCRIPTION - LOCATION
LOCATION: GENERALIZED
LOCATION: GENERALIZED

## 2022-04-29 ASSESSMENT — PAIN SCALES - GENERAL
PAINLEVEL_OUTOF10: 4
PAINLEVEL_OUTOF10: 5
PAINLEVEL_OUTOF10: 4

## 2022-04-29 ASSESSMENT — PULMONARY FUNCTION TESTS
PIF_VALUE: 15
PIF_VALUE: 11
PIF_VALUE: 21

## 2022-04-29 NOTE — PLAN OF CARE
Problem: Cardiac:  Goal: Hemodynamic stability will improve  Description: Hemodynamic stability will improve  Outcome: Progressing     Problem: Safety - Medical Restraint  Goal: Remains free of injury from restraints (Restraint for Interference with Medical Device)  Description: INTERVENTIONS:  1. Determine that other, less restrictive measures have been tried or would not be effective before applying the restraint  2. Evaluate the patient's condition at the time of restraint application  3. Inform patient/family regarding the reason for restraint  4. Q2H: Monitor safety, psychosocial status, comfort, nutrition and hydration  Outcome: Progressing     Problem: ABCDS Injury Assessment  Goal: Absence of physical injury  Outcome: Progressing     Problem: Skin/Tissue Integrity  Goal: Absence of new skin breakdown  Description: 1. Monitor for areas of redness and/or skin breakdown  2. Assess vascular access sites hourly  3. Every 4-6 hours minimum:  Change oxygen saturation probe site  4. Every 4-6 hours:  If on nasal continuous positive airway pressure, respiratory therapy assess nares and determine need for appliance change or resting period. Outcome: Progressing     Round on patient hourly. Reposition patient every two hours. Maintain clean and dry skin.   Jermain Pittman RN

## 2022-04-29 NOTE — PROGRESS NOTES
Order obtained for extubation. SpO2 of 97 on 40% FiO2. Patient extubated and placed on 4 liters O2 via nasal cannula. Post extubation SpO2 is 94% with HR  115 bpm and RR 18 breaths/min. Patient had mild cough that was non-productive. Extubation Well tolerated by patient. .   Breath Sounds: clerar, diminished    NIKKI ANGEL RCP   12:50 PM

## 2022-04-29 NOTE — PROGRESS NOTES
ECMO DAILY ROUNDING NOTE     Patient:  Arabella Gilbert  MRN: [id number]  Admit date: 4/18/2022  Primary Care Physician: Amol Bland MD  Consulting Physician: Loc Arreola MD  CODE Status: Full Code  LOS: 10    [x] Team present at bedside   [x] Family updated    INDICATION:   Respiratory Failure, status asthmaticus    CANNULATION:  VenoVeno ECMO cannulation on 4/22/22  Bilateral femoral cannulas  TREATMENT GOALS:  PUMP  FLOW: 3-5L/min  pH: 7.35-7.45   SvO2: >70%  SaO2:>97%  Sedation : RASS -1 to+1    CURRENT ECMO PARAMETERS:  PUMP Patient on ECMO: On  Hours on ECMO: 155  Pump Flow (L/min): 4.51 LPM  Pump Speed (Rotations/min): 3000 RPM  ECMO Mode: V/V  Pump Mode: Cardiohelp   SWEEP GAS Sweep Flow (L/min): 0.5 LPM   FiO2 (%): 40 %   CIRCUIT PRESSURES Inlet Pressure (Bladder): -82 mmHg  Pre-Membrane Pressure: 150 mmHg  Post-Membrane Pressure: 129 mmHg  Delta P: 23 mmHg  SVO2 (%): 69.3 %  ECMO blood flow temperature: 98.8 °F (37.1 °C)  CVP (mmHg): 6 mmHg   CIRCUIT CHECK Heat Exchg.  Water Temp Set: 36.9  Heat Exchanger Water Temp Actual: 36.9  Heat Exchanger Water Level: Full  Unused Pigtails Cleared: Done  System Plugged to Red Outlet: Done  Emergency Backup in Use: No  Hand Crank Available: Yes  Backup Unit Blood Avail: Done  Cart Checked and Stocked: Done  Pump Battery Charged: 100 Volts  Pressure Monitors Zeroed: Done  Pressure Limits Checked: Done  Circuit Number: 1  Back Up Circuit: Not done (being serviced)  Back Up Console/Motor: Not done (serviced)  Emergency O2 Tank Available: Done  Circuits and Cannulation Sites: Done  CMAG Flow Probe Repositioned: Not done  High/Low flow alarm set?: Yes  High/Low temp alarm set?: Yes  High/Low venous alarm set?: Yes  Pre-MO alarm limit: 300  Post-MO alarm limit: 300     CURRENT VENTILATOR SETTINGS:  Vent Information  Ventilator ID: [email or web address]  Vent Mode: AC/PC     RECENT LABS:  ABG Recent Labs     04/28/22  1100 04/28/22  1208 04/28/22  1552 04/28/22  1816 04/28/22 2009   POCPH 7.353 7.363 7.405 7.371 7.374   POCPCO2 57.6* 55.2* 49.5* 49.2* 52.5*   POCPO2 61.4* 67.0* 73.8* 53.0* 88.7   POCHCO3 32.0* 31.4* 31.0* 28.5* 30.6*   ECCE5JIA 89* 92* 94 86* 96      VBG Recent Labs     04/26/22  1819 04/27/22  0627 04/27/22  1757 04/28/22  0614 04/28/22  1809   PHVEN 7.360 7.376 7.355 7.366 7.377   XHU5ENA 54.3* 51.2* 54.7* 47.2 53.4*   CPY9IOQ 30.7* 30.0* 30.5* 27.0 31.4*   H3JDHLFF 78 74 71 71 69      CBC Recent Labs     04/27/22  1604 04/27/22 2209 04/28/22  0410 04/28/22  1045 04/28/22  1620   WBC 34.4* 31.3* 29.3* 31.4* 34.4*   HGB 9.6* 9.1* 8.7* 9.1* 9.4*   HCT 32.4* 29.8* 29.1* 29.9* 30.9*    159 154 152 153      COAGS Recent Labs     04/27/22  1604 04/27/22  1852 04/27/22 2209 04/27/22  2310 04/28/22  0410 04/28/22  0501 04/28/22  1045 04/28/22  1620 04/28/22  1831   INR 3.1  --  3.3  --  3.7  --  3.7 3.2  --    PROTIME 29.8*  --  31.8*  --  35.3*  --  35.9* 31.2*  --    APTT 58.7*   < > 61.0*   < > 64.8* 61.9* 63.3* 58.1* 58.4*    < > = values in this interval not displayed.       TEG Recent Labs     04/27/22  2209 04/28/22  1045 04/28/22  1620   HEPTHERAPY None None UNKNOWN   REACTTIMETEG 14.0* 13.2* 12.5*   KINETICSTEG 2.2 2.2 2.2   ANGLETEG 61.7 62.0 61.7   MAXCLOTTEG 64.4 67.0 65.2   UY50VST 0.0 0.0 0.0   EPLTEG 0.0 0.0 0.0      BMP Recent Labs     04/27/22  1604 04/27/22  2209 04/28/22  0410 04/28/22  1045 04/28/22  1620    138 138 136 137   K 5.0 4.9 4.7 4.9 4.5    102 104 101 102   CO2 31 28 29 29 30   BUN 30* 31* 29* 29* 31*   CREATININE 0.45* 0.45* 0.43* 0.48* 0.43*   GLUCOSE 160* 181* 176* 178* 199*   MG 2.2 2.2 2.1 2.0 2.1      LFT Recent Labs     04/26/22  1648 04/27/22  0400 04/27/22  1604 04/28/22  0410 04/28/22  1620   PROT 5.9* 5.6* 5.6* 5.0* 5.4*   LABALBU 3.3* 3.4* 3.3* 3.0* 3.3*   ALT 83* 75* 68* 57* 61*   AST 28 18 17 17 19   ALKPHOS 54 50 48 46 48   BILITOT 0.47 0.45 0.40 0.34 0.37      INFLAMMATORY MARKERS No results for input(s): CRP, FERRITIN, LDH in the last 72 hours. Invalid input(s):  ESR   CARDIAC No results for input(s): CKTOTAL, CKMB, CKMBINDEX, TROPONINI, BNP, PROBNP in the last 72 hours. Invalid input(s): TROPONIN, HSTROP   LACTIC ACID No results for input(s): LACTA in the last 72 hours. TRIGLYCERIDE No results for input(s): TRIG in the last 72 hours.      ASSESSMENT AND SYSTEM BASED PLAN (MEDICAL DECISION MAKING):    Neurologic                                                  [x] Sedation/paralytic requirement reviewed                              [x] Neurological assessment; not done, patient sedated with propofol and fentanyl currently     Cardiovascular                               [x] Cannulas secured, dsg dry and intact, no drainage or hematoma noted                             [x] ECMO Pump flow/pressures reviewed                             [x] Telemetry reviewed                             [x] Hemodynamic parameters stable    Pulmonary                             [x] Blood gases (Patient/Circuit) reviewed                             [x] Sweep/ settings reviewed                             [x] Vent Settings reviewed                             [x] Chest X-ray reviewed    Genitourinary                               [x] Intake/Output reviewed                             [x] Need for continuous IV fluids assessed, recommended diuresis with Lasix                             [x] Need for diuresis/renal replacement therapy reviewed    Gastrointestinal                              [x] GI Prophylaxis reviewed                              [x] Enteral nutrition reviewed    Hematology                             [x] Anticoagulation: Argatroban                            [x] Reviewed CBC, coagulation profile, ACT, TEG and platelet count                            [x] Transfusion needs reviewed                    Infectious disease                           [x] Reviewed T-max, white count and cultures results [x] Antimicrobials reviewed; has been switched to cefepime    Endocrine                            [x] Reviewed glycemic control                           [x] Reviewed need for steroids; continue Solu-Medrol    Others                           [x] Reviewed need for restrains                           [x] Reviewed need for lines/drains/invasive devices                           [x] Skin/Wound care                           [x] Reviewed current Medications list/orders                           [x] Reviewed goals of care                   OVERALL CONDITION:   Stable  FiO2 on vent and dropped to 50%, sweep decreased to 0.5 L  Will attempt weaning as tolerated      Julia Bland MD  Pulmonary & Critical care Medicine  4/28/2022

## 2022-04-29 NOTE — CARE COORDINATION
TRansitional Planning    Spoke to Javier from [place] at [phone] and he confirms receiving referral and is currently reviewing

## 2022-04-29 NOTE — PROGRESS NOTES
PULMONARY & CRITICAL CARE MEDICINE PROGRESS NOTE     Patient:  Zulay Bethea  MRN: [id number]  Admit date: 2022  Primary Care Physician: Woodie Goodell, MD  Consulting Physician: Joy Glasgow MD  CODE Status: Full Code  LOS: 11     SUBJECTIVE     CHIEF COMPLAINT/REASON FOR INITIAL CONSULT: Acute respiratory failure    BRIEF HOSPITAL COURSE:   The patient is a 23 y.o. female with past medical history of asthma was initially admitted to [facility] with acute exacerbation. She was initially put on nonrebreather, subsequently required intubation and initiation of mechanical ventilation due to worsening respiratory status. Patient was also on bicarb drip which was discontinued on arrival to [place].  She is sedated, paralyzed and mechanically ventilated. ABG shows improvement in respiratory acidosis and oxygenation.     INTERVAL HISTORY:  22  Sweep on ECMO was stopped this morning  She was following commands, on sedation discontinued  Patient was tolerating spontaneous mode of ventilation  Extubated without any immediate complications  Continues to be hemodynamically stable  Recommended nocturnal BiPAP  Respiratory culture grew Pseudomonas, on cefepime    OBJECTIVE     VENTILATOR SETTINGS:  Vent Information  Ventilator ID: [id number]  Vent Mode: (S) PS/CPAP  Ventilator Discontinue: Yes     PaO2/FiO2 RATIO:  Recent Labs     22  1141   POCPO2 72.4*      FiO2 : 40 %     VITAL SIGNS:   LAST:  BP (!) 157/99   Pulse 89   Temp 99.1 °F (37.3 °C) (Bladder)   Resp 12   Ht 5' 3\" (1.6 m)   Wt (!) 304 lb 10.8 oz (138.2 kg)   SpO2 98%   BMI 53.97 kg/m²   8-24 HR RANGE:  TEMP Temp  Av.9 °F (37.2 °C)  Min: 98.8 °F (37.1 °C)  Max: 99.1 °F (71.3 °C)   BP Systolic (45AKJ), VQW:388 , Min:89 , YR      Diastolic (90CPF), ZPR:19, Min:34, Max:110     PULSE Pulse  Av.2  Min: 64  Max: 130   RR Resp  Av.1  Min: 11  Max: 19   O2 SAT SpO2  Av.1 %  Min: 94 %  Max: 100 %   OXYGEN DELIVERY No data recorded        Exam  Morbid obesity  Endotracheal tube in place  No subcutaneous air in the neck  Poor air entry bilaterally without rhonchi and wheezing. Abdomen soft obese nontender  Lower extremity edema present  Bilateral femoral cannulas  Upper extremity noted    DATA REVIEW     Medications:  Scheduled Meds:   insulin lispro  0-12 Units SubCUTAneous Q4H    methylPREDNISolone  40 mg IntraVENous Q12H    budesonide  0.5 mg Nebulization BID    chlordiazePOXIDE  50 mg Oral 4 times per day    lidocaine PF  5 mL Tracheal Tube Once    oxyCODONE  10 mg Oral Q4H    docusate  100 mg Oral BID    polyethylene glycol  17 g Oral Daily    furosemide  20 mg IntraVENous Daily    heparin flush (PF)  3 mL IntraVENous Q12H    heparin flush (PF)  3 mL IntraVENous Q12H    heparin flush (PF)  3 mL IntraVENous Q12H    heparin flush (PF)  3 mL IntraVENous Q12H    albumin human  25 g IntraVENous Once    pantoprazole (PROTONIX) 40 mg injection  40 mg IntraVENous Daily    dilTIAZem  30 mg Oral 4 times per day    polyvinyl alcohol  1 drop Both Eyes Q6H    ipratropium-albuterol  1 ampule Inhalation Q4H    sodium chloride flush  5-40 mL IntraVENous 2 times per day     Continuous Infusions:   argatroban infusion 5.5 mcg/kg/min (04/29/22 1315)    sodium chloride      ketamine (KETALAR) 2 mg/mL infusion for analgosedation Stopped (04/29/22 1240)    midazolam Stopped (04/29/22 1229)    fentaNYL 50 mcg/mL Stopped (04/29/22 1229)    dextrose      sodium chloride 10 mL/hr at 04/29/22 1315       INPUT/OUTPUT:  In: 4284.2 [I.V.:2032. 2; NG/GT:2252]  Out: 5150 [Urine:5150]  Date 04/29/22 0000 - 04/29/22 2359   Shift 8603-6679 3104-0244 9090-2140 24 Hour Total   INTAKE   I.V.(mL/kg) 508.3(3.7) 380.4(2.8)  888. 8(6.4)   NG/GT(mL/kg) 752(5.4) 589(4.3)  1341(9.7)   Shift Total(mL/kg) 1260.3(9.1) 969.4(7)  2229. 8(16.1)   OUTPUT   Urine(mL/kg/hr) 800(0.7) 2215  3015   Shift Total(mL/kg) 800(5.8) 4053(62)  1555(48.3)   Weight (kg) 138.2 138.2 138.2 138.2        LABS:  ABGs:   Recent Labs     04/29/22  0355 04/29/22  0530 04/29/22  0651 04/29/22  0743 04/29/22  1141   POCPH 7.425 7.408 7.403 7. 401 7.427   POCPCO2 44.9 42.9 43.5 47.8 45.1   POCPO2 55.4* 77.2* 40.1* 92.4 72.4*   POCHCO3 29.4* 27.1 27.1 29.7* 29.7*   ZLZZ4EKI 89* 95 75* 97 95     CBC:   Recent Labs     04/28/22  1045 04/28/22  1620 04/28/22 2209 04/29/22  0402 04/29/22  0944   WBC 31.4* 34.4* 38.1* 37.3* 40.2*   HGB 9.1* 9.4* 9.3* 9.7* 10.4*   HCT 29.9* 30.9* 30.5* 32.1* 32.9*   MCV 92.3 92.8 91.3 90.7 89.6    153 160 157 185   RBC 3.24* 3.33* 3.34* 3.54* 3.67*   MCH 28.1 28.2 27.8 27.4 28.3   MCHC 30.4 30.4 30.5 30.2 31.6   RDW 14.2 14.1 14.2 14.3 14.6*     CRP:   No results for input(s): CRP in the last 72 hours. LDH:   No results for input(s): LDH in the last 72 hours. BMP:   Recent Labs     04/28/22  1045 04/28/22  1620 04/28/22 2209 04/29/22  0402 04/29/22  0944    137 136 136 135   K 4.9 4.5 4.7 4.6 4.3    102 102 100 98   CO2 29 30 29 27 29   BUN 29* 31* 28* 26* 26*   CREATININE 0.48* 0.43* 0.35* 0.37* 0.42*   GLUCOSE 178* 199* 151* 182* 166*     Liver Function Test:   Recent Labs     04/27/22  0400 04/27/22  1604 04/28/22  0410 04/28/22  1620 04/29/22  0402   PROT 5.6* 5.6* 5.0* 5.4* 5.6*   LABALBU 3.4* 3.3* 3.0* 3.3* 3.4*   ALT 75* 68* 57* 61* 64*   AST 18 17 17 19 30   ALKPHOS 50 48 46 48 53   BILITOT 0.45 0.40 0.34 0.37 0.56     Coagulation Profile:   Recent Labs     04/28/22  1045 04/28/22  1045 04/28/22  1620 04/28/22  1831 04/28/22  2209 04/29/22  0402 04/29/22  0530 04/29/22  0856 04/29/22  1240   INR 3.7  --  3.2  --  3.5 3.3  --   --  4.1   PROTIME 35.9*  --  31.2*  --  33.5* 31.6*  --   --  38.7*   APTT 63.3*   < > 58.1*   < > 62.0* 59.8* 59.2* 57.4* 57.0*    < > = values in this interval not displayed. D-Dimer:  No results for input(s): DDIMER in the last 72 hours.   Lactic Acid:  No results for input(s): LACTA in the last 72 hours. Cardiac Enzymes:  No results for input(s): CKTOTAL, CKMB, CKMBINDEX, TROPONINI in the last 72 hours. Invalid input(s): TROPONIN, HSTROP  BNP/ProBNP:   No results for input(s): BNP, PROBNP in the last 72 hours. Triglycerides:  Recent Labs     04/29/22  0402   TRIG 96        Microbiology:  Urine Culture:  No components found for: CURINE  Blood Culture:  No components found for: CBLOOD, CFUNGUSBL  Sputum Culture:  No components found for: CSPUTUM  No results for input(s): SPECDESC, SPECIAL, CULTURE, STATUS, ORG, CDIFFTOXPCR, CAMPYLOBPCR, SALMONELLAPC, SHIGAPCR, SHIGELLAPCR, MPNEUG, MPNEUM, LACTOQL in the last 72 hours. No results for input(s): SPUTUM, SPECDESC, SPECIAL, CULTURE, STATUS, ORG, CDIFFTOXPCR, MPNEUM, MPNEUG in the last 72 hours. Invalid input(s): CURINE, CBLOOD, CFUNGUSBL     Pathology:    Radiology Reports:  XR CHEST PORTABLE   Final Result   No significant change from prior study. Continued bibasilar opacities and   small effusions. XR CHEST PORTABLE   Final Result   Stable exam.         VL DUP LOWER EXTREMITY VENOUS BILATERAL   Final Result      XR CHEST PORTABLE   Final Result   1. Stable appropriate positions of support apparatus. 2. Stable small bilateral pleural effusions and bibasilar airspace disease. XR CHEST PORTABLE   Final Result   Relatively stable cardiopulmonary status         XR CHEST PORTABLE   Final Result   Stable chest x-ray compared to 04/24/2022. XR CHEST PORTABLE   Final Result   1. Stable support tubes and line. 2. Low lung volumes. Crowding of vessels from low lung volumes results in   apparent opacification at the right cardiophrenic angle. XR CHEST PORTABLE   Final Result   Lines and tubes are unchanged. Low lung volumes. Cardiomegaly. XR CHEST PORTABLE   Final Result   1. Endotracheal tube tip is now 1 cm above the soto.   Recommend retraction   by an additional 2.5 cm for optimal positioning. 2.  Improved aeration in the left lung with continued atelectatic/airspace   infiltrates. XR CHEST PORTABLE   Final Result   Endotracheal tube tip is in the right main bronchus and should be retracted   approximately 4-5 cm. Tips of ECMO cannulas project in the expected location of the IVC. Findings were discussed with Jatinder Correa RN (the patient's ICU nurse)   at 2:26 pm on 4/22/2022. XR CHEST (SINGLE VIEW FRONTAL)   Final Result   1. Support tubes and lines are unchanged. 2. Stable bibasilar lung infiltrates, right side greater than left. This may   be related to atelectasis and/or pleural effusion. Superimposed pneumonia   cannot be excluded. XR CHEST (SINGLE VIEW FRONTAL)   Final Result   Suboptimal position left IJ catheter. Consider removal and replacement or   repositioning. XR CHEST PORTABLE   Final Result   1. On today's exam the endotracheal tube tip is positioned 1.5 cm above the   soto. Recommend retraction by an additional 2 cm for optimal positioning. 2.  Interval development of right lower lobe airspace disease. This could   represent atelectasis         XR CHEST PORTABLE   Final Result   1. Endotracheal and enteric tubes as above. 2. Diffuse interstitial opacities throughout both lungs, right greater than   left. No new focal airspace consolidation. Follow-up is recommended to   document resolution. XR CHEST (SINGLE VIEW FRONTAL)   Final Result   Diffuse interstitial opacities with alveolar/consolidative opacities at the   bases favoring multifocal airspace disease. XR CHEST PORTABLE    (Results Pending)        Echocardiogram:   Results for orders placed during the hospital encounter of 04/18/22    ECHO Complete 2D W Doppler W Color      Summary  Left ventricle is normal in size, global left ventricular systolic function  is preserved, estimated ejection fraction is 50%.   There appears to be some apical hypokinesis, in the apical views. Evidence of diastolic dysfunction. Trivial mitral regurgitation. Trivial pulmonic insufficiency. IVC dilated but unable to assess respiratory collapse due to patient on  ventilator. ASSESSMENT AND PLAN     Assessment:    // Acute hypoxic/hypercapnic respiratory failure due to status asthmaticus, on VV ECMO/mechanical ventilation  // Chronic respiratory acidosis  // Rhino/enterovirus infection  // Leukocytosis  // Hyperglycemia, improved  // Elevated troponin  // Morbid obesity  // Anemia, stable    Plan:    I personally interviewed/examined the patient; reviewed interval history, interpreted all available radiographic and laboratory data at the time of service.  Patient was on fentanyl, Versed and ketamine infusions for sedation   She was following commands off sedation   Continued to be hemodynamically stable   Sweep on ECMO has been off since morning   She was tolerating spontaneous breathing trial   Extubated without any immediate complications   Bronchoscopy done on 4/24/2022-respiratory culture growing Pseudomonas   Continue aspiration precautions, bronchopulmonary hygiene, bronchodilators   Recommended nocturnal BiPAP   Tolerating tube feeds   Obtain X-ray chest daily   Continue to monitor I/O with a goal of even/negative fluid balance   Stress ulcer prophylaxis-Protonix   Chemical DVT prophylaxis; not indicated patient on systemic anticoagulation-with argatroban   Antimicrobials reviewed; patient was on cefepime, ID consulted, antibiotics changed to meropenem   On IV Solu-Medrol 40 mg twice daily   Glycemic control appropriate; sliding scale insulin   Skin/wound care reviewed with the nursing staff   Physical/occupational therapy    Discussed with ECMO staff, nursing staff.   Family updated     The patient remains critically ill with illness/injury that acutely impairs one or more vital organ systems, such that there is a high probability of imminent or life threatening deterioration in the patient's condition. Critical care time of 40 minutes was spent (excluding procedures), in coordination of care during bedside rounds and discussion of patient care in detail, and recommendations of the team were adopted in the plan. Necessity of all invasive devices was also confirmed. Jessika Stapleton MD  Pulmonary and Critical Care Medicine           4/29/2022     Please note that this chart was generated using voice recognition Dragon dictation software. Although every effort was made to ensure the accuracy of this automated transcription, some errors in transcription may have occurred.

## 2022-04-29 NOTE — PROGRESS NOTES
ECMO DAILY ROUNDING NOTE     Patient:  Arabella Gilbert  MRN: [id number]  Admit date: 4/18/2022  Primary Care Physician: Amol Bland MD  Consulting Physician: Loc Arreola MD  CODE Status: Full Code  LOS: 11    [x] Team present at bedside   [x] Family updated    INDICATION:   Respiratory Failure, status asthmaticus    CANNULATION:  VenoVeno ECMO cannulation on 4/22/22  Bilateral femoral cannulas    TREATMENT GOALS:  PUMP  FLOW: 3-5L/min  pH: 7.35-7.45   SvO2: >70%  SaO2:>97%  Sedation : RASS -1 to+1    CURRENT ECMO PARAMETERS:  PUMP Patient on ECMO: On  Hours on ECMO: 172  Pump Flow (L/min): 4.59 LPM  Pump Speed (Rotations/min): 3000 RPM  ECMO Mode: V/V  Pump Mode: Cardiohelp   SWEEP GAS Sweep Flow (L/min): 0 LPM   FiO2 (%): 21 %   CIRCUIT PRESSURES Inlet Pressure (Bladder): -83 mmHg  Pre-Membrane Pressure: 148 mmHg  Post-Membrane Pressure: 128 mmHg  Delta P: 23 mmHg  SVO2 (%): 76.2 %  ECMO blood flow temperature: 98.8 °F (37.1 °C)  CVP (mmHg): 6 mmHg   CIRCUIT CHECK Heat Exchg.  Water Temp Set: 36.7  Heat Exchanger Water Temp Actual: 36.8  Heat Exchanger Water Level: Full  Unused Pigtails Cleared: Done  System Plugged to Red Outlet: Done  Emergency Backup in Use: No  Hand Crank Available: Yes  Backup Unit Blood Avail: Done  Cart Checked and Stocked: Done  Pump Battery Charged: 100 Volts  Pressure Monitors Zeroed: Done  Pressure Limits Checked: Done  Circuit Number: 1  Back Up Circuit: Not done (being serviced)  Back Up Console/Motor: Not done (being serviced)  Emergency O2 Tank Available: Done  Circuits and Cannulation Sites: Done  CMAG Flow Probe Repositioned: Not done  High/Low flow alarm set?: Yes  High/Low temp alarm set?: Yes  High/Low venous alarm set?: Yes  Pre-MO alarm limit: 300  Post-MO alarm limit: 300     CURRENT VENTILATOR SETTINGS:  Vent Information  Ventilator ID: [email or web address]  Vent Mode: (S) PS/CPAP  Ventilator Discontinue: Yes     RECENT LABS:  ABG Recent Labs     04/29/22  0354 04/29/22  0530 04/29/22  0651 04/29/22  0743 04/29/22  1141   POCPH 7.425 7.408 7.403 7. 401 7.427   POCPCO2 44.9 42.9 43.5 47.8 45.1   POCPO2 55.4* 77.2* 40.1* 92.4 72.4*   POCHCO3 29.4* 27.1 27.1 29.7* 29.7*   XTNM9KDF 89* 95 75* 97 95      VBG Recent Labs     04/27/22  0627 04/27/22  1757 04/28/22  0614 04/28/22  1809 04/29/22  0642   PHVEN 7.376 7.355 7.366 7.377 7.404   LNW3YFL 51.2* 54.7* 47.2 53.4* 43.4   NSG0WPP 30.0* 30.5* 27.0 31.4* 27.1   D3SNPCIR 74 71 71 69 64      CBC Recent Labs     04/28/22  1045 04/28/22  1620 04/28/22  2209 04/29/22  0402 04/29/22  0944   WBC 31.4* 34.4* 38.1* 37.3* 40.2*   HGB 9.1* 9.4* 9.3* 9.7* 10.4*   HCT 29.9* 30.9* 30.5* 32.1* 32.9*    153 160 157 185      COAGS Recent Labs     04/28/22  1045 04/28/22  1045 04/28/22  1620 04/28/22  1831 04/28/22  2209 04/29/22  0402 04/29/22  0530 04/29/22  0856 04/29/22  1240   INR 3.7  --  3.2  --  3.5 3.3  --   --  4.1   PROTIME 35.9*  --  31.2*  --  33.5* 31.6*  --   --  38.7*   APTT 63.3*   < > 58.1*   < > 62.0* 59.8* 59.2* 57.4* 57.0*    < > = values in this interval not displayed.       TEG Recent Labs     04/28/22  1620 04/28/22  2209 04/29/22  0944   HEPTHERAPY UNKNOWN UNKNOWN UNKNOWN   REACTTIMETEG 12.5* 10.7* 10.1*   KINETICSTEG 2.2 2.0 2.0   ANGLETEG 61.7 61.6 62.6   MAXCLOTTEG 65.2 63.6 62.1   KB63HXP 0.0 0.0 0.0   EPLTEG 0.0 0.0 0.0      BMP Recent Labs     04/28/22  1045 04/28/22  1620 04/28/22  2209 04/29/22  0402 04/29/22  0944    137 136 136 135   K 4.9 4.5 4.7 4.6 4.3    102 102 100 98   CO2 29 30 29 27 29   BUN 29* 31* 28* 26* 26*   CREATININE 0.48* 0.43* 0.35* 0.37* 0.42*   GLUCOSE 178* 199* 151* 182* 166*   MG 2.0 2.1 2.0 1.9 2.0      LFT Recent Labs     04/27/22  0400 04/27/22  1604 04/28/22  0410 04/28/22  1620 04/29/22  0402   PROT 5.6* 5.6* 5.0* 5.4* 5.6*   LABALBU 3.4* 3.3* 3.0* 3.3* 3.4*   ALT 75* 68* 57* 61* 64*   AST 18 17 17 19 30   ALKPHOS 50 48 46 48 53   BILITOT 0.45 0.40 0.34 0.37 0.56 INFLAMMATORY MARKERS No results for input(s): CRP, FERRITIN, LDH in the last 72 hours. Invalid input(s):  ESR   CARDIAC No results for input(s): CKTOTAL, CKMB, CKMBINDEX, TROPONINI, BNP, PROBNP in the last 72 hours. Invalid input(s): TROPONIN, HSTROP   LACTIC ACID No results for input(s): LACTA in the last 72 hours.    TRIGLYCERIDE Recent Labs     04/29/22  0402   TRIG 80        ASSESSMENT AND SYSTEM BASED PLAN (MEDICAL DECISION MAKING):    Neurologic                                                  [x] Sedation/paralytic requirement reviewed, patient following commands                              [x] Neurological assessment     Cardiovascular                               [x] Cannulas secured, dsg dry and intact, no drainage or hematoma noted                             [x] ECMO Pump flow/pressures reviewed, patient remains off ECMO support since morning                             [x] Telemetry reviewed                             [x] Hemodynamic parameters stable    Pulmonary                             [x] Blood gases (Patient/Circuit) reviewed                             [x] Sweep/ settings reviewed                             [x] Vent Settings reviewed, patient tolerating CPAP, recommended extubation                             [x] Chest X-ray reviewed    Genitourinary                              [x] Intake/Output reviewed                             [x] Need for continuous IV fluids assessed, recommended diuresis with Lasix                             [x] Need for diuresis/renal replacement therapy reviewed    Gastrointestinal                              [x] GI Prophylaxis reviewed                              [x] Enteral nutrition reviewed    Hematology                            [x] Anticoagulation: Argatroban                            [x] Reviewed CBC, coagulation profile, ACT, TEG and platelet count                            [x] Transfusion needs reviewed                    Infectious disease                           [x] Reviewed T-max, white count and cultures results                           [x] Antimicrobials reviewed; continue cefepime    Endocrine                            [x] Reviewed glycemic control                           [x] Reviewed need for steroids; continue Solu-Medrol    Others                           [x] Reviewed need for restrains                           [x] Reviewed need for lines/drains/invasive devices                           [x] Skin/Wound care                           [x] Reviewed current Medications list/orders                           [x] Reviewed goals of care                   OVERALL CONDITION:   ECMO support; sweep is off since morning  Tolerated spontaneous breathing trial, extubated  Decannulation per CT surgery    Tl Adames MD  Pulmonary & Critical care Medicine  4/29/2022

## 2022-04-29 NOTE — PROGRESS NOTES
PULMONARY & CRITICAL CARE MEDICINE PROGRESS NOTE     Patient:  Shelli Fulton  MRN: [id number]  Admit date: 2022  Primary Care Physician: Alex Rizvi MD  Consulting Physician: Princess Lopez MD  CODE Status: Full Code  LOS: 10     SUBJECTIVE     CHIEF COMPLAINT/REASON FOR INITIAL CONSULT: Acute respiratory failure    BRIEF HOSPITAL COURSE:   The patient is a 23 y.o. female with past medical history of asthma was initially admitted to [facility] with acute exacerbation. She was initially put on nonrebreather, subsequently required intubation and initiation of mechanical ventilation due to worsening respiratory status. Patient was also on bicarb drip which was discontinued on arrival to [place].  She is sedated, paralyzed and mechanically ventilated. ABG shows improvement in respiratory acidosis and oxygenation.     INTERVAL HISTORY:  22  Patient remains on VV ECMO  FiO2 down to 50% on , sweep is reduced to 0.5 L/min  Continues to be hemodynamically stable  Followed commands  Okay to trial CPAP  Respiratory culture grew Pseudomonas, on cefepime  Remains in positive balance    OBJECTIVE     VENTILATOR SETTINGS:  Vent Information  Ventilator ID: [email or web address]  Vent Mode: AC/PC     PaO2/FiO2 RATIO:  Recent Labs     22   POCPO2 88.7      FiO2 : 40 %     VITAL SIGNS:   LAST:  BP (!) 161/78   Pulse 71   Temp 98.8 °F (37.1 °C) (Bladder)   Resp 17   Ht 5' 3\" (1.6 m)   Wt (!) 300 lb 4.8 oz (136.2 kg)   SpO2 99%   BMI 53.20 kg/m²   8-24 HR RANGE:  TEMP Temp  Av.6 °F (37 °C)  Min: 98.2 °F (36.8 °C)  Max: 98.8 °F (35.6 °C)   BP Systolic (13NZM), MYP:574 , Min:161 , MUC:090      Diastolic (58MWZ), QKQ:43, Min:78, Max:78     PULSE Pulse  Av.6  Min: 67  Max: 122   RR Resp  Av.8  Min: 12  Max: 17   O2 SAT SpO2  Av.3 %  Min: 95 %  Max: 99 %   OXYGEN DELIVERY No data recorded        Exam  Morbid obesity  Endotracheal tube in place  No subcutaneous air in the neck  Poor air entry bilaterally without rhonchi and wheezing. Abdomen soft obese nontender  Lower extremity edema present  Bilateral femoral cannulas  Upper extremity noted    DATA REVIEW     Medications:  Scheduled Meds:   insulin lispro  0-12 Units SubCUTAneous Q4H    cefepime  2,000 mg IntraVENous Q12H    budesonide  0.5 mg Nebulization BID    chlordiazePOXIDE  50 mg Oral 4 times per day    lidocaine PF  5 mL Tracheal Tube Once    oxyCODONE  10 mg Oral Q4H    docusate  100 mg Oral BID    polyethylene glycol  17 g Oral Daily    furosemide  20 mg IntraVENous Daily    heparin flush (PF)  3 mL IntraVENous Q12H    heparin flush (PF)  3 mL IntraVENous Q12H    heparin flush (PF)  3 mL IntraVENous Q12H    heparin flush (PF)  3 mL IntraVENous Q12H    albumin human  25 g IntraVENous Once    pantoprazole (PROTONIX) 40 mg injection  40 mg IntraVENous Daily    methylPREDNISolone  40 mg IntraVENous Q8H    dilTIAZem  30 mg Oral 4 times per day    polyvinyl alcohol  1 drop Both Eyes Q6H    ipratropium-albuterol  1 ampule Inhalation Q4H    sodium chloride flush  5-40 mL IntraVENous 2 times per day     Continuous Infusions:   argatroban infusion 5.1 mcg/kg/min (04/28/22 1858)    sodium chloride      ketamine (KETALAR) 2 mg/mL infusion for analgosedation 0.2 mg/kg/hr (04/28/22 1823)    midazolam 7 mg/hr (04/28/22 1823)    fentaNYL 50 mcg/mL 150 mcg/hr (04/28/22 1823)    dextrose      sodium chloride 10 mL/hr at 04/28/22 1823       INPUT/OUTPUT:  In: 3434.3 [I.V.:1734.8; Blood:22.5; DC/ZX:6852]  Out: 8075 [Urine:2470]  Date 04/28/22 0000 - 04/28/22 2359   Shift 6998-8603 8961-6636 9603-6857 24 Hour Total   INTAKE   I.V.(mL/kg) 518.9(3.8) 480.8(3.5) 291.9(2.1) 1291.6(9.5)   Blood(mL/kg) 22.5(0.2)   22.5(0.2)   NG/GT(mL/kg) 843(6.2) 135(1) 639(4.7) 1832(60.8)   Shift Total(mL/kg) 1384. 4(10.2) 615.8(4.5) 930.9(6.8) 2931.1(21.5)   OUTPUT   Urine(mL/kg/hr) 500(0.5) 1435(1.3) 300 2235 Shift Total(mL/kg) 500(3.7) 1435(10.5) 300(2.2) 7548(92.2)   Weight (kg) 136.2 136.2 136.2 136.2        LABS:  ABGs:   Recent Labs     04/28/22  1100 04/28/22  1208 04/28/22  1552 04/28/22  1816 04/28/22 2009   POCPH 7.353 7.363 7.405 7.371 7.374   POCPCO2 57.6* 55.2* 49.5* 49.2* 52.5*   POCPO2 61.4* 67.0* 73.8* 53.0* 88.7   POCHCO3 32.0* 31.4* 31.0* 28.5* 30.6*   WMVJ5CGT 89* 92* 94 86* 96     CBC:   Recent Labs     04/27/22  1604 04/27/22 2209 04/28/22  0410 04/28/22  1045 04/28/22  1620   WBC 34.4* 31.3* 29.3* 31.4* 34.4*   HGB 9.6* 9.1* 8.7* 9.1* 9.4*   HCT 32.4* 29.8* 29.1* 29.9* 30.9*   MCV 93.1 92.0 92.7 92.3 92.8    159 154 152 153   RBC 3.48* 3.24* 3.14* 3.24* 3.33*   MCH 27.6 28.1 27.7 28.1 28.2   MCHC 29.6 30.5 29.9 30.4 30.4   RDW 14.4 14.3 14.3 14.2 14.1     CRP:   No results for input(s): CRP in the last 72 hours. LDH:   No results for input(s): LDH in the last 72 hours. BMP:   Recent Labs     04/27/22  1604 04/27/22 2209 04/28/22  0410 04/28/22  1045 04/28/22  1620    138 138 136 137   K 5.0 4.9 4.7 4.9 4.5    102 104 101 102   CO2 31 28 29 29 30   BUN 30* 31* 29* 29* 31*   CREATININE 0.45* 0.45* 0.43* 0.48* 0.43*   GLUCOSE 160* 181* 176* 178* 199*     Liver Function Test:   Recent Labs     04/26/22  1648 04/27/22  0400 04/27/22  1604 04/28/22  0410 04/28/22  1620   PROT 5.9* 5.6* 5.6* 5.0* 5.4*   LABALBU 3.3* 3.4* 3.3* 3.0* 3.3*   ALT 83* 75* 68* 57* 61*   AST 28 18 17 17 19   ALKPHOS 54 50 48 46 48   BILITOT 0.47 0.45 0.40 0.34 0.37     Coagulation Profile:   Recent Labs     04/27/22  1604 04/27/22  1852 04/27/22  2209 04/27/22  2310 04/28/22  0410 04/28/22  0501 04/28/22  1045 04/28/22  1620 04/28/22  1831   INR 3.1  --  3.3  --  3.7  --  3.7 3.2  --    PROTIME 29.8*  --  31.8*  --  35.3*  --  35.9* 31.2*  --    APTT 58.7*   < > 61.0*   < > 64.8* 61.9* 63.3* 58.1* 58.4*    < > = values in this interval not displayed.      D-Dimer:  No results for input(s): DDIMER in the last 72 hours. Lactic Acid:  No results for input(s): LACTA in the last 72 hours. Cardiac Enzymes:  No results for input(s): CKTOTAL, CKMB, CKMBINDEX, TROPONINI in the last 72 hours. Invalid input(s): TROPONIN, HSTROP  BNP/ProBNP:   No results for input(s): BNP, PROBNP in the last 72 hours. Triglycerides:  No results for input(s): TRIG in the last 72 hours. Microbiology:  Urine Culture:  No components found for: CURINE  Blood Culture:  No components found for: CBLOOD, CFUNGUSBL  Sputum Culture:  No components found for: CSPUTUM  No results for input(s): SPECDESC, SPECIAL, CULTURE, STATUS, ORG, CDIFFTOXPCR, CAMPYLOBPCR, SALMONELLAPC, SHIGAPCR, SHIGELLAPCR, MPNEUG, MPNEUM, LACTOQL in the last 72 hours. No results for input(s): SPUTUM, SPECDESC, SPECIAL, CULTURE, STATUS, ORG, CDIFFTOXPCR, MPNEUM, MPNEUG in the last 72 hours. Invalid input(s): Allyne Friar, CFUNGUSBL     Pathology:    Radiology Reports:  XR CHEST PORTABLE   Preliminary Result   Stable exam.         VL DUP LOWER EXTREMITY VENOUS BILATERAL   Final Result      XR CHEST PORTABLE   Preliminary Result   1. Stable appropriate positions of support apparatus. 2. Stable small bilateral pleural effusions and bibasilar airspace disease. XR CHEST PORTABLE   Final Result   Relatively stable cardiopulmonary status         XR CHEST PORTABLE   Final Result   Stable chest x-ray compared to 04/24/2022. XR CHEST PORTABLE   Final Result   1. Stable support tubes and line. 2. Low lung volumes. Crowding of vessels from low lung volumes results in   apparent opacification at the right cardiophrenic angle. XR CHEST PORTABLE   Final Result   Lines and tubes are unchanged. Low lung volumes. Cardiomegaly. XR CHEST PORTABLE   Final Result   1. Endotracheal tube tip is now 1 cm above the soto. Recommend retraction   by an additional 2.5 cm for optimal positioning.       2.  Improved aeration in the left lung with continued atelectatic/airspace   infiltrates. XR CHEST PORTABLE   Final Result   Endotracheal tube tip is in the right main bronchus and should be retracted   approximately 4-5 cm. Tips of ECMO cannulas project in the expected location of the IVC. Findings were discussed with Pam Multani RN (the patient's ICU nurse)   at 2:26 pm on 4/22/2022. XR CHEST (SINGLE VIEW FRONTAL)   Final Result   1. Support tubes and lines are unchanged. 2. Stable bibasilar lung infiltrates, right side greater than left. This may   be related to atelectasis and/or pleural effusion. Superimposed pneumonia   cannot be excluded. XR CHEST (SINGLE VIEW FRONTAL)   Final Result   Suboptimal position left IJ catheter. Consider removal and replacement or   repositioning. XR CHEST PORTABLE   Final Result   1. On today's exam the endotracheal tube tip is positioned 1.5 cm above the   soto. Recommend retraction by an additional 2 cm for optimal positioning. 2.  Interval development of right lower lobe airspace disease. This could   represent atelectasis         XR CHEST PORTABLE   Final Result   1. Endotracheal and enteric tubes as above. 2. Diffuse interstitial opacities throughout both lungs, right greater than   left. No new focal airspace consolidation. Follow-up is recommended to   document resolution. XR CHEST (SINGLE VIEW FRONTAL)   Final Result   Diffuse interstitial opacities with alveolar/consolidative opacities at the   bases favoring multifocal airspace disease. XR CHEST PORTABLE    (Results Pending)        Echocardiogram:   Results for orders placed during the hospital encounter of 04/18/22    ECHO Complete 2D W Doppler W Color      Summary  Left ventricle is normal in size, global left ventricular systolic function  is preserved, estimated ejection fraction is 50%. There appears to be some apical hypokinesis, in the apical views.   Evidence of diastolic dysfunction. Trivial mitral regurgitation. Trivial pulmonic insufficiency. IVC dilated but unable to assess respiratory collapse due to patient on  ventilator. ASSESSMENT AND PLAN     Assessment:    // Acute hypoxic/hypercapnic respiratory failure due to status asthmaticus, on VV ECMO/mechanical ventilation  //Chronic respiratory acidosis  // Rhino/enterovirus infection  // Leukocytosis, stable  // Hyperglycemia, acceptable  // Elevated troponin  // Morbid obesity  // Anemia, stable    Plan:    I personally interviewed/examined the patient; reviewed interval history, interpreted all available radiographic and laboratory data at the time of service.  Patient is on fentanyl, Versed and ketamine infusions for sedation   Wean sedation as tolerated   Continues to be hemodynamically stable   FiO2 on  decreased to 50%, tolerating sweep at 0.5 L/min   Okay to do spontaneous breathing trial   Bronchoscopy done on 4/24/2022-respiratory culture growing Pseudomonas   Tolerating tube feeds   Monitor endotracheal secretions    Obtain X-ray chest daily-Chest x-ray was reviewed and no changes from before   Continue to monitor I/O with a goal of even/negative fluid balance   Stress ulcer prophylaxis-Protonix   Chemical DVT prophylaxis; not indicated patient on systemic anticoagulation-with argatroban   Antimicrobials reviewed; has been switched to cefepime   On IV Solu-Medrol; will recommend decreasing dose of 40 mg twice daily   Glycemic control appropriate; sliding scale insulin   Skin/wound care reviewed with the nursing staff   Physical/occupational therapy    Discussed with ECMO staff, nursing staff. Family updated     The patient remains critically ill with illness/injury that acutely impairs one or more vital organ systems, such that there is a high probability of imminent or life threatening deterioration in the patient's condition.  Critical care time of 40 minutes was spent (excluding procedures), in coordination of care during bedside rounds and discussion of patient care in detail, and recommendations of the team were adopted in the plan. Necessity of all invasive devices was also confirmed. Daryl Hanna MD  Pulmonary and Critical Care Medicine           4/28/2022     Please note that this chart was generated using voice recognition Dragon dictation software. Although every effort was made to ensure the accuracy of this automated transcription, some errors in transcription may have occurred.

## 2022-04-29 NOTE — PROGRESS NOTES
[facility] End of Shift Note:       Cannulation date/time: 4/22/22 @ 1005  ECMO type: V-V  Cannula sizes/locations: 25 Fr drain LFV, 23 Fr Return RFV  Flow ordered at: 4-6LPM       Currently flowing at: 4.51 LPM       FIO2 at: 30%       Sweep at: 0  Delta pressure: 24  Clot burden:        Oxygenator: 12, 3, & 9 o'clock        Circuit: Fibrin noted around 3/8 connector, Pre MO  Anticoagulation: Argatroban @ 5.25 mcg/kg/min  Products given:        PRBC's: 0       PLT's: 0        FFP: 0       Cryo: 0       Albumin: 0    Sweep off @ 0112. .. may need PO sedatives increased?

## 2022-04-29 NOTE — CONSULTS
[facility] -   Infectious diseases evaluation  admission date 4/18/2022    reason for consultation:   VAP with resp cultures showing Pseudomonas. Questionable allergy to cefepime. Switching to meropenem. Impression :   Current:  · Asthma exacerbation -ECMO bilat fem access  · Ventilator assoc RLL. Pneumonia  -Pseudomonas aeruginosa -  (S: cefepime, cipro, gentamicin, tobramycin, R: zosyn S meropenem)  · Resp panel pos for rhino/entero on 4/19. · Leukocytosis leukemoid reaction  · Rash cefepime - trunk    Other:  ·   Discussion / summary of stay / plan of care   · Severe Asthma exacerbation - on ECMO  · resp infection - pseudomonas RLL pneumonia   · Post ceftriaxone 4 days and tolerated  · Cefepime 4/28 and 4/29, developed rash torso - stopped - started meropenem  · ID called for AB -   Recommendations   · Stop cefepime -   · Meropenem 4/29 -5/8 total 10 days  · Might get decanulate in am   · RLL improved  · Follow response    Infection Control Recommendations   · [place] Precautions    Antimicrobial Stewardship Recommendations   · Simplification of therapy  · Targeted therapy  Coordination ofOutpatient Care:   · Estimated Length of IV antimicrobials:  · Patient will need Midline / picc Catheter Insertion:   · Patient will need SNF:  · Patient will need outpatient wound care:     History of Present Illness:   Initial history:  Judy Watt is a 23y.o.-year-old female with a past medical history of bronchial asthma who was admitted to [facility] with an acute exacerbation causing increasing shortness of breath. Patient was found to be hypoxemic with hypercarbic respiratory failure requiring intubation and mechanical ventilation. Patient was transferred to [facility] for further elevation with possibility of ECMO.   On arrival patient was found to be on a bicarb drip due to presumed respiratory acidosis but this was discontinued due to to worsening of the hypercarbia. Additionally patient was started on Nimbex due to the necessity for paralyzation as she was breathing over the vent. Pulmonology and cardiothoracic surgery were consulted, ECMO planned. In the MICU, patient had an episode of dark emesis which turned out to be bilious output, Hb remained stable. She was hypertensive, started on lopressor IV 5 mg PRN. She was hypoxic, requiring higher FiO2 of 80%, PEEP 16, RR 32, , blood gas showed pH 7.449, pCO2 41.7, pCO2 59. 1. treated with solumedrol, bronchodilators, CXR showed no pneumothorax or pleural effusion.     4/22 - overnight, patient became more hypoxic with increasing oxygen requirement, increasing wheezing on exam. It was determined to go with ECMO canulation. Patient previously treated with   Azithromycin from 4/19 to 4/24  Rocephin from 4/19 to 4/25  Off abx from 4/26 to 4/27  Cefepime from 4/28 to 4/29 4/29 nurse reports rash post treatment with cefepime, and patient switched to meropenem. Interval changes  4/29/2022   Patient Vitals for the past 8 hrs:   BP Temp Temp src Pulse Resp SpO2 Weight   04/29/22 1139 -- -- -- 108 16 -- --   04/29/22 1138 -- -- -- -- 14 -- --   04/29/22 1100 120/62 -- -- 86 11 97 % --   04/29/22 1030 118/64 -- -- 89 12 97 % --   04/29/22 1000 113/63 98.8 °F (37.1 °C) Bladder 87 12 96 % --   04/29/22 0930 (!) 122/54 -- -- 101 11 94 % --   04/29/22 0900 122/70 -- -- 130 18 94 % --   04/29/22 0830 118/61 -- -- 108 13 98 % --   04/29/22 0817 -- -- -- -- 16 98 % --   04/29/22 0800 111/64 99.1 °F (37.3 °C) Bladder 118 16 99 % --   04/29/22 0742 -- -- -- 91 11 -- --   04/29/22 0730 127/65 -- -- 97 13 98 % --   04/29/22 0700 124/62 -- -- 106 15 97 % --   04/29/22 0600 117/65 -- -- 129 17 97 % --   04/29/22 0500 (!) 89/34 -- -- 75 9 96 % (!) 304 lb 10.8 oz (138.2 kg)     Patient extubated about around 1230 today. Currently on nasal cannula at 4 LPM and maintaining saturation.   Patient currently on ECMO, since 4/22. Afebrile per records, ECMO nurse reports mach controls temperature and has been working harder to do so. Nurse reports that patient apparently became flushed after previous cefepime treatment and also points to a erythematous papular rash on chest.      Summary of relevant labs:  Labs:    WBC: 40.2  Plt 185  Cr 0.42  4/19 CRP 79.7  4/18 sed rate 39      Micro:    4/29 UC: sent  4/29 BC x2 sent  4/29 Resp cx sent  4/24 Resp cx: Pseudomonas aeruginosa - light growth  S: cefepime, cipro, gentamicin, tobramycin, R: zosyn  4/21 BC x2 no growth  4/19 BC x2 no growth  4/19 Resp cx: normal nadine  4/19 Resp panel: pos rhino/entero    Imaging:    CXR, 4/29/22    IMPRESSION:  No significant change from prior study. Continued bibasilar opacities and small effusions. I have personally reviewed the past medical history, past surgical history, medications, social history, and family history, and I haveupdated the database accordingly. Allergies:   Patient has no known allergies. Review of Systems:     Review of Systems   Unable to perform ROS: Other (Patient recently extubated)       Physical Examination :       Physical Exam  Constitutional:       General: She is not in acute distress. Appearance: She is not ill-appearing. HENT:      Head: Normocephalic and atraumatic. Eyes:      General:         Right eye: No discharge. Left eye: No discharge. Cardiovascular:      Rate and Rhythm: Regular rhythm. Tachycardia present. Heart sounds: Normal heart sounds. No murmur heard. No gallop. Pulmonary:      Comments: Upper lobes clear. Lower lobes not auscultatable  Abdominal:      General: There is no distension. Palpations: Abdomen is soft. Tenderness: There is no abdominal tenderness. Musculoskeletal:      Cervical back: Neck supple. Lymphadenopathy:      Cervical: No cervical adenopathy. Skin:     General: Skin is warm and dry.          Past Medical History:   No past medical history on file.     Past Surgical  History:     Past Surgical History:   Procedure Laterality Date    EXTRACORPOREAL CIRCULATION N/A 4/22/2022    EXTRA CORPOREAL MEMBRANE OXYGENATION 23 ON THE RIGHT 25 LEFT FEMORALS performed by Clarence Elise MD at [address] [facility]       Medications:      meropenem  1,000 mg IntraVENous Once    insulin lispro  0-12 Units SubCUTAneous Q4H    methylPREDNISolone  40 mg IntraVENous Q12H    budesonide  0.5 mg Nebulization BID    chlordiazePOXIDE  50 mg Oral 4 times per day    lidocaine PF  5 mL Tracheal Tube Once    oxyCODONE  10 mg Oral Q4H    docusate  100 mg Oral BID    polyethylene glycol  17 g Oral Daily    furosemide  20 mg IntraVENous Daily    heparin flush (PF)  3 mL IntraVENous Q12H    heparin flush (PF)  3 mL IntraVENous Q12H    heparin flush (PF)  3 mL IntraVENous Q12H    heparin flush (PF)  3 mL IntraVENous Q12H    albumin human  25 g IntraVENous Once    pantoprazole (PROTONIX) 40 mg injection  40 mg IntraVENous Daily    dilTIAZem  30 mg Oral 4 times per day    polyvinyl alcohol  1 drop Both Eyes Q6H    ipratropium-albuterol  1 ampule Inhalation Q4H    sodium chloride flush  5-40 mL IntraVENous 2 times per day       Social History:     Social History     Socioeconomic History    Marital status: Life Partner     Spouse name: Not on file    Number of children: Not on file    Years of education: Not on file    Highest education level: Not on file   Occupational History    Not on file   Tobacco Use    Smoking status: Not on file    Smokeless tobacco: Not on file   Substance and Sexual Activity    Alcohol use: Not on file    Drug use: Not on file    Sexual activity: Not on file   Other Topics Concern    Not on file   Social History Narrative    Not on file     Social Determinants of Health     Financial Resource Strain:     Difficulty of Paying Living Expenses: Not on file   Food Insecurity:     Worried About Running Out of Food in the Last Year: Not on file    Ran Out of Food in the Last Year: Not on file   Transportation Needs:     Lack of Transportation (Medical): Not on file    Lack of Transportation (Non-Medical):  Not on file   Physical Activity:     Days of Exercise per Week: Not on file    Minutes of Exercise per Session: Not on file   Stress:     Feeling of Stress : Not on file   Social Connections:     Frequency of Communication with Friends and Family: Not on file    Frequency of Social Gatherings with Friends and Family: Not on file    Attends Gnosticist Services: Not on file    Active Member of [address] [place] [address] [employer] or Organizations: Not on file    Attends Club or Organization Meetings: Not on file    Marital Status: Not on file   Intimate Partner Violence:     Fear of Current or Ex-Partner: Not on file    Emotionally Abused: Not on file    Physically Abused: Not on file    Sexually Abused: Not on file   Housing Stability:     Unable to Pay for Housing in the Last Year: Not on file    Number of [place] in the Last Year: Not on file    Unstable Housing in the Last Year: Not on file       Family History:     Family History   Problem Relation Age of Onset    Cancer Mother         breast ca 47, lumpectomy, RT, no GT    Cancer Father         breast ca 52, mastectomy, chemo, GT?    Cancer Paternal Grandmother         breast ca, 60-70s    Cancer Maternal Aunt         poss colon ca, colostomy bag, passed away at age 62s    Other Paternal Aunt         cirrhosis      Medical Decision Making:   I have independently reviewed/ordered the following labs:    CBC with Differential:   Recent Labs     04/29/22  0402 04/29/22  0944   WBC 37.3* 40.2*   HGB 9.7* 10.4*   HCT 32.1* 32.9*    185     BMP:  Recent Labs     04/29/22  0402 04/29/22  0944    135   K 4.6 4.3    98   CO2 27 29   BUN 26* 26*   CREATININE 0.37* 0.42*   MG 1.9 2.0     Hepatic Function Panel:   Recent Labs     04/28/22  1620 04/29/22  0402   PROT 5.4* 5.6* LABALBU 3.3* 3.4*   BILIDIR 0.14 0.15   IBILI 0.23 0.41   BILITOT 0.37 0.56   ALKPHOS 48 53   ALT 61* 64*   AST 19 30     No results for input(s): RPR in the last 72 hours. No results for input(s): HIV in the last 72 hours. No results for input(s): BC in the last 72 hours. Lab Results   Component Value Date    CREATININE 0.42 04/29/2022    GLUCOSE 166 04/29/2022       Detailed results: Thank you for allowing us to participate in the care of this patient. Please call with questions. This note is created with the assistance of a speech recognition program.  While intending to generate adocument that actually reflects the content of the visit, the document can still have some errors including those of syntax and sound a like substitutions which may escape proof reading. It such instances, actual meaningcan be extrapolated by contextual diversion. Liliane Carlton  Office: [phone]  Perfect serve / office [phone]        I have discussed the care of the patient, including pertinent history and exam findings,  with the resident. I have seen and examined the patient and the key elements of all parts of the encounter have been performed by me. I agree with the assessment, plan and orders as documented by the resident.     [place], Infectious Diseases

## 2022-04-29 NOTE — PROGRESS NOTES
[facility] End of Shift Note:       Cannulation date/time: 4/22/22 @ 1005  ECMO type: V/V  Cannula sizes/locations: 25 Fr Drainage LFV / 23 Fr RFV Return  Flow ordered at: 4-6 LPM       Currently flowing at: 4.5 to 4.47       FIO2 at: 21%       Sweep at: 0  Delta pressure: 23  Clot burden:       Oxygenator: 12,3, and 9 O'clock on MO       Circuit: Fibrin noted around 3/8 connector  Anticoagulation: Argatroban @ 5.5 Mcg/kg/min  Products given:        PRBC's: 0       PLT's: 0       FFP: 0       Cryo: 0       Albumin: 0    Sweep clamped @ 0725. Pt extubated and on NC. Argatroban titrated up to reach threptic level.

## 2022-04-29 NOTE — PROGRESS NOTES
[facility]  Internal Medicine Teaching Residency Program  Inpatient Daily Progress Note  ______________________________________________________________________________    Patient: Jessica Gonsales  YOB: 2000   IXJ:[id number]    Acct: [de-identified]     Room: [address] [place] [address]  Admit date: 4/18/2022  Today's date: 04/29/22  Number of days in the hospital: 11    SUBJECTIVE   Admitting Diagnosis: Acute asthma exacerbation   CC: Shortness of breath    Pt examined at bedside. Chart & results reviewed. No acute episodes overnight  Patient is heme stable and afebrile  Awake and following commands  Good cough  Was hypertensive overnight on ART line but was appropriate via Cuff  Started on cefepime but per nursing patient develops rash post treatment  Transitioned to meropenem  AM CBC and BMP reviewed  WBC elevated most likely secondary to steroids - deescalated to BID treatment  Urine output -2.7 liters last 24 hours    ROS:  Unable to assess due to patient being sedated and intubated. BRIEF HISTORY     22 y. o. female with a past medical history of bronchial asthma who was admitted to [facility] with an acute exacerbation causing increasing shortness of breath.  Patient was found to be hypoxemic with hypercarbic respiratory failure requiring intubation and mechanical ventilation.  Patient was transferred to [facility] for further elevation with possibility of ECMO. On arrival patient was found to be on a bicarb drip due to presumed respiratory acidosis but this was discontinued due to to worsening of the hypercarbia.  Additionally patient was started on Nimbex due to the necessity for paralyzation as she was breathing over the vent.  Pulmonology and cardiothoracic surgery were consulted- plan is for ECMO. In the MICU, patient remained stable, had an episode of dark emesis which turned out to be bilious output, Hb remained stable.  She was hypertensive, started on lopressor IV 5 mg PRN. Had left IJ placed which was malpositioned, removed and then CVC in left femoral was placed overnight. She was hypoxic, requiring higher FiO2 of 80%, PEEP 16, RR 32, , blood gas showed pH 7.449, pCO2 41.7, pCO2 59. 1. treated with solumedrol, bronchodilators, CXR showed no pneumothorax or pleural effusion.   On  overnight, patient became more hypoxic with increasing oxygen requirement, increasing wheezing on exam. It was determined to go with ECMO canulation on . OBJECTIVE     Vital Signs:  /64   Pulse 86   Temp 98.8 °F (37.1 °C) (Bladder)   Resp 11   Ht 5' 3\" (1.6 m)   Wt (!) 304 lb 10.8 oz (138.2 kg)   SpO2 97%   BMI 53.97 kg/m²     Temp (24hrs), Av.7 °F (37.1 °C), Min:98.2 °F (36.8 °C), Max:99.1 °F (37.3 °C)    In: 3689.8   Out: 4835 [Urine:4835]    Physical Exam:  Constitutional: This is a well developed, well nourished, Greater than 40 - Morbid Obesity / Extreme Obesity / Grade III 23y.o. year old female who is on ECMO    EENT:  PERRLA. No conjunctival injections. Septum was midline, mucosa was without erythema, exudates or cobblestoning. No thrush was noted. Neck: Supple without thyromegaly. No elevated JVP. Trachea was midline. Respiratory: Decreased breath sounds bilaterally  Cardiovascular: Regular without murmur, clicks, gallops or rubs. Abdomen: Slightly rounded and soft without organomegaly. No rebound, rigidity or guarding was appreciated. Lymphatic: No lymphadenopathy. Musculoskeletal: Normal curvature of the spine. No gross muscle weakness. Extremities:  No lower extremity edema, ulcerations, tenderness, varicosities or erythema. Muscle size, tone and strength are normal.  No involuntary movements are noted. Skin:  Warm and dry. Good color, turgor and pigmentation. No lesions or scars.   No cyanosis or clubbing  Neurological/Psychiatric: follows commands off sedation     Medications:  Scheduled Medications:    meropenem  1,000 mg IntraVENous Q8H    insulin lispro  0-12 Units SubCUTAneous Q4H    methylPREDNISolone  40 mg IntraVENous Q12H    budesonide  0.5 mg Nebulization BID    chlordiazePOXIDE  50 mg Oral 4 times per day    lidocaine PF  5 mL Tracheal Tube Once    oxyCODONE  10 mg Oral Q4H    docusate  100 mg Oral BID    polyethylene glycol  17 g Oral Daily    furosemide  20 mg IntraVENous Daily    heparin flush (PF)  3 mL IntraVENous Q12H    heparin flush (PF)  3 mL IntraVENous Q12H    heparin flush (PF)  3 mL IntraVENous Q12H    heparin flush (PF)  3 mL IntraVENous Q12H    albumin human  25 g IntraVENous Once    pantoprazole (PROTONIX) 40 mg injection  40 mg IntraVENous Daily    dilTIAZem  30 mg Oral 4 times per day    polyvinyl alcohol  1 drop Both Eyes Q6H    ipratropium-albuterol  1 ampule Inhalation Q4H    sodium chloride flush  5-40 mL IntraVENous 2 times per day     Continuous Infusions:    argatroban infusion 5.375 mcg/kg/min (04/29/22 0945)    sodium chloride      ketamine (KETALAR) 2 mg/mL infusion for analgosedation 0.2 mg/kg/hr (04/29/22 0945)    midazolam 6 mg/hr (04/29/22 0945)    fentaNYL 50 mcg/mL 125 mcg/hr (04/29/22 0945)    dextrose      sodium chloride 10 mL/hr at 04/29/22 0945     PRN Medicationssodium chloride, , PRN  albuterol, 2.5 mg, Q4H PRN  heparin flush (PF), 3 mL, PRN  fentanNYL, 25 mcg, Q1H PRN  midazolam, 2 mg, Q2H PRN  metoprolol, 5 mg, Q6H PRN  glucose, 15 g, PRN  dextrose, 12.5 g, PRN  glucagon (rDNA), 1 mg, PRN  dextrose, 100 mL/hr, PRN  sodium chloride flush, 5-40 mL, PRN  sodium chloride, , PRN  ondansetron, 4 mg, Q8H PRN   Or  ondansetron, 4 mg, Q6H PRN  acetaminophen, 650 mg, Q6H PRN   Or  acetaminophen, 650 mg, Q6H PRN      Diagnostic Labs:  CBC:   Recent Labs     04/28/22  2209 04/29/22  0402 04/29/22  0944   WBC 38.1* 37.3* 40.2*   RBC 3.34* 3.54* 3.67*   HGB 9.3* 9.7* 10.4*   HCT 30.5* 32.1* 32.9*   MCV 91.3 90.7 89.6   RDW 14.2 14.3 14.6*    157 185     BMP:   Recent Labs     04/28/22  2209 04/29/22  0402 04/29/22  0944    136 135   K 4.7 4.6 4.3    100 98   CO2 29 27 29   BUN 28* 26* 26*   CREATININE 0.35* 0.37* 0.42*     BNP: No results for input(s): BNP in the last 72 hours. PT/INR:   Recent Labs     04/28/22  1620 04/28/22  2209 04/29/22  0402   PROTIME 31.2* 33.5* 31.6*   INR 3.2 3.5 3.3     APTT:   Recent Labs     04/29/22  0402 04/29/22  0530 04/29/22  0856   APTT 59.8* 59.2* 57.4*     CARDIAC ENZYMES: No results for input(s): CKMB, CKMBINDEX, TROPONINI in the last 72 hours. Invalid input(s): CKTOTAL;3  FASTING LIPID PANEL:  Lab Results   Component Value Date    TRIG 96 04/29/2022     LIVER PROFILE:   Recent Labs     04/28/22  0410 04/28/22  1620 04/29/22  0402   AST 17 19 30   ALT 57* 61* 64*   BILIDIR 0.13 0.14 0.15   BILITOT 0.34 0.37 0.56   ALKPHOS 46 48 53      MICROBIOLOGY:   Lab Results   Component Value Date/Time    CULTURE PSEUDOMONAS AERUGINOSA LIGHT GROWTH (A) 04/24/2022 11:41 AM    CULTURE NORMAL RESPIRATORY SARAN LIGHT GROWTH 04/24/2022 11:41 AM     Imaging:    XR CHEST (SINGLE VIEW FRONTAL)    Result Date: 4/22/2022  1. Support tubes and lines are unchanged. 2. Stable bibasilar lung infiltrates, right side greater than left. This may be related to atelectasis and/or pleural effusion. Superimposed pneumonia cannot be excluded. XR CHEST (SINGLE VIEW FRONTAL)    Result Date: 4/21/2022  Suboptimal position left IJ catheter. Consider removal and replacement or repositioning. XR CHEST PORTABLE    Result Date: 4/28/2022  Stable exam     XR CHEST PORTABLE    Result Date: 4/27/2022  1. Stable appropriate positions of support apparatus. 2. Stable small bilateral pleural effusions and bibasilar airspace disease. XR CHEST PORTABLE    Result Date: 4/26/2022  Relatively stable cardiopulmonary status     XR CHEST PORTABLE    Result Date: 4/25/2022  Stable chest x-ray compared to 04/24/2022.      XR CHEST PORTABLE    Result Date: 4/24/2022  1. Stable support tubes and line. 2. Low lung volumes. Crowding of vessels from low lung volumes results in apparent opacification at the right cardiophrenic angle. XR CHEST PORTABLE    Result Date: 4/23/2022  Lines and tubes are unchanged. Low lung volumes. Cardiomegaly. XR CHEST PORTABLE    Result Date: 4/22/2022  Endotracheal tube tip is in the right main bronchus and should be retracted approximately 4-5 cm. Tips of ECMO cannulas project in the expected location of the IVC. Findings were discussed with Zohra Gomez RN (the patient's ICU nurse) at 2:26 pm on 4/22/2022. XR CHEST PORTABLE    Result Date: 4/22/2022  1. Endotracheal tube tip is now 1 cm above the soto. Recommend retraction by an additional 2.5 cm for optimal positioning. 2.  Improved aeration in the left lung with continued atelectatic/airspace infiltrates. XR CHEST PORTABLE    Result Date: 4/21/2022  1. On today's exam the endotracheal tube tip is positioned 1.5 cm above the soto. Recommend retraction by an additional 2 cm for optimal positioning. 2.  Interval development of right lower lobe airspace disease. This could represent atelectasis       ASSESSMENT & PLAN     ASSESSMENT / PLAN:     Principal Problem:    Acute asthma exacerbation  Active Problems:    Severe persistent asthma with status asthmaticus    CHIDI (obstructive sleep apnea)    History of seizures    Hypercapnic respiratory failure (HCC)    Endotracheally intubated    On mechanically assisted ventilation (HCC)    Diastolic dysfunction  Resolved Problems:    * No resolved hospital problems. *    1. Acute hypoxic hypercapnic respiratory failure on mechanical ventilation and s/p ECMO cannulation on 4/22/22 secondary asthma exacerbation: Continue SoluMedrol 40 mg IV q12 hours. Respiratory cultures positive for Pseudomonas - Started on Meropenem 1 gram daily.  Duoneb q4 hours and Albuterol as needed. Continue Lasix 20 mg IV daily, diuresing well. Pulm. Critical care following for ventilator management. Recommendations appreciated.      2. Troponin elevation type II demand ischemia: Continue Cardizem 30 mg q6 hours per cardiology. Cardiology to follow from a distance, once extubated, will contact cardiology for stress test prior to discharge due to apical hypokinesis on echo     3. Sinus tachycardia:- Controlled Continue Cardizem 30 mg q6 hours per Cardiology.  PRN Metoprolol 5 mg IV for HR >110; hold for SBP<110    4. Steroid induced hyperglycemia: Medium dose sliding scale q4 hours; Required 9 units over last 24 hours. Hypoglycemia protocol, monitor POCT glucose every 4 hours    5. Acute femoral vein DVT: Continue on Argatroban infusion, Pharmacy to dose to keep argatroban on therapeutic level      Diet: Tube feeds at goal  DVT ppx : Argatroban infusion  GI ppx: Protonix      PT/OT: Consulted  Discharge Planning / SW:  consulted, will follow up once patient is extubated and ECMO cannulation removed and Frida Whaley MD  Internal Medicine Resident, PGY-2  [facility];  [place], [place]  4/29/2022, 11:32 AM

## 2022-04-29 NOTE — PLAN OF CARE
Problem: Gas Exchange - Impaired:  Goal: Levels of oxygenation will improve  Description: Levels of oxygenation will improve  Outcome: Progressing     Problem: Activity:  Goal: Ability to tolerate increased activity will improve  Description: Ability to tolerate increased activity will improve  Outcome: Progressing     Problem: Cardiac:  Goal: Hemodynamic stability will improve  Description: Hemodynamic stability will improve  Outcome: Progressing     Problem: Respiratory:  Goal: Ability to maintain a clear airway will improve  Description: Ability to maintain a clear airway will improve  Outcome: Progressing  Goal: Ability to maintain adequate ventilation will improve  Description: Ability to maintain adequate ventilation will improve  Outcome: Progressing  Goal: Complications related to the disease process, condition or treatment will be avoided or minimized  Description: Complications related to the disease process, condition or treatment will be avoided or minimized  Outcome: Progressing     Problem: Skin Integrity:  Goal: Risk for impaired skin integrity will decrease  Description: Risk for impaired skin integrity will decrease  Outcome: Progressing     Problem: OXYGENATION/RESPIRATORY FUNCTION  Goal: Patient will maintain patent airway  Outcome: Progressing  Goal: Patient will achieve/maintain normal respiratory rate/effort  Description: Respiratory rate and effort will be within normal limits for the patient  Outcome: Progressing     Problem: Safety - Medical Restraint  Goal: Remains free of injury from restraints (Restraint for Interference with Medical Device)  Description: INTERVENTIONS:  1. Determine that other, less restrictive measures have been tried or would not be effective before applying the restraint  2. Evaluate the patient's condition at the time of restraint application  3. Inform patient/family regarding the reason for restraint  4.  Q2H: Monitor safety, psychosocial status, comfort, nutrition and hydration  4/28/2022 2009 by Chacorta Perez RN  Outcome: Progressing  4/28/2022 1654 by Lizbeth Hernandez RN  Outcome: Progressing  4/28/2022 0737 by Lizbeth Hernandez RN  Outcome: Progressing     Problem: Pain  Goal: Verbalizes/displays adequate comfort level or baseline comfort level  Outcome: Progressing     Problem: Safety - Adult  Goal: Free from fall injury  Outcome: Progressing

## 2022-04-30 ENCOUNTER — APPOINTMENT (OUTPATIENT)
Dept: GENERAL RADIOLOGY | Age: 22
DRG: 003 | End: 2022-04-30
Attending: INTERNAL MEDICINE
Payer: COMMERCIAL

## 2022-04-30 LAB
ALBUMIN SERPL-MCNC: 3.2 G/DL (ref 3.5–5.2)
ALBUMIN/GLOBULIN RATIO: 1.8 (ref 1–2.5)
ALP BLD-CCNC: 46 U/L (ref 35–104)
ALT SERPL-CCNC: 58 U/L (ref 5–33)
ANGLE TEG: 56.5 DEG (ref 53–72)
ANGLE TEG: 60.9 DEG (ref 53–72)
ANION GAP SERPL CALCULATED.3IONS-SCNC: 7 MMOL/L (ref 9–17)
ANION GAP SERPL CALCULATED.3IONS-SCNC: 7 MMOL/L (ref 9–17)
ANION GAP SERPL CALCULATED.3IONS-SCNC: 9 MMOL/L (ref 9–17)
AST SERPL-CCNC: 22 U/L
AT-III ACTIVITY: 116 % (ref 83–122)
BILIRUB SERPL-MCNC: 0.51 MG/DL (ref 0.3–1.2)
BILIRUBIN DIRECT: 0.14 MG/DL
BILIRUBIN, INDIRECT: 0.37 MG/DL (ref 0–1)
BUN BLDV-MCNC: 17 MG/DL (ref 6–20)
BUN BLDV-MCNC: 21 MG/DL (ref 6–20)
BUN BLDV-MCNC: 21 MG/DL (ref 6–20)
CALCIUM IONIZED: 1.14 MMOL/L (ref 1.13–1.33)
CALCIUM IONIZED: 1.15 MMOL/L (ref 1.13–1.33)
CALCIUM IONIZED: 1.15 MMOL/L (ref 1.13–1.33)
CALCIUM SERPL-MCNC: 8.3 MG/DL (ref 8.6–10.4)
CALCIUM SERPL-MCNC: 8.4 MG/DL (ref 8.6–10.4)
CALCIUM SERPL-MCNC: 8.8 MG/DL (ref 8.6–10.4)
CHLORIDE BLD-SCNC: 100 MMOL/L (ref 98–107)
CHLORIDE BLD-SCNC: 96 MMOL/L (ref 98–107)
CHLORIDE BLD-SCNC: 99 MMOL/L (ref 98–107)
CO2: 28 MMOL/L (ref 20–31)
CO2: 29 MMOL/L (ref 20–31)
CO2: 30 MMOL/L (ref 20–31)
CREAT SERPL-MCNC: 0.37 MG/DL (ref 0.5–0.9)
CREAT SERPL-MCNC: 0.4 MG/DL (ref 0.5–0.9)
CREAT SERPL-MCNC: 0.45 MG/DL (ref 0.5–0.9)
CULTURE: NORMAL
EPL-TEG: 0 % (ref 0–15)
EPL-TEG: 0 % (ref 0–15)
FIBRINOGEN: 142 MG/DL (ref 140–420)
FIBRINOGEN: 226 MG/DL (ref 140–420)
FIBRINOGEN: <80 MG/DL (ref 140–420)
FIO2: 32
FIO2: 32
FIO2: 4
GFR AFRICAN AMERICAN: >60 ML/MIN
GFR NON-AFRICAN AMERICAN: >60 ML/MIN
GFR SERPL CREATININE-BSD FRML MDRD: ABNORMAL ML/MIN/{1.73_M2}
GLUCOSE BLD-MCNC: 140 MG/DL (ref 65–105)
GLUCOSE BLD-MCNC: 145 MG/DL (ref 74–100)
GLUCOSE BLD-MCNC: 151 MG/DL (ref 65–105)
GLUCOSE BLD-MCNC: 151 MG/DL (ref 70–99)
GLUCOSE BLD-MCNC: 157 MG/DL (ref 70–99)
GLUCOSE BLD-MCNC: 163 MG/DL (ref 70–99)
GLUCOSE BLD-MCNC: 165 MG/DL (ref 74–100)
GLUCOSE BLD-MCNC: 165 MG/DL (ref 74–100)
HCT VFR BLD CALC: 30 % (ref 36.3–47.1)
HCT VFR BLD CALC: 30.1 % (ref 36.3–47.1)
HCT VFR BLD CALC: 33.8 % (ref 36.3–47.1)
HEMOGLOBIN, FREE, PLASMA: 3.9 MG/DL (ref 0–9.7)
HEMOGLOBIN, FREE, PLASMA: 4.8 MG/DL (ref 0–9.7)
HEMOGLOBIN: 10.7 G/DL (ref 11.9–15.1)
HEMOGLOBIN: 9.4 G/DL (ref 11.9–15.1)
HEMOGLOBIN: 9.6 G/DL (ref 11.9–15.1)
HEPARIN THERAPY: NORMAL
HEPARIN THERAPY: NORMAL
INR BLD: 1.2
INR BLD: 1.3
INR BLD: 4.1
KINETICS TEG: 2.2 MIN (ref 1–3)
KINETICS TEG: 2.7 MIN (ref 1–3)
LACTIC ACID, WHOLE BLOOD: 1 MMOL/L (ref 0.7–2.1)
LACTIC ACID, WHOLE BLOOD: 1.5 MMOL/L (ref 0.7–2.1)
LACTIC ACID, WHOLE BLOOD: 2 MMOL/L (ref 0.7–2.1)
LY30 (LYSIS) TEG: 0 % (ref 0–8)
LY30 (LYSIS) TEG: 0 % (ref 0–8)
MA (MAX CLOT) TEG: 50.9 MM (ref 50–70)
MA (MAX CLOT) TEG: 56.3 MM (ref 50–70)
MAGNESIUM: 2 MG/DL (ref 1.6–2.6)
MCH RBC QN AUTO: 27.8 PG (ref 25.2–33.5)
MCH RBC QN AUTO: 27.9 PG (ref 25.2–33.5)
MCH RBC QN AUTO: 28 PG (ref 25.2–33.5)
MCHC RBC AUTO-ENTMCNC: 31.3 G/DL (ref 28.4–34.8)
MCHC RBC AUTO-ENTMCNC: 31.7 G/DL (ref 28.4–34.8)
MCHC RBC AUTO-ENTMCNC: 31.9 G/DL (ref 28.4–34.8)
MCV RBC AUTO: 87.2 FL (ref 82.6–102.9)
MCV RBC AUTO: 88 FL (ref 82.6–102.9)
MCV RBC AUTO: 89.3 FL (ref 82.6–102.9)
NRBC AUTOMATED: 0.1 PER 100 WBC
NRBC AUTOMATED: 0.1 PER 100 WBC
NRBC AUTOMATED: 0.2 PER 100 WBC
O2 DEVICE/FLOW/%: ABNORMAL
PARTIAL THROMBOPLASTIN TIME: 23.1 SEC (ref 20.5–30.5)
PARTIAL THROMBOPLASTIN TIME: 27.2 SEC (ref 20.5–30.5)
PARTIAL THROMBOPLASTIN TIME: 69 SEC (ref 20.5–30.5)
PDW BLD-RTO: 14.5 % (ref 11.8–14.4)
PDW BLD-RTO: 14.6 % (ref 11.8–14.4)
PDW BLD-RTO: 14.8 % (ref 11.8–14.4)
PERFORMING LOCATION: NORMAL
PERFORMING LOCATION: NORMAL
PLATELET # BLD: 116 K/UL (ref 138–453)
PLATELET # BLD: 141 K/UL (ref 138–453)
PLATELET # BLD: 90 K/UL (ref 138–453)
PMV BLD AUTO: 10.4 FL (ref 8.1–13.5)
PMV BLD AUTO: 10.4 FL (ref 8.1–13.5)
PMV BLD AUTO: 11.2 FL (ref 8.1–13.5)
POC HCO3: 29.4 MMOL/L (ref 21–28)
POC HCO3: 29.8 MMOL/L (ref 21–28)
POC HCO3: 30.5 MMOL/L (ref 21–28)
POC O2 SATURATION: 92 % (ref 94–98)
POC O2 SATURATION: 93 % (ref 94–98)
POC O2 SATURATION: 95 % (ref 94–98)
POC PCO2: 41.7 MM HG (ref 35–48)
POC PCO2: 45.4 MM HG (ref 35–48)
POC PCO2: 47.5 MM HG (ref 35–48)
POC PH: 7.41 (ref 7.35–7.45)
POC PH: 7.44 (ref 7.35–7.45)
POC PH: 7.46 (ref 7.35–7.45)
POC PO2: 60.3 MM HG (ref 83–108)
POC PO2: 66 MM HG (ref 83–108)
POC PO2: 74.9 MM HG (ref 83–108)
POSITIVE BASE EXCESS, ART: 4 (ref 0–3)
POSITIVE BASE EXCESS, ART: 5 (ref 0–3)
POSITIVE BASE EXCESS, ART: 6 (ref 0–3)
POTASSIUM SERPL-SCNC: 4.3 MMOL/L (ref 3.7–5.3)
POTASSIUM SERPL-SCNC: 4.3 MMOL/L (ref 3.7–5.3)
POTASSIUM SERPL-SCNC: 5 MMOL/L (ref 3.7–5.3)
PROTHROMBIN TIME: 13 SEC (ref 9.1–12.3)
PROTHROMBIN TIME: 13.3 SEC (ref 9.1–12.3)
PROTHROMBIN TIME: 38.7 SEC (ref 9.1–12.3)
RBC # BLD: 3.36 M/UL (ref 3.95–5.11)
RBC # BLD: 3.45 M/UL (ref 3.95–5.11)
RBC # BLD: 3.84 M/UL (ref 3.95–5.11)
REACTION TIME TEG: 5 MIN (ref 5–10)
REACTION TIME TEG: 5.3 MIN (ref 5–10)
SAMPLE SITE: ABNORMAL
SODIUM BLD-SCNC: 134 MMOL/L (ref 135–144)
SODIUM BLD-SCNC: 135 MMOL/L (ref 135–144)
SODIUM BLD-SCNC: 136 MMOL/L (ref 135–144)
SPECIMEN DESCRIPTION: NORMAL
TOTAL PROTEIN: 5 G/DL (ref 6.4–8.3)
WBC # BLD: 21.1 K/UL (ref 3.5–11.3)
WBC # BLD: 27.5 K/UL (ref 3.5–11.3)
WBC # BLD: 30.6 K/UL (ref 3.5–11.3)

## 2022-04-30 PROCEDURE — 6370000000 HC RX 637 (ALT 250 FOR IP): Performed by: INTERNAL MEDICINE

## 2022-04-30 PROCEDURE — 82330 ASSAY OF CALCIUM: CPT

## 2022-04-30 PROCEDURE — 6360000002 HC RX W HCPCS: Performed by: THORACIC SURGERY (CARDIOTHORACIC VASCULAR SURGERY)

## 2022-04-30 PROCEDURE — 2580000003 HC RX 258: Performed by: THORACIC SURGERY (CARDIOTHORACIC VASCULAR SURGERY)

## 2022-04-30 PROCEDURE — 71045 X-RAY EXAM CHEST 1 VIEW: CPT

## 2022-04-30 PROCEDURE — 82803 BLOOD GASES ANY COMBINATION: CPT

## 2022-04-30 PROCEDURE — 6360000002 HC RX W HCPCS: Performed by: INTERNAL MEDICINE

## 2022-04-30 PROCEDURE — 85390 FIBRINOLYSINS SCREEN I&R: CPT

## 2022-04-30 PROCEDURE — 99232 SBSQ HOSP IP/OBS MODERATE 35: CPT | Performed by: INTERNAL MEDICINE

## 2022-04-30 PROCEDURE — 80048 BASIC METABOLIC PNL TOTAL CA: CPT

## 2022-04-30 PROCEDURE — 85384 FIBRINOGEN ACTIVITY: CPT

## 2022-04-30 PROCEDURE — 6370000000 HC RX 637 (ALT 250 FOR IP)

## 2022-04-30 PROCEDURE — 85610 PROTHROMBIN TIME: CPT

## 2022-04-30 PROCEDURE — 83051 HEMOGLOBIN PLASMA: CPT

## 2022-04-30 PROCEDURE — 2100000001 HC CVICU R&B

## 2022-04-30 PROCEDURE — 85576 BLOOD PLATELET AGGREGATION: CPT

## 2022-04-30 PROCEDURE — 2700000000 HC OXYGEN THERAPY PER DAY

## 2022-04-30 PROCEDURE — 2580000003 HC RX 258: Performed by: INTERNAL MEDICINE

## 2022-04-30 PROCEDURE — APPSS30 APP SPLIT SHARED TIME 16-30 MINUTES: Performed by: NURSE PRACTITIONER

## 2022-04-30 PROCEDURE — 85347 COAGULATION TIME ACTIVATED: CPT

## 2022-04-30 PROCEDURE — 33948 ECMO/ECLS DAILY MGMT-VENOUS: CPT | Performed by: INTERNAL MEDICINE

## 2022-04-30 PROCEDURE — 85730 THROMBOPLASTIN TIME PARTIAL: CPT

## 2022-04-30 PROCEDURE — 85027 COMPLETE CBC AUTOMATED: CPT

## 2022-04-30 PROCEDURE — 85300 ANTITHROMBIN III ACTIVITY: CPT

## 2022-04-30 PROCEDURE — 33948 ECMO/ECLS DAILY MGMT-VENOUS: CPT

## 2022-04-30 PROCEDURE — 6360000002 HC RX W HCPCS: Performed by: STUDENT IN AN ORGANIZED HEALTH CARE EDUCATION/TRAINING PROGRAM

## 2022-04-30 PROCEDURE — 83735 ASSAY OF MAGNESIUM: CPT

## 2022-04-30 PROCEDURE — 99233 SBSQ HOSP IP/OBS HIGH 50: CPT | Performed by: INTERNAL MEDICINE

## 2022-04-30 PROCEDURE — 2580000003 HC RX 258: Performed by: STUDENT IN AN ORGANIZED HEALTH CARE EDUCATION/TRAINING PROGRAM

## 2022-04-30 PROCEDURE — 99291 CRITICAL CARE FIRST HOUR: CPT | Performed by: INTERNAL MEDICINE

## 2022-04-30 PROCEDURE — 83605 ASSAY OF LACTIC ACID: CPT

## 2022-04-30 PROCEDURE — C9113 INJ PANTOPRAZOLE SODIUM, VIA: HCPCS | Performed by: INTERNAL MEDICINE

## 2022-04-30 PROCEDURE — 94761 N-INVAS EAR/PLS OXIMETRY MLT: CPT

## 2022-04-30 PROCEDURE — 2500000003 HC RX 250 WO HCPCS

## 2022-04-30 PROCEDURE — 6370000000 HC RX 637 (ALT 250 FOR IP): Performed by: STUDENT IN AN ORGANIZED HEALTH CARE EDUCATION/TRAINING PROGRAM

## 2022-04-30 PROCEDURE — 80076 HEPATIC FUNCTION PANEL: CPT

## 2022-04-30 PROCEDURE — 94660 CPAP INITIATION&MGMT: CPT

## 2022-04-30 PROCEDURE — 82947 ASSAY GLUCOSE BLOOD QUANT: CPT

## 2022-04-30 PROCEDURE — 2500000003 HC RX 250 WO HCPCS: Performed by: THORACIC SURGERY (CARDIOTHORACIC VASCULAR SURGERY)

## 2022-04-30 PROCEDURE — 94640 AIRWAY INHALATION TREATMENT: CPT

## 2022-04-30 PROCEDURE — 33966 ECMO/ECLS RMVL PRPH CANNULA: CPT

## 2022-04-30 RX ORDER — CHLORDIAZEPOXIDE HYDROCHLORIDE 25 MG/1
25 CAPSULE, GELATIN COATED ORAL EVERY 6 HOURS SCHEDULED
Status: DISCONTINUED | OUTPATIENT
Start: 2022-04-30 | End: 2022-05-01

## 2022-04-30 RX ORDER — LIDOCAINE HYDROCHLORIDE 10 MG/ML
20 INJECTION, SOLUTION EPIDURAL; INFILTRATION; INTRACAUDAL; PERINEURAL ONCE
Status: COMPLETED | OUTPATIENT
Start: 2022-04-30 | End: 2022-04-30

## 2022-04-30 RX ORDER — OXYCODONE HYDROCHLORIDE 5 MG/1
10 TABLET ORAL EVERY 6 HOURS PRN
Status: DISCONTINUED | OUTPATIENT
Start: 2022-04-30 | End: 2022-05-02

## 2022-04-30 RX ORDER — LIDOCAINE HYDROCHLORIDE 10 MG/ML
INJECTION, SOLUTION INFILTRATION; PERINEURAL
Status: COMPLETED
Start: 2022-04-30 | End: 2022-04-30

## 2022-04-30 RX ADMIN — FUROSEMIDE 20 MG: 10 INJECTION, SOLUTION INTRAMUSCULAR; INTRAVENOUS at 08:27

## 2022-04-30 RX ADMIN — Medication 30 UNITS: at 05:36

## 2022-04-30 RX ADMIN — POLYVINYL ALCOHOL 1 DROP: 14 SOLUTION/ DROPS OPHTHALMIC at 20:43

## 2022-04-30 RX ADMIN — MIDAZOLAM 2 MG: 1 INJECTION INTRAMUSCULAR; INTRAVENOUS at 12:45

## 2022-04-30 RX ADMIN — DILTIAZEM HYDROCHLORIDE 30 MG: 30 TABLET ORAL at 23:43

## 2022-04-30 RX ADMIN — FENTANYL CITRATE 25 MCG: 50 INJECTION INTRAMUSCULAR; INTRAVENOUS at 12:45

## 2022-04-30 RX ADMIN — CHLORDIAZEPOXIDE HYDROCHLORIDE 25 MG: 25 CAPSULE ORAL at 23:43

## 2022-04-30 RX ADMIN — CHLORDIAZEPOXIDE HYDROCHLORIDE 25 MG: 25 CAPSULE ORAL at 13:48

## 2022-04-30 RX ADMIN — Medication 30 UNITS: at 05:35

## 2022-04-30 RX ADMIN — INSULIN LISPRO 2 UNITS: 100 INJECTION, SOLUTION INTRAVENOUS; SUBCUTANEOUS at 13:30

## 2022-04-30 RX ADMIN — DILTIAZEM HYDROCHLORIDE 30 MG: 30 TABLET ORAL at 13:48

## 2022-04-30 RX ADMIN — POLYVINYL ALCOHOL 1 DROP: 14 SOLUTION/ DROPS OPHTHALMIC at 14:56

## 2022-04-30 RX ADMIN — INSULIN LISPRO 2 UNITS: 100 INJECTION, SOLUTION INTRAVENOUS; SUBCUTANEOUS at 23:45

## 2022-04-30 RX ADMIN — INSULIN LISPRO 2 UNITS: 100 INJECTION, SOLUTION INTRAVENOUS; SUBCUTANEOUS at 00:06

## 2022-04-30 RX ADMIN — INSULIN LISPRO 2 UNITS: 100 INJECTION, SOLUTION INTRAVENOUS; SUBCUTANEOUS at 18:18

## 2022-04-30 RX ADMIN — IPRATROPIUM BROMIDE AND ALBUTEROL SULFATE 1 AMPULE: .5; 2.5 SOLUTION RESPIRATORY (INHALATION) at 08:13

## 2022-04-30 RX ADMIN — INSULIN LISPRO 2 UNITS: 100 INJECTION, SOLUTION INTRAVENOUS; SUBCUTANEOUS at 04:03

## 2022-04-30 RX ADMIN — DILTIAZEM HYDROCHLORIDE 30 MG: 30 TABLET ORAL at 00:01

## 2022-04-30 RX ADMIN — IPRATROPIUM BROMIDE AND ALBUTEROL SULFATE 1 AMPULE: .5; 2.5 SOLUTION RESPIRATORY (INHALATION) at 15:30

## 2022-04-30 RX ADMIN — METHYLPREDNISOLONE SODIUM SUCCINATE 40 MG: 40 INJECTION, POWDER, FOR SOLUTION INTRAMUSCULAR; INTRAVENOUS at 08:25

## 2022-04-30 RX ADMIN — INSULIN LISPRO 2 UNITS: 100 INJECTION, SOLUTION INTRAVENOUS; SUBCUTANEOUS at 08:46

## 2022-04-30 RX ADMIN — OXYCODONE HYDROCHLORIDE 10 MG: 5 TABLET ORAL at 03:58

## 2022-04-30 RX ADMIN — ACETAMINOPHEN 650 MG: 325 TABLET ORAL at 08:23

## 2022-04-30 RX ADMIN — CHLORDIAZEPOXIDE HYDROCHLORIDE 50 MG: 25 CAPSULE ORAL at 00:00

## 2022-04-30 RX ADMIN — DILTIAZEM HYDROCHLORIDE 30 MG: 30 TABLET ORAL at 18:11

## 2022-04-30 RX ADMIN — METHYLPREDNISOLONE SODIUM SUCCINATE 40 MG: 40 INJECTION, POWDER, FOR SOLUTION INTRAMUSCULAR; INTRAVENOUS at 18:11

## 2022-04-30 RX ADMIN — SODIUM CHLORIDE, PRESERVATIVE FREE 40 MG: 5 INJECTION INTRAVENOUS at 08:27

## 2022-04-30 RX ADMIN — DOCUSATE SODIUM 100 MG: 50 LIQUID ORAL at 08:27

## 2022-04-30 RX ADMIN — IPRATROPIUM BROMIDE AND ALBUTEROL SULFATE 1 AMPULE: .5; 2.5 SOLUTION RESPIRATORY (INHALATION) at 23:44

## 2022-04-30 RX ADMIN — SODIUM CHLORIDE: 9 INJECTION, SOLUTION INTRAVENOUS at 23:49

## 2022-04-30 RX ADMIN — SODIUM CHLORIDE, PRESERVATIVE FREE 10 ML: 5 INJECTION INTRAVENOUS at 08:28

## 2022-04-30 RX ADMIN — DILTIAZEM HYDROCHLORIDE 30 MG: 30 TABLET ORAL at 06:07

## 2022-04-30 RX ADMIN — POLYVINYL ALCOHOL 1 DROP: 14 SOLUTION/ DROPS OPHTHALMIC at 03:56

## 2022-04-30 RX ADMIN — LIDOCAINE HYDROCHLORIDE 20 ML: 10 INJECTION, SOLUTION EPIDURAL; INFILTRATION; INTRACAUDAL; PERINEURAL at 12:46

## 2022-04-30 RX ADMIN — OXYCODONE HYDROCHLORIDE 10 MG: 5 TABLET ORAL at 08:23

## 2022-04-30 RX ADMIN — POLYVINYL ALCOHOL 1 DROP: 14 SOLUTION/ DROPS OPHTHALMIC at 08:27

## 2022-04-30 RX ADMIN — IPRATROPIUM BROMIDE AND ALBUTEROL SULFATE 1 AMPULE: .5; 2.5 SOLUTION RESPIRATORY (INHALATION) at 00:11

## 2022-04-30 RX ADMIN — SODIUM CHLORIDE, PRESERVATIVE FREE 10 ML: 5 INJECTION INTRAVENOUS at 20:44

## 2022-04-30 RX ADMIN — IPRATROPIUM BROMIDE AND ALBUTEROL SULFATE 1 AMPULE: .5; 2.5 SOLUTION RESPIRATORY (INHALATION) at 20:02

## 2022-04-30 RX ADMIN — BUDESONIDE 500 MCG: 0.5 INHALANT RESPIRATORY (INHALATION) at 08:13

## 2022-04-30 RX ADMIN — ARGATROBAN 5.5 MCG/KG/MIN: 250 INJECTION, SOLUTION INTRAVENOUS at 01:57

## 2022-04-30 RX ADMIN — CHLORDIAZEPOXIDE HYDROCHLORIDE 50 MG: 25 CAPSULE ORAL at 06:07

## 2022-04-30 RX ADMIN — LIDOCAINE HYDROCHLORIDE 200 MG: 10 INJECTION, SOLUTION INFILTRATION; PERINEURAL at 12:47

## 2022-04-30 RX ADMIN — BUDESONIDE 500 MCG: 0.5 INHALANT RESPIRATORY (INHALATION) at 20:03

## 2022-04-30 RX ADMIN — POLYETHYLENE GLYCOL 3350 17 G: 17 POWDER, FOR SOLUTION ORAL at 08:25

## 2022-04-30 RX ADMIN — CHLORDIAZEPOXIDE HYDROCHLORIDE 25 MG: 25 CAPSULE ORAL at 18:24

## 2022-04-30 RX ADMIN — MEROPENEM 1000 MG: 1 INJECTION, POWDER, FOR SOLUTION INTRAVENOUS at 11:30

## 2022-04-30 RX ADMIN — MEROPENEM 1000 MG: 1 INJECTION, POWDER, FOR SOLUTION INTRAVENOUS at 18:07

## 2022-04-30 RX ADMIN — IPRATROPIUM BROMIDE AND ALBUTEROL SULFATE 1 AMPULE: .5; 2.5 SOLUTION RESPIRATORY (INHALATION) at 11:21

## 2022-04-30 ASSESSMENT — PAIN SCALES - GENERAL
PAINLEVEL_OUTOF10: 0
PAINLEVEL_OUTOF10: 0
PAINLEVEL_OUTOF10: 4
PAINLEVEL_OUTOF10: 4
PAINLEVEL_OUTOF10: 0

## 2022-04-30 ASSESSMENT — PAIN - FUNCTIONAL ASSESSMENT: PAIN_FUNCTIONAL_ASSESSMENT: ACTIVITIES ARE NOT PREVENTED

## 2022-04-30 ASSESSMENT — ENCOUNTER SYMPTOMS: GASTROINTESTINAL NEGATIVE: 1

## 2022-04-30 ASSESSMENT — PAIN DESCRIPTION - DESCRIPTORS: DESCRIPTORS: ACHING

## 2022-04-30 ASSESSMENT — PAIN DESCRIPTION - LOCATION: LOCATION: GENERALIZED

## 2022-04-30 NOTE — PROGRESS NOTES
End of ECMO/decannulation note:    Pt decannulated at bedside by Dr Candace Jordan. Left and Right femoral vein sutured with 0-silk and manual pressure held for 40 minutes. Pt tolerated well.     OFF ECMO TIME 8863

## 2022-04-30 NOTE — PROGRESS NOTES
Dr Srikanth Murray at bedside; pre-medicated prior to decanulation. Ruthie AMBROSE providing care. Dolores ecmo coordinator, Ember Correa ecmo specialist, and Daniel ecmo specialist at bedside. 1249 off ECMO. Right and left canula's clamped    1250 Dr Srikanth Murray removed the left femoral multistage canula, sutured with 1.0 silk and purse-string, manual pressure held [id number]    1252 Dr Srikanth Murray removed the right femoral return canula, sutured with 1.0 silk and purse-string, manual pressure held x40 minutes    <25ml blood loss.

## 2022-04-30 NOTE — PLAN OF CARE
Problem: OXYGENATION/RESPIRATORY FUNCTION  Goal: Patient will maintain patent airway  4/30/2022 1027 by Rosio Meza RCP  Outcome: Progressing     Problem: OXYGENATION/RESPIRATORY FUNCTION  Goal: Patient will achieve/maintain normal respiratory rate/effort  Description: Respiratory rate and effort will be within normal limits for the patient  4/30/2022 1027 by Rosio Meza RCP  Outcome: Progressing

## 2022-04-30 NOTE — PROGRESS NOTES
ECMO DAILY ROUNDING NOTE     Patient:  Shamika Last  MRN: [id number]  Admit date: 4/18/2022  Primary Care Physician: Emil Norris MD  Consulting Physician: Cruz Garcia MD  CODE Status: Full Code  LOS: 12    [x] Team present at bedside   [x] Family updated    INDICATION:   Respiratory Failure, status asthmaticus    CANNULATION:  VenoVeno ECMO cannulation on 4/22/22  Bilateral femoral cannulas  TREATMENT GOALS:  PUMP  FLOW: 3-5L/min  pH: 7.35-7.45   SvO2: >70%  SaO2:>97%  Sedation : RASS -1 to+1    CURRENT ECMO PARAMETERS:  PUMP Patient on ECMO: On  Hours on ECMO: 191  Pump Flow (L/min): 4.3 LPM  Pump Speed (Rotations/min): 3000 RPM  ECMO Mode: V/V  Pump Mode: Cardiohelp   SWEEP GAS Sweep Flow (L/min): 0 LPM   FiO2 (%): 21 %   CIRCUIT PRESSURES Inlet Pressure (Bladder): -87 mmHg  Pre-Membrane Pressure: 145 mmHg  Post-Membrane Pressure: 120 mmHg  Delta P: 24 mmHg  SVO2 (%): 69.9 %  ECMO blood flow temperature: 99.5 °F (37.5 °C)  CVP (mmHg): 6 mmHg   CIRCUIT CHECK Heat Exchg.  Water Temp Set: 37  Heat Exchanger Water Temp Actual: 37  Heat Exchanger Water Level: Full  Unused Pigtails Cleared: Done  System Plugged to Red Outlet: Done  Emergency Backup in Use: No  Hand Crank Available: Yes  Backup Unit Blood Avail: Done  Cart Checked and Stocked: Done  Pump Battery Charged: 100 Volts  Pressure Monitors Zeroed: Done  Pressure Limits Checked: Done  Circuit Number: 1  Back Up Circuit: Done  Back Up Console/Motor: Done  Emergency O2 Tank Available: Done  Circuits and Cannulation Sites: Done  CMAG Flow Probe Repositioned: Not done  High/Low flow alarm set?: Yes  High/Low temp alarm set?: Yes  High/Low venous alarm set?: Yes  Pre-MO alarm limit: 30  Post-MO alarm limit: 30     CURRENT VENTILATOR SETTINGS:  Vent Information  Ventilator ID: [email or web address]  Vent Mode: (S) PS/CPAP  Ventilator Discontinue: Yes     RECENT LABS:  ABG Recent Labs     04/29/22  1543 04/29/22 2010 04/30/22  0003 04/30/22  0356 04/30/22  0800   POCPH 7.440 7.424 7.405 7.436 7.457*   POCPCO2 44.1 44.5 47.5 45.4 41.7   POCPO2 66.8* 71.5* 74.9* 66.0* 60.3*   POCHCO3 29.9* 29.1* 29.8* 30.5* 29.4*   QPRH6LNB 94 94 95 93* 92*      VBG Recent Labs     04/27/22  1757 04/28/22  0614 04/28/22  1809 04/29/22  0642 04/29/22  1804   PHVEN 7.355 7.366 7.377 7.404 7. 417   ATC8IWE 54.7* 47.2 53.4* 43.4 46.6   ARU3DQD 30.5* 27.0 31.4* 27.1 30.0*   D7EHTJWV 71 71 69 64 77      CBC Recent Labs     04/29/22  0402 04/29/22  0944 04/29/22  1548 04/29/22 2209 04/30/22  0404   WBC 37.3* 40.2* 36.3* 27.9* 27.5*   HGB 9.7* 10.4* 10.0* 8.9* 9.4*   HCT 32.1* 32.9* 31.4* 28.3* 30.0*    185 155 See Reflexed IPF Result 141      COAGS Recent Labs     04/29/22  0402 04/29/22  0530 04/29/22  1240 04/29/22  1548 04/29/22 2010 04/29/22 2209 04/30/22  0404   INR 3.3  --  4.1 3.7  --  4.5 4.1   PROTIME 31.6*  --  38.7* 35.2*  --  42.1* 38.7*   APTT 59.8*   < > 57.0* 68.6* 63.1* 72.0* 69.0*    < > = values in this interval not displayed.       TEG Recent Labs     04/28/22 2209 04/29/22  0944 04/29/22 2209   HEPTHERAPY UNKNOWN UNKNOWN None   REACTTIMETEG 10.7* 10.1* 9.0   KINETICSTEG 2.0 2.0 2.3   ANGLETEG 61.6 62.6 58.5   MAXCLOTTEG 63.6 62.1 55.8   HA95BBX 0.0 0.0 0.0   EPLTEG 0.0 0.0 0.0      BMP Recent Labs     04/29/22  0402 04/29/22  0944 04/29/22  1548 04/29/22  2209 04/30/22  0404    135 136 136 134*   K 4.6 4.3 4.7 4.8 5.0    98 100 101 99   CO2 27 29 29 29 28   BUN 26* 26* 25* 24* 21*   CREATININE 0.37* 0.42* 0.44* 0.39* 0.45*   GLUCOSE 182* 166* 177* 172* 163*   MG 1.9 2.0 1.9 1.9 2.0      LFT Recent Labs     04/28/22  0410 04/28/22  1620 04/29/22  0402 04/29/22  1548 04/30/22  0404   PROT 5.0* 5.4* 5.6* 5.6* 5.0*   LABALBU 3.0* 3.3* 3.4* 3.4* 3.2*   ALT 57* 61* 64* 64* 58*   AST 17 19 30 27 22   ALKPHOS 46 48 53 52 46   BILITOT 0.34 0.37 0.56 0.67 0.51      INFLAMMATORY MARKERS No results for input(s): CRP, FERRITIN, LDH in the last 72 hours.    Invalid input(s):  ESR   CARDIAC No results for input(s): CKTOTAL, CKMB, CKMBINDEX, TROPONINI, BNP, PROBNP in the last 72 hours. Invalid input(s): TROPONIN, HSTROP   LACTIC ACID No results for input(s): LACTA in the last 72 hours.    TRIGLYCERIDE Recent Labs     04/29/22  0402   TRIG 80        ASSESSMENT AND SYSTEM BASED PLAN (MEDICAL DECISION MAKING):    Neurologic                                                  [x] Sedation/paralytic requirement reviewed                               [x] Neurological assessment; done, patient off sedation arousable currently     Cardiovascular                               [x] Cannulas secured, dsg dry and intact, no drainage or hematoma noted                             [x] ECMO Pump flow/pressures reviewed                             [x] Telemetry reviewed                             [x] Hemodynamic parameters stable    Pulmonary                             [x] Blood gases (Patient/Circuit) reviewed                             [x] Sweep/ settings reviewed                             [x] Vent Settings reviewed                             [x] Chest X-ray reviewed    Genitourinary                               [x] Intake/Output reviewed                             [x] Need for continuous IV fluids assessed, recommended diuresis with Lasix                             [x] Need for diuresis/renal replacement therapy reviewed    Gastrointestinal                              [x] GI Prophylaxis reviewed                              [x] Enteral nutrition reviewed    Hematology                             [x] Anticoagulation: Argatroban                            [x] Reviewed CBC, coagulation profile, ACT, TEG and platelet count                            [x] Transfusion needs reviewed                    Infectious disease                           [x] Reviewed T-max, white count and cultures results                           [x] Antimicrobials reviewed; has been switched to cefepime    Endocrine                            [x] Reviewed glycemic control                           [x] Reviewed need for steroids; continue Solu-Medrol    Others                           [x] Reviewed need for restrains                           [x] Reviewed need for lines/drains/invasive devices                           [x] Skin/Wound care                           [x] Reviewed current Medications list/orders                           [x] Reviewed goals of care                   OVERALL CONDITION:   Stable. Extubated on 04/29/2022 on nasal cannula 4 L  Currently on ECMO flow 4.3 LPM sweep is 0 LPM and plan to decannulate the cannula today.         Alexi Vega MD  Pulmonary & Critical care Medicine  4/30/2022

## 2022-04-30 NOTE — PROGRESS NOTES
[facility]  Internal Medicine Teaching Residency Program  Inpatient Daily Progress Note  ______________________________________________________________________________    Patient: Alice Mauricio  YOB: 2000   RTS:[id number]    Acct: [de-identified]     Room: [address] [place] [address]  Admit date: 4/18/2022  Today's date: 04/30/22  Number of days in the hospital: 12    SUBJECTIVE   Admitting Diagnosis: Acute asthma exacerbation     CC: Shortness of breath    Pt examined at bedside. Chart & results reviewed. Patient was extubated yesterday to NC oxygen. tolerated extubation well. - Patient  Currently stable on 4L NC oxygen and hypertensive.   - ECMO currently off and plan for decannulation today by Cardiothoracic surgery. - Patient is alert, awake, oriented x4. She denies any complains this morning, asked if she can eat. Plan for bedside swallow eval after decannulation today. - Off sedation, pressors. - Currently on Argatroban infusion per ECMO protocol, patient will be switched to Eliquis after decannulation for Acute DVT. - Urinating well, Uout: 4.6L/24 hours  - Blood glucose: 140s-180  - Pulmonology following: Continue IV Solu-Medrol 40 mg BID. Continue nocturnal BiPAP and supplemental oxygen duyring day. Continue Lasix 20 mg IV daily   - ID consulted: respiratory culture has pseudomonas, patient developed rash post cefepime on chest, Continue on Meropenem for total 10 days until 5/8.    - Labs reviewed: WBC 27.5<--27.9; Hgb 9.4<--8.9; and BMP wnl     ROS:  Constitutional:  negative for chills, fevers, sweats  Respiratory:  negative for cough, dyspnea on exertion, hemoptysis, shortness of breath, wheezing  Cardiovascular:  negative for chest pain, chest pressure/discomfort, lower extremity edema, palpitations  Gastrointestinal:  negative for abdominal pain, constipation, diarrhea, nausea, vomiting  Neurological:  negative for dizziness, headache    BRIEF HISTORY 22 y.o. female with a past medical history of bronchial asthma who was admitted to [facility] with an acute exacerbation causing increasing shortness of breath.  Patient was found to be hypoxemic with hypercarbic respiratory failure requiring intubation and mechanical ventilation.  Patient was transferred to [facility] for further elevation with possibility of ECMO. On arrival patient was found to be on a bicarb drip due to presumed respiratory acidosis but this was discontinued due to to worsening of the hypercarbia.  Additionally patient was started on Nimbex due to the necessity for paralyzation as she was breathing over the vent.  Pulmonology and cardiothoracic surgery were consulted- plan is for ECMO. In the MICU, patient remained stable, had an episode of dark emesis which turned out to be bilious output, Hb remained stable. She was hypertensive, started on lopressor IV 5 mg PRN. Had left IJ placed which was malpositioned, removed and then CVC in left femoral was placed overnight. She was hypoxic, requiring higher FiO2 of 80%, PEEP 16, RR 32, , blood gas showed pH 7.449, pCO2 41.7, pCO2 59. 1. treated with solumedrol, bronchodilators, CXR showed no pneumothorax or pleural effusion.   On  overnight, patient became more hypoxic with increasing oxygen requirement, increasing wheezing on exam. It was determined to go with ECMO canulation on .     OBJECTIVE     Vital Signs:  BP (!) 157/99   Pulse 96   Temp 99.1 °F (37.3 °C) (Bladder)   Resp 17   Ht 5' 3\" (1.6 m)   Wt (!) 305 lb 8.9 oz (138.6 kg)   SpO2 98%   BMI 54.13 kg/m²     Temp (24hrs), Av.1 °F (37.3 °C), Min:98.8 °F (37.1 °C), Max:99.1 °F (37.3 °C)    In: 2682.5   Out: 4603 [Urine:4603]    Physical Exam:  Constitutional: This is a well developed, well nourished, Greater than 40 - Morbid Obesity / Extreme Obesity / Grade III 23y.o. year old female who is alert, oriented, cooperative and in no apparent distress on nasal canula and on ECMO. EENT:  PERRLA. No conjunctival injections. Septum was midline, mucosa was without erythema, exudates or cobblestoning. No thrush was noted. Neck: Supple without thyromegaly. No elevated JVP. Trachea was midline. Respiratory: Chest was symmetrical without dullness to percussion. Breath sounds bilaterally were clear to auscultation. There were no wheezes, rhonchi or rales. There is no intercostal retraction or use of accessory muscles. No egophony noted. Cardiovascular: Regular without murmur, clicks, gallops or rubs. Abdomen: Slightly rounded and soft without organomegaly. No rebound, rigidity or guarding was appreciated. Lymphatic: No lymphadenopathy. Musculoskeletal: Normal curvature of the spine. No gross muscle weakness. Extremities:  No lower extremity edema, ulcerations, tenderness, varicosities or erythema. Muscle size, tone and strength are normal.  No involuntary movements are noted. Skin:  Warm and dry. Good color, turgor and pigmentation. No lesions or scars.   No cyanosis or clubbing  Neurological/Psychiatric: The patient's general behavior, level of consciousness, thought content and emotional status is normal.        Medications:  Scheduled Medications:    insulin lispro  0-12 Units SubCUTAneous Q4H    methylPREDNISolone  40 mg IntraVENous Q12H    budesonide  0.5 mg Nebulization BID    chlordiazePOXIDE  50 mg Oral 4 times per day    lidocaine PF  5 mL Tracheal Tube Once    oxyCODONE  10 mg Oral Q4H    docusate  100 mg Oral BID    polyethylene glycol  17 g Oral Daily    furosemide  20 mg IntraVENous Daily    heparin flush (PF)  3 mL IntraVENous Q12H    heparin flush (PF)  3 mL IntraVENous Q12H    heparin flush (PF)  3 mL IntraVENous Q12H    heparin flush (PF)  3 mL IntraVENous Q12H    albumin human  25 g IntraVENous Once    pantoprazole (PROTONIX) 40 mg injection  40 mg IntraVENous Daily    dilTIAZem  30 mg Oral 4 times per day    polyvinyl alcohol  1 drop Both Eyes Q6H    ipratropium-albuterol  1 ampule Inhalation Q4H    sodium chloride flush  5-40 mL IntraVENous 2 times per day     Continuous Infusions:    sodium chloride      ketamine (KETALAR) 2 mg/mL infusion for analgosedation Stopped (04/29/22 1240)    midazolam Stopped (04/29/22 1229)    fentaNYL 50 mcg/mL Stopped (04/29/22 1229)    dextrose      sodium chloride 10 mL/hr at 04/30/22 0600     PRN Medicationssodium chloride, , PRN  albuterol, 2.5 mg, Q4H PRN  heparin flush (PF), 3 mL, PRN  fentanNYL, 25 mcg, Q1H PRN  midazolam, 2 mg, Q2H PRN  metoprolol, 5 mg, Q6H PRN  glucose, 15 g, PRN  dextrose, 12.5 g, PRN  glucagon (rDNA), 1 mg, PRN  dextrose, 100 mL/hr, PRN  sodium chloride flush, 5-40 mL, PRN  sodium chloride, , PRN  ondansetron, 4 mg, Q8H PRN   Or  ondansetron, 4 mg, Q6H PRN  acetaminophen, 650 mg, Q6H PRN   Or  acetaminophen, 650 mg, Q6H PRN        Diagnostic Labs:  CBC:   Recent Labs     04/29/22  1548 04/29/22 2209 04/30/22  0404   WBC 36.3* 27.9* 27.5*   RBC 3.53* 3.17* 3.36*   HGB 10.0* 8.9* 9.4*   HCT 31.4* 28.3* 30.0*   MCV 89.0 89.3 89.3   RDW 14.4 14.3 14.5*    See Reflexed IPF Result 141     BMP:   Recent Labs     04/29/22  1548 04/29/22 2209 04/30/22  0404    136 134*   K 4.7 4.8 5.0    101 99   CO2 29 29 28   BUN 25* 24* 21*   CREATININE 0.44* 0.39* 0.45*     BNP: No results for input(s): BNP in the last 72 hours. PT/INR:   Recent Labs     04/29/22  1548 04/29/22 2209 04/30/22  0404   PROTIME 35.2* 42.1* 38.7*   INR 3.7 4.5 4.1     APTT:   Recent Labs     04/29/22 2010 04/29/22  2209 04/30/22  0404   APTT 63.1* 72.0* 69.0*     CARDIAC ENZYMES: No results for input(s): CKMB, CKMBINDEX, TROPONINI in the last 72 hours.     Invalid input(s): CKTOTAL;3  FASTING LIPID PANEL:  Lab Results   Component Value Date    TRIG 96 04/29/2022     LIVER PROFILE:   Recent Labs     04/29/22 0402 04/29/22  1548 04/30/22  0404   AST 30 27 22   ALT 64* 64* 62*   BILIDIR 0.15 0.18 0.14   BILITOT 0.56 0.67 0.51   ALKPHOS 53 52 46      MICROBIOLOGY:   Lab Results   Component Value Date/Time    CULTURE NO GROWTH 12 HOURS 04/29/2022 02:46 PM       Imaging:    XR CHEST PORTABLE    Result Date: 4/29/2022  1. Stable appropriate positions of support apparatus. 2. Stable small bilateral pleural effusions and bibasilar airspace disease. XR CHEST PORTABLE    Result Date: 4/29/2022  No significant change from prior study. Continued bibasilar opacities and small effusions. XR CHEST PORTABLE    Result Date: 4/28/2022  Stable exam.     XR CHEST PORTABLE    Result Date: 4/26/2022  Relatively stable cardiopulmonary status     XR CHEST PORTABLE    Result Date: 4/25/2022  Stable chest x-ray compared to 04/24/2022. XR CHEST PORTABLE    Result Date: 4/24/2022  1. Stable support tubes and line. 2. Low lung volumes. Crowding of vessels from low lung volumes results in apparent opacification at the right cardiophrenic angle. ASSESSMENT & PLAN     ASSESSMENT / PLAN:     Principal Problem:    Acute asthma exacerbation  Active Problems:    Severe persistent asthma with status asthmaticus    CHIDI (obstructive sleep apnea)    History of seizures    Hypercapnic respiratory failure (HCC)    Endotracheally intubated    On mechanically assisted ventilation (HCC)    Diastolic dysfunction  Resolved Problems:    * No resolved hospital problems. *    1. Acute hypoxic hypercapnic respiratory failure on mechanical ventilation and s/p ECMO cannulation on 4/22/22 secondary asthma exacerbation: Continue SoluMedrol 40 mg IV q12 hours. Respiratory cultures positive for Pseudomonas - Started on Meropenem 1 gram daily per ID. Post Cefepime, patient developed a rash. Duoneb q4 hours and Albuterol as needed. Continue Lasix 20 mg IV daily, diuresing well. Pulm. Critical care following. Recommendations appreciated.      2.  Troponin elevation type II demand ischemia: Continue Cardizem 30 mg q6 hours per cardiology. Cardiology to follow from a distance, once extubated, will contact cardiology for stress test prior to discharge due to apical hypokinesis on echo     3. Sinus tachycardia:- Controlled Continue Cardizem 30 mg q6 hours per Cardiology.  PRN Metoprolol 5 mg IV for HR >110; hold for SBP<110     4. Steroid induced hyperglycemia: Medium dose sliding scale q4 hours; Hypoglycemia protocol, monitor POCT glucose every 4 hours     5. Acute femoral vein DVT: Continue on Argatroban infusion, Pharmacy to dose to keep argatroban on therapeutic level. Once patient is de-cannulated, will start patient on Eliquis for anticoagulation for DVT.      Diet: Tube feeds at goal  DVT ppx : Argatroban infusion  GI ppx: Protonix      PT/OT: Consulted  Discharge Planning / SW:  consulted, will follow up once patient is de-cannulated ECMO cannulation removed and Mirella Lozano MD  Internal Medicine Resident, PGY-1  [address], [place]  4/30/2022, 7:36 AM

## 2022-04-30 NOTE — PROGRESS NOTES
PULMONARY & CRITICAL CARE MEDICINE PROGRESS NOTE     Patient:  Irene Lebron  MRN: [id number]  Admit date: 4/18/2022  Primary Care Physician: Miriam Hill MD  Consulting Physician: Ifeanyi Frias MD  CODE Status: Full Code  LOS: 12     SUBJECTIVE     CHIEF COMPLAINT/REASON FOR INITIAL CONSULT:   Acute hypercapnic respiratory failure    BRIEF HOSPITAL COURSE:   The patient is a 23 y.o. female with past medical history of asthma was initially admitted to [facility] with acute exacerbation. She was initially put on nonrebreather, subsequently required intubation and initiation of mechanical ventilation due to worsening respiratory status. Patient was also on bicarb drip which was discontinued on arrival to [place].  She is sedated, paralyzed and mechanically ventilated. ABG shows improvement in respiratory acidosis and oxygenation. INTERVAL HISTORY:  04/30/22  Overnight events noted, chart reviewed, labs and arterial blood gases seen. Patient remained cannulated for VV ECMO. She was on minimal sweep which was stopped and on 0 LPM currently on VV ECMO flow 4.3 LPM.  She was extubated on 04/29/2022 and remained on nasal cannula currently 4 L maintaining saturation 96% to 98%. She continued to be hemodynamically stable afebrile. Patient was not placed on BiPAP last night per respiratory therapist and nursing staff. She has been on argatroban drip which is currently on hold and planning to decannulate the catheter. Patient is currently on Librium 50 mg every 6 hours arousable but lethargic. She is on Lasix 20 mg IV daily with good urine output. Urine output last 24 hours for 603 in last 24 hours. On Pulmicort aerosol and on DuoNeb aerosol. On Solu-Medrol 40 mg every 12 hours  She is currently on meropenem, respiratory culture grew Pseudomonas. Chest x-ray 04/30/2022 showed low lung volume possibly bibasilar atelectasis no change from chest x-ray on 04/29/2022.     Labs show sodium 135 potassium 4.3 bicarbonate 30 BUN 21 creatinine 0.40. WBC count is 30 hemoglobin 10.7 hematocrit 33.8 platelet count 196. INR 1.2        OBJECTIVE     VENTILATOR SETTINGS:  Vent Information  Ventilator ID: [id number]  Vent Mode: (S) PS/CPAP  Ventilator Discontinue: Yes     PaO2/FiO2 RATIO:  Recent Labs     22  0800   POCPO2 60.3*      FiO2 : 40 %     VITAL SIGNS:   LAST:  BP (!) 157/99   Pulse 105   Temp 99.5 °F (37.5 °C) (Bladder)   Resp 16   Ht 5' 3\" (1.6 m)   Wt (!) 305 lb 8.9 oz (138.6 kg)   SpO2 97%   BMI 54.13 kg/m²   8-24 HR RANGE:  TEMP Temp  Av.3 °F (37.4 °C)  Min: 99.1 °F (37.3 °C)  Max: 99.7 °F (12.6 °C)   BP Systolic (48BEV), SXP:294 , Min:120 , HSS:217      Diastolic (96MDS), SVL:34, Min:62, Max:99     PULSE Pulse  Av.7  Min: 70  Max: 127   RR Resp  Avg: 15.9  Min: 10  Max: 23   O2 SAT SpO2  Av.2 %  Min: 91 %  Max: 99 %   OXYGEN DELIVERY O2 Flow Rate (L/min)  Av L/min  Min: 4 L/min  Max: 4 L/min        Exam    General: Look comfortable currently not in distress arousable but lethargic. Morbidly obese  Mentation: Arousable slightly lethargic follows command able to talk  HENT: Oral mucosa moist small oropharynx.     Eyes: Pupils equally reactive nonicteric sclera  Neck: Short thick neck: No subcutaneous emphysema  Chest/lung: Bilateral air entry is distant no expiratory wheezing and no rhonchi no crackles  Cardiovascular: S1-S2 is regular slightly diminished heart sounds no murmur  Abdomen: Soft obese nontender nondistended nonrigid abdomen  Lower extremity: Positive bilateral edema present  Bilateral femoral cannulas present  CNS: Grossly no motor or cranial nerve deficit move extremities bilaterally  Skin: Mild bruising ecchymosis in the groin present otherwise no skin rash      DATA REVIEW     Medications:  Scheduled Meds:   chlordiazePOXIDE  25 mg Oral 4 times per day    meropenem  1,000 mg IntraVENous Q8H    insulin lispro  0-12 Units SubCUTAneous Q4H    methylPREDNISolone  40 mg IntraVENous Q12H    budesonide  0.5 mg Nebulization BID    lidocaine PF  5 mL Tracheal Tube Once    docusate  100 mg Oral BID    polyethylene glycol  17 g Oral Daily    furosemide  20 mg IntraVENous Daily    heparin flush (PF)  3 mL IntraVENous Q12H    heparin flush (PF)  3 mL IntraVENous Q12H    heparin flush (PF)  3 mL IntraVENous Q12H    heparin flush (PF)  3 mL IntraVENous Q12H    albumin human  25 g IntraVENous Once    pantoprazole (PROTONIX) 40 mg injection  40 mg IntraVENous Daily    dilTIAZem  30 mg Oral 4 times per day    polyvinyl alcohol  1 drop Both Eyes Q6H    ipratropium-albuterol  1 ampule Inhalation Q4H    sodium chloride flush  5-40 mL IntraVENous 2 times per day     Continuous Infusions:   sodium chloride      dextrose      sodium chloride 10 mL/hr at 04/30/22 0843       INPUT/OUTPUT:  In: 2934.7 [I.V.:1246. 7; NG/GT:1588]  Out: 7660 [DYQRY:4920]  Date 04/30/22 0000 - 04/30/22 2359   Shift 4545-2529 5289-0734 4041-6831 24 Hour Total   INTAKE   I.V.(mL/kg) 331(2.4) 27.2(0.2)  358.2(2.6)   NG/GT(mL/kg) 366(2.6) 225(1.6)  591(4.3)   Shift Total(mL/kg) 697(5) 252.2(1.8)  949. 2(6.8)   OUTPUT   Urine(mL/kg/hr) 1350(1.2) 1950  3300   Shift Total(mL/kg) 1350(9.7) 1950(14.1)  3300(23.8)   Weight (kg) 138.6 138.6 138.6 138.6        LABS:  ABGs:   Recent Labs     04/29/22  1543 04/29/22 2010 04/30/22  0003 04/30/22  0356 04/30/22  0800   POCPH 7.440 7.424 7.405 7.436 7.457*   POCPCO2 44.1 44.5 47.5 45.4 41.7   POCPO2 66.8* 71.5* 74.9* 66.0* 60.3*   POCHCO3 29.9* 29.1* 29.8* 30.5* 29.4*   AVJS4ZIF 94 94 95 93* 92*     CBC:   Recent Labs     04/29/22  0402 04/29/22  0944 04/29/22  1548 04/29/22  2209 04/30/22  0404   WBC 37.3* 40.2* 36.3* 27.9* 27.5*   HGB 9.7* 10.4* 10.0* 8.9* 9.4*   HCT 32.1* 32.9* 31.4* 28.3* 30.0*   MCV 90.7 89.6 89.0 89.3 89.3    185 155 See Reflexed IPF Result 141   RBC 3.54* 3.67* 3.53* 3.17* 3.36*   MCH 27.4 28.3 28.3 28.1 28.0 MCHC 30.2 31.6 31.8 31.4 31.3   RDW 14.3 14.6* 14.4 14.3 14.5*     CRP:   No results for input(s): CRP in the last 72 hours. LDH:   No results for input(s): LDH in the last 72 hours. BMP:   Recent Labs     04/29/22  0402 04/29/22  0944 04/29/22  1548 04/29/22  2209 04/30/22  0404    135 136 136 134*   K 4.6 4.3 4.7 4.8 5.0    98 100 101 99   CO2 27 29 29 29 28   BUN 26* 26* 25* 24* 21*   CREATININE 0.37* 0.42* 0.44* 0.39* 0.45*   GLUCOSE 182* 166* 177* 172* 163*     Liver Function Test:   Recent Labs     04/28/22  0410 04/28/22  1620 04/29/22  0402 04/29/22  1548 04/30/22  0404   PROT 5.0* 5.4* 5.6* 5.6* 5.0*   LABALBU 3.0* 3.3* 3.4* 3.4* 3.2*   ALT 57* 61* 64* 64* 58*   AST 17 19 30 27 22   ALKPHOS 46 48 53 52 46   BILITOT 0.34 0.37 0.56 0.67 0.51     Coagulation Profile:   Recent Labs     04/29/22  0402 04/29/22  0530 04/29/22  1240 04/29/22  1548 04/29/22 2010 04/29/22 2209 04/30/22  0404   INR 3.3  --  4.1 3.7  --  4.5 4.1   PROTIME 31.6*  --  38.7* 35.2*  --  42.1* 38.7*   APTT 59.8*   < > 57.0* 68.6* 63.1* 72.0* 69.0*    < > = values in this interval not displayed. D-Dimer:  No results for input(s): DDIMER in the last 72 hours. Lactic Acid:  No results for input(s): LACTA in the last 72 hours. Cardiac Enzymes:  No results for input(s): CKTOTAL, CKMB, CKMBINDEX, TROPONINI in the last 72 hours. Invalid input(s): TROPONIN, HSTROP  BNP/ProBNP:   No results for input(s): BNP, PROBNP in the last 72 hours. Triglycerides:  Recent Labs     04/29/22  0402   TRIG 96        Microbiology:  Urine Culture:  No components found for: CURINE  Blood Culture:  No components found for: CBLOOD, CFUNGUSBL  Sputum Culture:  No components found for: CSPUTUM  Recent Labs     04/29/22  1446   SPECDESC . BLOOD   SPECIAL LAC    CULTURE NO GROWTH 12 HOURS     Recent Labs     04/29/22  1437 04/29/22  1446   SPECDESC . BLOOD . BLOOD   SPECIAL LEFT WRIST . 1ML LAC    CULTURE NO GROWTH 12 HOURS NO GROWTH 12 HOURS Pathology:    Radiology Reports:  XR CHEST PORTABLE   Final Result   1. The endotracheal tube has been removed. 2.  Similar appearance of basilar atelectasis and probable trace effusions. XR CHEST PORTABLE   Final Result   No significant change from prior study. Continued bibasilar opacities and   small effusions. XR CHEST PORTABLE   Final Result   Stable exam.         VL DUP LOWER EXTREMITY VENOUS BILATERAL   Final Result      XR CHEST PORTABLE   Final Result   1. Stable appropriate positions of support apparatus. 2. Stable small bilateral pleural effusions and bibasilar airspace disease. XR CHEST PORTABLE   Final Result   Relatively stable cardiopulmonary status         XR CHEST PORTABLE   Final Result   Stable chest x-ray compared to 04/24/2022. XR CHEST PORTABLE   Final Result   1. Stable support tubes and line. 2. Low lung volumes. Crowding of vessels from low lung volumes results in   apparent opacification at the right cardiophrenic angle. XR CHEST PORTABLE   Final Result   Lines and tubes are unchanged. Low lung volumes. Cardiomegaly. XR CHEST PORTABLE   Final Result   1. Endotracheal tube tip is now 1 cm above the soto. Recommend retraction   by an additional 2.5 cm for optimal positioning. 2.  Improved aeration in the left lung with continued atelectatic/airspace   infiltrates. XR CHEST PORTABLE   Final Result   Endotracheal tube tip is in the right main bronchus and should be retracted   approximately 4-5 cm. Tips of ECMO cannulas project in the expected location of the IVC. Findings were discussed with Alyse Augustin RN (the patient's ICU nurse)   at 2:26 pm on 4/22/2022. XR CHEST (SINGLE VIEW FRONTAL)   Final Result   1. Support tubes and lines are unchanged. 2. Stable bibasilar lung infiltrates, right side greater than left. This may   be related to atelectasis and/or pleural effusion. Superimposed pneumonia   cannot be excluded. XR CHEST (SINGLE VIEW FRONTAL)   Final Result   Suboptimal position left IJ catheter. Consider removal and replacement or   repositioning. XR CHEST PORTABLE   Final Result   1. On today's exam the endotracheal tube tip is positioned 1.5 cm above the   soto. Recommend retraction by an additional 2 cm for optimal positioning. 2.  Interval development of right lower lobe airspace disease. This could   represent atelectasis         XR CHEST PORTABLE   Final Result   1. Endotracheal and enteric tubes as above. 2. Diffuse interstitial opacities throughout both lungs, right greater than   left. No new focal airspace consolidation. Follow-up is recommended to   document resolution. XR CHEST (SINGLE VIEW FRONTAL)   Final Result   Diffuse interstitial opacities with alveolar/consolidative opacities at the   bases favoring multifocal airspace disease. XR CHEST PORTABLE    (Results Pending)        Echocardiogram:   Results for orders placed during the hospital encounter of 04/18/22    ECHO Complete 2D W Doppler W Color      Summary  Left ventricle is normal in size, global left ventricular systolic function  is preserved, estimated ejection fraction is 50%. There appears to be some apical hypokinesis, in the apical views. Evidence of diastolic dysfunction. Trivial mitral regurgitation. Trivial pulmonic insufficiency. IVC dilated but unable to assess respiratory collapse due to patient on  ventilator.        ASSESSMENT AND PLAN     Assessment:    // Acute hypoxic/hypercapnic respiratory failure due to status asthmaticus, on VV ECMO/mechanical ventilation  //Chronic respiratory acidosis  // Rhino/enterovirus infection  // Leukocytosis, stable likely contributed by steroid  // Hyperglycemia, acceptable  // Elevated troponin  // Morbid obesity  // Anemia, stable  // Pseudomonas in respiratory secretion    Plan:    I personally interviewed/examined the patient; reviewed interval history, interpreted all available radiographic and laboratory data at the time of service.  Patient was extubated on 04/29/2022.  She is on nasal cannula tolerating it well maintaining saturation above 92%.  VV ECMO FiO2 1 plantar decreased: Sweep is 0 L/minute. Plan to decannulate by CT surgery today   Currently she is on Librium 50 mg every 6 hours will wean down Librium every day and hold if she is lethargic or somnolent   Continues to be hemodynamically stable does not require pressor support   On argatroban drip. Currently on hold. Post decannulation will ask CT surgery when to resume anticoagulation as she has DVT. Argatroban drip can be resumed or alternatively Eliquis can be started   Bronchoscopy done on 4/24/2022-respiratory culture growing Pseudomonas   Once decannulation is done and patient is able to elevate the head then will resume NG tube feeding or swallow evaluation before restarting oral feed   Aspiration precaution   Obtain X-ray chest daily-Chest x-ray was reviewed and no changes from before   Continue to monitor I/O with a goal of even/negative fluid balance   Stress ulcer prophylaxis-Protonix   Chemical DVT prophylaxis; not indicated patient on systemic anticoagulation-with argatroban   Antimicrobials reviewed; on meropenem   On IV Solu-Medrol; on 40 mg twice daily   Glycemic control appropriate; sliding scale insulin   Skin/wound care reviewed with the nursing staff   Physical/occupational therapy    Discussed with ECMO staff, nursing staff. Discussed with respiratory therapist in detail    The patient remains critically ill with illness/injury that acutely impairs one or more vital organ systems, such that there is a high probability of imminent or life threatening deterioration in the patient's condition.  Critical care time of 40 minutes was spent (excluding procedures), in coordination of care during bedside rounds and discussion of patient care in detail, and recommendations of the team were adopted in the plan. Necessity of all invasive devices was also confirmed. Jessica Valdez MD  Pulmonary and Critical Care Medicine           4/30/2022     Please note that this chart was generated using voice recognition Dragon dictation software. Although every effort was made to ensure the accuracy of this automated transcription, some errors in transcription may have occurred.

## 2022-04-30 NOTE — PROGRESS NOTES
[facility] -   Infectious diseases evaluation  admission date 4/18/2022    reason for consultation:   VAP with resp cultures showing Pseudomonas. Questionable allergy to cefepime. Switching to meropenem. Impression :   Current:  · Asthma exacerbation -ECMO bilat fem access  · Ventilator assoc RLL. Pneumonia  -Pseudomonas aeruginosa -  (S: cefepime, cipro, gentamicin, tobramycin, R: zosyn S meropenem)  · Resp panel pos for rhino/entero on 4/19. · Leukocytosis leukemoid reaction  · Rash cefepime - trunk    Other:  ·   Discussion / summary of stay / plan of care   · Severe Asthma exacerbation - on ECMO  · resp infection - pseudomonas RLL pneumonia   · Post ceftriaxone 4 days and tolerated  · Cefepime 4/28 and 4/29, developed rash torso - stopped - started meropenem  · ID called for AB -   Recommendations   · Stop cefepime -   · Meropenem 4/29 -5/8 total 10 days  · Might get decanulate in am   · RLL improved  · Follow response    Infection Control Recommendations   · Aspers Precautions    Antimicrobial Stewardship Recommendations   · Simplification of therapy  · Targeted therapy  Coordination ofOutpatient Care:   · Estimated Length of IV antimicrobials:  · Patient will need Midline / picc Catheter Insertion:   · Patient will need SNF:  · Patient will need outpatient wound care:     History of Present Illness:   Initial history:  Natali Parnell is a 23y.o.-year-old female with a past medical history of bronchial asthma who was admitted to [facility] with an acute exacerbation causing increasing shortness of breath. Patient was found to be hypoxemic with hypercarbic respiratory failure requiring intubation and mechanical ventilation. Patient was transferred to [facility] for further elevation with possibility of ECMO.       On arrival patient was found to be on a bicarb drip due to presumed respiratory acidosis but this was discontinued due to to worsening of the hypercarbia. Additionally patient was started on Nimbex due to the necessity for paralyzation as she was breathing over the vent. Pulmonology and cardiothoracic surgery were consulted, ECMO planned. In the MICU, patient had an episode of dark emesis which turned out to be bilious output, Hb remained stable. She was hypertensive, started on lopressor IV 5 mg PRN. She was hypoxic, requiring higher FiO2 of 80%, PEEP 16, RR 32, , blood gas showed pH 7.449, pCO2 41.7, pCO2 59.1. Treated with solumedrol, bronchodilators, CXR showed no pneumothorax or pleural effusion.     4/22 - overnight, patient became more hypoxic with increasing oxygen requirement, increasing wheezing on exam. It was determined to go with ECMO canulation. Patient previously treated with   Azithromycin from 4/19 to 4/24  Rocephin from 4/19 to 4/25  Off abx from 4/26 to 4/27  Cefepime from 4/28 to 4/29 4/29 nurse reports rash post treatment with cefepime, and patient switched to meropenem. Patient extubated about around 1230 today. Currently on nasal cannula at 4 LPM and maintaining saturation. Patient currently on ECMO, since 4/22. Afebrile per records, ECMO nurse reports mach controls temperature and has been working harder to do so.   Nurse reports that patient apparently became flushed after previous cefepime treatment and also points to a erythematous papular rash on chest.    CURRENT EVALUATION 4/30/2022:  Patient Vitals for the past 8 hrs:   Temp Temp src Pulse Resp SpO2 Weight   04/30/22 0900 99.5 °F (37.5 °C) Bladder 105 16 97 % --   04/30/22 0816 -- -- -- 16 96 % --   04/30/22 0800 99.7 °F (37.6 °C) Bladder 121 18 96 % --   04/30/22 0700 -- -- 94 16 98 % --   04/30/22 0643 -- -- 96 17 98 % --   04/30/22 0600 -- -- 125 23 97 % --   04/30/22 0500 -- -- 81 12 99 % --   04/30/22 0428 -- -- -- 14 -- --   04/30/22 0420 -- -- -- -- -- (!) 305 lb 8.9 oz (138.6 kg)   04/30/22 0400 99.1 °F (37.3 °C) Bladder 100 17 94 % --     TMAX 99.7  VS stable    The patient is oxygenating well on 4 L NC s/p extubation on 4/29/22. She is alert     ECMO has been stopped and CT Surgery is planning for de-cannulation today. Stable CXR    Leukocytosis continues but is trending down. Steroids continue    Rash does not appear to have worsened. Meropenem continues    Summary of relevant labs:  Labs:    WBC: 40.2-->30.6  Plt 185-->116  Cr 0.42-->0.45  4/19 CRP 79.7  4/18 sed rate 39    Micro:    4/29 UC: Pending  4/29 BC x2 sent-No growth thus far  4/29 Resp cx sent  4/24 Resp cx: Pseudomonas aeruginosa - light growth  S: cefepime, cipro, gentamicin, tobramycin, R: zosyn  4/21 BC x2 no growth  4/19 BC x2 no growth  4/19 Resp cx: normal nadine  4/19 Resp panel: pos rhino/entero    Imaging:    CXR, 4/29/22    IMPRESSION:  No significant change from prior study. Continued bibasilar opacities and small effusions. I have personally reviewed the past medical history, past surgical history, medications, social history, and family history, and I haveupdated the database accordingly. Allergies:   Patient has no known allergies. Review of Systems:     Review of Systems   Constitutional: Positive for activity change and fever. Low grade fever   HENT: Negative. Respiratory:        Dyspnea with exertion   Cardiovascular: Negative. Gastrointestinal: Negative. Endocrine: Negative. Genitourinary: Osullivan   Musculoskeletal:        Generalized weakness   Skin: Positive for rash. All other systems reviewed and are negative. Physical Examination :       Physical Exam  Vitals and nursing note reviewed. Constitutional:       General: She is not in acute distress. Appearance: She is obese. She is not ill-appearing. HENT:      Head: Normocephalic and atraumatic.       Right Ear: Tympanic membrane normal.      Left Ear: Tympanic membrane normal.      Nose: Nose normal.      Mouth/Throat: Mouth: Mucous membranes are moist.   Eyes:      General:         Right eye: No discharge. Left eye: No discharge. Pupils: Pupils are equal, round, and reactive to light. Cardiovascular:      Rate and Rhythm: Regular rhythm. Heart sounds: Normal heart sounds. No murmur heard. No gallop. Pulmonary:      Comments: Upper lobes clear. Lower lobes diminished  Abdominal:      General: Bowel sounds are normal. There is no distension. Palpations: Abdomen is soft. Tenderness: There is no abdominal tenderness. Genitourinary:     Comments: Osullivan  Musculoskeletal:      Cervical back: Neck supple. Comments: Generalized weakness   Lymphadenopathy:      Cervical: No cervical adenopathy. Skin:     General: Skin is warm and dry. Capillary Refill: Capillary refill takes less than 2 seconds. Findings: Rash present. Neurological:      General: No focal deficit present. Mental Status: She is alert. Past Medical History:   No past medical history on file.     Past Surgical  History:     Past Surgical History:   Procedure Laterality Date    EXTRACORPOREAL CIRCULATION N/A 4/22/2022    EXTRA CORPOREAL MEMBRANE OXYGENATION 23 ON THE RIGHT 25 LEFT FEMORALS performed by Jatinder Soler MD at [address] [facility]       Medications:      chlordiazePOXIDE  25 mg Oral 4 times per day    meropenem  1,000 mg IntraVENous Q8H    insulin lispro  0-12 Units SubCUTAneous Q4H    methylPREDNISolone  40 mg IntraVENous Q12H    budesonide  0.5 mg Nebulization BID    lidocaine PF  5 mL Tracheal Tube Once    docusate  100 mg Oral BID    polyethylene glycol  17 g Oral Daily    furosemide  20 mg IntraVENous Daily    heparin flush (PF)  3 mL IntraVENous Q12H    heparin flush (PF)  3 mL IntraVENous Q12H    heparin flush (PF)  3 mL IntraVENous Q12H    heparin flush (PF)  3 mL IntraVENous Q12H    albumin human  25 g IntraVENous Once    pantoprazole (PROTONIX) 40 mg injection  40 mg IntraVENous Daily    dilTIAZem  30 mg Oral 4 times per day    polyvinyl alcohol  1 drop Both Eyes Q6H    ipratropium-albuterol  1 ampule Inhalation Q4H    sodium chloride flush  5-40 mL IntraVENous 2 times per day       Social History:     Social History     Socioeconomic History    Marital status: Life Partner     Spouse name: Not on file    Number of children: Not on file    Years of education: Not on file    Highest education level: Not on file   Occupational History    Not on file   Tobacco Use    Smoking status: Not on file    Smokeless tobacco: Not on file   Substance and Sexual Activity    Alcohol use: Not on file    Drug use: Not on file    Sexual activity: Not on file   Other Topics Concern    Not on file   Social History Narrative    Not on file     Social Determinants of Health     Financial Resource Strain:     Difficulty of Paying Living Expenses: Not on file   Food Insecurity:     Worried About Running Out of Food in the Last Year: Not on file    Yfn of Food in the Last Year: Not on file   Transportation Needs:     Lack of Transportation (Medical): Not on file    Lack of Transportation (Non-Medical):  Not on file   Physical Activity:     Days of Exercise per Week: Not on file    Minutes of Exercise per Session: Not on file   Stress:     Feeling of Stress : Not on file   Social Connections:     Frequency of Communication with Friends and Family: Not on file    Frequency of Social Gatherings with Friends and Family: Not on file    Attends [employer] Services: Not on file    Active Member of Clubs or Organizations: Not on file    Attends Club or Organization Meetings: Not on file    Marital Status: Not on file   Intimate Partner Violence:     Fear of Current or Ex-Partner: Not on file    Emotionally Abused: Not on file    Physically Abused: Not on file    Sexually Abused: Not on file   Housing Stability:     Unable to Pay for Housing in the Last Year: Not on file    Number of Places Lived in the Last Year: Not on file    Unstable Housing in the Last Year: Not on file       Family History:     Family History   Problem Relation Age of Onset    Cancer Mother         breast ca 47, lumpectomy, RT, no GT    Cancer Father         breast ca 52, mastectomy, chemo, GT?    Cancer Paternal Grandmother         breast ca, 60-70s    Cancer Maternal Aunt         poss colon ca, colostomy bag, passed away at age 62s    Other Paternal Aunt         cirrhosis      Medical Decision Making:   I have independently reviewed/ordered the following labs:    CBC with Differential:   Recent Labs     04/29/22 2209 04/30/22  0404   WBC 27.9* 27.5*   HGB 8.9* 9.4*   HCT 28.3* 30.0*   PLT See Reflexed IPF Result 141     BMP:  Recent Labs     04/29/22 2209 04/30/22  0404    134*   K 4.8 5.0    99   CO2 29 28   BUN 24* 21*   CREATININE 0.39* 0.45*   MG 1.9 2.0     Hepatic Function Panel:   Recent Labs     04/29/22  1548 04/30/22  0404   PROT 5.6* 5.0*   LABALBU 3.4* 3.2*   BILIDIR 0.18 0.14   IBILI 0.49 0.37   BILITOT 0.67 0.51   ALKPHOS 52 46   ALT 64* 58*   AST 27 22     No results for input(s): RPR in the last 72 hours. No results for input(s): HIV in the last 72 hours. No results for input(s): BC in the last 72 hours. Lab Results   Component Value Date    CREATININE 0.45 04/30/2022    GLUCOSE 163 04/30/2022       Detailed results: Thank you for allowing us to participate in the care of this patient. Please call with questions. This note is created with the assistance of a speech recognition program.  While intending to generate adocument that actually reflects the content of the visit, the document can still have some errors including those of syntax and sound a like substitutions which may escape proof reading. It such instances, actual meaningcan be extrapolated by contextual diversion.     KANU Solano - CNP  Office: [phone]  Perfect serve / office [phone]      ATTESTATION:    I have discussed the case, including pertinent history and exam findings with the APRN. I have evaluated the  History, physical findings and pictures of the patient and the key elements of the encounter have been performed by me. I have reviewed the laboratory data, other diagnostic studies and discussed them with the APRN. I have updated the medical record where necessary. I agree with the assessment, plan and orders as documented by the APRN.     Afua Ayoub MD.

## 2022-04-30 NOTE — PLAN OF CARE
Problem: Safety - Medical Restraint  Goal: Remains free of injury from restraints (Restraint for Interference with Medical Device)  Description: INTERVENTIONS:  1. Determine that other, less restrictive measures have been tried or would not be effective before applying the restraint  2. Evaluate the patient's condition at the time of restraint application  3. Inform patient/family regarding the reason for restraint  4. Q2H: Monitor safety, psychosocial status, comfort, nutrition and hydration  4/30/2022 0224 by Rhys Franco RN  Outcome: Completed  4/30/2022 0222 by Rhys Franco RN  Outcome: Not Progressing     Problem: Gas Exchange - Impaired:  Goal: Levels of oxygenation will improve  Description: Levels of oxygenation will improve  4/30/2022 0224 by Rhys Franco RN  Outcome: Progressing  4/30/2022 0222 by Rhys Franco RN  Outcome: Not Progressing     Problem:  Activity:  Goal: Ability to tolerate increased activity will improve  Description: Ability to tolerate increased activity will improve  4/30/2022 0224 by Rhys Franco RN  Outcome: Progressing  4/30/2022 0222 by Rhys Franco RN  Outcome: Not Progressing     Problem: Cardiac:  Goal: Hemodynamic stability will improve  Description: Hemodynamic stability will improve  4/30/2022 0224 by Rhys Franco RN  Outcome: Progressing  4/30/2022 0222 by Rhys Franco RN  Outcome: Not Progressing     Problem: Respiratory:  Goal: Ability to maintain a clear airway will improve  Description: Ability to maintain a clear airway will improve  4/30/2022 0224 by Rhys Franco RN  Outcome: Progressing  4/30/2022 0222 by Rhys Franco RN  Outcome: Not Progressing  Goal: Ability to maintain adequate ventilation will improve  Description: Ability to maintain adequate ventilation will improve  4/30/2022 0224 by Rhys Franco RN  Outcome: Progressing  4/30/2022 0222 by Rhys Franco RN  Outcome: Not Progressing  Goal: Complications related to the disease process, condition or treatment will be avoided or minimized  Description: Complications related to the disease process, condition or treatment will be avoided or minimized  4/30/2022 0224 by Santiago Tobin RN  Outcome: Progressing  4/30/2022 0222 by Santiago Tobin RN  Outcome: Not Progressing     Problem: Skin Integrity:  Goal: Risk for impaired skin integrity will decrease  Description: Risk for impaired skin integrity will decrease  4/30/2022 0224 by Santiago Tobin RN  Outcome: Progressing  4/30/2022 0222 by Santiago Tobin RN  Outcome: Not Progressing     Problem: OXYGENATION/RESPIRATORY FUNCTION  Goal: Patient will maintain patent airway  4/30/2022 0224 by Santiago Tobin RN  Outcome: Progressing  4/30/2022 0222 by Santiago Tobin RN  Outcome: Not Progressing  Goal: Patient will achieve/maintain normal respiratory rate/effort  Description: Respiratory rate and effort will be within normal limits for the patient  4/30/2022 0224 by Santiago Tobin RN  Outcome: Progressing  4/30/2022 0222 by Santiago Tobin RN  Outcome: Not Progressing     Problem: MECHANICAL VENTILATION  Goal: Patient will maintain patent airway  4/30/2022 0224 by Santiago Tobin RN  Outcome: Progressing  4/30/2022 0222 by Santiago Tobin RN  Outcome: Not Progressing  Goal: Oral health is maintained or improved  4/30/2022 0224 by Santiago Tobin RN  Outcome: Progressing  4/30/2022 0222 by Santiago Tobin RN  Outcome: Not Progressing  Goal: ET tube will be managed safely  4/30/2022 0224 by Santiago Tobin RN  Outcome: Progressing  4/30/2022 0222 by Santiago Tobin RN  Outcome: Not Progressing     Problem: Nutrition  Goal: Optimal nutrition therapy  4/30/2022 0224 by Santiago Tobin RN  Outcome: Progressing  4/30/2022 0222 by Santiago Tobin RN  Outcome: Not Progressing     Problem: Discharge Planning  Goal: Discharge to home or other facility with appropriate resources  4/30/2022 0224 by Santiago Tobin RN  Outcome: Progressing  4/30/2022 0222 by Santiago Tobin RN  Outcome: Not Progressing Problem: Genitourinary - Adult  Goal: Urinary catheter remains patent  4/30/2022 0224 by Tomas Mendoza RN  Outcome: Progressing  4/30/2022 0222 by Tomas Mendoza RN  Outcome: Not Progressing     Problem: Neurosensory - Adult  Goal: Achieves stable or improved neurological status  4/30/2022 0224 by Tomas Mendoza RN  Outcome: Progressing  4/30/2022 0222 by Tomas Mendoza RN  Outcome: Not Progressing  Goal: Achieves maximal functionality and self care  4/30/2022 0224 by Tomas Mendoza RN  Outcome: Progressing  4/30/2022 0222 by Tomas Mendoza RN  Outcome: Not Progressing     Problem: Pain  Goal: Verbalizes/displays adequate comfort level or baseline comfort level  4/30/2022 0224 by Tomas Mendoza RN  Outcome: Progressing  4/30/2022 0222 by Tomas Mendoza RN  Outcome: Not Progressing     Problem: Safety - Adult  Goal: Free from fall injury  4/30/2022 0224 by Tomas Mendoza RN  Outcome: Progressing  4/30/2022 0222 by Tomas Mendoza RN  Outcome: Not Progressing     Problem: ABCDS Injury Assessment  Goal: Absence of physical injury  4/30/2022 0224 by Tomas Mendoza RN  Outcome: Progressing  4/30/2022 0222 by Tomas Mendoza RN  Outcome: Not Progressing     Problem: Skin/Tissue Integrity  Goal: Absence of new skin breakdown  Description: 1. Monitor for areas of redness and/or skin breakdown  2. Assess vascular access sites hourly  3. Every 4-6 hours minimum:  Change oxygen saturation probe site  4. Every 4-6 hours:  If on nasal continuous positive airway pressure, respiratory therapy assess nares and determine need for appliance change or resting period.   4/30/2022 0224 by Tomas Mendoza RN  Outcome: Progressing  4/30/2022 0222 by Tomas Mendoza RN  Outcome: Not Progressing

## 2022-04-30 NOTE — PROGRESS NOTES
Clarified anticoagulation s/p ecmo removal with Dr Linsey Bonilla; okay to start heparin 5/1/22 if theres no complications s/p removal, then bridge to eliquis.  Will relay information to Medicine-resident

## 2022-04-30 NOTE — PROGRESS NOTES
Ecmo End of Shift Note:       Cannulation date/time:4/22/2022  ECMO type:V/V  Cannula sizes/locations:25 Fr drainage LFV // 23 Fr return RFV  Flow ordered at:4-6       Currently flowing at:4.5       FIO2 at:21       Sweep at:0  Delta pressure:23  Clot burden:       Oxygenator:3,9,and 12 o'clock position       Circuit:Fibrin noted around 3/8 connector  Anticoagulation: off at 0400  Products given:        PRBC's:0       PLT's:0       FFP:0       Cryo:0       Albumin:0

## 2022-05-01 ENCOUNTER — APPOINTMENT (OUTPATIENT)
Dept: GENERAL RADIOLOGY | Age: 22
DRG: 003 | End: 2022-05-01
Attending: INTERNAL MEDICINE
Payer: COMMERCIAL

## 2022-05-01 LAB
ALBUMIN SERPL-MCNC: 2.9 G/DL (ref 3.5–5.2)
ALBUMIN/GLOBULIN RATIO: 1.7 (ref 1–2.5)
ALP BLD-CCNC: 39 U/L (ref 35–104)
ALT SERPL-CCNC: 51 U/L (ref 5–33)
ANION GAP SERPL CALCULATED.3IONS-SCNC: 5 MMOL/L (ref 9–17)
ANION GAP SERPL CALCULATED.3IONS-SCNC: 9 MMOL/L (ref 9–17)
AST SERPL-CCNC: 15 U/L
AT-III ACTIVITY: 100 % (ref 83–122)
BILIRUB SERPL-MCNC: 0.62 MG/DL (ref 0.3–1.2)
BILIRUBIN DIRECT: 0.19 MG/DL
BILIRUBIN, INDIRECT: 0.43 MG/DL (ref 0–1)
BUN BLDV-MCNC: 19 MG/DL (ref 6–20)
BUN BLDV-MCNC: 19 MG/DL (ref 6–20)
CALCIUM IONIZED: 1.13 MMOL/L (ref 1.13–1.33)
CALCIUM IONIZED: 1.14 MMOL/L (ref 1.13–1.33)
CALCIUM SERPL-MCNC: 7.9 MG/DL (ref 8.6–10.4)
CALCIUM SERPL-MCNC: 8.5 MG/DL (ref 8.6–10.4)
CHLORIDE BLD-SCNC: 100 MMOL/L (ref 98–107)
CHLORIDE BLD-SCNC: 97 MMOL/L (ref 98–107)
CO2: 28 MMOL/L (ref 20–31)
CO2: 29 MMOL/L (ref 20–31)
CREAT SERPL-MCNC: 0.4 MG/DL (ref 0.5–0.9)
CREAT SERPL-MCNC: 0.47 MG/DL (ref 0.5–0.9)
FIBRINOGEN: 115 MG/DL (ref 140–420)
FIBRINOGEN: 164 MG/DL (ref 140–420)
GFR AFRICAN AMERICAN: >60 ML/MIN
GFR AFRICAN AMERICAN: >60 ML/MIN
GFR NON-AFRICAN AMERICAN: >60 ML/MIN
GFR NON-AFRICAN AMERICAN: >60 ML/MIN
GFR SERPL CREATININE-BSD FRML MDRD: ABNORMAL ML/MIN/{1.73_M2}
GFR SERPL CREATININE-BSD FRML MDRD: ABNORMAL ML/MIN/{1.73_M2}
GLUCOSE BLD-MCNC: 105 MG/DL (ref 65–105)
GLUCOSE BLD-MCNC: 133 MG/DL (ref 65–105)
GLUCOSE BLD-MCNC: 142 MG/DL (ref 70–99)
GLUCOSE BLD-MCNC: 159 MG/DL (ref 65–105)
GLUCOSE BLD-MCNC: 160 MG/DL (ref 70–99)
GLUCOSE BLD-MCNC: 214 MG/DL (ref 65–105)
HCT VFR BLD CALC: 27 % (ref 36.3–47.1)
HCT VFR BLD CALC: 31.5 % (ref 36.3–47.1)
HEMOGLOBIN: 10.3 G/DL (ref 11.9–15.1)
HEMOGLOBIN: 8.7 G/DL (ref 11.9–15.1)
INR BLD: 1.2
INR BLD: 1.3
LACTIC ACID, WHOLE BLOOD: 0.9 MMOL/L (ref 0.7–2.1)
LACTIC ACID, WHOLE BLOOD: 1.2 MMOL/L (ref 0.7–2.1)
MAGNESIUM: 2.1 MG/DL (ref 1.6–2.6)
MAGNESIUM: 2.1 MG/DL (ref 1.6–2.6)
MCH RBC QN AUTO: 28.1 PG (ref 25.2–33.5)
MCH RBC QN AUTO: 28.1 PG (ref 25.2–33.5)
MCHC RBC AUTO-ENTMCNC: 32.2 G/DL (ref 28.4–34.8)
MCHC RBC AUTO-ENTMCNC: 32.7 G/DL (ref 28.4–34.8)
MCV RBC AUTO: 85.8 FL (ref 82.6–102.9)
MCV RBC AUTO: 87.1 FL (ref 82.6–102.9)
NRBC AUTOMATED: 0 PER 100 WBC
NRBC AUTOMATED: 0.1 PER 100 WBC
PARTIAL THROMBOPLASTIN TIME: 21.2 SEC (ref 20.5–30.5)
PARTIAL THROMBOPLASTIN TIME: 23.2 SEC (ref 20.5–30.5)
PARTIAL THROMBOPLASTIN TIME: >120 SEC (ref 20.5–30.5)
PDW BLD-RTO: 14.8 % (ref 11.8–14.4)
PDW BLD-RTO: 15.2 % (ref 11.8–14.4)
PLATELET # BLD: 116 K/UL (ref 138–453)
PLATELET # BLD: 91 K/UL (ref 138–453)
PMV BLD AUTO: 10.8 FL (ref 8.1–13.5)
PMV BLD AUTO: 10.9 FL (ref 8.1–13.5)
POTASSIUM SERPL-SCNC: 4.1 MMOL/L (ref 3.7–5.3)
POTASSIUM SERPL-SCNC: 4.4 MMOL/L (ref 3.7–5.3)
PROTHROMBIN TIME: 12.7 SEC (ref 9.1–12.3)
PROTHROMBIN TIME: 13.3 SEC (ref 9.1–12.3)
RBC # BLD: 3.1 M/UL (ref 3.95–5.11)
RBC # BLD: 3.67 M/UL (ref 3.95–5.11)
SODIUM BLD-SCNC: 134 MMOL/L (ref 135–144)
SODIUM BLD-SCNC: 134 MMOL/L (ref 135–144)
TOTAL PROTEIN: 4.6 G/DL (ref 6.4–8.3)
WBC # BLD: 16.9 K/UL (ref 3.5–11.3)
WBC # BLD: 24.7 K/UL (ref 3.5–11.3)

## 2022-05-01 PROCEDURE — 85610 PROTHROMBIN TIME: CPT

## 2022-05-01 PROCEDURE — APPSS30 APP SPLIT SHARED TIME 16-30 MINUTES: Performed by: NURSE PRACTITIONER

## 2022-05-01 PROCEDURE — 82947 ASSAY GLUCOSE BLOOD QUANT: CPT

## 2022-05-01 PROCEDURE — 2580000003 HC RX 258: Performed by: INTERNAL MEDICINE

## 2022-05-01 PROCEDURE — 85027 COMPLETE CBC AUTOMATED: CPT

## 2022-05-01 PROCEDURE — 6370000000 HC RX 637 (ALT 250 FOR IP): Performed by: STUDENT IN AN ORGANIZED HEALTH CARE EDUCATION/TRAINING PROGRAM

## 2022-05-01 PROCEDURE — C9113 INJ PANTOPRAZOLE SODIUM, VIA: HCPCS | Performed by: INTERNAL MEDICINE

## 2022-05-01 PROCEDURE — 83735 ASSAY OF MAGNESIUM: CPT

## 2022-05-01 PROCEDURE — 6360000002 HC RX W HCPCS: Performed by: INTERNAL MEDICINE

## 2022-05-01 PROCEDURE — 71045 X-RAY EXAM CHEST 1 VIEW: CPT

## 2022-05-01 PROCEDURE — 92610 EVALUATE SWALLOWING FUNCTION: CPT

## 2022-05-01 PROCEDURE — 37799 UNLISTED PX VASCULAR SURGERY: CPT

## 2022-05-01 PROCEDURE — 2700000000 HC OXYGEN THERAPY PER DAY

## 2022-05-01 PROCEDURE — 99291 CRITICAL CARE FIRST HOUR: CPT | Performed by: INTERNAL MEDICINE

## 2022-05-01 PROCEDURE — 6370000000 HC RX 637 (ALT 250 FOR IP): Performed by: INTERNAL MEDICINE

## 2022-05-01 PROCEDURE — 80076 HEPATIC FUNCTION PANEL: CPT

## 2022-05-01 PROCEDURE — 85300 ANTITHROMBIN III ACTIVITY: CPT

## 2022-05-01 PROCEDURE — 94761 N-INVAS EAR/PLS OXIMETRY MLT: CPT

## 2022-05-01 PROCEDURE — 6360000002 HC RX W HCPCS: Performed by: STUDENT IN AN ORGANIZED HEALTH CARE EDUCATION/TRAINING PROGRAM

## 2022-05-01 PROCEDURE — 6370000000 HC RX 637 (ALT 250 FOR IP)

## 2022-05-01 PROCEDURE — 85730 THROMBOPLASTIN TIME PARTIAL: CPT

## 2022-05-01 PROCEDURE — 83605 ASSAY OF LACTIC ACID: CPT

## 2022-05-01 PROCEDURE — 6370000000 HC RX 637 (ALT 250 FOR IP): Performed by: NURSE PRACTITIONER

## 2022-05-01 PROCEDURE — 2100000001 HC CVICU R&B

## 2022-05-01 PROCEDURE — 82330 ASSAY OF CALCIUM: CPT

## 2022-05-01 PROCEDURE — 6360000002 HC RX W HCPCS

## 2022-05-01 PROCEDURE — 94640 AIRWAY INHALATION TREATMENT: CPT

## 2022-05-01 PROCEDURE — 83051 HEMOGLOBIN PLASMA: CPT

## 2022-05-01 PROCEDURE — 82803 BLOOD GASES ANY COMBINATION: CPT

## 2022-05-01 PROCEDURE — 80048 BASIC METABOLIC PNL TOTAL CA: CPT

## 2022-05-01 PROCEDURE — 2580000003 HC RX 258: Performed by: STUDENT IN AN ORGANIZED HEALTH CARE EDUCATION/TRAINING PROGRAM

## 2022-05-01 PROCEDURE — 99232 SBSQ HOSP IP/OBS MODERATE 35: CPT | Performed by: INTERNAL MEDICINE

## 2022-05-01 PROCEDURE — 99233 SBSQ HOSP IP/OBS HIGH 50: CPT | Performed by: INTERNAL MEDICINE

## 2022-05-01 PROCEDURE — 85384 FIBRINOGEN ACTIVITY: CPT

## 2022-05-01 PROCEDURE — 2580000003 HC RX 258

## 2022-05-01 RX ORDER — HEPARIN SODIUM 1000 [USP'U]/ML
10000 INJECTION, SOLUTION INTRAVENOUS; SUBCUTANEOUS PRN
Status: DISCONTINUED | OUTPATIENT
Start: 2022-05-01 | End: 2022-05-07 | Stop reason: ALTCHOICE

## 2022-05-01 RX ORDER — CHLORDIAZEPOXIDE HYDROCHLORIDE 5 MG/1
10 CAPSULE, GELATIN COATED ORAL EVERY 6 HOURS SCHEDULED
Status: DISCONTINUED | OUTPATIENT
Start: 2022-05-01 | End: 2022-05-01

## 2022-05-01 RX ORDER — FLUCONAZOLE 200 MG/1
200 TABLET ORAL DAILY
Status: COMPLETED | OUTPATIENT
Start: 2022-05-01 | End: 2022-05-03

## 2022-05-01 RX ORDER — FENTANYL CITRATE 50 UG/ML
25 INJECTION, SOLUTION INTRAMUSCULAR; INTRAVENOUS
Status: DISCONTINUED | OUTPATIENT
Start: 2022-05-01 | End: 2022-05-02

## 2022-05-01 RX ORDER — HEPARIN SODIUM 1000 [USP'U]/ML
5000 INJECTION, SOLUTION INTRAVENOUS; SUBCUTANEOUS PRN
Status: DISCONTINUED | OUTPATIENT
Start: 2022-05-01 | End: 2022-05-07 | Stop reason: ALTCHOICE

## 2022-05-01 RX ORDER — HEPARIN SODIUM 1000 [USP'U]/ML
10000 INJECTION, SOLUTION INTRAVENOUS; SUBCUTANEOUS ONCE
Status: COMPLETED | OUTPATIENT
Start: 2022-05-01 | End: 2022-05-01

## 2022-05-01 RX ORDER — CHLORDIAZEPOXIDE HYDROCHLORIDE 5 MG/1
10 CAPSULE, GELATIN COATED ORAL 3 TIMES DAILY
Status: DISCONTINUED | OUTPATIENT
Start: 2022-05-01 | End: 2022-05-02

## 2022-05-01 RX ADMIN — INSULIN LISPRO 2 UNITS: 100 INJECTION, SOLUTION INTRAVENOUS; SUBCUTANEOUS at 05:16

## 2022-05-01 RX ADMIN — CHLORDIAZEPOXIDE HYDROCHLORIDE 10 MG: 5 CAPSULE ORAL at 14:58

## 2022-05-01 RX ADMIN — POLYVINYL ALCOHOL 1 DROP: 14 SOLUTION/ DROPS OPHTHALMIC at 07:37

## 2022-05-01 RX ADMIN — DILTIAZEM HYDROCHLORIDE 30 MG: 30 TABLET ORAL at 19:08

## 2022-05-01 RX ADMIN — IPRATROPIUM BROMIDE AND ALBUTEROL SULFATE 1 AMPULE: .5; 2.5 SOLUTION RESPIRATORY (INHALATION) at 03:20

## 2022-05-01 RX ADMIN — Medication 15.5 UNITS/KG/HR: at 10:59

## 2022-05-01 RX ADMIN — METHYLPREDNISOLONE SODIUM SUCCINATE 40 MG: 40 INJECTION, POWDER, FOR SOLUTION INTRAMUSCULAR; INTRAVENOUS at 20:38

## 2022-05-01 RX ADMIN — DILTIAZEM HYDROCHLORIDE 30 MG: 30 TABLET ORAL at 05:52

## 2022-05-01 RX ADMIN — INSULIN LISPRO 2 UNITS: 100 INJECTION, SOLUTION INTRAVENOUS; SUBCUTANEOUS at 12:06

## 2022-05-01 RX ADMIN — IPRATROPIUM BROMIDE AND ALBUTEROL SULFATE 1 AMPULE: .5; 2.5 SOLUTION RESPIRATORY (INHALATION) at 08:16

## 2022-05-01 RX ADMIN — FUROSEMIDE 20 MG: 10 INJECTION, SOLUTION INTRAMUSCULAR; INTRAVENOUS at 08:21

## 2022-05-01 RX ADMIN — SODIUM CHLORIDE, PRESERVATIVE FREE 10 ML: 5 INJECTION INTRAVENOUS at 07:38

## 2022-05-01 RX ADMIN — FLUCONAZOLE 200 MG: 200 TABLET ORAL at 14:58

## 2022-05-01 RX ADMIN — DILTIAZEM HYDROCHLORIDE 30 MG: 30 TABLET ORAL at 23:47

## 2022-05-01 RX ADMIN — SODIUM CHLORIDE, PRESERVATIVE FREE 40 MG: 5 INJECTION INTRAVENOUS at 08:21

## 2022-05-01 RX ADMIN — MEROPENEM 1000 MG: 1 INJECTION, POWDER, FOR SOLUTION INTRAVENOUS at 10:42

## 2022-05-01 RX ADMIN — IPRATROPIUM BROMIDE AND ALBUTEROL SULFATE 1 AMPULE: .5; 2.5 SOLUTION RESPIRATORY (INHALATION) at 15:50

## 2022-05-01 RX ADMIN — MEROPENEM 1000 MG: 1 INJECTION, POWDER, FOR SOLUTION INTRAVENOUS at 01:25

## 2022-05-01 RX ADMIN — METHYLPREDNISOLONE SODIUM SUCCINATE 40 MG: 40 INJECTION, POWDER, FOR SOLUTION INTRAMUSCULAR; INTRAVENOUS at 07:37

## 2022-05-01 RX ADMIN — CHLORDIAZEPOXIDE HYDROCHLORIDE 10 MG: 5 CAPSULE ORAL at 20:38

## 2022-05-01 RX ADMIN — POLYVINYL ALCOHOL 1 DROP: 14 SOLUTION/ DROPS OPHTHALMIC at 15:59

## 2022-05-01 RX ADMIN — HEPARIN SODIUM 10000 UNITS: 1000 INJECTION INTRAVENOUS; SUBCUTANEOUS at 10:54

## 2022-05-01 RX ADMIN — BUDESONIDE 500 MCG: 0.5 INHALANT RESPIRATORY (INHALATION) at 20:48

## 2022-05-01 RX ADMIN — BUDESONIDE 500 MCG: 0.5 INHALANT RESPIRATORY (INHALATION) at 12:32

## 2022-05-01 RX ADMIN — DILTIAZEM HYDROCHLORIDE 30 MG: 30 TABLET ORAL at 13:09

## 2022-05-01 RX ADMIN — IPRATROPIUM BROMIDE AND ALBUTEROL SULFATE 1 AMPULE: .5; 2.5 SOLUTION RESPIRATORY (INHALATION) at 12:32

## 2022-05-01 RX ADMIN — SODIUM CHLORIDE, PRESERVATIVE FREE 10 ML: 5 INJECTION INTRAVENOUS at 20:39

## 2022-05-01 RX ADMIN — MEROPENEM 1000 MG: 1 INJECTION, POWDER, FOR SOLUTION INTRAVENOUS at 18:51

## 2022-05-01 RX ADMIN — IPRATROPIUM BROMIDE AND ALBUTEROL SULFATE 1 AMPULE: .5; 2.5 SOLUTION RESPIRATORY (INHALATION) at 20:48

## 2022-05-01 RX ADMIN — INSULIN LISPRO 4 UNITS: 100 INJECTION, SOLUTION INTRAVENOUS; SUBCUTANEOUS at 18:00

## 2022-05-01 RX ADMIN — CHLORDIAZEPOXIDE HYDROCHLORIDE 25 MG: 25 CAPSULE ORAL at 05:52

## 2022-05-01 RX ADMIN — OXYCODONE HYDROCHLORIDE 10 MG: 5 TABLET ORAL at 05:51

## 2022-05-01 ASSESSMENT — PAIN SCALES - WONG BAKER
WONGBAKER_NUMERICALRESPONSE: 0

## 2022-05-01 ASSESSMENT — ENCOUNTER SYMPTOMS: GASTROINTESTINAL NEGATIVE: 1

## 2022-05-01 ASSESSMENT — PAIN SCALES - GENERAL
PAINLEVEL_OUTOF10: 0
PAINLEVEL_OUTOF10: 6
PAINLEVEL_OUTOF10: 0

## 2022-05-01 ASSESSMENT — PULMONARY FUNCTION TESTS
PIF_VALUE: 12
PIF_VALUE: 11
PIF_VALUE: 11

## 2022-05-01 ASSESSMENT — PAIN DESCRIPTION - DESCRIPTORS: DESCRIPTORS: ACHING

## 2022-05-01 ASSESSMENT — PAIN DESCRIPTION - LOCATION: LOCATION: GENERALIZED

## 2022-05-01 NOTE — PROGRESS NOTES
Informed Dr. Glenis Wiggins of patient developing a bloody nose after heparin drip was started, will continue to monitor.

## 2022-05-01 NOTE — PLAN OF CARE
Problem: Gas Exchange - Impaired:  Goal: Levels of oxygenation will improve  Description: Levels of oxygenation will improve  Outcome: Progressing     Problem:  Activity:  Goal: Ability to tolerate increased activity will improve  Description: Ability to tolerate increased activity will improve  Outcome: Progressing     Problem: Cardiac:  Goal: Hemodynamic stability will improve  Description: Hemodynamic stability will improve  Outcome: Progressing     Problem: Respiratory:  Goal: Ability to maintain a clear airway will improve  Description: Ability to maintain a clear airway will improve  5/1/2022 1412 by Cornelio Barry RN  Outcome: Progressing  5/1/2022 0820 by Cherelle Hayes RCP  Outcome: Progressing  Goal: Ability to maintain adequate ventilation will improve  Description: Ability to maintain adequate ventilation will improve  5/1/2022 1412 by Cornelio Barry RN  Outcome: Progressing  5/1/2022 0820 by Cherelle Hayes RCP  Outcome: Progressing  Goal: Complications related to the disease process, condition or treatment will be avoided or minimized  Description: Complications related to the disease process, condition or treatment will be avoided or minimized  5/1/2022 1412 by Cornelio Barry RN  Outcome: Progressing  5/1/2022 0820 by Cherelle Hayes RCP  Outcome: Progressing     Problem: Skin Integrity:  Goal: Risk for impaired skin integrity will decrease  Description: Risk for impaired skin integrity will decrease  Outcome: Progressing     Problem: OXYGENATION/RESPIRATORY FUNCTION  Goal: Patient will maintain patent airway  5/1/2022 1412 by Cornelio Barry RN  Outcome: Progressing  5/1/2022 0820 by Cherelle Hayes RCP  Outcome: Progressing  Goal: Patient will achieve/maintain normal respiratory rate/effort  Description: Respiratory rate and effort will be within normal limits for the patient  5/1/2022 1412 by Cornelio Barry RN  Outcome: Progressing  5/1/2022 0820 by Cherelle Hayes RCP  Outcome: Progressing     Problem: Nutrition  Goal: Optimal nutrition therapy  Outcome: Progressing     Problem: Discharge Planning  Goal: Discharge to home or other facility with appropriate resources  Outcome: Progressing     Problem: Genitourinary - Adult  Goal: Urinary catheter remains patent  Outcome: Progressing     Problem: Neurosensory - Adult  Goal: Achieves stable or improved neurological status  Outcome: Progressing  Goal: Achieves maximal functionality and self care  Outcome: Progressing     Problem: Pain  Goal: Verbalizes/displays adequate comfort level or baseline comfort level  Outcome: Progressing     Problem: Safety - Adult  Goal: Free from fall injury  Outcome: Progressing     Problem: ABCDS Injury Assessment  Goal: Absence of physical injury  Outcome: Progressing     Problem: Skin/Tissue Integrity  Goal: Absence of new skin breakdown  Description: 1. Monitor for areas of redness and/or skin breakdown  2. Assess vascular access sites hourly  3. Every 4-6 hours minimum:  Change oxygen saturation probe site  4. Every 4-6 hours:  If on nasal continuous positive airway pressure, respiratory therapy assess nares and determine need for appliance change or resting period.   Outcome: Progressing

## 2022-05-01 NOTE — PLAN OF CARE
Problem: Respiratory:  Goal: Ability to maintain a clear airway will improve  Description: Ability to maintain a clear airway will improve  Outcome: Progressing  Goal: Ability to maintain adequate ventilation will improve  Description: Ability to maintain adequate ventilation will improve  Outcome: Progressing  Goal: Complications related to the disease process, condition or treatment will be avoided or minimized  Description: Complications related to the disease process, condition or treatment will be avoided or minimized  Outcome: Progressing     Problem: OXYGENATION/RESPIRATORY FUNCTION  Goal: Patient will maintain patent airway  Outcome: Progressing  Goal: Patient will achieve/maintain normal respiratory rate/effort  Description: Respiratory rate and effort will be within normal limits for the patient  Outcome: Progressing     BRONCHOSPASM/BRONCHOCONSTRICTION     [x]         IMPROVE AERATION/BREATH SOUNDS  [x]   ADMINISTER BRONCHODILATOR THERAPY AS APPROPRIATE  [x]   ASSESS BREATH SOUNDS  []   IMPLEMENT AEROSOL/MDI PROTOCOL  [x]   PATIENT EDUCATION AS NEEDED

## 2022-05-01 NOTE — PROGRESS NOTES
PULMONARY & CRITICAL CARE MEDICINE PROGRESS NOTE     Patient:  Brie Lopez  MRN: [id number]  6 [facility] date: 4/18/2022  Primary Care Physician: Noble Fernandez MD  Consulting Physician: Jordi Young MD  CODE Status: Full Code  LOS: 13     SUBJECTIVE     CHIEF COMPLAINT/REASON FOR INITIAL CONSULT:   Acute hypercapnic respiratory failure    BRIEF HOSPITAL COURSE:   The patient is a 23 y.o. female with past medical history of asthma was initially admitted to [facility] with acute exacerbation. She was initially put on nonrebreather, subsequently required intubation and initiation of mechanical ventilation due to worsening respiratory status. Patient was also on bicarb drip which was discontinued on arrival to [place].  She is sedated, paralyzed and mechanically ventilated. ABG shows improvement in respiratory acidosis and oxygenation. INTERVAL HISTORY:  05/01/22  Overnight events noted, chart reviewed, labs and arterial blood gases seen. Patient is off ECMO flow and decannulated yesterday no bleeding or events reported post decannulation  She is on 4 L nasal cannula maintaining saturation above 92%. She is hemodynamically stable and afebrile last 24 hours. She did use NIV last night NIV setting 8/5/1940 percent and did well overnight. She is a started on heparin drip back again this morning. Her Librium was reduced to 25 mg from 50 mg yesterday and 10 mg 3 times a day this morning. She is getting bedside swallow evaluation NG tube is still in place feeding to be started. She is on Lasix 20 mg IV daily with good urine output. Urine output last 24 hours is 6400. On Pulmicort aerosol and on DuoNeb aerosol. On Solu-Medrol 40 mg every 12 hours  She is currently on meropenem, respiratory culture grew Pseudomonas.   Chest x-ray 05/01/2022, 04/30/2022 showed low lung volume bibasilar atelectasis with slightly more prominent right basilar atelectasis/infiltrate no change from chest x-ray from 2022. Labs show sodium 134 potassium 4.1 BUN is 19 creatinine 0.47 bicarbonate 28. WBC count is 24.7 hemoglobin 10.3 platelet count 008. OBJECTIVE     VENTILATOR SETTINGS:  Vent Information  Ventilator ID: [id number]  Vent Mode: (S) PS/CPAP  Ventilator Discontinue: Yes     PaO2/FiO2 RATIO:  Recent Labs     22  0800   POCPO2 60.3*      FiO2 : 40 %     VITAL SIGNS:   LAST:  /80   Pulse 121   Temp 99.7 °F (37.6 °C) (Bladder)   Resp 20   Ht 5' 3\" (1.6 m)   Wt (!) 300 lb 0.7 oz (136.1 kg)   SpO2 92%   BMI 53.15 kg/m²   8-24 HR RANGE:  TEMP Temp  Av.6 °F (37.6 °C)  Min: 98.8 °F (37.1 °C)  Max: 100 °F (39.3 °C)   BP Systolic (98YHE), VXZ:722 , Min:106 , KQK:409      Diastolic (43NTL), ABH:95, Min:49, Max:80     PULSE Pulse  Av.6  Min: 83  Max: 128   RR Resp  Av  Min: 14  Max: 23   O2 SAT SpO2  Av.8 %  Min: 90 %  Max: 93 %   OXYGEN DELIVERY O2 Flow Rate (L/min)  Av L/min  Min: 4 L/min  Max: 4 L/min        Exam    General: Look comfortable currently not in distress arousable but lethargic. Morbidly obese  Mentation: Arousable slightly lethargic follows command able to talk  HENT: Oral mucosa moist small oropharynx.     Eyes: Pupils equally reactive nonicteric sclera  Neck: Short thick neck: No subcutaneous emphysema  Chest/lung: Bilateral air entry is distant no expiratory wheezing and no rhonchi no crackles  Cardiovascular: S1-S2 is regular slightly diminished heart sounds no murmur  Abdomen: Soft obese nontender nondistended nonrigid abdomen  Lower extremity: Positive bilateral mild edema present  Bilateral femoral cannulas present  CNS: Grossly no motor or cranial nerve deficit move extremities bilaterally  Skin: Mild bruising ecchymosis in the groin present otherwise no skin rash      DATA REVIEW     Medications:  Scheduled Meds:   heparin (porcine)  10,000 Units IntraVENous Once    chlordiazePOXIDE  25 mg Oral 4 times per day    meropenem 1,000 mg IntraVENous Q8H    insulin lispro  0-12 Units SubCUTAneous Q4H    methylPREDNISolone  40 mg IntraVENous Q12H    budesonide  0.5 mg Nebulization BID    lidocaine PF  5 mL Tracheal Tube Once    docusate  100 mg Oral BID    polyethylene glycol  17 g Oral Daily    furosemide  20 mg IntraVENous Daily    albumin human  25 g IntraVENous Once    pantoprazole (PROTONIX) 40 mg injection  40 mg IntraVENous Daily    dilTIAZem  30 mg Oral 4 times per day    polyvinyl alcohol  1 drop Both Eyes Q6H    ipratropium-albuterol  1 ampule Inhalation Q4H    sodium chloride flush  5-40 mL IntraVENous 2 times per day     Continuous Infusions:   heparin (PORCINE) Infusion      sodium chloride      dextrose      sodium chloride 10 mL/hr at 05/01/22 0600       INPUT/OUTPUT:  In: 1472.2 [P.O.:500; I.V.:224.8; NG/GT:455]  Out: 8815 [Urine:8815]  Date 05/01/22 0000 - 05/01/22 2359   Shift [id number] 5991-8782 3600-2359 24 Hour Total   INTAKE   P.O.(mL/kg/hr) 500(0.5)   500   I. V.(mL/kg) 109.8(0.8)   109.8(0.8)   NG/GT(mL/kg) 90(0.7)   90(0.7)   IV Piggyback(mL/kg) 192.8(1.4)   192.8(1.4)   Shift Total(mL/kg) 892.6(6.6)   892.6(6.6)   OUTPUT   Urine(mL/kg/hr) 1315(1.2) 2050  3365   Shift Total(mL/kg) 1315(9.7) 2741(48.6)  1707(94.2)   Weight (kg) 136.1 136.1 136.1 136.1        LABS:  ABGs:   Recent Labs     04/29/22  1543 04/29/22 2010 04/30/22  0003 04/30/22  0356 04/30/22  0800   POCPH 7.440 7.424 7.405 7.436 7.457*   POCPCO2 44.1 44.5 47.5 45.4 41.7   POCPO2 66.8* 71.5* 74.9* 66.0* 60.3*   POCHCO3 29.9* 29.1* 29.8* 30.5* 29.4*   TQOJ8TMF 94 94 95 93* 92*     CBC:   Recent Labs     04/29/22 2209 04/30/22  0404 04/30/22  1115 04/30/22  2220 05/01/22  0437   WBC 27.9* 27.5* 30.6* 21.1* 16.9*   HGB 8.9* 9.4* 10.7* 9.6* 8.7*   HCT 28.3* 30.0* 33.8* 30.1* 27.0*   MCV 89.3 89.3 88.0 87.2 87.1   PLT See Reflexed IPF Result 141 116* 90* 91*   RBC 3.17* 3.36* 3.84* 3.45* 3.10*   MCH 28.1 28.0 27.9 27.8 28.1   MCHC 31.4 31.3 31.7 31.9 32.2   RDW 14.3 14.5* 14.6* 14.8* 14.8*     CRP:   No results for input(s): CRP in the last 72 hours. LDH:   No results for input(s): LDH in the last 72 hours. BMP:   Recent Labs     04/29/22  2209 04/30/22  0404 04/30/22  1115 04/30/22  2220 05/01/22  0437    134* 135 136 134*   K 4.8 5.0 4.3 4.3 4.4    99 96* 100 100   CO2 29 28 30 29 29   BUN 24* 21* 21* 17 19   CREATININE 0.39* 0.45* 0.40* 0.37* 0.40*   GLUCOSE 172* 163* 151* 157* 142*     Liver Function Test:   Recent Labs     04/28/22  1620 04/29/22  0402 04/29/22  1548 04/30/22  0404 05/01/22  0437   PROT 5.4* 5.6* 5.6* 5.0* 4.6*   LABALBU 3.3* 3.4* 3.4* 3.2* 2.9*   ALT 61* 64* 64* 58* 51*   AST 19 30 27 22 15   ALKPHOS 48 53 52 46 39   BILITOT 0.37 0.56 0.67 0.51 0.62     Coagulation Profile:   Recent Labs     04/29/22 2209 04/30/22  0404 04/30/22  1115 04/30/22  2220 05/01/22  0437   INR 4.5 4.1 1.2 1.3 1.3   PROTIME 42.1* 38.7* 13.0* 13.3* 13.3*   APTT 72.0* 69.0* 27.2 23.1 23.2     D-Dimer:  No results for input(s): DDIMER in the last 72 hours. Lactic Acid:  No results for input(s): LACTA in the last 72 hours. Cardiac Enzymes:  No results for input(s): CKTOTAL, CKMB, CKMBINDEX, TROPONINI in the last 72 hours. Invalid input(s): TROPONIN, HSTROP  BNP/ProBNP:   No results for input(s): BNP, PROBNP in the last 72 hours. Triglycerides:  Recent Labs     04/29/22  0402   TRIG 96        Microbiology:  Urine Culture:  No components found for: CURINE  Blood Culture:  No components found for: CBLOOD, CFUNGUSBL  Sputum Culture:  No components found for: 100 [facility]  Recent Labs     04/29/22  1446 04/29/22  1446 04/29/22  1644   SPECDESC . BLOOD   < > .INDWELLING CATH URINE   SPECIAL LAC   --   --    CULTURE NO GROWTH 1 DAY   < > Candida albicans/dubliniensis >733240 CFU/ML    < > = values in this interval not displayed. Recent Labs     04/29/22  1437 04/29/22  1446 04/29/22  1644   SPECDESC . BLOOD . BLOOD . INDWELLING CATH URINE   SPECIAL LEFT WRIST . 1ML LAC   --    CULTURE NO GROWTH 1 DAY NO GROWTH 1 DAY Candida albicans/dubliniensis >114500 CFU/ML        Pathology:    Radiology Reports:  XR CHEST PORTABLE   Final Result   Small bibasilar effusions and adjacent bibasilar infiltrates. Stable right internal jugular central line. XR CHEST PORTABLE   Final Result   1. The endotracheal tube has been removed. 2.  Similar appearance of basilar atelectasis and probable trace effusions. XR CHEST PORTABLE   Final Result   No significant change from prior study. Continued bibasilar opacities and   small effusions. XR CHEST PORTABLE   Final Result   Stable exam.         VL DUP LOWER EXTREMITY VENOUS BILATERAL   Final Result      XR CHEST PORTABLE   Final Result   1. Stable appropriate positions of support apparatus. 2. Stable small bilateral pleural effusions and bibasilar airspace disease. XR CHEST PORTABLE   Final Result   Relatively stable cardiopulmonary status         XR CHEST PORTABLE   Final Result   Stable chest x-ray compared to 04/24/2022. XR CHEST PORTABLE   Final Result   1. Stable support tubes and line. 2. Low lung volumes. Crowding of vessels from low lung volumes results in   apparent opacification at the right cardiophrenic angle. XR CHEST PORTABLE   Final Result   Lines and tubes are unchanged. Low lung volumes. Cardiomegaly. XR CHEST PORTABLE   Final Result   1. Endotracheal tube tip is now 1 cm above the soto. Recommend retraction   by an additional 2.5 cm for optimal positioning. 2.  Improved aeration in the left lung with continued atelectatic/airspace   infiltrates. XR CHEST PORTABLE   Final Result   Endotracheal tube tip is in the right main bronchus and should be retracted   approximately 4-5 cm. Tips of ECMO cannulas project in the expected location of the IVC.       Findings were discussed with Quynh Lorenzo RN (the patient's ICU nurse)   at 2:26 pm on 4/22/2022. XR CHEST (SINGLE VIEW FRONTAL)   Final Result   1. Support tubes and lines are unchanged. 2. Stable bibasilar lung infiltrates, right side greater than left. This may   be related to atelectasis and/or pleural effusion. Superimposed pneumonia   cannot be excluded. XR CHEST (SINGLE VIEW FRONTAL)   Final Result   Suboptimal position left IJ catheter. Consider removal and replacement or   repositioning. XR CHEST PORTABLE   Final Result   1. On today's exam the endotracheal tube tip is positioned 1.5 cm above the   soto. Recommend retraction by an additional 2 cm for optimal positioning. 2.  Interval development of right lower lobe airspace disease. This could   represent atelectasis         XR CHEST PORTABLE   Final Result   1. Endotracheal and enteric tubes as above. 2. Diffuse interstitial opacities throughout both lungs, right greater than   left. No new focal airspace consolidation. Follow-up is recommended to   document resolution. XR CHEST (SINGLE VIEW FRONTAL)   Final Result   Diffuse interstitial opacities with alveolar/consolidative opacities at the   bases favoring multifocal airspace disease. XR CHEST PORTABLE    (Results Pending)        Echocardiogram:   Results for orders placed during the hospital encounter of 04/18/22    ECHO Complete 2D W Doppler W Color      Summary  Left ventricle is normal in size, global left ventricular systolic function  is preserved, estimated ejection fraction is 50%. There appears to be some apical hypokinesis, in the apical views. Evidence of diastolic dysfunction. Trivial mitral regurgitation. Trivial pulmonic insufficiency. IVC dilated but unable to assess respiratory collapse due to patient on  ventilator.        ASSESSMENT AND PLAN     Assessment:    // Acute hypoxic/hypercapnic respiratory failure due to status asthmaticus, on VV ECMO/mechanical ventilation  //Chronic respiratory acidosis  // Rhino/enterovirus infection  // Leukocytosis, stable likely contributed by steroid  // Hyperglycemia, acceptable  // Elevated troponin  // Morbid obesity  // Anemia, stable  // Pseudomonas in respiratory secretion    Plan:    I personally interviewed/examined the patient; reviewed interval history, interpreted all available radiographic and laboratory data at the time of service.  Patient was extubated on 04/29/2022.  She is on nasal cannula tolerating it well maintaining saturation above 92%.  Currently she is on Librium 25 mg every 6 hours will wean down Librium every day and hold if she is lethargic or somnolent   Continues to be hemodynamically stable does not require pressor support   Continue with DuoNeb aerosol and Pulmicort aerosol   On heparin drip for DVT   Bronchoscopy done on 4/24/2022-respiratory culture growing Pseudomonas   Okay to start oral feeding after swallow evaluation   Aspiration precaution   Obtain chest x-ray as needed   Continue to monitor I/O with a goal of even/negative fluid balance   Stress ulcer prophylaxis-Protonix   Chemical DVT prophylaxis; not indicated patient on systemic anticoagulation with heparin drip   Antimicrobials reviewed; on meropenem   On IV Solu-Medrol; on 40 mg twice daily. Likely start oral prednisone tomorrow and then wean   Glycemic control appropriate; sliding scale insulin   Skin/wound care reviewed with the nursing staff   Physical/occupational therapy    Discussed with respiratory therapist in detail and nursing staff    The patient remains critically ill with illness/injury that acutely impairs one or more vital organ systems, such that there is a high probability of imminent or life threatening deterioration in the patient's condition.  Critical care time of 30 minutes was spent (excluding procedures), in coordination of care during bedside rounds and discussion of patient care in detail, and recommendations of the team were adopted in the plan. Necessity of all invasive devices was also confirmed. Kandice Lundberg MD  Pulmonary and Critical Care Medicine           5/1/2022     Please note that this chart was generated using voice recognition Dragon dictation software. Although every effort was made to ensure the accuracy of this automated transcription, some errors in transcription may have occurred.

## 2022-05-01 NOTE — PROGRESS NOTES
[facility] -   Infectious diseases evaluation    Progress Note    admission date 4/18/2022    reason for consultation:   VAP with resp cultures showing Pseudomonas. Questionable allergy to cefepime. Switched to meropenem. Impression :   Current:  · Asthma exacerbation -ECMO bilat fem access  · Ventilator assoc RLL. Pneumonia  -Pseudomonas aeruginosa -  (S: cefepime, cipro, gentamicin, tobramycin, R: zosyn S meropenem)  · Resp panel pos for rhino/entero on 4/19. · Leukocytosis leukemoid reaction  · Rash cefepime - affecting trunk-improving  · Urine culture 4/29/2022 growing Candida albicans  · P.o. Diflucan 200 mg daily x3 days initiated 5/1/2022    Discussion / summary of stay / plan of care   · Severe Asthma exacerbation - on ECMO  · resp infection - pseudomonas RLL pneumonia   · Post ceftriaxone 4 days and tolerated  · Cefepime 4/28 and 4/29, developed rash torso - stopped - started meropenem  · ECMO decannulation on 4-30-22    Recommendations     · Meropenem 4/29. Stop date 5/8 total 10 days  · Monitor response    Infection Control Recommendations   · [place] Precautions    Antimicrobial Stewardship Recommendations   · Simplification of therapy  · Targeted therapy  Coordination ofOutpatient Care:   · Estimated Length of IV antimicrobials:  · Patient will need Midline / picc Catheter Insertion:   · Patient will need SNF:  · Patient will need outpatient wound care:     History of Present Illness:   Initial history:  Vero Roach is a 23y.o.-year-old female with a past medical history of bronchial asthma who was admitted to [facility] with an acute exacerbation causing increasing shortness of breath. Patient was found to be hypoxemic with hypercarbic respiratory failure requiring intubation and mechanical ventilation. Patient was transferred to [facility] for further elevation with possibility of ECMO.       On arrival patient was found to be on a bicarb drip due to presumed respiratory acidosis but this was discontinued due to to worsening of the hypercarbia. Additionally patient was started on Nimbex due to the necessity for paralyzation as she was breathing over the vent. Pulmonology and cardiothoracic surgery were consulted, ECMO planned. In the MICU, patient had an episode of dark emesis which turned out to be bilious output, Hb remained stable. She was hypertensive, started on lopressor IV 5 mg PRN. She was hypoxic, requiring higher FiO2 of 80%, PEEP 16, RR 32, , blood gas showed pH 7.449, pCO2 41.7, pCO2 59.1. Treated with solumedrol, bronchodilators, CXR showed no pneumothorax or pleural effusion.     4/22 - overnight, patient became more hypoxic with increasing oxygen requirement, increasing wheezing on exam. It was determined to go with ECMO canulation. Patient previously treated with   Azithromycin from 4/19 to 4/24  Rocephin from 4/19 to 4/25  Off abx from 4/26 to 4/27  Cefepime from 4/28 to 4/29 4/29 nurse reports rash post treatment with cefepime, and patient switched to meropenem. Patient extubated about around 1230 today. Currently on nasal cannula at 4 LPM and maintaining saturation. Patient currently on ECMO, since 4/22. Afebrile per records, ECMO nurse reports mach controls temperature and has been working harder to do so. Nurse reports that patient apparently became flushed after previous cefepime treatment and also points to a erythematous papular rash on chest.    CURRENT EVALUATION 5/1/2022:  /80   Pulse 117   Temp 99.7 °F (37.6 °C) (Bladder)   Resp 18   Ht 5' 3\" (1.6 m)   Wt (!) 300 lb 0.7 oz (136.1 kg)   SpO2 94%   BMI 53.15 kg/m²     Patient evaluated and examined in the ICU. TMAX 99.7  VS stable with sinus tachycardia  Heparin drip    The patient is oxygenating well on 4 L NC s/p extubation on 4/29/22.   BiPAP overnight  She is alert and conversant    ECMO decannulation was completed on 4/30/2022    Stable CXR    She passed a swallow evaluation per speech therapy and has been started on a diet. Leukocytosis continues but is trending down. Steroids continue    Rash is improving    Meropenem continues    Diflucan initiated for UTI with Candida    Summary of relevant labs: 5/1/2022    Labs:    WBC: 40.2-->30.6-->24.7  Plt 185-->116-->10.3  Cr 0.42-->0.45-->0.47  4/19 CRP 79.7  4/18 sed rate 39    Micro:    4/29 UC: Candida albicans/dubliniensis  4/29 BC x2 sent-No growth thus far  4/29 Resp cx sent  4/24 Resp cx: Pseudomonas aeruginosa - light growth  S: cefepime, cipro, gentamicin, tobramycin, R: zosyn  4/21 BC x2 no growth  4/19 BC x2 no growth  4/19 Resp cx: normal nadine  4/19 Resp panel: pos rhino/entero    Imaging:    CXR, 4/29/22    IMPRESSION:  No significant change from prior study. Continued bibasilar opacities and small effusions. I have personally reviewed the past medical history, past surgical history, medications, social history, and family history, and I haveupdated the database accordingly. Allergies:   Patient has no known allergies. Review of Systems:     Review of Systems   Constitutional: Positive for activity change. Negative for fever. HENT: Negative. Respiratory:        Dyspnea with exertion   Cardiovascular: Negative. Gastrointestinal: Negative. Endocrine: Negative. Genitourinary: [place]   Musculoskeletal:        Generalized weakness   Skin: Positive for rash. All other systems reviewed and are negative. Physical Examination :       Physical Exam  Vitals and nursing note reviewed. Constitutional:       General: She is not in acute distress. Appearance: She is obese. She is not ill-appearing. HENT:      Head: Normocephalic and atraumatic.       Right Ear: Tympanic membrane normal.      Left Ear: Tympanic membrane normal.      Nose: Nose normal.      Mouth/Throat:      Mouth: Mucous membranes are moist.   Eyes:      General:         Right eye: No discharge. Left eye: No discharge. Pupils: Pupils are equal, round, and reactive to light. Cardiovascular:      Rate and Rhythm: Regular rhythm. Heart sounds: Normal heart sounds. No murmur heard. No gallop. Pulmonary:      Comments: Upper lobes clear. Lower lobes diminished  Abdominal:      General: Bowel sounds are normal. There is no distension. Palpations: Abdomen is soft. Tenderness: There is no abdominal tenderness. Genitourinary:     Comments: [place]  Musculoskeletal:      Cervical back: Neck supple. Comments: Generalized weakness   Lymphadenopathy:      Cervical: No cervical adenopathy. Skin:     General: Skin is warm and dry. Capillary Refill: Capillary refill takes less than 2 seconds. Findings: Rash present. Neurological:      General: No focal deficit present. Mental Status: She is alert. Past Medical History:   No past medical history on file.     Past Surgical  History:     Past Surgical History:   Procedure Laterality Date    EXTRACORPOREAL CIRCULATION N/A 4/22/2022    EXTRA CORPOREAL MEMBRANE OXYGENATION 23 ON THE RIGHT 25 LEFT FEMORALS performed by Steven Yang MD at [facility] [facility]       Medications:      chlordiazePOXIDE  10 mg Oral TID    meropenem  1,000 mg IntraVENous Q8H    insulin lispro  0-12 Units SubCUTAneous Q4H    methylPREDNISolone  40 mg IntraVENous Q12H    budesonide  0.5 mg Nebulization BID    lidocaine PF  5 mL Tracheal Tube Once    docusate  100 mg Oral BID    polyethylene glycol  17 g Oral Daily    furosemide  20 mg IntraVENous Daily    albumin human  25 g IntraVENous Once    pantoprazole (PROTONIX) 40 mg injection  40 mg IntraVENous Daily    dilTIAZem  30 mg Oral 4 times per day    polyvinyl alcohol  1 drop Both Eyes Q6H    ipratropium-albuterol  1 ampule Inhalation Q4H    sodium chloride flush  5-40 mL IntraVENous 2 times per day       Social History:     Social History     Socioeconomic History    Marital status: Life Partner     Spouse name: Not on file    Number of children: Not on file    Years of education: Not on file    Highest education level: Not on file   Occupational History    Not on file   Tobacco Use    Smoking status: Not on file    Smokeless tobacco: Not on file   Substance and Sexual Activity    Alcohol use: Not on file    Drug use: Not on file    Sexual activity: Not on file   Other Topics Concern    Not on file   Social History Narrative    Not on file     Social Determinants of Health     Financial Resource Strain:     Difficulty of Paying Living Expenses: Not on file   Food Insecurity:     Worried About Running Out of Food in the Last Year: Not on file    Yfn of Food in the Last Year: Not on file   Transportation Needs:     Lack of Transportation (Medical): Not on file    Lack of Transportation (Non-Medical):  Not on file   Physical Activity:     Days of Exercise per Week: Not on file    Minutes of Exercise per Session: Not on file   Stress:     Feeling of Stress : Not on file   Social Connections:     Frequency of Communication with Friends and Family: Not on file    Frequency of Social Gatherings with Friends and Family: Not on file    Attends Hinduism Services: Not on file    Active Member of [address] [place] [address] [employer] or Organizations: Not on file    Attends Club or Organization Meetings: Not on file    Marital Status: Not on file   Intimate Partner Violence:     Fear of Current or Ex-Partner: Not on file    Emotionally Abused: Not on file    Physically Abused: Not on file    Sexually Abused: Not on file   Housing Stability:     Unable to Pay for Housing in the Last Year: Not on file    Number of [place] in the Last Year: Not on file    Unstable Housing in the Last Year: Not on file       Family History:     Family History   Problem Relation Age of Onset    Cancer Mother         breast ca 47, lumpectomy, RT, no GT    Cancer Father         breast ca 52, mastectomy, chemo, GT?    Cancer Paternal Grandmother         breast ca, 60-70s    Cancer Maternal Aunt         poss colon ca, colostomy bag, passed away at age 62s    Other Paternal Aunt         cirrhosis      Medical Decision Making:   I have independently reviewed/ordered the following labs:    CBC with Differential:   Recent Labs     05/01/22  0437 05/01/22  1026   WBC 16.9* 24.7*   HGB 8.7* 10.3*   HCT 27.0* 31.5*   PLT 91* 116*     BMP:  Recent Labs     05/01/22  0437 05/01/22  1026   * 134*   K 4.4 4.1    97*   CO2 29 28   BUN 19 19   CREATININE 0.40* 0.47*   MG 2.1 2.1     Hepatic Function Panel:   Recent Labs     04/30/22  0404 05/01/22  0437   PROT 5.0* 4.6*   LABALBU 3.2* 2.9*   BILIDIR 0.14 0.19   IBILI 0.37 0.43   BILITOT 0.51 0.62   ALKPHOS 46 39   ALT 58* 51*   AST 22 15     No results for input(s): RPR in the last 72 hours. No results for input(s): HIV in the last 72 hours. No results for input(s): BC in the last 72 hours. Lab Results   Component Value Date    CREATININE 0.47 05/01/2022    GLUCOSE 160 05/01/2022       Detailed results: Thank you for allowing us to participate in the care of this patient. Please call with questions. This note is created with the assistance of a speech recognition program.  While intending to generate adocument that actually reflects the content of the visit, the document can still have some errors including those of syntax and sound a like substitutions which may escape proof reading. It such instances, actual meaningcan be extrapolated by contextual diversion. KANU Ryan - CNP  Office: [phone]  Perfect serve / office [phone]      ATTESTATION:    I have discussed the case, including pertinent history and exam findings with the APRN.  I have evaluated the  History, physical findings and pictures of the patient and the key elements of the encounter have been performed by me. I have reviewed the laboratory data, other diagnostic studies and discussed them with the APRN. I have updated the medical record where necessary. I agree with the assessment, plan and orders as documented by the APRN.     Ines White MD.

## 2022-05-01 NOTE — PROGRESS NOTES
[facility]  Internal Medicine Teaching Residency Program  Inpatient Daily Progress Note  ______________________________________________________________________________    Patient: Toni Meyer  YOB: 2000   [facility]:[id number]    Acct: [de-identified]     Room: [address] [place], [place] [address]  Admit date: 4/18/2022  Today's date: 05/01/22  Number of days in the hospital: 13    SUBJECTIVE   Admitting Diagnosis: Acute asthma exacerbation     CC: Shortness of breath    Pt examined at bedside. Chart & results reviewed. - Patient  Currently stable on 4L NC oxygen a   - ECMO currently off decannulated 4/30/2022. .   - Patient is alert, awake, oriented x4. Passed bedside swallow eval. Will d/c NGT  - Completed 24hrs without AC, no major events noted. Will restart AC with high dose heparin for DVT. .   - Has good urine output. Will do voiding trial. Plan to d/c [place]. - Urinating well, Uout: 4.6L/24 hours  - Blood glucose: 140s-180  - Pulmonology following: Continue IV Solu-Medrol 40 mg BID. Continue nocturnal BiPAP and supplemental oxygen duyring day. Continue Lasix 20 mg IV daily   - ID consulted: respiratory culture has pseudomonas, patient developed rash post cefepime on chest, Continue on Meropenem for total 10 days until 5/8. - Labs reviewed: WBC 27.5<--27.9; Hgb 9.4<--8.9; and BMP wnl     ROS:  Constitutional:  negative for chills, fevers, sweats  Respiratory:  negative for cough, dyspnea on exertion, hemoptysis, shortness of breath, wheezing  Cardiovascular:  negative for chest pain, chest pressure/discomfort, lower extremity edema, palpitations  Gastrointestinal:  negative for abdominal pain, constipation, diarrhea, nausea, vomiting  Neurological:  negative for dizziness, headache    BRIEF HISTORY       22 y. o. female with a past medical history of bronchial asthma who was admitted to [facility] with an acute exacerbation causing increasing shortness of breath.  Patient was found to be hypoxemic with hypercarbic respiratory failure requiring intubation and mechanical ventilation.  Patient was transferred to [facility] for further elevation with possibility of ECMO. On arrival patient was found to be on a bicarb drip due to presumed respiratory acidosis but this was discontinued due to to worsening of the hypercarbia.  Additionally patient was started on Nimbex due to the necessity for paralyzation as she was breathing over the vent.  Pulmonology and cardiothoracic surgery were consulted- plan is for ECMO. In the MICU, patient remained stable, had an episode of dark emesis which turned out to be bilious output, Hb remained stable. She was hypertensive, started on lopressor IV 5 mg PRN. Had left IJ placed which was malpositioned, removed and then CVC in left femoral was placed overnight. She was hypoxic, requiring higher FiO2 of 80%, PEEP 16, RR 32, , blood gas showed pH 7.449, pCO2 41.7, pCO2 59. 1. treated with solumedrol, bronchodilators, CXR showed no pneumothorax or pleural effusion.   On  overnight, patient became more hypoxic with increasing oxygen requirement, increasing wheezing on exam. It was determined to go with ECMO canulation on . OBJECTIVE     Vital Signs:  /80   Pulse 117   Temp 99.7 °F (37.6 °C) (Bladder)   Resp 18   Ht 5' 3\" (1.6 m)   Wt (!) 300 lb 0.7 oz (136.1 kg)   SpO2 94%   BMI 53.15 kg/m²     Temp (24hrs), Av.6 °F (37.6 °C), Min:98.8 °F (37.1 °C), Max:100 °F (37.8 °C)    In: 1628.1   Out: 9290 [Urine:9290]    Physical Exam:  Constitutional: This is a well developed, well nourished, Greater than 40 - Morbid Obesity / Extreme Obesity / Grade III 23y.o. year old female who is alert, oriented, cooperative and in no apparent distress on nasal canula and on ECMO. EENT:  PERRLA. No conjunctival injections. Septum was midline, mucosa was without erythema, exudates or cobblestoning. No thrush was noted.    Neck: Supple without thyromegaly. No elevated JVP. Trachea was midline. Respiratory: Chest was symmetrical without dullness to percussion. Breath sounds bilaterally were clear to auscultation. There were no wheezes, rhonchi or rales. There is no intercostal retraction or use of accessory muscles. No egophony noted. Cardiovascular: Regular without murmur, clicks, gallops or rubs. Abdomen: Slightly rounded and soft without organomegaly. No rebound, rigidity or guarding was appreciated. Lymphatic: No lymphadenopathy. Musculoskeletal: Normal curvature of the spine. No gross muscle weakness. Extremities:  No lower extremity edema, ulcerations, tenderness, varicosities or erythema. Muscle size, tone and strength are normal.  No involuntary movements are noted. Skin:  Warm and dry. Good color, turgor and pigmentation. No lesions or scars.   No cyanosis or clubbing  Neurological/Psychiatric: The patient's general behavior, level of consciousness, thought content and emotional status is normal.        Medications:  Scheduled Medications:    chlordiazePOXIDE  10 mg Oral TID    meropenem  1,000 mg IntraVENous Q8H    insulin lispro  0-12 Units SubCUTAneous Q4H    methylPREDNISolone  40 mg IntraVENous Q12H    budesonide  0.5 mg Nebulization BID    lidocaine PF  5 mL Tracheal Tube Once    docusate  100 mg Oral BID    polyethylene glycol  17 g Oral Daily    furosemide  20 mg IntraVENous Daily    albumin human  25 g IntraVENous Once    pantoprazole (PROTONIX) 40 mg injection  40 mg IntraVENous Daily    dilTIAZem  30 mg Oral 4 times per day    polyvinyl alcohol  1 drop Both Eyes Q6H    ipratropium-albuterol  1 ampule Inhalation Q4H    sodium chloride flush  5-40 mL IntraVENous 2 times per day     Continuous Infusions:    heparin (PORCINE) Infusion 15.5 Units/kg/hr (05/01/22 1121)    sodium chloride      dextrose      sodium chloride 10 mL/hr at 05/01/22 1121     PRN Medicationsheparin (porcine), 10,000 Units, PRN  heparin (porcine), 5,000 Units, PRN  fentanNYL, 25 mcg, Q2H PRN  oxyCODONE, 10 mg, Q6H PRN  sodium chloride, , PRN  albuterol, 2.5 mg, Q4H PRN  heparin flush (PF), 3 mL, PRN  midazolam, 2 mg, Q2H PRN  metoprolol, 5 mg, Q6H PRN  glucose, 15 g, PRN  dextrose, 12.5 g, PRN  glucagon (rDNA), 1 mg, PRN  dextrose, 100 mL/hr, PRN  sodium chloride flush, 5-40 mL, PRN  sodium chloride, , PRN  ondansetron, 4 mg, Q8H PRN   Or  ondansetron, 4 mg, Q6H PRN  acetaminophen, 650 mg, Q6H PRN   Or  acetaminophen, 650 mg, Q6H PRN        Diagnostic Labs:  CBC:   Recent Labs     04/30/22 2220 05/01/22 0437 05/01/22  1026   WBC 21.1* 16.9* 24.7*   RBC 3.45* 3.10* 3.67*   HGB 9.6* 8.7* 10.3*   HCT 30.1* 27.0* 31.5*   MCV 87.2 87.1 85.8   RDW 14.8* 14.8* 15.2*   PLT 90* 91* 116*     BMP:   Recent Labs     04/30/22 2220 05/01/22 0437 05/01/22  1026    134* 134*   K 4.3 4.4 4.1    100 97*   CO2 29 29 28   BUN 17 19 19   CREATININE 0.37* 0.40* 0.47*     BNP: No results for input(s): BNP in the last 72 hours. PT/INR:   Recent Labs     04/30/22 2220 05/01/22 0437 05/01/22  1026   PROTIME 13.3* 13.3* 12.7*   INR 1.3 1.3 1.2     APTT:   Recent Labs     04/30/22 2220 05/01/22 0437 05/01/22  1026   APTT 23.1 23.2 21.2     CARDIAC ENZYMES: No results for input(s): CKMB, CKMBINDEX, TROPONINI in the last 72 hours. Invalid input(s): CKTOTAL;3  FASTING LIPID PANEL:  Lab Results   Component Value Date    TRIG 96 04/29/2022     LIVER PROFILE:   Recent Labs     04/29/22  1548 04/30/22  0404 05/01/22  0437   AST 27 22 15   ALT 64* 58* 51*   BILIDIR 0.18 0.14 0.19   BILITOT 0.67 0.51 0.62   ALKPHOS 52 46 39      MICROBIOLOGY:   Lab Results   Component Value Date/Time    CULTURE Laura albicans/dubliniensis >[id number] CFU/ML 04/29/2022 04:44 PM       Imaging:    XR CHEST PORTABLE    Result Date: 4/29/2022  1. Stable appropriate positions of support apparatus.  2. Stable small bilateral pleural effusions and bibasilar airspace disease. XR CHEST PORTABLE    Result Date: 4/29/2022  No significant change from prior study. Continued bibasilar opacities and small effusions. XR CHEST PORTABLE    Result Date: 4/28/2022  Stable exam.     XR CHEST PORTABLE    Result Date: 4/26/2022  Relatively stable cardiopulmonary status     XR CHEST PORTABLE    Result Date: 4/25/2022  Stable chest x-ray compared to 04/24/2022. XR CHEST PORTABLE    Result Date: 4/24/2022  1. Stable support tubes and line. 2. Low lung volumes. Crowding of vessels from low lung volumes results in apparent opacification at the right cardiophrenic angle. ASSESSMENT & PLAN     ASSESSMENT / PLAN:     Principal Problem:    Acute asthma exacerbation  Active Problems:    Severe persistent asthma with status asthmaticus    CHIDI (obstructive sleep apnea)    Morbid obesity with BMI of 50.0-59.9, adult ([facility])    History of seizures    Hypercapnic respiratory failure ([facility])    Endotracheally intubated    On mechanically assisted ventilation ([facility])    Diastolic dysfunction  Resolved Problems:    * No resolved hospital problems. *    1. Acute hypoxic hypercapnic respiratory failure on mechanical ventilation and s/p ECMO cannulation on 4/22/22 secondary asthma exacerbation: Continue SoluMedrol 40 mg IV q12 hours. Respiratory cultures positive for Pseudomonas - Started on Meropenem 1 gram daily per ID. Post Cefepime, patient developed a rash. Duoneb q4 hours and Albuterol as needed. Continue Lasix 20 mg IV daily, diuresing well. Pulm. Critical care following. Recommendations appreciated.      2. Troponin elevation type II demand ischemia: Continue Cardizem 30 mg q6 hours per cardiology. Cardiology  for stress test prior to discharge due to apical hypokinesis on echo     3. Sinus tachycardia:- Controlled Continue Cardizem 30 mg q6 hours per Cardiology.  PRN Metoprolol 5 mg IV for HR >110; hold for SBP<110     4.  Steroid induced hyperglycemia: Medium dose sliding scale q4 hours; Hypoglycemia protocol, monitor POCT glucose every 4 hours     5. Acute femoral vein DVT: Restarted AC with Heparin high dose. will switch to Eliquis at discharge.      Diet: Full liquid diet. Will advance as tolerated  DVT ppx : Heparin  GI ppx: Protonix      PT/OT: Consulted  Discharge Planning / SW:  consulted,     Kb Garcias MD  Internal Medicine Resident, PGY-1  [address], [place]  5/1/2022, 12:58 PM

## 2022-05-01 NOTE — PROGRESS NOTES
Speech Language Pathology  Facility/Department: [facility] CAR 1   CLINICAL BEDSIDE SWALLOW EVALUATION    NAME: Aurea Martin  : 2000  MRN: [id number]    ADMISSION DATE: 2022  ADMITTING DIAGNOSIS: has Acute asthma exacerbation; History of seizures; Hypercapnic respiratory failure ([address] Endotracheally intubated; On mechanically assisted ventilation ([address] Diastolic dysfunction; Severe persistent asthma with status asthmaticus; CHIDI (obstructive sleep apnea); and Morbid obesity with BMI of 50.0-59.9, adult (HCC) on their problem list.     Recent Chest Xray/CT of Chest: 2022    Impression   Small bibasilar effusions and adjacent bibasilar infiltrates.       Stable right internal jugular central line. Date of Eval: 2022  Evaluating Therapist: KYLER Fernández    Current Diet level:  Current Diet : NPO    Primary Complaint  The patient is a pleasant 23 y.o. female with a past medical history of bronchial asthma who was admitted to [facility] with an acute exacerbation causing increasing shortness of breath. Patient was found to be hypoxemic with hypercarbic respiratory failure requiring intubation and mechanical ventilation. Patient was transferred to [facility] for further elevation with possibility of ECMO. On arrival patient was found to be on a bicarb drip due to presumed respiratory acidosis but this was discontinued due to to worsening of the hypercarbia. Additionally patient was started on Nimbex due to the necessity for paralyzation as she was breathing over the vent. Pulmonology and cardiothoracic surgery were consulted as well.     Pain:  Pain Assessment  Pain Assessment: None - Denies Pain  Pain Level: 0    Reason for Referral  Aurea Martin was referred for a bedside swallow evaluation to assess the efficiency of her swallow function, identify signs and symptoms of aspiration and make recommendations regarding safe dietary consistencies, effective compensatory strategies, and safe eating environment. Impression  Patient presents with probable safe swallow for Regular diet with thin liquids as evidenced by no overt s/s of aspiration noted with consistencies tested. +mild extended mastication of regular solids. Pt may benefit from assist feed. Recommend small sips and bites, only feed when alert and awake and upright at 90 degrees for all PO intake. Recommend close monitoring for overt/clinical s/s of aspiration and D/C PO intake and complete Modified Barium Swallow Study should they occur. Results and recommendations reported to RN. Dysphagia Diagnosis: Swallow function appears WFL; Mild oral stage dysphagia  Dysphagia Outcome Severity Scale: Level 6: Within functional limits/Modified independence     Treatment Plan  Requires SLP Intervention: Yes  D/C Recommendations: Ongoing speech therapy is recommended during this hospitalization    Recommended Diet and Intervention  Recommended Form of Meds: PO  Therapeutic Interventions: Diet tolerance monitoring;Patient/Family education    Compensatory Swallowing Strategies  Compensatory Swallowing Strategies : Upright as possible for all oral intake;Small bites/sips;Assist feed    Treatment/Goals  Dysphagia Goals: The patient will tolerate recommended diet without observed clinical signs of aspiration    General  Chart Reviewed: Yes  Behavior/Cognition: Cooperative; Alert;Pleasant mood  Respiratory Status: O2 via nasual cannula  O2 Device: Nasal cannula  Communication Observation: Functional  Follows Directions: Complex  Dentition: Adequate  Patient Positioning: Upright in bed  Baseline Vocal Quality: Normal  Consistencies Administered: Regular; Soft and Bite-Sized;Pureed; Thin - straw    Vision/Hearing  Vision  Vision: Within Functional Limits  Hearing  Hearing: Within functional limits    Oral Phase Dysfunction  Oral Phase  Oral Phase: WFL  Oral Phase  Oral Phase - Comment: +mild extended mastication w/ regular solids, however no lingual residual w/ liquid wash.      Indicators of Pharyngeal Phase Dysfunction   Pharyngeal Phase   Pharyngeal Phase: No overt s/s of aspiration w/ all consistencies tested    Prognosis  Individuals consulted  Consulted and agree with results and recommendations: Patient;RN    Education  Patient Education: yes  Patient Education Response: Verbalizes understanding       Therapy Time  0830-0720    KYLER Stafford  5/1/2022 11:02 AM

## 2022-05-02 ENCOUNTER — APPOINTMENT (OUTPATIENT)
Dept: GENERAL RADIOLOGY | Age: 22
DRG: 003 | End: 2022-05-02
Attending: INTERNAL MEDICINE
Payer: COMMERCIAL

## 2022-05-02 LAB
ANION GAP SERPL CALCULATED.3IONS-SCNC: 8 MMOL/L (ref 9–17)
BUN BLDV-MCNC: 15 MG/DL (ref 6–20)
CALCIUM SERPL-MCNC: 7.9 MG/DL (ref 8.6–10.4)
CHLORIDE BLD-SCNC: 103 MMOL/L (ref 98–107)
CO2: 25 MMOL/L (ref 20–31)
CREAT SERPL-MCNC: 0.4 MG/DL (ref 0.5–0.9)
GFR AFRICAN AMERICAN: >60 ML/MIN
GFR NON-AFRICAN AMERICAN: >60 ML/MIN
GFR SERPL CREATININE-BSD FRML MDRD: ABNORMAL ML/MIN/{1.73_M2}
GLUCOSE BLD-MCNC: 131 MG/DL (ref 65–105)
GLUCOSE BLD-MCNC: 136 MG/DL (ref 65–105)
GLUCOSE BLD-MCNC: 137 MG/DL (ref 65–105)
GLUCOSE BLD-MCNC: 148 MG/DL (ref 65–105)
GLUCOSE BLD-MCNC: 158 MG/DL (ref 70–99)
GLUCOSE BLD-MCNC: 167 MG/DL (ref 65–105)
HCT VFR BLD CALC: 28.7 % (ref 36.3–47.1)
HEMOGLOBIN, FREE, PLASMA: 3.4 MG/DL (ref 0–9.7)
HEMOGLOBIN, FREE, PLASMA: 4.6 MG/DL (ref 0–9.7)
HEMOGLOBIN: 9.3 G/DL (ref 11.9–15.1)
MCH RBC QN AUTO: 28.3 PG (ref 25.2–33.5)
MCHC RBC AUTO-ENTMCNC: 32.4 G/DL (ref 28.4–34.8)
MCV RBC AUTO: 87.2 FL (ref 82.6–102.9)
NRBC AUTOMATED: 0 PER 100 WBC
PARTIAL THROMBOPLASTIN TIME: 49.2 SEC (ref 20.5–30.5)
PARTIAL THROMBOPLASTIN TIME: >120 SEC (ref 20.5–30.5)
PDW BLD-RTO: 15.6 % (ref 11.8–14.4)
PLATELET # BLD: 117 K/UL (ref 138–453)
PMV BLD AUTO: 11.3 FL (ref 8.1–13.5)
POTASSIUM SERPL-SCNC: 4.5 MMOL/L (ref 3.7–5.3)
RBC # BLD: 3.29 M/UL (ref 3.95–5.11)
SODIUM BLD-SCNC: 136 MMOL/L (ref 135–144)
WBC # BLD: 19.9 K/UL (ref 3.5–11.3)

## 2022-05-02 PROCEDURE — 6370000000 HC RX 637 (ALT 250 FOR IP): Performed by: INTERNAL MEDICINE

## 2022-05-02 PROCEDURE — 99232 SBSQ HOSP IP/OBS MODERATE 35: CPT | Performed by: INTERNAL MEDICINE

## 2022-05-02 PROCEDURE — 2580000003 HC RX 258: Performed by: STUDENT IN AN ORGANIZED HEALTH CARE EDUCATION/TRAINING PROGRAM

## 2022-05-02 PROCEDURE — 6370000000 HC RX 637 (ALT 250 FOR IP)

## 2022-05-02 PROCEDURE — 80048 BASIC METABOLIC PNL TOTAL CA: CPT

## 2022-05-02 PROCEDURE — 85730 THROMBOPLASTIN TIME PARTIAL: CPT

## 2022-05-02 PROCEDURE — 6370000000 HC RX 637 (ALT 250 FOR IP): Performed by: NURSE PRACTITIONER

## 2022-05-02 PROCEDURE — 6360000002 HC RX W HCPCS

## 2022-05-02 PROCEDURE — 6370000000 HC RX 637 (ALT 250 FOR IP): Performed by: STUDENT IN AN ORGANIZED HEALTH CARE EDUCATION/TRAINING PROGRAM

## 2022-05-02 PROCEDURE — 6360000002 HC RX W HCPCS: Performed by: INTERNAL MEDICINE

## 2022-05-02 PROCEDURE — 2060000000 HC ICU INTERMEDIATE R&B

## 2022-05-02 PROCEDURE — 2700000000 HC OXYGEN THERAPY PER DAY

## 2022-05-02 PROCEDURE — 94660 CPAP INITIATION&MGMT: CPT

## 2022-05-02 PROCEDURE — 94761 N-INVAS EAR/PLS OXIMETRY MLT: CPT

## 2022-05-02 PROCEDURE — 6360000002 HC RX W HCPCS: Performed by: STUDENT IN AN ORGANIZED HEALTH CARE EDUCATION/TRAINING PROGRAM

## 2022-05-02 PROCEDURE — 82947 ASSAY GLUCOSE BLOOD QUANT: CPT

## 2022-05-02 PROCEDURE — 71045 X-RAY EXAM CHEST 1 VIEW: CPT

## 2022-05-02 PROCEDURE — 99233 SBSQ HOSP IP/OBS HIGH 50: CPT | Performed by: INTERNAL MEDICINE

## 2022-05-02 PROCEDURE — 94640 AIRWAY INHALATION TREATMENT: CPT

## 2022-05-02 PROCEDURE — 76937 US GUIDE VASCULAR ACCESS: CPT

## 2022-05-02 PROCEDURE — 92526 ORAL FUNCTION THERAPY: CPT

## 2022-05-02 PROCEDURE — 2580000003 HC RX 258: Performed by: INTERNAL MEDICINE

## 2022-05-02 PROCEDURE — 85027 COMPLETE CBC AUTOMATED: CPT

## 2022-05-02 RX ORDER — DIAPER,BRIEF,INFANT-TODD,DISP
EACH MISCELLANEOUS 2 TIMES DAILY
Status: DISCONTINUED | OUTPATIENT
Start: 2022-05-02 | End: 2022-05-10 | Stop reason: HOSPADM

## 2022-05-02 RX ORDER — INSULIN LISPRO 100 [IU]/ML
0-6 INJECTION, SOLUTION INTRAVENOUS; SUBCUTANEOUS NIGHTLY
Status: DISCONTINUED | OUTPATIENT
Start: 2022-05-02 | End: 2022-05-10 | Stop reason: HOSPADM

## 2022-05-02 RX ORDER — PANTOPRAZOLE SODIUM 40 MG/1
40 TABLET, DELAYED RELEASE ORAL
Status: DISCONTINUED | OUTPATIENT
Start: 2022-05-03 | End: 2022-05-10 | Stop reason: HOSPADM

## 2022-05-02 RX ORDER — PREDNISONE 20 MG/1
40 TABLET ORAL DAILY
Status: DISCONTINUED | OUTPATIENT
Start: 2022-05-02 | End: 2022-05-05

## 2022-05-02 RX ORDER — FUROSEMIDE 40 MG/1
40 TABLET ORAL DAILY
Status: DISCONTINUED | OUTPATIENT
Start: 2022-05-02 | End: 2022-05-10 | Stop reason: HOSPADM

## 2022-05-02 RX ORDER — CHLORDIAZEPOXIDE HYDROCHLORIDE 5 MG/1
5 CAPSULE, GELATIN COATED ORAL 3 TIMES DAILY
Status: DISCONTINUED | OUTPATIENT
Start: 2022-05-02 | End: 2022-05-02

## 2022-05-02 RX ORDER — INSULIN LISPRO 100 [IU]/ML
0-12 INJECTION, SOLUTION INTRAVENOUS; SUBCUTANEOUS
Status: DISCONTINUED | OUTPATIENT
Start: 2022-05-03 | End: 2022-05-10 | Stop reason: HOSPADM

## 2022-05-02 RX ADMIN — IPRATROPIUM BROMIDE AND ALBUTEROL SULFATE 1 AMPULE: .5; 2.5 SOLUTION RESPIRATORY (INHALATION) at 07:50

## 2022-05-02 RX ADMIN — APIXABAN 10 MG: 5 TABLET, FILM COATED ORAL at 22:57

## 2022-05-02 RX ADMIN — MEROPENEM 1000 MG: 1 INJECTION, POWDER, FOR SOLUTION INTRAVENOUS at 01:24

## 2022-05-02 RX ADMIN — Medication 13.5 UNITS/KG/HR: at 01:26

## 2022-05-02 RX ADMIN — POLYVINYL ALCOHOL 1 DROP: 14 SOLUTION/ DROPS OPHTHALMIC at 10:17

## 2022-05-02 RX ADMIN — DILTIAZEM HYDROCHLORIDE 30 MG: 30 TABLET ORAL at 05:20

## 2022-05-02 RX ADMIN — APIXABAN 10 MG: 5 TABLET, FILM COATED ORAL at 10:16

## 2022-05-02 RX ADMIN — SODIUM CHLORIDE, PRESERVATIVE FREE 10 ML: 5 INJECTION INTRAVENOUS at 20:13

## 2022-05-02 RX ADMIN — BUDESONIDE 500 MCG: 0.5 INHALANT RESPIRATORY (INHALATION) at 11:37

## 2022-05-02 RX ADMIN — HYDROCORTISONE: 10 CREAM TOPICAL at 22:57

## 2022-05-02 RX ADMIN — POLYVINYL ALCOHOL 1 DROP: 14 SOLUTION/ DROPS OPHTHALMIC at 23:00

## 2022-05-02 RX ADMIN — HEPARIN SODIUM 5000 UNITS: 1000 INJECTION INTRAVENOUS; SUBCUTANEOUS at 00:44

## 2022-05-02 RX ADMIN — MEROPENEM 1000 MG: 1 INJECTION, POWDER, FOR SOLUTION INTRAVENOUS at 20:06

## 2022-05-02 RX ADMIN — FUROSEMIDE 40 MG: 40 TABLET ORAL at 10:16

## 2022-05-02 RX ADMIN — IPRATROPIUM BROMIDE AND ALBUTEROL SULFATE 1 AMPULE: .5; 2.5 SOLUTION RESPIRATORY (INHALATION) at 00:03

## 2022-05-02 RX ADMIN — IPRATROPIUM BROMIDE AND ALBUTEROL SULFATE 1 AMPULE: .5; 2.5 SOLUTION RESPIRATORY (INHALATION) at 19:41

## 2022-05-02 RX ADMIN — SODIUM CHLORIDE, PRESERVATIVE FREE 10 ML: 5 INJECTION INTRAVENOUS at 10:21

## 2022-05-02 RX ADMIN — PREDNISONE 40 MG: 20 TABLET ORAL at 10:17

## 2022-05-02 RX ADMIN — IPRATROPIUM BROMIDE AND ALBUTEROL SULFATE 1 AMPULE: .5; 2.5 SOLUTION RESPIRATORY (INHALATION) at 11:37

## 2022-05-02 RX ADMIN — INSULIN LISPRO 2 UNITS: 100 INJECTION, SOLUTION INTRAVENOUS; SUBCUTANEOUS at 20:08

## 2022-05-02 RX ADMIN — MEROPENEM 1000 MG: 1 INJECTION, POWDER, FOR SOLUTION INTRAVENOUS at 10:18

## 2022-05-02 RX ADMIN — DILTIAZEM HYDROCHLORIDE 30 MG: 30 TABLET ORAL at 12:18

## 2022-05-02 RX ADMIN — DILTIAZEM HYDROCHLORIDE 30 MG: 30 TABLET ORAL at 19:53

## 2022-05-02 RX ADMIN — IPRATROPIUM BROMIDE AND ALBUTEROL SULFATE 1 AMPULE: .5; 2.5 SOLUTION RESPIRATORY (INHALATION) at 15:01

## 2022-05-02 RX ADMIN — FLUCONAZOLE 200 MG: 200 TABLET ORAL at 10:16

## 2022-05-02 RX ADMIN — SODIUM CHLORIDE: 9 INJECTION, SOLUTION INTRAVENOUS at 05:36

## 2022-05-02 ASSESSMENT — PAIN SCALES - WONG BAKER

## 2022-05-02 ASSESSMENT — PAIN SCALES - GENERAL
PAINLEVEL_OUTOF10: 0
PAINLEVEL_OUTOF10: 0
PAINLEVEL_OUTOF10: 4
PAINLEVEL_OUTOF10: 0

## 2022-05-02 ASSESSMENT — ENCOUNTER SYMPTOMS
ABDOMINAL DISTENTION: 0
COLOR CHANGE: 0
EYE DISCHARGE: 0
APNEA: 0

## 2022-05-02 ASSESSMENT — PULMONARY FUNCTION TESTS: PIF_VALUE: 11

## 2022-05-02 NOTE — PROGRESS NOTES
[facility]  Internal Medicine Teaching Residency Program  Inpatient Daily Progress Note  ______________________________________________________________________________    Patient: Aurea Martin  YOB: 2000   RFR:[id number]    Acct: [de-identified]     Room: [address] [place] [address]  Admit date: 4/18/2022  Today's date: 05/02/22  Number of days in the hospital: 14    SUBJECTIVE   Admitting Diagnosis: Acute asthma exacerbation     CC: Shortness of breath    Pt examined at bedside. Chart & results reviewed. Alert and oriented. Hemodynamically stable. On 4L NC. /67, pulse 95  6.7L UOP  Wbc 19, Hb 9.3, plt 117, Glycemia 158  On meropenem day 4,  deescalating librium, colorado, lines, AC Heparin to eliquis, solumedrol IV to po prednisone. Transfer to step down      ROS:  Constitutional:  negative for chills, fevers, sweats  Respiratory:  negative for cough, dyspnea on exertion, hemoptysis, shortness of breath, wheezing  Cardiovascular:  negative for chest pain, chest pressure/discomfort, lower extremity edema, palpitations  Gastrointestinal:  negative for abdominal pain, constipation, diarrhea, nausea, vomiting  Neurological:  negative for dizziness, headache    BRIEF HISTORY       22 y. o. female with a past medical history of bronchial asthma who was admitted to [facility] with an acute exacerbation causing increasing shortness of breath.  Patient was found to be hypoxemic with hypercarbic respiratory failure requiring intubation and mechanical ventilation.  Patient was transferred to [facility] for further elevation with possibility of ECMO.  On arrival patient was found to be on a bicarb drip due to presumed respiratory acidosis but this was discontinued due to to worsening of the hypercarbia.  Additionally patient was started on Nimbex due to the necessity for paralyzation as she was breathing over the vent.  Pulmonology and cardiothoracic surgery were consulted- plan is for ECMO. In the MICU, patient remained stable, had an episode of dark emesis which turned out to be bilious output, Hb remained stable. She was hypertensive, started on lopressor IV 5 mg PRN. Had left IJ placed which was malpositioned, removed and then CVC in left femoral was placed overnight. She was hypoxic, requiring higher FiO2 of 80%, PEEP 16, RR 32, , blood gas showed pH 7.449, pCO2 41.7, pCO2 59. 1. treated with solumedrol, bronchodilators, CXR showed no pneumothorax or pleural effusion.   On  overnight, patient became more hypoxic with increasing oxygen requirement, increasing wheezing on exam. It was determined to go with ECMO canulation on . OBJECTIVE     Vital Signs:  /67   Pulse 95   Temp 99.5 °F (37.5 °C) (Bladder)   Resp 21   Ht 5' 3\" (1.6 m)   Wt 297 lb 9.9 oz (135 kg)   SpO2 94%   BMI 52.72 kg/m²     Temp (24hrs), Av.6 °F (37.6 °C), Min:99.5 °F (37.5 °C), Max:99.9 °F (37.7 °C)    In: 835   Out: 6790 [Urine:6790]    Physical Exam:  Constitutional: This is a well developed, well nourished, Greater than 40 - Morbid Obesity / Extreme Obesity / Grade III 23y.o. year old female who is alert, oriented, cooperative and in no apparent distress on nasal canula and on ECMO. EENT:  PERRLA. No conjunctival injections. Septum was midline, mucosa was without erythema, exudates or cobblestoning. No thrush was noted. Neck: Supple without thyromegaly. No elevated JVP. Trachea was midline. Respiratory: Chest was symmetrical without dullness to percussion. Breath sounds bilaterally were clear to auscultation. There were no wheezes, rhonchi or rales. There is no intercostal retraction or use of accessory muscles. No egophony noted. Cardiovascular: Regular without murmur, clicks, gallops or rubs. Abdomen: Slightly rounded and soft without organomegaly. No rebound, rigidity or guarding was appreciated.     Lymphatic: No lymphadenopathy. Musculoskeletal: Normal curvature of the spine. No gross muscle weakness. Extremities:  No lower extremity edema, ulcerations, tenderness, varicosities or erythema. Muscle size, tone and strength are normal.  No involuntary movements are noted. Skin:  Warm and dry. Good color, turgor and pigmentation. No lesions or scars.   No cyanosis or clubbing  Neurological/Psychiatric: The patient's general behavior, level of consciousness, thought content and emotional status is normal.        Medications:  Scheduled Medications:    chlordiazePOXIDE  10 mg Oral TID    fluconazole  200 mg Oral Daily    meropenem  1,000 mg IntraVENous Q8H    insulin lispro  0-12 Units SubCUTAneous Q4H    methylPREDNISolone  40 mg IntraVENous Q12H    budesonide  0.5 mg Nebulization BID    lidocaine PF  5 mL Tracheal Tube Once    docusate  100 mg Oral BID    polyethylene glycol  17 g Oral Daily    furosemide  20 mg IntraVENous Daily    albumin human  25 g IntraVENous Once    pantoprazole (PROTONIX) 40 mg injection  40 mg IntraVENous Daily    dilTIAZem  30 mg Oral 4 times per day    polyvinyl alcohol  1 drop Both Eyes Q6H    ipratropium-albuterol  1 ampule Inhalation Q4H    sodium chloride flush  5-40 mL IntraVENous 2 times per day     Continuous Infusions:    heparin (PORCINE) Infusion 9.5 Units/kg/hr (05/02/22 0619)    dextrose      sodium chloride 10 mL/hr at 05/02/22 0536     PRN Medicationsheparin (porcine), 10,000 Units, PRN  heparin (porcine), 5,000 Units, PRN  fentanNYL, 25 mcg, Q2H PRN  oxyCODONE, 10 mg, Q6H PRN  albuterol, 2.5 mg, Q4H PRN  heparin flush (PF), 3 mL, PRN  midazolam, 2 mg, Q2H PRN  metoprolol, 5 mg, Q6H PRN  glucose, 15 g, PRN  dextrose, 12.5 g, PRN  glucagon (rDNA), 1 mg, PRN  dextrose, 100 mL/hr, PRN  sodium chloride flush, 5-40 mL, PRN  sodium chloride, , PRN  ondansetron, 4 mg, Q8H PRN   Or  ondansetron, 4 mg, Q6H PRN  acetaminophen, 650 mg, Q6H PRN   Or  acetaminophen, 650 mg, Q6H PRN        Diagnostic Labs:  CBC:   Recent Labs     05/01/22  0437 05/01/22  1026 05/02/22  0501   WBC 16.9* 24.7* 19.9*   RBC 3.10* 3.67* 3.29*   HGB 8.7* 10.3* 9.3*   HCT 27.0* 31.5* 28.7*   MCV 87.1 85.8 87.2   RDW 14.8* 15.2* 15.6*   PLT 91* 116* 117*     BMP:   Recent Labs     05/01/22  0437 05/01/22  1026 05/02/22  0501   * 134* 136   K 4.4 4.1 4.5    97* 103   CO2 29 28 25   BUN 19 19 15   CREATININE 0.40* 0.47* 0.40*     BNP: No results for input(s): BNP in the last 72 hours. PT/INR:   Recent Labs     04/30/22  2220 05/01/22  0437 05/01/22  1026   PROTIME 13.3* 13.3* 12.7*   INR 1.3 1.3 1.2     APTT:   Recent Labs     05/01/22  1624 05/01/22  2347 05/02/22  0501   APTT >120.0* 49.2* >120.0*     CARDIAC ENZYMES: No results for input(s): CKMB, CKMBINDEX, TROPONINI in the last 72 hours. Invalid input(s): CKTOTAL;3  FASTING LIPID PANEL:  Lab Results   Component Value Date    TRIG 96 04/29/2022     LIVER PROFILE:   Recent Labs     04/29/22  1548 04/30/22  0404 05/01/22  0437   AST 27 22 15   ALT 64* 58* 51*   BILIDIR 0.18 0.14 0.19   BILITOT 0.67 0.51 0.62   ALKPHOS 52 46 39      MICROBIOLOGY:   Lab Results   Component Value Date/Time    CULTURE Laura albicans/dubliniensis >[id number] CFU/ML 04/29/2022 04:44 PM       Imaging:    XR CHEST PORTABLE    Result Date: 4/29/2022  1. Stable appropriate positions of support apparatus. 2. Stable small bilateral pleural effusions and bibasilar airspace disease. XR CHEST PORTABLE    Result Date: 4/29/2022  No significant change from prior study. Continued bibasilar opacities and small effusions. XR CHEST PORTABLE    Result Date: 4/28/2022  Stable exam.     XR CHEST PORTABLE    Result Date: 4/26/2022  Relatively stable cardiopulmonary status     XR CHEST PORTABLE    Result Date: 4/25/2022  Stable chest x-ray compared to 04/24/2022. XR CHEST PORTABLE    Result Date: 4/24/2022  1. Stable support tubes and line. 2. Low lung volumes.   Crowding of vessels from low lung volumes results in apparent opacification at the right cardiophrenic angle. ASSESSMENT & PLAN     ASSESSMENT / PLAN:     Principal Problem:    Acute asthma exacerbation  Active Problems:    Severe persistent asthma with status asthmaticus    CHIDI (obstructive sleep apnea)    Morbid obesity with BMI of 50.0-59.9, adult (HCC)    History of seizures    Hypercapnic respiratory failure (HCC)    Endotracheally intubated    On mechanically assisted ventilation (HCC)    Diastolic dysfunction  Resolved Problems:    * No resolved hospital problems. *    1. Acute hypoxic hypercapnic respiratory failure on mechanical ventilation and s/p ECMO cannulation on 4/22/22 secondary asthma exacerbation: Continue SoluMedrol 40 mg IV q12 hours. Respiratory cultures positive for Pseudomonas - Started on Meropenem 1 gram daily per ID. Post Cefepime, patient developed a rash. Duoneb q4 hours and Albuterol as needed. Continue Lasix 20 mg IV daily, diuresing well. Pulm. Critical care following. Recommendations appreciated.      2. Troponin elevation type II demand ischemia: Continue Cardizem 30 mg q6 hours per cardiology. Cardiology  for stress test prior to discharge due to apical hypokinesis on echo     3. Sinus tachycardia:- Controlled Continue Cardizem 30 mg q6 hours per Cardiology.  PRN Metoprolol 5 mg IV for HR >110; hold for SBP<110     4. Steroid induced hyperglycemia: Medium dose sliding scale q4 hours; Hypoglycemia protocol, monitor POCT glucose every 4 hours     5. Acute femoral vein DVT: Restarted AC with Heparin high dose. switched to Eliquis.      Diet: adult diet  DVT ppx : eliquis  GI ppx: Protonix      PT/OT: Consulted  Discharge Planning / SW:  consulted,     Zenaida Smith MD  Internal Medicine Resident, PGY-1  [facility] [facility]  5/2/2022, 7:48 AM

## 2022-05-02 NOTE — PLAN OF CARE
Received PS that patient having nose bleeds following onset of heparin. Epistaxis prior to removal of NG tube. Evaluated bleeding, which was minimal. Patient hemodynamically stable. Counseled patient on the importance of Heparin as she has DV, and reassured her that we will keep a close eye on the bleeding; and take action only if it becomes profuse. She expressed understanding. Discussed plan with nurse. Zenaida Smith MD  Internal Medicine Resident, PGY- [facility];  [facility], [place]  5/1/2022, 3:30pm

## 2022-05-02 NOTE — PROGRESS NOTES
[employer] message sent to Dr. Jose Talbot regarding clarification of colorado removal order regardnig clamping colorado and waiting for patient to void and then removing colorado. Order to be placed to removal colorado and continue to monitor.

## 2022-05-02 NOTE — PROGRESS NOTES
PULMONARY & CRITICAL CARE MEDICINE PROGRESS NOTE     Patient:  Chiara Ramos  MRN: [id number]  [address] [facility] date: 2022  Primary Care Physician: Nicholas Vazquez MD  Consulting Physician: Mahogany Gallegos MD  CODE Status: Full Code  LOS: 14     SUBJECTIVE     CHIEF COMPLAINT/REASON FOR INITIAL CONSULT: Acute hypercapnic respiratory failure    BRIEF HOSPITAL COURSE:   The patient is a 23 y.o. female with past medical history of asthma was initially admitted to [facility] with acute exacerbation. She was initially put on nonrebreather, subsequently required intubation and initiation of mechanical ventilation due to worsening respiratory status. Patient was also on bicarb drip which was discontinued on arrival to [facility].  She is sedated, paralyzed and mechanically ventilated. ABG shows improvement in respiratory acidosis and oxygenation.     INTERVAL HISTORY:  22  Patient extubated , decannulated   Remains hemodynamically stable  She is saturating well on nasal cannula  She was on BiPAP overnight  She remains on fluconazole and meropenem as per ID recommendations  White count has come down to 19.9, T-max 99.5    OBJECTIVE     VENTILATOR SETTINGS:  Vent Information  Ventilator ID: [id number]  Vent Mode: PS/CPAP  Ventilator Discontinue: Yes     PaO2/FiO2 RATIO:  Recent Labs     22  0800   POCPO2 60.3*      FiO2 : 40 %     VITAL SIGNS:   LAST:  /81   Pulse 102   Temp 97.9 °F (36.6 °C) (Oral)   Resp 22   Ht 5' 3\" (1.6 m)   Wt 297 lb 9.9 oz (135 kg)   SpO2 93%   BMI 52.72 kg/m²   8-24 HR RANGE:  TEMP Temp  Av.1 °F (37.3 °C)  Min: 97.9 °F (36.6 °C)  Max: 99.5 °F (83.1 °C)   BP Systolic (73AJF), KWY:855 , Min:136 , SAF:240      Diastolic (16LAV), PPI:68, Min:81, Max:81     PULSE Pulse  Av.6  Min: 78  Max: 124   RR Resp  Av  Min: 21  Max: 24   O2 SAT SpO2  Av.3 %  Min: 93 %  Max: 97 %   OXYGEN DELIVERY O2 Flow Rate (L/min)  Av.5 L/min  Min: 4 L/min  Max: 7 L/min        Physical exam  General: Look comfortable currently not in distress arousable but lethargic. Morbidly obese  Mentation: Arousable slightly lethargic follows command able to talk  HENT: Oral mucosa moist small oropharynx.     Eyes: Pupils equally reactive nonicteric sclera  Neck: Short thick neck: No subcutaneous emphysema  Chest/lung: Bilateral air entry is distant no expiratory wheezing and no rhonchi no crackles  Cardiovascular: S1-S2 is regular slightly diminished heart sounds no murmur  Abdomen: Soft obese nontender nondistended nonrigid abdomen  Lower extremity: Positive bilateral mild edema present  Bilateral femoral cannulas present  CNS: Grossly no motor or cranial nerve deficit move extremities bilaterally  Skin: Mild bruising ecchymosis in the groin present otherwise no skin rash    DATA REVIEW     Medications:  Scheduled Meds:   apixaban  10 mg Oral BID    Followed by   Jeanmarie Woodard ON 5/9/2022] apixaban  5 mg Oral BID    predniSONE  40 mg Oral Daily    [START ON 5/3/2022] pantoprazole  40 mg Oral QAM AC    furosemide  40 mg Oral Daily    hydrocortisone   Topical BID    fluconazole  200 mg Oral Daily    meropenem  1,000 mg IntraVENous Q8H    insulin lispro  0-12 Units SubCUTAneous Q4H    budesonide  0.5 mg Nebulization BID    lidocaine PF  5 mL Tracheal Tube Once    docusate  100 mg Oral BID    polyethylene glycol  17 g Oral Daily    albumin human  25 g IntraVENous Once    dilTIAZem  30 mg Oral 4 times per day    polyvinyl alcohol  1 drop Both Eyes Q6H    ipratropium-albuterol  1 ampule Inhalation Q4H    sodium chloride flush  5-40 mL IntraVENous 2 times per day     Continuous Infusions:   dextrose      sodium chloride Stopped (05/02/22 1136)       INPUT/OUTPUT:  In: 1042.1 [I.V.:643.2]  Out: 6193 [Urine:8990]  Date 05/02/22 0000 - 05/02/22 2359   Shift 5667-1022 9890-7740 5347-6270 24 Hour Total   INTAKE   I.V.(mL/kg) 270.9(2) 108.4(0.8)  379.3(2.8)   IV Piggyback(mL/kg) 189.5(1.4) 98.7(0.7)  288.2(2.1)   Shift Total(mL/kg) 460.4(3.4) 207.1(1.5)  667.5(4.9)   OUTPUT   Urine(mL/kg/hr) 1675(1.6) 1000(0.9) 1200 3875   Shift Total(mL/kg) 1675(12.4) 1000(7.4) 1200(8.9) 3875(28.7)   Weight (kg) 135 135 135 135        LABS:  ABGs:   Recent Labs     04/29/22 2010 04/30/22  0003 04/30/22  0356 04/30/22  0800   POCPH 7.424 7.405 7.436 7.457*   POCPCO2 44.5 47.5 45.4 41.7   POCPO2 71.5* 74.9* 66.0* 60.3*   POCHCO3 29.1* 29.8* 30.5* 29.4*   VASX2KFF 94 95 93* 92*     CBC:   Recent Labs     04/30/22 1115 04/30/22 2220 05/01/22 0437 05/01/22  1026 05/02/22  0501   WBC 30.6* 21.1* 16.9* 24.7* 19.9*   HGB 10.7* 9.6* 8.7* 10.3* 9.3*   HCT 33.8* 30.1* 27.0* 31.5* 28.7*   MCV 88.0 87.2 87.1 85.8 87.2   * 90* 91* 116* 117*   RBC 3.84* 3.45* 3.10* 3.67* 3.29*   MCH 27.9 27.8 28.1 28.1 28.3   MCHC 31.7 31.9 32.2 32.7 32.4   RDW 14.6* 14.8* 14.8* 15.2* 15.6*     CRP:   No results for input(s): CRP in the last 72 hours. LDH:   No results for input(s): LDH in the last 72 hours.   BMP:   Recent Labs     04/30/22 1115 04/30/22 2220 05/01/22  0437 05/01/22  1026 05/02/22  0501    136 134* 134* 136   K 4.3 4.3 4.4 4.1 4.5   CL 96* 100 100 97* 103   CO2 30 29 29 28 25   BUN 21* 17 19 19 15   CREATININE 0.40* 0.37* 0.40* 0.47* 0.40*   GLUCOSE 151* 157* 142* 160* 158*     Liver Function Test:   Recent Labs     04/30/22 0404 05/01/22 0437   PROT 5.0* 4.6*   LABALBU 3.2* 2.9*   ALT 58* 51*   AST 22 15   ALKPHOS 46 39   BILITOT 0.51 0.62     Coagulation Profile:   Recent Labs     04/30/22  0404 04/30/22  0404 04/30/22  1115 04/30/22  1115 04/30/22  2220 04/30/22  2220 05/01/22  0437 05/01/22  1026 05/01/22  1624 05/01/22  2347 05/02/22  0501   INR 4.1  --  1.2  --  1.3  --  1.3 1.2  --   --   --    PROTIME 38.7*  --  13.0*  --  13.3*  --  13.3* 12.7*  --   --   --    APTT 69.0*   < > 27.2   < > 23.1   < > 23.2 21.2 >120.0* 49.2* >120.0*    < > = values in this interval not displayed. D-Dimer:  No results for input(s): DDIMER in the last 72 hours. Lactic Acid:  No results for input(s): LACTA in the last 72 hours. Cardiac Enzymes:  No results for input(s): CKTOTAL, CKMB, CKMBINDEX, TROPONINI in the last 72 hours. Invalid input(s): TROPONIN, HSTROP  BNP/ProBNP:   No results for input(s): BNP, PROBNP in the last 72 hours. Triglycerides:  No results for input(s): TRIG in the last 72 hours. Microbiology:  Urine Culture:  No components found for: CURINE  Blood Culture:  No components found for: CBLOOD, CFUNGUSBL  Sputum Culture:  No components found for: CSPUTUM  No results for input(s): SPECDESC, SPECIAL, CULTURE, STATUS, ORG, CDIFFTOXPCR, CAMPYLOBPCR, SALMONELLAPC, SHIGAPCR, SHIGELLAPCR, MPNEUG, MPNEUM, LACTOQL in the last 72 hours. No results for input(s): SPUTUM, SPECDESC, SPECIAL, CULTURE, STATUS, ORG, CDIFFTOXPCR, MPNEUM, MPNEUG in the last 72 hours. Invalid input(s): CURINE, CBLOOD, CFUNGUSBL     Pathology:    Radiology Reports:  XR CHEST PORTABLE   Final Result   Slight improved aeration of  the lung bases. Bibasilar airspace disease   remains         XR CHEST PORTABLE   Final Result   Small bibasilar effusions and adjacent bibasilar infiltrates. Stable right internal jugular central line. XR CHEST PORTABLE   Final Result   1. The endotracheal tube has been removed. 2.  Similar appearance of basilar atelectasis and probable trace effusions. XR CHEST PORTABLE   Final Result   No significant change from prior study. Continued bibasilar opacities and   small effusions. XR CHEST PORTABLE   Final Result   Stable exam.         VL DUP LOWER EXTREMITY VENOUS BILATERAL   Final Result      XR CHEST PORTABLE   Final Result   1. Stable appropriate positions of support apparatus. 2. Stable small bilateral pleural effusions and bibasilar airspace disease.          XR CHEST PORTABLE   Final Result   Relatively stable cardiopulmonary status XR CHEST PORTABLE   Final Result   Stable chest x-ray compared to 04/24/2022. XR CHEST PORTABLE   Final Result   1. Stable support tubes and line. 2. Low lung volumes. Crowding of vessels from low lung volumes results in   apparent opacification at the right cardiophrenic angle. XR CHEST PORTABLE   Final Result   Lines and tubes are unchanged. Low lung volumes. Cardiomegaly. XR CHEST PORTABLE   Final Result   1. Endotracheal tube tip is now 1 cm above the soto. Recommend retraction   by an additional 2.5 cm for optimal positioning. 2.  Improved aeration in the left lung with continued atelectatic/airspace   infiltrates. XR CHEST PORTABLE   Final Result   Endotracheal tube tip is in the right main bronchus and should be retracted   approximately 4-5 cm. Tips of ECMO cannulas project in the expected location of the IVC. Findings were discussed with Ej Herrera RN (the patient's ICU nurse)   at 2:26 pm on 4/22/2022. XR CHEST (SINGLE VIEW FRONTAL)   Final Result   1. Support tubes and lines are unchanged. 2. Stable bibasilar lung infiltrates, right side greater than left. This may   be related to atelectasis and/or pleural effusion. Superimposed pneumonia   cannot be excluded. XR CHEST (SINGLE VIEW FRONTAL)   Final Result   Suboptimal position left IJ catheter. Consider removal and replacement or   repositioning. XR CHEST PORTABLE   Final Result   1. On today's exam the endotracheal tube tip is positioned 1.5 cm above the   soto. Recommend retraction by an additional 2 cm for optimal positioning. 2.  Interval development of right lower lobe airspace disease. This could   represent atelectasis         XR CHEST PORTABLE   Final Result   1. Endotracheal and enteric tubes as above. 2. Diffuse interstitial opacities throughout both lungs, right greater than   left. No new focal airspace consolidation. Follow-up is recommended to   document resolution. XR CHEST (SINGLE VIEW FRONTAL)   Final Result   Diffuse interstitial opacities with alveolar/consolidative opacities at the   bases favoring multifocal airspace disease. Echocardiogram:   Results for orders placed during the hospital encounter of 04/18/22    ECHO Complete 2D W Doppler W Color      Summary  Left ventricle is normal in size, global left ventricular systolic function  is preserved, estimated ejection fraction is 50%. There appears to be some apical hypokinesis, in the apical views. Evidence of diastolic dysfunction. Trivial mitral regurgitation. Trivial pulmonic insufficiency. IVC dilated but unable to assess respiratory collapse due to patient on  ventilator. ASSESSMENT AND PLAN     Assessment:    // Acute hypoxic/hypercapnic respiratory failure due to status asthmaticus  S/p extubation 4/29, VV ECMO, decannulated 4/30  //Chronic respiratory acidosis  // Rhino/enterovirus infection  // Leukocytosis, improving  // Hyperglycemia  // Elevated troponin  // Morbid obesity  // Anemia, stable  // Pseudomonas in respiratory secretion    Plan:    I personally interviewed/examined the patient; reviewed interval history, interpreted all available radiographic and laboratory data at the time of service.      Patient was extubated on 04/29/2022, decannulated 4/30   Currently saturating well on nasal cannula   Continue nocturnal BiPAP   Continue supplemental oxygen to keep oxygen saturation greater than 90%   Remains hemodynamically stable   Continue aspiration precautions, bronchopulmonary hygiene and bronchodilators   Bronchoscopy done on 4/24/2022-respiratory culture growing Pseudomonas   Tolerating oral diet   Continue to monitor I/O with a goal of even/negative fluid balance   Stress ulcer prophylaxis-Protonix   Chemical DVT prophylaxis; not indicated patient on systemic anticoagulation with heparin drip   Antimicrobials reviewed; on meropenem/fluconazole as per ID recommendations   Continue prednisone taper   Glycemic control appropriate; sliding scale insulin   Skin/wound care reviewed with the nursing staff   Physical/occupational therapy  [facility] to transfer out of ICU    We will continue to follow. I would like to thank you for allowing me to participate in the care of this patient. Please feel free to call with any further questions or concerns. Anthony Thompson MD  Pulmonary and Critical Care Medicine           5/2/2022     Please note that this chart was generated using voice recognition Dragon dictation software. Although every effort was made to ensure the accuracy of this automated transcription, some errors in transcription may have occurred.

## 2022-05-02 NOTE — PROGRESS NOTES
Speech Language Pathology  Speech Language Pathology  [facility]    Dysphagia Treatment Note    Date: 5/2/2022  Patients Name: Leighton Apple  MRN: [id number]  Diagnosis:  Patient Active Problem List   Diagnosis Code    Acute asthma exacerbation J45. 0    History of seizures Z87.898    Hypercapnic respiratory failure ([facility]) J96.92    Endotracheally intubated Z97.8    On mechanically assisted ventilation ([facility]) I59.12    Diastolic dysfunction L79.62    Severe persistent asthma with status asthmaticus J45.52    CHIDI (obstructive sleep apnea) G47.33    Morbid obesity with BMI of 50.0-59.9, adult ([facility]) E66.01, Z68.43     Pain: denies     Dysphagia Treatment  Treatment time: 4313-1743    Subjective: [x] Alert [x] Cooperative     [] Confused     [] Agitated    [] Lethargic    Objective/Assessment:    Pt. Seen for diet tolerance monitoring. Pt seen for BSSE on 5/1/22, ST recommended Regular diet with thin liquids. This date, pt observed w/ trials of regular solids and thin liquids w/ no overt s/s of aspiration. Min lingual residual w/ regular solid cleared w/ liquid wash. ST and pt reviewed safe swallowing strategies w/ good return. ST recommends pt continue w/ current diet of Regular diet with thin liquids at this time. ST to d/c pt from dysphagia services at this time; available for re-consult as needed. Plan:  [] Continue ST services    [x] Discharge from ST:      Discharge recommendations: []  Further therapy recommended at discharge. The patient should be able to tolerate at least 3 hours of therapy per day over 5 days or 15 hours over 7 days. [] Further therapy recommended at discharge. [x] No therapy recommended at discharge.     Treatment completed by: Anil Branham, SLP

## 2022-05-02 NOTE — PLAN OF CARE
Problem: Gas Exchange - Impaired:  Goal: Levels of oxygenation will improve  Description: Levels of oxygenation will improve  5/2/2022 0209 by Josefa Douglass RN  Outcome: Progressing  5/1/2022 1412 by Jose Chang RN  Outcome: Progressing     Problem:  Activity:  Goal: Ability to tolerate increased activity will improve  Description: Ability to tolerate increased activity will improve  5/2/2022 0209 by Josefa Douglass RN  Outcome: Progressing  5/1/2022 1412 by Jose Chang RN  Outcome: Progressing     Problem: Cardiac:  Goal: Hemodynamic stability will improve  Description: Hemodynamic stability will improve  5/2/2022 0209 by Josefa Douglass RN  Outcome: Progressing  5/1/2022 1412 by Jose Chang RN  Outcome: Progressing     Problem: Respiratory:  Goal: Ability to maintain a clear airway will improve  Description: Ability to maintain a clear airway will improve  5/2/2022 0209 by Josefa Douglass RN  Outcome: Progressing  5/1/2022 1412 by Jose Chang RN  Outcome: Progressing  Goal: Ability to maintain adequate ventilation will improve  Description: Ability to maintain adequate ventilation will improve  5/2/2022 0209 by Josefa Douglass RN  Outcome: Progressing  5/1/2022 1412 by Jose Chang RN  Outcome: Progressing  Goal: Complications related to the disease process, condition or treatment will be avoided or minimized  Description: Complications related to the disease process, condition or treatment will be avoided or minimized  5/2/2022 0209 by Josefa Douglass RN  Outcome: Progressing  5/1/2022 1412 by Jose Chang RN  Outcome: Progressing     Problem: Skin Integrity:  Goal: Risk for impaired skin integrity will decrease  Description: Risk for impaired skin integrity will decrease  5/2/2022 0209 by Josefa Douglass RN  Outcome: Progressing  5/1/2022 1412 by Jose Chang RN  Outcome: Progressing     Problem: OXYGENATION/RESPIRATORY FUNCTION  Goal: Patient will maintain patent airway  5/2/2022 0209 by Lucina Love RN  Outcome: Progressing  5/1/2022 1412 by Wenceslao Rosado RN  Outcome: Progressing  Goal: Patient will achieve/maintain normal respiratory rate/effort  Description: Respiratory rate and effort will be within normal limits for the patient  5/2/2022 0209 by Lucina Love RN  Outcome: Progressing  5/1/2022 1412 by Wenceslao Rosado RN  Outcome: Progressing     Problem: Nutrition  Goal: Optimal nutrition therapy  5/2/2022 0209 by Lucina Love RN  Outcome: Progressing  5/1/2022 1412 by Wenceslao Rosado RN  Outcome: Progressing     Problem: Discharge Planning  Goal: Discharge to home or other facility with appropriate resources  5/2/2022 0209 by Lucina Love RN  Outcome: Progressing  5/1/2022 1412 by Wenceslao Rosado RN  Outcome: Progressing     Problem: Genitourinary - Adult  Goal: Urinary catheter remains patent  5/2/2022 0209 by Lucina Love RN  Outcome: Progressing  5/1/2022 1412 by Wenceslao Rosado RN  Outcome: Progressing     Problem: Neurosensory - Adult  Goal: Achieves stable or improved neurological status  5/2/2022 0209 by Lucina Love RN  Outcome: Progressing  5/1/2022 1412 by Wenceslao Rosado RN  Outcome: Progressing  Goal: Achieves maximal functionality and self care  5/2/2022 0209 by Lucina Love RN  Outcome: Progressing  5/1/2022 1412 by Wenceslao Rosado RN  Outcome: Progressing     Problem: Pain  Goal: Verbalizes/displays adequate comfort level or baseline comfort level  5/2/2022 0209 by Lucina Love RN  Outcome: Progressing  5/1/2022 1412 by Wenceslao Rosado RN  Outcome: Progressing     Problem: Safety - Adult  Goal: Free from fall injury  5/2/2022 0209 by Lucina Love RN  Outcome: Progressing  5/1/2022 1412 by Wenceslao Rosado RN  Outcome: Progressing     Problem: ABCDS Injury Assessment  Goal: Absence of physical injury  5/2/2022 0209 by Lucina Love RN  Outcome: Progressing  5/1/2022 1412 by Wenceslao Rosado RN  Outcome: Progressing     Problem: Skin/Tissue Integrity  Goal: Absence of new skin breakdown  Description: 1. Monitor for areas of redness and/or skin breakdown  2. Assess vascular access sites hourly  3. Every 4-6 hours minimum:  Change oxygen saturation probe site  4. Every 4-6 hours:  If on nasal continuous positive airway pressure, respiratory therapy assess nares and determine need for appliance change or resting period.   5/2/2022 0209 by Reina Amor RN  Outcome: Progressing  5/1/2022 1412 by Jessika Naik RN  Outcome: Progressing

## 2022-05-02 NOTE — FLOWSHEET NOTE
Pt was on bipap for a little bit and has refused to wear it for the rest of the night. Nurse is aware. No complications and no signs of distress at this time.

## 2022-05-02 NOTE — PROGRESS NOTES
Comprehensive Nutrition Assessment    Type and Reason for Visit:  Reassess    Nutrition Recommendations/Plan:   1. Continue with current diet, monitor/encourage adequate intake  2. Nutritional supplements available as needed     Malnutrition Assessment:  Malnutrition Status: At risk for malnutrition (Comment) (04/22/22 1230)    Context:  Acute Illness       Nutrition Assessment:    s/p extubation and NG tube d/c'd, passed swallow study yesterday-diet advanced to Regular this am. Has been tolerating well. Nutrition Related Findings:    labs/meds reviewed Wound Type: None       Current Nutrition Intake & Therapies:    Average Meal Intake: Unable to assess  Average Supplements Intake: None Ordered  ADULT DIET; Regular; 4 carb choices (60 gm/meal)    Anthropometric Measures:  Height: 5' 3\" (160 cm)  Ideal Body Weight (IBW): 115 lbs (52 kg)    Admission Body Weight: 293 lb 10.4 oz (133.2 kg)  Current Body Weight: 297 lb 9.9 oz (135 kg), 261.1 % IBW.  Weight Source: Bed Scale  Current BMI (kg/m2): 52.7                          BMI Categories: Obese Class 3 (BMI 40.0 or greater)    Estimated Daily Nutrient Needs:  Energy Requirements Based On: Kcal/kg  Weight Used for Energy Requirements: Ideal  Energy (kcal/day): 1300 kcal/day  Weight Used for Protein Requirements: Ideal  Protein (g/day): 100 g pro/day  Method Used for Fluid Requirements: ml/Kg  Fluid (ml/day): per MD    Nutrition Diagnosis:   · Inadequate oral intake related to other (comment) (recent extubation) as evidenced by  (diet advancement today)      Nutrition Interventions:   Food and/or Nutrient Delivery: Continue Current Diet  Nutrition Education/Counseling: No recommendation at this time  Coordination of Nutrition Care: Continue to monitor while inpatient       Goals:  Previous Goal Met: Progressing toward Goal(s)  Goals: Meet at least 75% of estimated needs       Nutrition Monitoring and Evaluation:   Behavioral-Environmental Outcomes: None Identified  Food/Nutrient Intake Outcomes: Diet Advancement/Tolerance,Food and Nutrient Intake  Physical Signs/Symptoms Outcomes: Biochemical Data,Nutrition Focused Physical Findings,Weight,Meal Time Behavior    Discharge Planning:    Continue current diet     Ramiro Lopez RD, LD  Contact: [phone]

## 2022-05-03 LAB
ANION GAP SERPL CALCULATED.3IONS-SCNC: 8 MMOL/L (ref 9–17)
BUN BLDV-MCNC: 24 MG/DL (ref 6–20)
CALCIUM SERPL-MCNC: 8 MG/DL (ref 8.6–10.4)
CHLORIDE BLD-SCNC: 101 MMOL/L (ref 98–107)
CO2: 23 MMOL/L (ref 20–31)
CREAT SERPL-MCNC: 0.46 MG/DL (ref 0.5–0.9)
GFR AFRICAN AMERICAN: >60 ML/MIN
GFR NON-AFRICAN AMERICAN: >60 ML/MIN
GFR SERPL CREATININE-BSD FRML MDRD: ABNORMAL ML/MIN/{1.73_M2}
GLUCOSE BLD-MCNC: 103 MG/DL (ref 70–99)
GLUCOSE BLD-MCNC: 142 MG/DL (ref 65–105)
GLUCOSE BLD-MCNC: 144 MG/DL (ref 65–105)
GLUCOSE BLD-MCNC: 96 MG/DL (ref 65–105)
HCT VFR BLD CALC: 30.8 % (ref 36.3–47.1)
HEMOGLOBIN: 9.8 G/DL (ref 11.9–15.1)
MCH RBC QN AUTO: 28.1 PG (ref 25.2–33.5)
MCHC RBC AUTO-ENTMCNC: 31.8 G/DL (ref 28.4–34.8)
MCV RBC AUTO: 88.3 FL (ref 82.6–102.9)
NRBC AUTOMATED: 0.1 PER 100 WBC
PDW BLD-RTO: 16.4 % (ref 11.8–14.4)
PLATELET # BLD: 157 K/UL (ref 138–453)
PMV BLD AUTO: 11.1 FL (ref 8.1–13.5)
POTASSIUM SERPL-SCNC: 3.9 MMOL/L (ref 3.7–5.3)
RBC # BLD: 3.49 M/UL (ref 3.95–5.11)
SODIUM BLD-SCNC: 132 MMOL/L (ref 135–144)
WBC # BLD: 22.2 K/UL (ref 3.5–11.3)

## 2022-05-03 PROCEDURE — 97530 THERAPEUTIC ACTIVITIES: CPT

## 2022-05-03 PROCEDURE — 97166 OT EVAL MOD COMPLEX 45 MIN: CPT

## 2022-05-03 PROCEDURE — 94664 DEMO&/EVAL PT USE INHALER: CPT

## 2022-05-03 PROCEDURE — 51798 US URINE CAPACITY MEASURE: CPT

## 2022-05-03 PROCEDURE — 6370000000 HC RX 637 (ALT 250 FOR IP): Performed by: NURSE PRACTITIONER

## 2022-05-03 PROCEDURE — 6370000000 HC RX 637 (ALT 250 FOR IP): Performed by: STUDENT IN AN ORGANIZED HEALTH CARE EDUCATION/TRAINING PROGRAM

## 2022-05-03 PROCEDURE — 6370000000 HC RX 637 (ALT 250 FOR IP): Performed by: INTERNAL MEDICINE

## 2022-05-03 PROCEDURE — 82947 ASSAY GLUCOSE BLOOD QUANT: CPT

## 2022-05-03 PROCEDURE — 99232 SBSQ HOSP IP/OBS MODERATE 35: CPT | Performed by: INTERNAL MEDICINE

## 2022-05-03 PROCEDURE — 99233 SBSQ HOSP IP/OBS HIGH 50: CPT | Performed by: INTERNAL MEDICINE

## 2022-05-03 PROCEDURE — 94640 AIRWAY INHALATION TREATMENT: CPT

## 2022-05-03 PROCEDURE — 36415 COLL VENOUS BLD VENIPUNCTURE: CPT

## 2022-05-03 PROCEDURE — 2580000003 HC RX 258: Performed by: INTERNAL MEDICINE

## 2022-05-03 PROCEDURE — 97535 SELF CARE MNGMENT TRAINING: CPT

## 2022-05-03 PROCEDURE — 2060000000 HC ICU INTERMEDIATE R&B

## 2022-05-03 PROCEDURE — 80048 BASIC METABOLIC PNL TOTAL CA: CPT

## 2022-05-03 PROCEDURE — 51701 INSERT BLADDER CATHETER: CPT

## 2022-05-03 PROCEDURE — 6370000000 HC RX 637 (ALT 250 FOR IP)

## 2022-05-03 PROCEDURE — 85027 COMPLETE CBC AUTOMATED: CPT

## 2022-05-03 PROCEDURE — 6360000002 HC RX W HCPCS: Performed by: INTERNAL MEDICINE

## 2022-05-03 PROCEDURE — 94761 N-INVAS EAR/PLS OXIMETRY MLT: CPT

## 2022-05-03 PROCEDURE — 6360000002 HC RX W HCPCS: Performed by: STUDENT IN AN ORGANIZED HEALTH CARE EDUCATION/TRAINING PROGRAM

## 2022-05-03 PROCEDURE — 2580000003 HC RX 258: Performed by: STUDENT IN AN ORGANIZED HEALTH CARE EDUCATION/TRAINING PROGRAM

## 2022-05-03 PROCEDURE — 94660 CPAP INITIATION&MGMT: CPT

## 2022-05-03 PROCEDURE — 97163 PT EVAL HIGH COMPLEX 45 MIN: CPT

## 2022-05-03 PROCEDURE — 2700000000 HC OXYGEN THERAPY PER DAY

## 2022-05-03 RX ADMIN — APIXABAN 10 MG: 5 TABLET, FILM COATED ORAL at 20:12

## 2022-05-03 RX ADMIN — DILTIAZEM HYDROCHLORIDE 30 MG: 30 TABLET ORAL at 00:11

## 2022-05-03 RX ADMIN — FLUCONAZOLE 200 MG: 200 TABLET ORAL at 09:38

## 2022-05-03 RX ADMIN — MEROPENEM 1000 MG: 1 INJECTION, POWDER, FOR SOLUTION INTRAVENOUS at 17:45

## 2022-05-03 RX ADMIN — IPRATROPIUM BROMIDE AND ALBUTEROL SULFATE 1 AMPULE: .5; 2.5 SOLUTION RESPIRATORY (INHALATION) at 15:02

## 2022-05-03 RX ADMIN — HYDROCORTISONE: 10 CREAM TOPICAL at 20:13

## 2022-05-03 RX ADMIN — DILTIAZEM HYDROCHLORIDE 30 MG: 30 TABLET ORAL at 12:35

## 2022-05-03 RX ADMIN — IPRATROPIUM BROMIDE AND ALBUTEROL SULFATE 1 AMPULE: .5; 2.5 SOLUTION RESPIRATORY (INHALATION) at 08:30

## 2022-05-03 RX ADMIN — BUDESONIDE 500 MCG: 0.5 INHALANT RESPIRATORY (INHALATION) at 20:18

## 2022-05-03 RX ADMIN — POLYVINYL ALCOHOL 1 DROP: 14 SOLUTION/ DROPS OPHTHALMIC at 09:42

## 2022-05-03 RX ADMIN — IPRATROPIUM BROMIDE AND ALBUTEROL SULFATE 1 AMPULE: .5; 2.5 SOLUTION RESPIRATORY (INHALATION) at 20:17

## 2022-05-03 RX ADMIN — DILTIAZEM HYDROCHLORIDE 30 MG: 30 TABLET ORAL at 17:38

## 2022-05-03 RX ADMIN — MEROPENEM 1000 MG: 1 INJECTION, POWDER, FOR SOLUTION INTRAVENOUS at 02:51

## 2022-05-03 RX ADMIN — PANTOPRAZOLE SODIUM 40 MG: 40 TABLET, DELAYED RELEASE ORAL at 09:38

## 2022-05-03 RX ADMIN — APIXABAN 10 MG: 5 TABLET, FILM COATED ORAL at 09:38

## 2022-05-03 RX ADMIN — INSULIN LISPRO 2 UNITS: 100 INJECTION, SOLUTION INTRAVENOUS; SUBCUTANEOUS at 17:40

## 2022-05-03 RX ADMIN — SODIUM CHLORIDE, PRESERVATIVE FREE 10 ML: 5 INJECTION INTRAVENOUS at 09:42

## 2022-05-03 RX ADMIN — DILTIAZEM HYDROCHLORIDE 30 MG: 30 TABLET ORAL at 05:56

## 2022-05-03 RX ADMIN — INSULIN LISPRO 1 UNITS: 100 INJECTION, SOLUTION INTRAVENOUS; SUBCUTANEOUS at 20:14

## 2022-05-03 RX ADMIN — POLYVINYL ALCOHOL 1 DROP: 14 SOLUTION/ DROPS OPHTHALMIC at 17:21

## 2022-05-03 RX ADMIN — BUDESONIDE 500 MCG: 0.5 INHALANT RESPIRATORY (INHALATION) at 08:30

## 2022-05-03 RX ADMIN — POLYVINYL ALCOHOL 1 DROP: 14 SOLUTION/ DROPS OPHTHALMIC at 20:47

## 2022-05-03 RX ADMIN — HYDROCORTISONE: 10 CREAM TOPICAL at 09:38

## 2022-05-03 RX ADMIN — FUROSEMIDE 40 MG: 40 TABLET ORAL at 09:38

## 2022-05-03 RX ADMIN — IPRATROPIUM BROMIDE AND ALBUTEROL SULFATE 1 AMPULE: .5; 2.5 SOLUTION RESPIRATORY (INHALATION) at 12:29

## 2022-05-03 RX ADMIN — SODIUM CHLORIDE, PRESERVATIVE FREE 10 ML: 5 INJECTION INTRAVENOUS at 20:14

## 2022-05-03 RX ADMIN — PREDNISONE 40 MG: 20 TABLET ORAL at 09:37

## 2022-05-03 RX ADMIN — DILTIAZEM HYDROCHLORIDE 30 MG: 30 TABLET ORAL at 23:33

## 2022-05-03 ASSESSMENT — PAIN SCALES - WONG BAKER

## 2022-05-03 ASSESSMENT — ENCOUNTER SYMPTOMS
APNEA: 0
ABDOMINAL DISTENTION: 0
COLOR CHANGE: 0
EYE DISCHARGE: 0

## 2022-05-03 ASSESSMENT — PAIN SCALES - GENERAL: PAINLEVEL_OUTOF10: 0

## 2022-05-03 NOTE — PROGRESS NOTES
Occupational Therapy  Facility/Department: [facility] 2  Occupational Therapy Initial Assessment    Name: Jessica Gonsales  : 2000  MRN: [id number]  Date of Service: 5/3/2022    Discharge Recommendations:  Further therapy recommended at discharge. The patient should be able to tolerate at least 3 hours of therapy per day over 5 days or 15 hours over 7 days. This patient may benefit from a Physical Medicine and Rehab consult. OT Equipment Recommendations  Equipment Needed: Yes  Mobility Devices: ADL Assistive Devices  ADL Assistive Devices: Transfer Tub Bench; Toileting - Drop Arm Commode, Heavy Duty Drop Arm Commode;Grab Bars - shower;Grab Bars - toilet; Reacher;Sock-Aid Hard;Dressing Stick;Long-handled Shoe Horn;Long-handled Sponge       Patient Diagnosis(es): The encounter diagnosis was CHIDI (obstructive sleep apnea). Past Medical History:  has no past medical history on file. Past Surgical History:  has a past surgical history that includes extracorporeal circulation (N/A, 2022). Assessment   Performance deficits / Impairments: Decreased functional mobility ; Decreased ADL status; Decreased endurance;Decreased high-level IADLs;Decreased strength;Decreased safe awareness;Decreased cognition;Decreased balance;Decreased fine motor control  Assessment: Pt completed supine to sit transfer EOB with Mod A for trunk progression d/t decreased strength, ROM and endurance. Prior to transfer EOB pt was soiled d/t bedpan and required SBA for UB dressing to doff/don gown for VCs, Mod A for pericare/bottom hygiene d/t decreased funcional reach. Once pt was EOB, engaged in sit<>stand transfers 3 x with Mod A x 2, Min VCs for body positioning with good return. After first 2 stands pt quickly returned to seated position. Upon third stand, pt was able to toeralte 30 seconds of standing with CGA-Min A, but unable to progress to dynamic standing d/t decreased endurance and strength.  Pt completed oral hygiene seated EOB unsupported. Returned supine with Mod A for B LE progression. Pt is expected to require skilled OT services during their acute hospitalization stay to address the above noted deficits through skilled occupational therapy intervention for promotion of increased independence throughout ADLs, IADLs and functional mobility tasks. Pt is currently unsafe to return to their prior living arrangements without 24/7 assist/supervision to safely engage in all aspects of ADLs, IADLs and functional mobility tasks. Prognosis: Good  Decision Making: Medium Complexity  REQUIRES OT FOLLOW-UP: Yes  Activity Tolerance  Activity Tolerance: Patient Tolerated treatment well        Plan   Plan  Times per Week: 3-5x/wk  Current Treatment Recommendations: Patient/Caregiver education & training,Safety education & training,Balance training,Functional mobility training,Strengthening,Endurance training,Home management training,Cognitive/Perceptual training     Restrictions  Restrictions/Precautions  Restrictions/Precautions: Fall Risk,General Precautions  Required Braces or Orthoses?: No  Position Activity Restriction  Other position/activity restrictions: asthma exacerbation 4/18, ECMO started 4/22, bronchoscopy 4/24, RLE DVT 4/27, extubated 4/29, ECMO ended 4/30,    Subjective   General  Patient assessed for rehabilitation services?: Yes  Family / Caregiver Present: Yes (Mother)  General Comment  Comments: RN ok'd for OT/PT eval this PM. Pt agreeable to session, pleasent/cooperative throughout. PAIN: Pt reports pain/discomfort in chest, 6 numerical rating. Social/Functional History  Social/Functional History  Lives With: Parent (pt reports living with boyfriend.  plan to go to parents upon discharge.)  Type of Home: House  Home Layout: One level  Home Access: Stairs to enter without rails  Entrance Stairs - Number of Steps: 3  Bathroom Shower/Tub: Tub/Shower unit  Bathroom Toilet: Standard  Bathroom Equipment:  (none)  Bathroom Accessibility: Accessible  Home Equipment:  (none)  Receives Help From: Family  ADL Assistance: Independent  Homemaking Assistance: Independent  Homemaking Responsibilities: Yes  Ambulation Assistance: Independent  Transfer Assistance: Independent  Active : Yes  Mode of Transportation: Other (jeep wrangler)  Occupation: Full time employment  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 Objective     Safety Devices  Type of Devices: Call light within reach;Nurse notified;Gait belt;Left in bed;Patient at risk for falls; All fall risk precautions in place; Bed alarm in place  Restraints  Restraints Initially in Place: No  Balance  Sitting - Static:  (SBA)  Sitting - Dynamic:  (SBA-CGA, CGA for dynamic reaching in various planes w/out tray table, SBA for reaching for items and engaging in oral hygiene seated unsupported)  Standing - Static:  (CGA-Min A, Min A as pt became fatiued, 30 seconds of static standing)  Standing - Dynamic:  (Not able to complete dynamic, pt unable to take steps or complete dynamic marches)  Gait  Overall Level of Assistance:  (Pt unable to complete d/t fatiuge and decreased endurance)     AROM: Within functional limits  PROM: Generally decreased, functional  Strength: Generally decreased, functional (shoulder flexion 3-/5, elbow flexion/extension 4+/5)  Coordination: Generally decreased, functional (Slow FMC observed through opposition to B hands)  Tone: Normal  Sensation: Intact    ADL  Feeding: Independent  Grooming: Modified independent ;Setup  Grooming Skilled Clinical Factors: Pt seated EOB unsupported completed oral hygiene. UE Bathing: Minimal assistance; Increased time to complete  LE Bathing: Moderate assistance;Verbal cueing;Setup; Increased time to complete  UE Dressing: Increased time to complete;Stand by assistance (Doffed/don gown, assist for lines only)  LE Dressing:  Moderate assistance;Verbal cueing; Increased time to complete  Toileting: Moderate assistance; Increased time to complete;Setup  Toileting Skilled Clinical Factors: Pt voiding on bed pan upon arrival and was soiled. Required asisst to clean up, rolling L/R with min A, assist for pericare/bottom hgyiene d/t decreased functional reach     Activity Tolerance  Activity Tolerance: Patient tolerated treatment well;Patient limited by fatigue;Patient limited by endurance     Bed mobility  Rolling to Left: Minimal assistance (Use of bedrail)  Rolling to Right: Minimal assistance (Use of bedrail)  Supine to Sit: Moderate assistance (Trunk progression)  Sit to Supine: Moderate assistance (for BLE progression)  Scooting: Minimal assistance  Comment: hand held assist; use of bed rail; HOB elevated; increased time/effort     Transfers  Sit to stand: 2 Person assistance; Moderate assistance  Stand to sit: Moderate assistance  Transfer Comments: Completed 3 sit<>stand transfers EOB. Use of RW. Cognition  Overall Cognitive Status: WFL                  Education Given To: Patient  Education Provided: Role of Therapy;Plan of Care;Transfer Training;Energy Conservation; ADL Adaptive Strategies  Education Provided Comments: walker management, activity promotion, balance maintenance-good return  Education Method: Demonstration;Verbal  Education Outcome: Demonstrated understanding;Verbalized understanding  LUE PROM (degrees)  LUE PROM: WFL  LUE AROM (degrees)  LUE AROM : Exceptions  L Shoulder Flexion 0-180: 0-100, distal to shoulder WFL  Left Hand AROM (degrees)  Left Hand AROM: WFL  RUE PROM (degrees)  RUE PROM: WFL  RUE AROM (degrees)  RUE AROM : Exceptions  R Shoulder Flexion 0-180: 0-100, distal to shoulder WFL  Right Hand AROM (degrees)  Right Hand AROM: WFL        Hand Dominance  Hand Dominance: Right         AM-PAC Score        AM-[facility] Inpatient Daily Activity Raw Score: 17 (05/03/22 1645)  AM-PAC Inpatient ADL T-Scale Score : 37.26 (05/03/22 1645)  ADL Inpatient CMS 0-100% Score: 50.11 (05/03/22 1645)  ADL Inpatient CMS G-Code Modifier : CK (05/03/22 1645)    Goals  Short Term Goals  Time Frame for Short term goals: By discharge, pt will:  Short Term Goal 1: Demo bed mobility sit<>supine transfers with SBA  Short Term Goal 2: Demo functional sit<>stand transfers with Min A and LRD PRN  Short Term Goal 3: Demo 4 minutes of dynamic standing balance with CGA and unilateral hand release to promote increased engagement in ADLs  Short Term Goal 4: Demo functional mobility with Mod A and LRD PRN  Short Term Goal 5: Demo UB ADLs with SUP  Short Term Goal 6: Demo LB ADLs/toileitng with CGA, use of DME and adaptive tech PRN       Therapy Time   Individual Concurrent Group Co-treatment   Time In 1325         Time Out 1413         Minutes 48         Timed Code Treatment Minutes: 23 Minutes       Lefty Chase, OTR/L

## 2022-05-03 NOTE — PROGRESS NOTES
PROVIDE ADEQUATE OXYGENATION WITH ACCEPTABLE SP02/ABG'S     [x]         IDENTIFY APPROPRIATE OXYGEN THERAPY  [x]         MONITOR SP02/ABG'S AS NEEDED   [x]         PATIENT EDUCATION AS NEEDED     NON INVASIVE VENTILATION  PROVIDE OPTIMAL VENTILATION/ACCEPTABLE SP02  IMPLEMENT NON INVASIVE VENTILATION PROTOCOL  ASSESSMENT SKIN INTEGRITY  PATIENT EDUCATION AS NEEDED  BIPAP AS NEEDED

## 2022-05-03 NOTE — PROGRESS NOTES
[facility]  Internal Medicine Teaching Residency Program  Inpatient Daily Progress Note  ______________________________________________________________________________    Patient: Taye Loja  YOB: 2000   NPK:[id number]    Acct: [de-identified]     Room: 2001/2001-01  Admit date: 4/18/2022  Today's date: 05/03/22  Number of days in the hospital: 15    SUBJECTIVE   Admitting Diagnosis: Acute asthma exacerbation     CC: Shortness of breath    Pt examined at bedside. Chart & results reviewed. Alert and oriented. Hemodynamically stable. Had urine retention, apparently pulled out her [place]. Straight cath 750ml. On 4L NC. UOP 2975  No new labs today. On meropenem day 5,  Reached out to cardiology for stress test. They will follow outpatient. Start discharge planning. ROS:  Constitutional:  negative for chills, fevers, sweats  Respiratory:  negative for cough, dyspnea on exertion, hemoptysis, shortness of breath, wheezing  Cardiovascular:  negative for chest pain, chest pressure/discomfort, lower extremity edema, palpitations  Gastrointestinal:  negative for abdominal pain, constipation, diarrhea, nausea, vomiting  Neurological:  negative for dizziness, headache    BRIEF HISTORY       23 y.o. female with a past medical history of bronchial asthma who was admitted to [facility] with an acute exacerbation causing increasing shortness of breath. Patient was found to be hypoxemic with hypercarbic respiratory failure requiring intubation and mechanical ventilation. Patient was transferred to [address] [place] [address] for further elevation with possibility of ECMO. On arrival patient was found to be on a bicarb drip due to presumed respiratory acidosis but this was discontinued due to to worsening of the hypercarbia. Additionally patient was started on Nimbex due to the necessity for paralyzation as she was breathing over the vent.   Pulmonology and cardiothoracic surgery were consulted- plan is for ECMO. In the MICU, patient remained stable, had an episode of dark emesis which turned out to be bilious output, Hb remained stable. She was hypertensive, started on lopressor IV 5 mg PRN. Had left IJ placed which was malpositioned, removed and then CVC in left femoral was placed overnight. She was hypoxic, requiring higher FiO2 of 80%, PEEP 16, RR 32, , blood gas showed pH 7.449, pCO2 41.7, pCO2 59. 1. treated with solumedrol, bronchodilators, CXR showed no pneumothorax or pleural effusion. On  overnight, patient became more hypoxic with increasing oxygen requirement, increasing wheezing on exam. It was determined to go with ECMO canulation on . OBJECTIVE     Vital Signs:  BP (!) 123/48   Pulse 89   Temp 98.7 °F (37.1 °C) (Oral)   Resp 20   Ht 5' 3\" (1.6 m)   Wt 297 lb 9.9 oz (135 kg)   SpO2 99%   BMI 52.72 kg/m²     Temp (24hrs), Av.2 °F (36.8 °C), Min:97.9 °F (36.6 °C), Max:98.7 °F (37.1 °C)    In: 307.1   Out: 2975 [Urine:2975]    Physical Exam:  Constitutional: This is a well developed, well nourished, Greater than 40 - Morbid Obesity / Extreme Obesity / Grade III 23y.o. year old female who is alert, oriented, cooperative and in no apparent distress on nasal canula and on ECMO. EENT:  PERRLA. No conjunctival injections. Septum was midline, mucosa was without erythema, exudates or cobblestoning. No thrush was noted. Neck: Supple without thyromegaly. No elevated JVP. Trachea was midline. Respiratory: Chest was symmetrical without dullness to percussion. Breath sounds bilaterally were clear to auscultation. There were no wheezes, rhonchi or rales. There is no intercostal retraction or use of accessory muscles. No egophony noted. Cardiovascular: Regular without murmur, clicks, gallops or rubs. Abdomen: Slightly rounded and soft without organomegaly. No rebound, rigidity or guarding was appreciated.     Lymphatic: No lymphadenopathy. Musculoskeletal: Normal curvature of the spine. No gross muscle weakness. Extremities:  No lower extremity edema, ulcerations, tenderness, varicosities or erythema. Muscle size, tone and strength are normal.  No involuntary movements are noted. Skin:  Warm and dry. Good color, turgor and pigmentation. No lesions or scars.   No cyanosis or clubbing  Neurological/Psychiatric: The patient's general behavior, level of consciousness, thought content and emotional status is normal.        Medications:  Scheduled Medications:    apixaban  10 mg Oral BID    Followed by   Olive Law ON 5/9/2022] apixaban  5 mg Oral BID    predniSONE  40 mg Oral Daily    pantoprazole  40 mg Oral QAM AC    furosemide  40 mg Oral Daily    hydrocortisone   Topical BID    insulin lispro  0-12 Units SubCUTAneous TID WC    insulin lispro  0-6 Units SubCUTAneous Nightly    fluconazole  200 mg Oral Daily    meropenem  1,000 mg IntraVENous Q8H    budesonide  0.5 mg Nebulization BID    lidocaine PF  5 mL Tracheal Tube Once    docusate  100 mg Oral BID    polyethylene glycol  17 g Oral Daily    albumin human  25 g IntraVENous Once    dilTIAZem  30 mg Oral 4 times per day    polyvinyl alcohol  1 drop Both Eyes Q6H    ipratropium-albuterol  1 ampule Inhalation Q4H    sodium chloride flush  5-40 mL IntraVENous 2 times per day     Continuous Infusions:    dextrose      sodium chloride Stopped (05/02/22 1136)     PRN Medicationsheparin (porcine), 10,000 Units, PRN  heparin (porcine), 5,000 Units, PRN  albuterol, 2.5 mg, Q4H PRN  heparin flush (PF), 3 mL, PRN  midazolam, 2 mg, Q2H PRN  metoprolol, 5 mg, Q6H PRN  glucose, 15 g, PRN  dextrose, 12.5 g, PRN  dextrose, 100 mL/hr, PRN  sodium chloride flush, 5-40 mL, PRN  sodium chloride, , PRN  ondansetron, 4 mg, Q8H PRN   Or  ondansetron, 4 mg, Q6H PRN  acetaminophen, 650 mg, Q6H PRN   Or  acetaminophen, 650 mg, Q6H PRN        Diagnostic Labs:  CBC:   Recent Labs 05/01/22  0437 05/01/22  1026 05/02/22  0501   WBC 16.9* 24.7* 19.9*   RBC 3.10* 3.67* 3.29*   HGB 8.7* 10.3* 9.3*   HCT 27.0* 31.5* 28.7*   MCV 87.1 85.8 87.2   RDW 14.8* 15.2* 15.6*   PLT 91* 116* 117*     BMP:   Recent Labs     05/01/22  0437 05/01/22  1026 05/02/22  0501   * 134* 136   K 4.4 4.1 4.5    97* 103   CO2 29 28 25   BUN 19 19 15   CREATININE 0.40* 0.47* 0.40*     BNP: No results for input(s): BNP in the last 72 hours. PT/INR:   Recent Labs     04/30/22  2220 05/01/22  0437 05/01/22  1026   PROTIME 13.3* 13.3* 12.7*   INR 1.3 1.3 1.2     APTT:   Recent Labs     05/01/22  1624 05/01/22  2347 05/02/22  0501   APTT >120.0* 49.2* >120.0*     CARDIAC ENZYMES: No results for input(s): CKMB, CKMBINDEX, TROPONINI in the last 72 hours. Invalid input(s): CKTOTAL;3  FASTING LIPID PANEL:  Lab Results   Component Value Date    TRIG 96 04/29/2022     LIVER PROFILE:   Recent Labs     05/01/22 0437   AST 15   ALT 51*   BILIDIR 0.19   BILITOT 0.62   ALKPHOS 39      MICROBIOLOGY:   Lab Results   Component Value Date/Time    CULTURE Laura albicans/dubliniensis >[id number] CFU/ML 04/29/2022 04:44 PM       Imaging:    XR CHEST PORTABLE    Result Date: 4/29/2022  1. Stable appropriate positions of support apparatus. 2. Stable small bilateral pleural effusions and bibasilar airspace disease. XR CHEST PORTABLE    Result Date: 4/29/2022  No significant change from prior study. Continued bibasilar opacities and small effusions. XR CHEST PORTABLE    Result Date: 4/28/2022  Stable exam.     XR CHEST PORTABLE    Result Date: 4/26/2022  Relatively stable cardiopulmonary status     XR CHEST PORTABLE    Result Date: 4/25/2022  Stable chest x-ray compared to 04/24/2022. XR CHEST PORTABLE    Result Date: 4/24/2022  1. Stable support tubes and line. 2. Low lung volumes. Crowding of vessels from low lung volumes results in apparent opacification at the right cardiophrenic angle.        ASSESSMENT & PLAN ASSESSMENT / PLAN:     Principal Problem:    Acute asthma exacerbation  Active Problems:    Severe persistent asthma with status asthmaticus    CHIDI (obstructive sleep apnea)    Morbid obesity with BMI of 50.0-59.9, adult (HCC)    History of seizures    Hypercapnic respiratory failure ([facility])    Endotracheally intubated    On mechanically assisted ventilation ([facility])    Diastolic dysfunction  Resolved Problems:    * No resolved hospital problems. *    1. Acute hypoxic hypercapnic respiratory failure on mechanical ventilation and s/p ECMO cannulation on 4/22/22 secondary asthma exacerbation: Continue SoluMedrol 40 mg IV q12 hours. Respiratory cultures positive for Pseudomonas - Started on Meropenem 1 gram daily per ID. Post Cefepime, patient developed a rash. Duoneb q4 hours and Albuterol as needed. Continue Lasix 20 mg IV daily, diuresing well. Pulm. Critical care following. Recommendations appreciated. 2. Troponin elevation type II demand ischemia: Continue Cardizem 30 mg q6 hours per cardiology. Cardiology  for stress test prior to discharge due to apical hypokinesis on echo     3. Sinus tachycardia:- Controlled Continue Cardizem 30 mg q6 hours per Cardiology. PRN Metoprolol 5 mg IV for HR >110; hold for SBP<110     4. Steroid induced hyperglycemia: Medium dose sliding scale q4 hours; Hypoglycemia protocol, monitor POCT glucose every 4 hours     5. Acute femoral vein DVT: Restarted AC with Heparin high dose. switched to Eliquis. Diet: adult diet  DVT ppx : eliquis  GI ppx: Protonix      PT/OT: Consulted  Discharge Planning / SW:  re-evaluation for discharge planning. Had reached out to [facility] 4/29/2022    Maribell Snyder MD  Internal Medicine Resident, PGY-1  [address], [place]  5/3/2022, 7:18 AM

## 2022-05-03 NOTE — PROGRESS NOTES
Physical Therapy  Facility/Department: [facility] CAR 2  Physical Therapy Initial Assessment    Name: Irene Lebron  : 2000  MRN: [id number]  Date of Service: 5/3/2022    No chief complaint on file. HPI: 77-year-old female with history of asthma, presented to [facility] with shortness of breath.  She was in acute asthma exacerbation.  She was initially put on nonrebreather but then had to be intubated because of her worsening symptoms.  However her ABGs did not improve.  She was transferred over here for possible ECMO. Discharge Recommendations:    Further therapy recommended at discharge. The patient should be able to tolerate at least 3 hours of therapy per day over 5 days or 15 hours over 7 days. This patient may benefit from a Physical Medicine and Rehab consult. PT Equipment Recommendations  Other: CTA      Patient Diagnosis(es): The encounter diagnosis was CHIDI (obstructive sleep apnea). Past Medical History:  has no past medical history on file. Past Surgical History:  has a past surgical history that includes extracorporeal circulation (N/A, 2022). Assessment   Body Structures, Functions, Activity Limitations Requiring Skilled Therapeutic Intervention: Decreased functional mobility ; Decreased strength;Decreased endurance;Decreased sensation  Assessment: Pt Khadar rolling, modA bed mobility, modAx2 to stand to RW, unable to take steps. Pt would benefit from continued acute PT to address deficits.   Therapy Prognosis: Good  Decision Making: High Complexity  Requires PT Follow-Up: Yes  Activity Tolerance  Activity Tolerance: Patient tolerated treatment well;Patient limited by fatigue;Patient limited by endurance     Plan   Plan  Plan:  (5-6x/wk)  Current Treatment Recommendations: Strengthening,Balance training,Functional mobility training,Transfer training,Endurance training,Gait training,Stair training,Safety education & training,Patient/Caregiver education & training,Home exercise program,Equipment evaluation, education, & procurement  Safety Devices  Type of Devices: Call light within reach,Nurse notified,Gait belt,Left in bed,Patient at risk for falls,All fall risk precautions in place  Restraints  Restraints Initially in Place: No     Restrictions  Restrictions/Precautions  Restrictions/Precautions: Fall Risk,General Precautions  Required Braces or Orthoses?: No  Position Activity Restriction  Other position/activity restrictions: asthma exacerbation 4/18, ECMO started 4/22, bronchoscopy 4/24, RLE DVT 4/27, extubated 4/29, ECMO ended 4/30,     Subjective   General  Chart Reviewed: Yes  Patient assessed for rehabilitation services?: Yes  Response To Previous Treatment: Not applicable  Family / Caregiver Present: No (parents arrived mid session)  Follows Commands: Within Functional Limits  General Comment  Comments: OT co-eval. RN and pt agreeable to PT. Pt alert in bed upon arrival and exit. Subjective  Subjective: Pt reports chest \"discomfort\", unrated. Social/Functional History  Social/Functional History  Lives With: Parent (pt reports living with boyfriend. plan to go to parents upon discharge.)  Type of Home: House  Home Layout: One level  Home Access: Stairs to enter without rails  Entrance Stairs - Number of Steps: 3  Bathroom Shower/Tub: Tub/Shower unit  Bathroom Toilet: Standard  Bathroom Equipment:  (none)  Bathroom Accessibility: Accessible  Home Equipment:  (none)  Receives Help From: Family  ADL Assistance: Independent  Homemaking Assistance: Independent  Homemaking Responsibilities: Yes  Ambulation Assistance: Independent  Transfer Assistance: Independent  Active : Yes  Mode of Transportation: Other (jeep wrangler)  Occupation: Full time employment  Type of Occupation: [employer], builds dish washers  Leisure & Hobbies: be with baoy friend/ friends, bingo with mother.   Vision/Hearing       Cognition   Orientation  Overall Orientation Status: Within Functional Limits  Cognition  Overall Cognitive Status: WFL     Objective     AROM RLE (degrees)  RLE AROM: WFL  AROM LLE (degrees)  LLE AROM : WFL  AROM RUE (degrees)  RUE General AROM: OT co-eval, see OT  AROM LUE (degrees)  LUE General AROM: OT co-eval, see OT  Strength RLE  Comment: 4-/5 grossly  Strength LLE  Comment: 4-/5 grossly  Strength RUE  Comment: OT co-eval, see OT  Strength LUE  Comment: OT co-eval, see OT           Bed mobility  Rolling to Left: Minimal assistance  Rolling to Right: Minimal assistance  Supine to Sit: Moderate assistance (trunk progression)  Sit to Supine: Moderate assistance (BLE progression)  Scooting: Minimal assistance  Transfers  Sit to Stand: Moderate Assistance;2 Person Assistance (performed 3 times total, 3rd time pt stood ~30 seconds, CGA to Khadar)  Stand to sit: Moderate Assistance        Balance  Posture: Fair  Sitting - Static: Good;-  Sitting - Dynamic: Fair;+  Standing - Static: Fair  Comments: RW used while assessing standing balance  Exercise Treatment: EOB~30min, dynamic activities EOB.  ADLs EOB, see OT        AM-PAC Score  AM-PAC Inpatient Mobility Raw Score : 10 (05/03/22 1541)  AM-PAC Inpatient T-Scale Score : 32.29 (05/03/22 1541)  Mobility Inpatient CMS 0-100% Score: 76.75 (05/03/22 1541)  Mobility Inpatient CMS G-Code Modifier : CL (05/03/22 1541)          Goals  Short Term Goals  Time Frame for Short term goals: 14 visits  Short term goal 1: Pt will be Khadar bed mobility  Short term goal 2: Pt will be Khadar transfers  Short term goal 3: Pt will amb 50' RW CGA  Short term goal 4: Pt will navigate 4 steps CGA       Therapy Time   Individual Concurrent Group Co-treatment   Time In 1326         Time Out 1413         Minutes 47         Timed Code Treatment Minutes: 21 Minutes       Lex Shane, PT

## 2022-05-03 NOTE — PROGRESS NOTES
[facility] -   Infectious diseases evaluation  admission date 4/18/2022    reason for consultation:   VAP with resp cultures showing Pseudomonas. Questionable allergy to cefepime. Switching to meropenem. Impression :   Current:  · Asthma exacerbation -ECMO bilat fem access  · Ventilator assoc RLL. Pneumonia  -Pseudomonas aeruginosa -  (S: cefepime, cipro, gentamicin, tobramycin, R: zosyn S meropenem)  · Resp panel pos for rhino/entero on 4/19. · Leukocytosis leukemoid reaction  · Rash cefepime - trunk    Other:  ·   Discussion / summary of stay / plan of care   · Severe Asthma exacerbation - on ECMO  · resp infection - pseudomonas RLL pneumonia   · Post ceftriaxone 4 days and tolerated  · Cefepime 4/28 and 4/29, developed rash torso - stopped - started meropenem  · ID called for AB -   Recommendations   · Stop cefepime -   · Meropenem 4/29 -5/8 total 10 days  · Might get decanulate in am   · RLL improved  · Follow response    Infection Control Recommendations   · [place] Precautions    Antimicrobial Stewardship Recommendations   · Simplification of therapy  · Targeted therapy  Coordination ofOutpatient Care:   · Estimated Length of IV antimicrobials:  · Patient will need Midline / picc Catheter Insertion:   · Patient will need SNF:  · Patient will need outpatient wound care:     History of Present Illness:   Initial history:  Tim Beebe is a 23y.o.-year-old female with a past medical history of bronchial asthma who was admitted to [facility] with an acute exacerbation causing increasing shortness of breath. Patient was found to be hypoxemic with hypercarbic respiratory failure requiring intubation and mechanical ventilation. Patient was transferred to [facility] for further elevation with possibility of ECMO.   On arrival patient was found to be on a bicarb drip due to presumed respiratory acidosis but this was discontinued due to to worsening of the hypercarbia. Additionally patient was started on Nimbex due to the necessity for paralyzation as she was breathing over the vent. Pulmonology and cardiothoracic surgery were consulted, ECMO planned. In the MICU, patient had an episode of dark emesis which turned out to be bilious output, Hb remained stable. She was hypertensive, started on lopressor IV 5 mg PRN. She was hypoxic, requiring higher FiO2 of 80%, PEEP 16, RR 32, , blood gas showed pH 7.449, pCO2 41.7, pCO2 59. 1. treated with solumedrol, bronchodilators, CXR showed no pneumothorax or pleural effusion.     4/22 - overnight, patient became more hypoxic with increasing oxygen requirement, increasing wheezing on exam. It was determined to go with ECMO canulation. Patient previously treated with   Azithromycin from 4/19 to 4/24  Rocephin from 4/19 to 4/25  Off abx from 4/26 to 4/27  Cefepime from 4/28 to 4/29 4/29 nurse reports rash post treatment with cefepime, and patient switched to meropenem.     Interval changes  5/2/2022   Patient Vitals for the past 8 hrs:   BP Temp Temp src Pulse Resp SpO2   05/02/22 2309 -- -- -- -- 20 97 %   05/02/22 2000 133/62 97.9 °F (36.6 °C) Oral 112 -- 95 %   05/02/22 1953 133/62 -- -- -- -- --   05/02/22 1939 133/62 97.9 °F (36.6 °C) Oral -- -- --   05/02/22 1934 -- -- -- -- 21 97 %   05/02/22 1544 136/81 97.9 °F (36.6 °C) Oral 102 22 93 %     Patient extubated about 4/29  5 L NC    Post cefepime treatment and also points to a erythematous papular rash on chest.  5/2 rash has improved, maculopapular over the chest and the back      Summary of relevant labs:  Labs:    WBC: 40.2  Plt 185  Cr 0.42  4/19 CRP 79.7  4/18 sed rate 39      Micro:    4/29 UC: sent  4/29 BC x2 sent  4/29 Resp cx sent  4/24 Resp cx: Pseudomonas aeruginosa - light growth  S: cefepime, cipro, gentamicin, tobramycin, R: zosyn  4/21 BC x2 no growth  4/19 BC x2 no growth  4/19 Resp cx: normal nadine  4/19 Resp panel: pos rhino/entero    Imaging:    CXR, 4/29/22    IMPRESSION:  No significant change from prior study. Continued bibasilar opacities and small effusions. I have personally reviewed the past medical history, past surgical history, medications, social history, and family history, and I haveupdated the database accordingly. Allergies:   Patient has no known allergies. Review of Systems:     Review of Systems   Constitutional: Positive for activity change. HENT: Negative for congestion. Eyes: Negative for discharge. Respiratory: Negative for apnea. Cardiovascular: Negative for chest pain. Gastrointestinal: Negative for abdominal distention. Endocrine: Negative for cold intolerance. Genitourinary: Negative for dysuria. Skin: Negative for color change. Allergic/Immunologic: Negative for environmental allergies. Neurological: Negative for dizziness. Hematological: Negative for adenopathy. Psychiatric/Behavioral: Negative for agitation. Physical Examination :       Physical Exam  Constitutional:       General: She is not in acute distress. Appearance: Normal appearance. She is not ill-appearing. HENT:      Head: Normocephalic and atraumatic. Nose: Nose normal. No congestion. Eyes:      General:         Right eye: No discharge. Left eye: No discharge. Cardiovascular:      Rate and Rhythm: Regular rhythm. Tachycardia present. Heart sounds: Normal heart sounds. No murmur heard. No gallop. Pulmonary:      Effort: No respiratory distress. Breath sounds: No stridor. Comments: Upper lobes clear. Lower lobes not auscultatable  Abdominal:      General: There is no distension. Palpations: Abdomen is soft. There is no mass. Tenderness: There is no abdominal tenderness. Hernia: No hernia is present. Genitourinary:     Comments: Urine sukhwinder  Musculoskeletal:         General: No swelling or deformity. Cervical back: Neck supple. Lymphadenopathy:      Cervical: No cervical adenopathy. Skin:     General: Skin is warm and dry. Neurological:      General: No focal deficit present. Mental Status: She is alert. Past Medical History:   No past medical history on file.     Past Surgical  History:     Past Surgical History:   Procedure Laterality Date    EXTRACORPOREAL CIRCULATION N/A 4/22/2022    EXTRA CORPOREAL MEMBRANE OXYGENATION 23 ON THE RIGHT 25 LEFT FEMORALS performed by Christiano Conde MD at [address] [facility]       Medications:      apixaban  10 mg Oral BID    Followed by   Pravin Scherer ON 5/9/2022] apixaban  5 mg Oral BID    predniSONE  40 mg Oral Daily    [START ON 5/3/2022] pantoprazole  40 mg Oral QAM AC    furosemide  40 mg Oral Daily    hydrocortisone   Topical BID    [START ON 5/3/2022] insulin lispro  0-12 Units SubCUTAneous TID WC    insulin lispro  0-6 Units SubCUTAneous Nightly    fluconazole  200 mg Oral Daily    meropenem  1,000 mg IntraVENous Q8H    budesonide  0.5 mg Nebulization BID    lidocaine PF  5 mL Tracheal Tube Once    docusate  100 mg Oral BID    polyethylene glycol  17 g Oral Daily    albumin human  25 g IntraVENous Once    dilTIAZem  30 mg Oral 4 times per day    polyvinyl alcohol  1 drop Both Eyes Q6H    ipratropium-albuterol  1 ampule Inhalation Q4H    sodium chloride flush  5-40 mL IntraVENous 2 times per day       Social History:     Social History     Socioeconomic History    Marital status: Life Partner     Spouse name: Not on file    Number of children: Not on file    Years of education: Not on file    Highest education level: Not on file   Occupational History    Not on file   Tobacco Use    Smoking status: Not on file    Smokeless tobacco: Not on file   Substance and Sexual Activity    Alcohol use: Not on file    Drug use: Not on file    Sexual activity: Not on file   Other Topics Concern    Not on file   Social History Narrative    Not on file     Social Determinants of Health     Financial Resource Strain:     Difficulty of Paying Living Expenses: Not on file   Food Insecurity:     Worried About Running Out of Food in the Last Year: Not on file    Yfn of Food in the Last Year: Not on file   Transportation Needs:     Lack of Transportation (Medical): Not on file    Lack of Transportation (Non-Medical):  Not on file   Physical Activity:     Days of Exercise per Week: Not on file    Minutes of Exercise per Session: Not on file   Stress:     Feeling of Stress : Not on file   Social Connections:     Frequency of Communication with Friends and Family: Not on file    Frequency of Social Gatherings with Friends and Family: Not on file    Attends Latter day Services: Not on file    Active Member of [address] [place] [address] [employer] or Organizations: Not on file    Attends Club or Organization Meetings: Not on file    Marital Status: Not on file   Intimate Partner Violence:     Fear of Current or Ex-Partner: Not on file    Emotionally Abused: Not on file    Physically Abused: Not on file    Sexually Abused: Not on file   Housing Stability:     Unable to Pay for Housing in the Last Year: Not on file    Number of [place] in the Last Year: Not on file    Unstable Housing in the Last Year: Not on file       Family History:     Family History   Problem Relation Age of Onset    Cancer Mother         breast ca 47, lumpectomy, RT, no GT    Cancer Father         breast ca 52, mastectomy, chemo, GT?    Cancer Paternal Grandmother         breast ca, 60-70s    Cancer Maternal Aunt         poss colon ca, colostomy bag, passed away at age 62s    Other Paternal Aunt         cirrhosis      Medical Decision Making:   I have independently reviewed/ordered the following labs:    CBC with Differential:   Recent Labs     05/01/22  1026 05/02/22  0501   WBC 24.7* 19.9*   HGB 10.3* 9.3*   HCT 31.5* 28.7*   * 117*     BMP:  Recent Labs     05/01/22  0437 05/01/22  0437 05/01/22  1026 05/02/22  0501 *   < > 134* 136   K 4.4   < > 4.1 4.5      < > 97* 103   CO2 29   < > 28 25   BUN 19   < > 19 15   CREATININE 0.40*   < > 0.47* 0.40*   MG 2.1  --  2.1  --     < > = values in this interval not displayed. Hepatic Function Panel:   Recent Labs     04/30/22  0404 05/01/22  0437   PROT 5.0* 4.6*   LABALBU 3.2* 2.9*   BILIDIR 0.14 0.19   IBILI 0.37 0.43   BILITOT 0.51 0.62   ALKPHOS 46 39   ALT 58* 51*   AST 22 15     No results for input(s): RPR in the last 72 hours. No results for input(s): HIV in the last 72 hours. No results for input(s): BC in the last 72 hours. Lab Results   Component Value Date    CREATININE 0.40 05/02/2022    GLUCOSE 158 05/02/2022       Detailed results: Thank you for allowing us to participate in the care of this patient. Please call with questions. This note is created with the assistance of a speech recognition program.  While intending to generate adocument that actually reflects the content of the visit, the document can still have some errors including those of syntax and sound a like substitutions which may escape proof reading. It such instances, actual meaningcan be extrapolated by contextual diversion.     Parviz Cruz MD  Office: [phone]  Perfect serve / office [phone]

## 2022-05-03 NOTE — CARE COORDINATION
Met with patient to discuss transitional planning. Patient states she would like to go to a SNF for rehab that is closer to her home in [facility], if possible. She is agreeable to [facility]) if needed, but would prefer to stay closer to home. SNF list provided to patient.

## 2022-05-03 NOTE — PROGRESS NOTES
05/03/22 0830 05/03/22 0831   Treatment   Treatment Type HHN HHN   Medications Albuterol/Ipratropium Budesonide  (mouth rinsed)     Aerosol Education     [x] Good return demonstration per patient   [x] Aerosolized Medications:   Duonetuyet Budesonide  Verbal education has been provided in the use, benefits and possible adverse reactions of aerosolized medications used in the treatment of this patient.     Gabriela Moreno RCP

## 2022-05-03 NOTE — PROGRESS NOTES
[facility]   Progress Note                   Date:   5/3/2022  Patient name: Taye Loja  Date of admission:  4/18/2022  2:00 PM  MRN:   [id number]  YOB: 2000  PCP: Maria G Diaz MD    Reason for Admission:  Apical hypokinesis on echo needing stress test     Subjective: There were no acute events overnight, remained hemodynamically stable, denies chest pain, dyspnea, orthopnea or palpitations. Patient was intubated and mechanically ventilated and required ECMO for severe asthma exacerbation.   -Patient is extubated and ECMO was decannulated on 4/30/22. Showed EF  - patient is currently hemodynamically stable on nasal cannula oxygen BiPAP at night.  -Cardiology was following for troponinemia which was due to demand ischemia likely from critical illness and echo was done that showed apical hypokinesia. Patient is currently doing well. Denies chest pain, palpitations. Shortness of breath is improved and NC at 2L oxygen currently. Echo 4/26/2022:   Left ventricle is normal in size. Global left ventricular systolic function  appears normal. Estimated ejection fraction is 55 % . Calculated EF via 3D Heart Model is 55 %. Inter-atrial septum appears so be intact. No shunt seen by color Doppler. Negative bubble study, no obvious shunt noted via injection of agitated  saline. Normal right ventricular size and function. ECMO cannulation flow is noted on Doppler and appears to be in good  position. No significant pericardial effusion is seen.     Medications:   Scheduled Meds:   apixaban  10 mg Oral BID    Followed by   Dawood Pettit ON 5/9/2022] apixaban  5 mg Oral BID    predniSONE  40 mg Oral Daily    pantoprazole  40 mg Oral QAM AC    furosemide  40 mg Oral Daily    hydrocortisone   Topical BID    insulin lispro  0-12 Units SubCUTAneous TID WC    insulin lispro  0-6 Units SubCUTAneous Nightly    fluconazole  200 mg Oral Daily    meropenem  1,000 mg IntraVENous Q8H    budesonide  0.5 mg Nebulization BID    lidocaine PF  5 mL Tracheal Tube Once    docusate  100 mg Oral BID    polyethylene glycol  17 g Oral Daily    albumin human  25 g IntraVENous Once    dilTIAZem  30 mg Oral 4 times per day    polyvinyl alcohol  1 drop Both Eyes Q6H    ipratropium-albuterol  1 ampule Inhalation Q4H    sodium chloride flush  5-40 mL IntraVENous 2 times per day       Continuous Infusions:   dextrose      sodium chloride Stopped (05/02/22 1136)       CBC:   Recent Labs     05/01/22 0437 05/01/22  1026 05/02/22  0501   WBC 16.9* 24.7* 19.9*   HGB 8.7* 10.3* 9.3*   PLT 91* 116* 117*     BMP:    Recent Labs     05/01/22 0437 05/01/22  1026 05/02/22  0501   * 134* 136   K 4.4 4.1 4.5    97* 103   CO2 29 28 25   BUN 19 19 15   CREATININE 0.40* 0.47* 0.40*   GLUCOSE 142* 160* 158*     Hepatic:   Recent Labs     05/01/22  0437   AST 15   ALT 51*   BILITOT 0.62   ALKPHOS 39     Troponin: No results for input(s): TROPONINI in the last 72 hours. BNP: No results for input(s): BNP in the last 72 hours. Lipids: No results for input(s): CHOL, HDL in the last 72 hours. Invalid input(s): LDLCALCU  INR:   Recent Labs     04/30/22  2220 05/01/22 0437 05/01/22  1026   INR 1.3 1.3 1.2       Objective:   Vitals: BP (!) 135/48   Pulse 99   Temp 98.6 °F (37 °C) (Oral)   Resp 25   Ht 5' 3\" (1.6 m)   Wt 297 lb 9.9 oz (135 kg)   SpO2 95%   BMI 52.72 kg/m²     General appearance: awake, alert, in no apparent respiratory distress   HEENT: Head: Normocephalic, no lesions, without obvious abnormality  Neck: no JVD  Lungs: clear to auscultation bilaterally, no basilar rales, no wheezing   Heart: regular rate and rhythm, S1, S2 normal, no murmur, click, rub or gallop  Abdomen: soft, non-tender; bowel sounds normal  Extremities: No LE edema  Neurologic: Mental status: Alert, oriented. Motor and sensory not done.        EKG: normal sinus rhythm on 4/18/2022    Echocardiogram: 4/26/2022  Left ventricle is normal in size. Global left ventricular systolic function  appears normal. Estimated ejection fraction is 55 % . Calculated EF via 3D Heart Model is 55 %. Inter-atrial septum appears so be intact. No shunt seen by color Doppler. Negative bubble study, no obvious shunt noted via injection of agitated  saline. Normal right ventricular size and function. ECMO cannulation flow is noted on Doppler and appears to be in good  position. No significant pericardial effusion is seen. Assessment:     Patient Active Problem List:     Acute asthma exacerbation     History of seizures     Hypercapnic respiratory failure ([address]     Endotracheally intubated     On mechanically assisted ventilation (HCC)     Diastolic dysfunction     Severe persistent asthma with status asthmaticus     CHIDI (obstructive sleep apnea)     Morbid obesity with BMI of 50.0-59.9, adult ([address]    1. Troponin elevation type II demand ischemia and apical hypokinesia on Echo   2. Acute hypoxic respiratory failure s/p intubated and extubated s/p ECMO decannulation on 4/30/22  3. Asthma exacerbation requiring ECMO and intubation   4. Sinus Ttachycardia  5. Hx of seizures  6. Ventilator associated RLL PNA      Treatment Plan:   1. Continue Cardizem 30 mg q6 hours for tachycardia and Metoprolol PRN for HR>110.   2. Continue IV antibiotics per ID for pseudomonas. 3. Continue anticoagulation for acute femoral DVT. 4. Continue IV diuretics and steroids per Pulmonology for     Plan will be updated after discussing with attending physician. Discussed with patient and nursing. Elvira Friend MD, PGY-1  Internal Medicine Resident  [address] [place] [address] [facility]   [facility]     Attending note. The patient was seen and examined agree with the evaluation and plan documented above. She denies any chest pain or shortness of breath. Echocardiogram reviewed.   LVEF is within normal limits with normal wall motion. We will hold on further cardiac testing. Continue current medications. We will follow as an outpatient. left upper arm

## 2022-05-04 ENCOUNTER — APPOINTMENT (OUTPATIENT)
Dept: GENERAL RADIOLOGY | Age: 22
DRG: 003 | End: 2022-05-04
Attending: INTERNAL MEDICINE
Payer: COMMERCIAL

## 2022-05-04 LAB
ANION GAP SERPL CALCULATED.3IONS-SCNC: 9 MMOL/L (ref 9–17)
BUN BLDV-MCNC: 18 MG/DL (ref 6–20)
CALCIUM SERPL-MCNC: 8.2 MG/DL (ref 8.6–10.4)
CHLORIDE BLD-SCNC: 103 MMOL/L (ref 98–107)
CO2: 24 MMOL/L (ref 20–31)
CREAT SERPL-MCNC: 0.37 MG/DL (ref 0.5–0.9)
CULTURE: NORMAL
CULTURE: NORMAL
GFR AFRICAN AMERICAN: >60 ML/MIN
GFR NON-AFRICAN AMERICAN: >60 ML/MIN
GFR SERPL CREATININE-BSD FRML MDRD: ABNORMAL ML/MIN/{1.73_M2}
GLUCOSE BLD-MCNC: 110 MG/DL (ref 65–105)
GLUCOSE BLD-MCNC: 262 MG/DL (ref 65–105)
GLUCOSE BLD-MCNC: 85 MG/DL (ref 70–99)
GLUCOSE BLD-MCNC: 86 MG/DL (ref 65–105)
HCT VFR BLD CALC: 29.7 % (ref 36.3–47.1)
HEMOGLOBIN: 9.7 G/DL (ref 11.9–15.1)
Lab: NORMAL
Lab: NORMAL
MCH RBC QN AUTO: 28.4 PG (ref 25.2–33.5)
MCHC RBC AUTO-ENTMCNC: 32.7 G/DL (ref 28.4–34.8)
MCV RBC AUTO: 86.8 FL (ref 82.6–102.9)
NRBC AUTOMATED: 0 PER 100 WBC
PDW BLD-RTO: 16.6 % (ref 11.8–14.4)
PLATELET # BLD: 162 K/UL (ref 138–453)
PMV BLD AUTO: 10.7 FL (ref 8.1–13.5)
POTASSIUM SERPL-SCNC: 4.3 MMOL/L (ref 3.7–5.3)
RBC # BLD: 3.42 M/UL (ref 3.95–5.11)
SODIUM BLD-SCNC: 136 MMOL/L (ref 135–144)
SPECIMEN DESCRIPTION: NORMAL
SPECIMEN DESCRIPTION: NORMAL
WBC # BLD: 21 K/UL (ref 3.5–11.3)

## 2022-05-04 PROCEDURE — 2580000003 HC RX 258: Performed by: INTERNAL MEDICINE

## 2022-05-04 PROCEDURE — 99232 SBSQ HOSP IP/OBS MODERATE 35: CPT | Performed by: INTERNAL MEDICINE

## 2022-05-04 PROCEDURE — 71046 X-RAY EXAM CHEST 2 VIEWS: CPT

## 2022-05-04 PROCEDURE — 36415 COLL VENOUS BLD VENIPUNCTURE: CPT

## 2022-05-04 PROCEDURE — 6370000000 HC RX 637 (ALT 250 FOR IP): Performed by: INTERNAL MEDICINE

## 2022-05-04 PROCEDURE — 99233 SBSQ HOSP IP/OBS HIGH 50: CPT | Performed by: INTERNAL MEDICINE

## 2022-05-04 PROCEDURE — 85027 COMPLETE CBC AUTOMATED: CPT

## 2022-05-04 PROCEDURE — 2060000000 HC ICU INTERMEDIATE R&B

## 2022-05-04 PROCEDURE — 6360000002 HC RX W HCPCS: Performed by: INTERNAL MEDICINE

## 2022-05-04 PROCEDURE — 2700000000 HC OXYGEN THERAPY PER DAY

## 2022-05-04 PROCEDURE — 6370000000 HC RX 637 (ALT 250 FOR IP): Performed by: STUDENT IN AN ORGANIZED HEALTH CARE EDUCATION/TRAINING PROGRAM

## 2022-05-04 PROCEDURE — 94761 N-INVAS EAR/PLS OXIMETRY MLT: CPT

## 2022-05-04 PROCEDURE — 6360000002 HC RX W HCPCS: Performed by: STUDENT IN AN ORGANIZED HEALTH CARE EDUCATION/TRAINING PROGRAM

## 2022-05-04 PROCEDURE — 94640 AIRWAY INHALATION TREATMENT: CPT

## 2022-05-04 PROCEDURE — 82947 ASSAY GLUCOSE BLOOD QUANT: CPT

## 2022-05-04 PROCEDURE — 2580000003 HC RX 258: Performed by: STUDENT IN AN ORGANIZED HEALTH CARE EDUCATION/TRAINING PROGRAM

## 2022-05-04 PROCEDURE — 80048 BASIC METABOLIC PNL TOTAL CA: CPT

## 2022-05-04 RX ORDER — PREDNISONE 20 MG/1
40 TABLET ORAL DAILY
Qty: 10 TABLET | Refills: 0 | Status: CANCELLED | OUTPATIENT
Start: 2022-05-05 | End: 2022-05-10

## 2022-05-04 RX ORDER — TETRAHYDROZOLINE HCL 0.05 %
1 DROPS OPHTHALMIC (EYE) EVERY 4 HOURS PRN
Status: DISCONTINUED | OUTPATIENT
Start: 2022-05-04 | End: 2022-05-10 | Stop reason: HOSPADM

## 2022-05-04 RX ADMIN — APIXABAN 10 MG: 5 TABLET, FILM COATED ORAL at 21:06

## 2022-05-04 RX ADMIN — MEROPENEM 1000 MG: 1 INJECTION, POWDER, FOR SOLUTION INTRAVENOUS at 22:27

## 2022-05-04 RX ADMIN — HYDROCORTISONE: 10 CREAM TOPICAL at 21:06

## 2022-05-04 RX ADMIN — DILTIAZEM HYDROCHLORIDE 30 MG: 30 TABLET ORAL at 18:41

## 2022-05-04 RX ADMIN — BUDESONIDE 500 MCG: 0.5 INHALANT RESPIRATORY (INHALATION) at 21:43

## 2022-05-04 RX ADMIN — PANTOPRAZOLE SODIUM 40 MG: 40 TABLET, DELAYED RELEASE ORAL at 08:28

## 2022-05-04 RX ADMIN — IPRATROPIUM BROMIDE AND ALBUTEROL SULFATE 1 AMPULE: .5; 2.5 SOLUTION RESPIRATORY (INHALATION) at 12:48

## 2022-05-04 RX ADMIN — DILTIAZEM HYDROCHLORIDE 30 MG: 30 TABLET ORAL at 06:20

## 2022-05-04 RX ADMIN — IPRATROPIUM BROMIDE AND ALBUTEROL SULFATE 1 AMPULE: .5; 2.5 SOLUTION RESPIRATORY (INHALATION) at 21:42

## 2022-05-04 RX ADMIN — IPRATROPIUM BROMIDE AND ALBUTEROL SULFATE 1 AMPULE: .5; 2.5 SOLUTION RESPIRATORY (INHALATION) at 09:15

## 2022-05-04 RX ADMIN — SODIUM CHLORIDE: 9 INJECTION, SOLUTION INTRAVENOUS at 14:03

## 2022-05-04 RX ADMIN — INSULIN LISPRO 6 UNITS: 100 INJECTION, SOLUTION INTRAVENOUS; SUBCUTANEOUS at 14:05

## 2022-05-04 RX ADMIN — MEROPENEM 1000 MG: 1 INJECTION, POWDER, FOR SOLUTION INTRAVENOUS at 14:03

## 2022-05-04 RX ADMIN — FUROSEMIDE 40 MG: 40 TABLET ORAL at 08:28

## 2022-05-04 RX ADMIN — MEROPENEM 1000 MG: 1 INJECTION, POWDER, FOR SOLUTION INTRAVENOUS at 02:08

## 2022-05-04 RX ADMIN — SODIUM CHLORIDE, PRESERVATIVE FREE 10 ML: 5 INJECTION INTRAVENOUS at 08:30

## 2022-05-04 RX ADMIN — HYDROCORTISONE: 10 CREAM TOPICAL at 08:29

## 2022-05-04 RX ADMIN — APIXABAN 10 MG: 5 TABLET, FILM COATED ORAL at 08:28

## 2022-05-04 RX ADMIN — DILTIAZEM HYDROCHLORIDE 30 MG: 30 TABLET ORAL at 14:02

## 2022-05-04 RX ADMIN — PREDNISONE 40 MG: 20 TABLET ORAL at 08:28

## 2022-05-04 ASSESSMENT — PAIN SCALES - GENERAL
PAINLEVEL_OUTOF10: 0
PAINLEVEL_OUTOF10: 0

## 2022-05-04 ASSESSMENT — ENCOUNTER SYMPTOMS
ABDOMINAL DISTENTION: 0
COLOR CHANGE: 0
APNEA: 0
EYE DISCHARGE: 0

## 2022-05-04 NOTE — CARE COORDINATION
Met with pt to discuss transitional planning. Freedom of choice provided for SNF. She would like to go to [facility]. Referral sent    [id number] received call from Greyson at [facility]. SNF closed in 2020. Notified pt. She relates that her grandmother has the same insurance and went to [facility]. Referral sent    [phone] received call from Mohsen terry at [facility]. They are OON. She called insurance and they responded that only [facility], [facility], and [facility] are in network.  Referral sent to [facility]

## 2022-05-04 NOTE — PLAN OF CARE
Slow,mentation though very appropriate- not at her baseline yet  Breathing well    Get a CXR look for resolution of the pneumonia   reconsiled meropenem and ordered a midline  DC planning    Eyal Thompson MD. Infectious Diseases

## 2022-05-04 NOTE — CARE COORDINATION
Spoke with America Leal from [facility] who states that patient's insurance is out of network for their facility. Referrals placed to [facility]] and Manda at the [facility] that are both listed as in network on [employer] for patient. 1135 notified patient that [facility] is out of network with her insurance. She states referrals to [facility] are ok and requests referral to [facility] as well.      1145 Referral sent to [facility]

## 2022-05-04 NOTE — PLAN OF CARE
Patient yelling out for \"Love\" Upon entering clarified that the patient was looking for Dolores. Patient asking if she is allowed to blow her nose. Writer advised patient that she is allowed to blow her nose.

## 2022-05-04 NOTE — PROGRESS NOTES
[facility] -   Infectious diseases evaluation  admission date 4/18/2022    reason for consultation:   VAP with resp cultures showing Pseudomonas. Questionable allergy to cefepime. Switching to meropenem. Impression :   Current:  · Asthma exacerbation -ECMO bilat fem access  · Ventilator assoc RLL. Pneumonia  -Pseudomonas aeruginosa -  (S: cefepime, cipro, gentamicin, tobramycin, R: zosyn S meropenem)  · Resp panel pos for rhino/entero on 4/19. · Leukocytosis leukemoid reaction  · Rash cefepime - trunk    Other:  ·   Discussion / summary of stay / plan of care   · Severe Asthma exacerbation - on ECMO  · resp infection - pseudomonas RLL pneumonia   · Post ceftriaxone 4 days and tolerated  · Cefepime 4/28 and 4/29, developed rash torso - stopped - started meropenem  · ID called for AB -   Recommendations   · Stop cefepime -   · Meropenem 4/29 -5/6  · midine  · RLL improved  · reconsiled for DC    Infection Control Recommendations   · [place] Precautions    Antimicrobial Stewardship Recommendations   · Simplification of therapy  · Targeted therapy  Coordination ofOutpatient Care:   · Estimated Length of IV antimicrobials:  · Patient will need Midline / picc Catheter Insertion:   · Patient will need SNF:  · Patient will need outpatient wound care:     History of Present Illness:   Initial history:  Irene Lebron is a 23y.o.-year-old female with a past medical history of bronchial asthma who was admitted to [facility] with an acute exacerbation causing increasing shortness of breath. Patient was found to be hypoxemic with hypercarbic respiratory failure requiring intubation and mechanical ventilation. Patient was transferred to [facility] for further elevation with possibility of ECMO. On arrival patient was found to be on a bicarb drip due to presumed respiratory acidosis but this was discontinued due to to worsening of the hypercarbia. Additionally patient was started on Nimbex due to the necessity for paralyzation as she was breathing over the vent. Pulmonology and cardiothoracic surgery were consulted, ECMO planned. In the MICU, patient had an episode of dark emesis which turned out to be bilious output, Hb remained stable. She was hypertensive, started on lopressor IV 5 mg PRN. She was hypoxic, requiring higher FiO2 of 80%, PEEP 16, RR 32, , blood gas showed pH 7.449, pCO2 41.7, pCO2 59. 1. treated with solumedrol, bronchodilators, CXR showed no pneumothorax or pleural effusion.     4/22 - overnight, patient became more hypoxic with increasing oxygen requirement, increasing wheezing on exam. It was determined to go with ECMO canulation. Patient previously treated with   Azithromycin from 4/19 to 4/24  Rocephin from 4/19 to 4/25  Off abx from 4/26 to 4/27  Cefepime from 4/28 to 4/29 4/29 nurse reports rash post treatment with cefepime, and patient switched to meropenem.     Interval changes  5/4/2022   Patient Vitals for the past 8 hrs:   BP Temp Temp src Pulse Resp SpO2   05/04/22 0915 -- -- -- -- 16 96 %   05/04/22 0750 117/68 98.2 °F (36.8 °C) Oral 82 20 99 %   05/04/22 0600 -- -- -- 81 -- 97 %   05/04/22 0500 -- -- -- 99 20 95 %   05/04/22 0400 127/60 97.7 °F (36.5 °C) Axillary 82 21 98 %     Patient extubated about 4/29  NC 5 -2 L  ox3  W 21 from steroids  CXR atelectasis  Rash over the chest is better    Post cefepime treatment and also points to a erythematous papular rash on chest.improving       Summary of relevant labs:  Labs:    WBC: 40.2 - 24 - 19 - 22 -21  Plt 185  Cr 0.42  4/19 CRP 79.7  4/18 sed rate 39      Micro:    4/29 UC: sent  4/29 BC x2 sent  4/29 Resp cx sent  4/24 Resp cx: Pseudomonas aeruginosa - light growth  S: cefepime, cipro, gentamicin, tobramycin, R: zosyn  4/21 BC x2 no growth  4/19 BC x2 no growth  4/19 Resp cx: normal nadine  4/19 Resp panel: pos rhino/entero    Imaging:    CXR, 4/29/22    IMPRESSION:  No significant change from prior study. Continued bibasilar opacities and small effusions. I have personally reviewed the past medical history, past surgical history, medications, social history, and family history, and I haveupdated the database accordingly. Allergies:   Cefepime     Review of Systems:     Review of Systems   Constitutional: Positive for activity change. HENT: Negative for congestion. Eyes: Negative for discharge. Respiratory: Negative for apnea. Cardiovascular: Negative for chest pain. Gastrointestinal: Negative for abdominal distention. Endocrine: Negative for cold intolerance, polydipsia and polyphagia. Genitourinary: Negative for dysuria. Skin: Positive for rash. Negative for color change. Allergic/Immunologic: Negative for environmental allergies. Neurological: Negative for dizziness, light-headedness and headaches. Hematological: Negative for adenopathy. Psychiatric/Behavioral: Negative for agitation. Physical Examination :       Physical Exam  Constitutional:       General: She is not in acute distress. Appearance: Normal appearance. She is not ill-appearing. HENT:      Head: Normocephalic and atraumatic. Nose: Nose normal. No congestion. Eyes:      General:         Right eye: No discharge. Left eye: No discharge. Cardiovascular:      Rate and Rhythm: Regular rhythm. Tachycardia present. Heart sounds: Normal heart sounds. No murmur heard. No gallop. Pulmonary:      Effort: No respiratory distress. Breath sounds: No stridor. Comments: Upper lobes clear. Lower lobes not auscultatable  Abdominal:      General: There is no distension. Palpations: Abdomen is soft. There is no mass. Tenderness: There is no abdominal tenderness. Hernia: No hernia is present.    Genitourinary:     Comments: Urine sukhwinder  Musculoskeletal:         General: No swelling, tenderness, deformity or signs of injury. Cervical back: Neck supple. Lymphadenopathy:      Cervical: No cervical adenopathy. Skin:     General: Skin is warm and dry. Coloration: Skin is not jaundiced or pale. Findings: Rash present. Neurological:      General: No focal deficit present. Mental Status: She is alert. Past Medical History:   No past medical history on file.     Past Surgical  History:     Past Surgical History:   Procedure Laterality Date    EXTRACORPOREAL CIRCULATION N/A 4/22/2022    EXTRA CORPOREAL MEMBRANE OXYGENATION 23 ON THE RIGHT 25 LEFT FEMORALS performed by Julia Tuttle MD at [facility]       Medications:      apixaban  10 mg Oral BID    Followed by   [place] Fossa ON 5/9/2022] apixaban  5 mg Oral BID    predniSONE  40 mg Oral Daily    pantoprazole  40 mg Oral QAM AC    furosemide  40 mg Oral Daily    hydrocortisone   Topical BID    insulin lispro  0-12 Units SubCUTAneous TID WC    insulin lispro  0-6 Units SubCUTAneous Nightly    meropenem  1,000 mg IntraVENous Q8H    budesonide  0.5 mg Nebulization BID    lidocaine PF  5 mL Tracheal Tube Once    docusate  100 mg Oral BID    polyethylene glycol  17 g Oral Daily    albumin human  25 g IntraVENous Once    dilTIAZem  30 mg Oral 4 times per day    ipratropium-albuterol  1 ampule Inhalation Q4H    sodium chloride flush  5-40 mL IntraVENous 2 times per day       Social History:     Social History     Socioeconomic History    Marital status: Life Partner     Spouse name: Not on file    Number of children: Not on file    Years of education: Not on file    Highest education level: Not on file   Occupational History    Not on file   Tobacco Use    Smoking status: Not on file    Smokeless tobacco: Not on file   Substance and Sexual Activity    Alcohol use: Not on file    Drug use: Not on file    Sexual activity: Not on file   Other Topics Concern    Not on file   Social History Narrative    Not on file     Social Determinants of Health     Financial Resource Strain:     Difficulty of Paying Living Expenses: Not on file   Food Insecurity:     Worried About Running Out of Food in the Last Year: Not on file    Yfn of Food in the Last Year: Not on file   Transportation Needs:     Lack of Transportation (Medical): Not on file    Lack of Transportation (Non-Medical):  Not on file   Physical Activity:     Days of Exercise per Week: Not on file    Minutes of Exercise per Session: Not on file   Stress:     Feeling of Stress : Not on file   Social Connections:     Frequency of Communication with Friends and Family: Not on file    Frequency of Social Gatherings with Friends and Family: Not on file    Attends Christianity Services: Not on file    Active Member of [address] [place] [address] [employer] or Organizations: Not on file    Attends Club or Organization Meetings: Not on file    Marital Status: Not on file   Intimate Partner Violence:     Fear of Current or Ex-Partner: Not on file    Emotionally Abused: Not on file    Physically Abused: Not on file    Sexually Abused: Not on file   Housing Stability:     Unable to Pay for Housing in the Last Year: Not on file    Number of [place] in the Last Year: Not on file    Unstable Housing in the Last Year: Not on file       Family History:     Family History   Problem Relation Age of Onset    Cancer Mother         breast ca 47, lumpectomy, RT, no GT    Cancer Father         breast ca 52, mastectomy, chemo, GT?    Cancer Paternal Grandmother         breast ca, 60-70s    Cancer Maternal Aunt         poss colon ca, colostomy bag, passed away at age 62s    Other Paternal Aunt         cirrhosis      Medical Decision Making:   I have independently reviewed/ordered the following labs:    CBC with Differential:   Recent Labs     05/03/22  1029 05/04/22  0244   WBC 22.2* 21.0*   HGB 9.8* 9.7*   HCT 30.8* 29.7*    162     BMP:  Recent Labs     05/03/22  1029 05/04/22  0244   * 136   K 3.9 4.3    103   CO2 23 24   BUN 24* 18   CREATININE 0.46* 0.37*     Hepatic Function Panel:   No results for input(s): PROT, LABALBU, BILIDIR, IBILI, BILITOT, ALKPHOS, ALT, AST in the last 72 hours. No results for input(s): RPR in the last 72 hours. No results for input(s): HIV in the last 72 hours. No results for input(s): BC in the last 72 hours. Lab Results   Component Value Date    CREATININE 0.37 05/04/2022    GLUCOSE 85 05/04/2022       Detailed results: Thank you for allowing us to participate in the care of this patient. Please call with questions. This note is created with the assistance of a speech recognition program.  While intending to generate adocument that actually reflects the content of the visit, the document can still have some errors including those of syntax and sound a like substitutions which may escape proof reading. It such instances, actual meaningcan be extrapolated by contextual diversion.     Nakia Farooq MD  Office: [phone]  Perfect serve / office [phone]

## 2022-05-04 NOTE — PROGRESS NOTES
[facility]  Internal Medicine Teaching Residency Program  Inpatient Daily Progress Note  ______________________________________________________________________________    Patient: Minnie Carrasco  YOB: 2000   FQD:[id number]    Acct: [de-identified]     Room: 2001/2001-01  Admit date: 4/18/2022  Today's date: 05/04/22  Number of days in the hospital: 16    SUBJECTIVE   Admitting Diagnosis: Acute asthma exacerbation     CC: Shortness of breath    Pt examined at bedside. Chart & results reviewed. Alert and oriented, Hemodynamically stable, on 2L NC   persistent leucocytosis, 21. Hb 9.7, plt 162  UOP 1350  On meropenem for pseudomonas pneumonia. Cardiology reviewed echo: LVEF and wall motiion within normal limits. No further testing. Plan discharge to SNF.         ROS:  Constitutional:  negative for chills, fevers, sweats  Respiratory:  negative for cough, dyspnea on exertion, hemoptysis, shortness of breath, wheezing  Cardiovascular:  negative for chest pain, chest pressure/discomfort, lower extremity edema, palpitations  Gastrointestinal:  negative for abdominal pain, constipation, diarrhea, nausea, vomiting  Neurological:  negative for dizziness, headache    BRIEF HISTORY       23 y.o. female with a past medical history of bronchial asthma who was admitted to [facility] with an acute exacerbation causing increasing shortness of breath. Patient was found to be hypoxemic with hypercarbic respiratory failure requiring intubation and mechanical ventilation. Patient was transferred to [facility] for further elevation with possibility of ECMO. On arrival patient was found to be on a bicarb drip due to presumed respiratory acidosis but this was discontinued due to to worsening of the hypercarbia. Additionally patient was started on Nimbex due to the necessity for paralyzation as she was breathing over the vent.   Pulmonology and cardiothoracic surgery were consulted- plan is for ECMO. In the MICU, patient remained stable, had an episode of dark emesis which turned out to be bilious output, Hb remained stable. She was hypertensive, started on lopressor IV 5 mg PRN. Had left IJ placed which was malpositioned, removed and then CVC in left femoral was placed overnight. She was hypoxic, requiring higher FiO2 of 80%, PEEP 16, RR 32, , blood gas showed pH 7.449, pCO2 41.7, pCO2 59. 1. treated with solumedrol, bronchodilators, CXR showed no pneumothorax or pleural effusion. On  overnight, patient became more hypoxic with increasing oxygen requirement, increasing wheezing on exam. It was determined to go with ECMO canulation on . OBJECTIVE     Vital Signs:  /60   Pulse 81   Temp 97.7 °F (36.5 °C) (Axillary)   Resp 20   Ht 5' 3\" (1.6 m)   Wt 297 lb 9.9 oz (135 kg)   SpO2 97%   BMI 52.72 kg/m²     Temp (24hrs), Av.6 °F (37 °C), Min:97.7 °F (36.5 °C), Max:99.2 °F (37.3 °C)    In: 300   Out: 1350 [Urine:1350]    Physical Exam:  Constitutional: This is a well developed, well nourished, Greater than 40 - Morbid Obesity / Extreme Obesity / Grade  Ulysses Drivey.o. year old female who is alert, oriented, cooperative and in no apparent distress on nasal canula and on ECMO. EENT:  PERRLA. No conjunctival injections. Septum was midline, mucosa was without erythema, exudates or cobblestoning. No thrush was noted. Neck: Supple without thyromegaly. No elevated JVP. Trachea was midline. Respiratory: Chest was symmetrical without dullness to percussion. Breath sounds bilaterally were clear to auscultation. There were no wheezes, rhonchi or rales. There is no intercostal retraction or use of accessory muscles. No egophony noted. Cardiovascular: Regular without murmur, clicks, gallops or rubs. Abdomen: Slightly rounded and soft without organomegaly. No rebound, rigidity or guarding was appreciated.     Lymphatic: No lymphadenopathy. Musculoskeletal: Normal curvature of the spine. No gross muscle weakness. Extremities:  No lower extremity edema, ulcerations, tenderness, varicosities or erythema. Muscle size, tone and strength are normal.  No involuntary movements are noted. Skin:  Warm and dry. Good color, turgor and pigmentation. No lesions or scars.   No cyanosis or clubbing  Neurological/Psychiatric: The patient's general behavior, level of consciousness, thought content and emotional status is normal.        Medications:  Scheduled Medications:    apixaban  10 mg Oral BID    Followed by   Yelitza Weeks ON 5/9/2022] apixaban  5 mg Oral BID    predniSONE  40 mg Oral Daily    pantoprazole  40 mg Oral QAM AC    furosemide  40 mg Oral Daily    hydrocortisone   Topical BID    insulin lispro  0-12 Units SubCUTAneous TID WC    insulin lispro  0-6 Units SubCUTAneous Nightly    meropenem  1,000 mg IntraVENous Q8H    budesonide  0.5 mg Nebulization BID    lidocaine PF  5 mL Tracheal Tube Once    docusate  100 mg Oral BID    polyethylene glycol  17 g Oral Daily    albumin human  25 g IntraVENous Once    dilTIAZem  30 mg Oral 4 times per day    polyvinyl alcohol  1 drop Both Eyes Q6H    ipratropium-albuterol  1 ampule Inhalation Q4H    sodium chloride flush  5-40 mL IntraVENous 2 times per day     Continuous Infusions:    dextrose      sodium chloride Stopped (05/02/22 1136)     PRN Medicationsheparin (porcine), 10,000 Units, PRN  heparin (porcine), 5,000 Units, PRN  albuterol, 2.5 mg, Q4H PRN  heparin flush (PF), 3 mL, PRN  midazolam, 2 mg, Q2H PRN  metoprolol, 5 mg, Q6H PRN  glucose, 15 g, PRN  dextrose, 12.5 g, PRN  dextrose, 100 mL/hr, PRN  sodium chloride flush, 5-40 mL, PRN  sodium chloride, , PRN  ondansetron, 4 mg, Q8H PRN   Or  ondansetron, 4 mg, Q6H PRN  acetaminophen, 650 mg, Q6H PRN   Or  acetaminophen, 650 mg, Q6H PRN        Diagnostic Labs:  CBC:   Recent Labs     05/02/22  0501 05/03/22  1029 05/04/22  0244   WBC 19.9* 22.2* 21.0*   RBC 3.29* 3.49* 3.42*   HGB 9.3* 9.8* 9.7*   HCT 28.7* 30.8* 29.7*   MCV 87.2 88.3 86.8   RDW 15.6* 16.4* 16.6*   * 157 162     BMP:   Recent Labs     05/02/22  0501 05/03/22  1029 05/04/22  0244    132* 136   K 4.5 3.9 4.3    101 103   CO2 25 23 24   BUN 15 24* 18   CREATININE 0.40* 0.46* 0.37*     BNP: No results for input(s): BNP in the last 72 hours. PT/INR:   Recent Labs     05/01/22  1026   PROTIME 12.7*   INR 1.2     APTT:   Recent Labs     05/01/22  1624 05/01/22  2347 05/02/22  0501   APTT >120.0* 49.2* >120.0*     CARDIAC ENZYMES: No results for input(s): CKMB, CKMBINDEX, TROPONINI in the last 72 hours. Invalid input(s): CKTOTAL;3  FASTING LIPID PANEL:  Lab Results   Component Value Date    TRIG 96 04/29/2022     LIVER PROFILE:   No results for input(s): AST, ALT, ALB, BILIDIR, BILITOT, ALKPHOS in the last 72 hours. MICROBIOLOGY:   Lab Results   Component Value Date/Time    CULTURE Laura albicans/dubliniensis >[id number] CFU/ML 04/29/2022 04:44 PM       Imaging:    XR CHEST PORTABLE    Result Date: 4/29/2022  1. Stable appropriate positions of support apparatus. 2. Stable small bilateral pleural effusions and bibasilar airspace disease. XR CHEST PORTABLE    Result Date: 4/29/2022  No significant change from prior study. Continued bibasilar opacities and small effusions. XR CHEST PORTABLE    Result Date: 4/28/2022  Stable exam.     XR CHEST PORTABLE    Result Date: 4/26/2022  Relatively stable cardiopulmonary status     XR CHEST PORTABLE    Result Date: 4/25/2022  Stable chest x-ray compared to 04/24/2022. XR CHEST PORTABLE    Result Date: 4/24/2022  1. Stable support tubes and line. 2. Low lung volumes. Crowding of vessels from low lung volumes results in apparent opacification at the right cardiophrenic angle.        ASSESSMENT & PLAN     ASSESSMENT / PLAN:     Principal Problem:    Acute asthma exacerbation  Active Problems: Severe persistent asthma with status asthmaticus    CHIDI (obstructive sleep apnea)    Morbid obesity with BMI of 50.0-59.9, adult (HCC)    History of seizures    Hypercapnic respiratory failure (HCC)    Endotracheally intubated    On mechanically assisted ventilation ([facility])    Diastolic dysfunction  Resolved Problems:    * No resolved hospital problems. *    1. Acute hypoxic hypercapnic respiratory failure on mechanical ventilation and s/p ECMO cannulation on 4/22/22 secondary asthma exacerbation: Continue SoluMedrol 40 mg IV q12 hours. Respiratory cultures positive for Pseudomonas - Started on Meropenem 1 gram daily per ID. Post Cefepime, patient developed a rash. Duoneb q4 hours and Albuterol as needed. Continue Lasix 20 mg IV daily, diuresing well. Pulm. Critical care following. Recommendations appreciated. 2. Troponin elevation type II demand ischemia: Continue Cardizem 30 mg q6 hours per cardiology. Cardiology  for stress test prior to discharge due to apical hypokinesis on echo     3. Sinus tachycardia:- Controlled Continue Cardizem 30 mg q6 hours per Cardiology. PRN Metoprolol 5 mg IV for HR >110; hold for SBP<110     4. Steroid induced hyperglycemia: Medium dose sliding scale q4 hours; Hypoglycemia protocol, monitor POCT glucose every 4 hours     5. Acute femoral vein DVT: Restarted AC with Heparin high dose. switched to Eliquis. Diet: adult diet  DVT ppx : eliquis  GI ppx: Protonix      PT/OT: Consulted  Discharge Planning / SW:  re-evaluation for discharge planning. Had reached out to [facility] 4/29/2022    Svetlana Forman MD  Internal Medicine Resident, PGY-1  Jacquie Cee;  [place], [place]  5/4/2022, 7:43 AM

## 2022-05-04 NOTE — PROGRESS NOTES
Physical Therapy        Physical Therapy Cancel Note      DATE: 2022    NAME: Judy Watt  MRN: [id number]   : 2000      Patient not seen this date for Physical Therapy due to:    Patient is not appropriate for PT treatment at this time d/t RN requests that the pt not be this afternoon as pt is sleeping and doesn't want pt woke up right now.         Electronically signed by Cipriano Marrero on 2022 at 2:58 PM

## 2022-05-04 NOTE — PLAN OF CARE
Patient called out to use bedpan. Patient assisted with bedpan. Patient request Heather Melo as well so new pur wick placed on patient.

## 2022-05-04 NOTE — PROGRESS NOTES
[facility] -   Infectious diseases evaluation  admission date 4/18/2022    reason for consultation:   VAP with resp cultures showing Pseudomonas. Questionable allergy to cefepime. Switching to meropenem. Impression :   Current:  · Asthma exacerbation -ECMO bilat fem access  · Ventilator assoc RLL. Pneumonia  -Pseudomonas aeruginosa -  (S: cefepime, cipro, gentamicin, tobramycin, R: zosyn S meropenem)  · Resp panel pos for rhino/entero on 4/19. · Leukocytosis leukemoid reaction  · Rash cefepime - trunk    Other:  ·   Discussion / summary of stay / plan of care   · Severe Asthma exacerbation - on ECMO  · resp infection - pseudomonas RLL pneumonia   · Post ceftriaxone 4 days and tolerated  · Cefepime 4/28 and 4/29, developed rash torso - stopped - started meropenem  · ID called for AB -   Recommendations   · Stop cefepime -   · Meropenem 4/29 -5/6  · RLL improved  · Follow response    Infection Control Recommendations   · [place] Precautions    Antimicrobial Stewardship Recommendations   · Simplification of therapy  · Targeted therapy  Coordination ofOutpatient Care:   · Estimated Length of IV antimicrobials:  · Patient will need Midline / picc Catheter Insertion:   · Patient will need SNF:  · Patient will need outpatient wound care:     History of Present Illness:   Initial history:  Leighton Apple is a 23y.o.-year-old female with a past medical history of bronchial asthma who was admitted to [facility] with an acute exacerbation causing increasing shortness of breath. Patient was found to be hypoxemic with hypercarbic respiratory failure requiring intubation and mechanical ventilation. Patient was transferred to [address] [place] [address] for further elevation with possibility of ECMO. On arrival patient was found to be on a bicarb drip due to presumed respiratory acidosis but this was discontinued due to to worsening of the hypercarbia.  Additionally patient was started on Nimbex due to the necessity for paralyzation as she was breathing over the vent. Pulmonology and cardiothoracic surgery were consulted, ECMO planned. In the MICU, patient had an episode of dark emesis which turned out to be bilious output, Hb remained stable. She was hypertensive, started on lopressor IV 5 mg PRN. She was hypoxic, requiring higher FiO2 of 80%, PEEP 16, RR 32, , blood gas showed pH 7.449, pCO2 41.7, pCO2 59. 1. treated with solumedrol, bronchodilators, CXR showed no pneumothorax or pleural effusion.     4/22 - overnight, patient became more hypoxic with increasing oxygen requirement, increasing wheezing on exam. It was determined to go with ECMO canulation. Patient previously treated with   Azithromycin from 4/19 to 4/24  Rocephin from 4/19 to 4/25  Off abx from 4/26 to 4/27  Cefepime from 4/28 to 4/29 4/29 nurse reports rash post treatment with cefepime, and patient switched to meropenem. Interval changes  5/3/2022   Patient Vitals for the past 8 hrs:   BP Temp Temp src Pulse Resp SpO2   05/03/22 1949 124/75 98.5 °F (36.9 °C) Oral -- -- --   05/03/22 1610 136/63 98.9 °F (37.2 °C) Oral 101 -- 96 %   05/03/22 1506 -- -- -- -- 18 100 %     Patient extubated about 4/29  5 L NC    Post cefepime treatment and also points to a erythematous papular rash on chest.improving   abd soft and feels good -  No fever      Summary of relevant labs:  Labs:    WBC: 40.2 - 24 - 19 - 22  Plt 185  Cr 0.42  4/19 CRP 79.7  4/18 sed rate 39      Micro:    4/29 UC: sent  4/29 BC x2 sent  4/29 Resp cx sent  4/24 Resp cx: Pseudomonas aeruginosa - light growth  S: cefepime, cipro, gentamicin, tobramycin, R: zosyn  4/21 BC x2 no growth  4/19 BC x2 no growth  4/19 Resp cx: normal nadine  4/19 Resp panel: pos rhino/entero    Imaging:    CXR, 4/29/22    IMPRESSION:  No significant change from prior study. Continued bibasilar opacities and small effusions.     I have personally reviewed the past medical history, past surgical history, medications, social history, and family history, and I haveupdated the database accordingly. Allergies:   Patient has no known allergies. Review of Systems:     Review of Systems   Constitutional: Positive for activity change. HENT: Negative for congestion. Eyes: Negative for discharge. Respiratory: Negative for apnea. Cardiovascular: Negative for chest pain. Gastrointestinal: Negative for abdominal distention. Endocrine: Negative for cold intolerance, polydipsia and polyphagia. Genitourinary: Negative for dysuria. Skin: Positive for rash. Negative for color change. Allergic/Immunologic: Negative for environmental allergies. Neurological: Negative for dizziness. Hematological: Negative for adenopathy. Psychiatric/Behavioral: Negative for agitation. Physical Examination :       Physical Exam  Constitutional:       General: She is not in acute distress. Appearance: Normal appearance. She is not ill-appearing. HENT:      Head: Normocephalic and atraumatic. Nose: Nose normal. No congestion. Eyes:      General:         Right eye: No discharge. Left eye: No discharge. Cardiovascular:      Rate and Rhythm: Regular rhythm. Tachycardia present. Heart sounds: Normal heart sounds. No murmur heard. No gallop. Pulmonary:      Effort: No respiratory distress. Breath sounds: No stridor. Comments: Upper lobes clear. Lower lobes not auscultatable  Abdominal:      General: There is no distension. Palpations: Abdomen is soft. There is no mass. Tenderness: There is no abdominal tenderness. Hernia: No hernia is present. Genitourinary:     Comments: Urine sukhwinder  Musculoskeletal:         General: No swelling, tenderness, deformity or signs of injury. Cervical back: Neck supple. Lymphadenopathy:      Cervical: No cervical adenopathy. Skin:     General: Skin is warm and dry.       Findings: Rash present. Neurological:      General: No focal deficit present. Mental Status: She is alert. Past Medical History:   No past medical history on file.     Past Surgical  History:     Past Surgical History:   Procedure Laterality Date    EXTRACORPOREAL CIRCULATION N/A 4/22/2022    EXTRA CORPOREAL MEMBRANE OXYGENATION 23 ON THE RIGHT 25 LEFT FEMORALS performed by Jude Blake MD at [facility]       Medications:      apixaban  10 mg Oral BID    Followed by   Amarilys Larios ON 5/9/2022] apixaban  5 mg Oral BID    predniSONE  40 mg Oral Daily    pantoprazole  40 mg Oral QAM AC    furosemide  40 mg Oral Daily    hydrocortisone   Topical BID    insulin lispro  0-12 Units SubCUTAneous TID WC    insulin lispro  0-6 Units SubCUTAneous Nightly    meropenem  1,000 mg IntraVENous Q8H    budesonide  0.5 mg Nebulization BID    lidocaine PF  5 mL Tracheal Tube Once    docusate  100 mg Oral BID    polyethylene glycol  17 g Oral Daily    albumin human  25 g IntraVENous Once    dilTIAZem  30 mg Oral 4 times per day    polyvinyl alcohol  1 drop Both Eyes Q6H    ipratropium-albuterol  1 ampule Inhalation Q4H    sodium chloride flush  5-40 mL IntraVENous 2 times per day       Social History:     Social History     Socioeconomic History    Marital status: Life Partner     Spouse name: Not on file    Number of children: Not on file    Years of education: Not on file    Highest education level: Not on file   Occupational History    Not on file   Tobacco Use    Smoking status: Not on file    Smokeless tobacco: Not on file   Substance and Sexual Activity    Alcohol use: Not on file    Drug use: Not on file    Sexual activity: Not on file   Other Topics Concern    Not on file   Social History Narrative    Not on file     Social Determinants of Health     Financial Resource Strain:     Difficulty of Paying Living Expenses: Not on file   Food Insecurity:     Worried About Running Out of Food in the Last Year: Not on file    Ran Out of Food in the Last Year: Not on file   Transportation Needs:     Lack of Transportation (Medical): Not on file    Lack of Transportation (Non-Medical):  Not on file   Physical Activity:     Days of Exercise per Week: Not on file    Minutes of Exercise per Session: Not on file   Stress:     Feeling of Stress : Not on file   Social Connections:     Frequency of Communication with Friends and Family: Not on file    Frequency of Social Gatherings with Friends and Family: Not on file    Attends Taoist Services: Not on file    Active Member of Clubs or Organizations: Not on file    Attends Club or Organization Meetings: Not on file    Marital Status: Not on file   Intimate Partner Violence:     Fear of Current or Ex-Partner: Not on file    Emotionally Abused: Not on file    Physically Abused: Not on file    Sexually Abused: Not on file   Housing Stability:     Unable to Pay for Housing in the Last Year: Not on file    Number of [place] in the Last Year: Not on file    Unstable Housing in the Last Year: Not on file       Family History:     Family History   Problem Relation Age of Onset    Cancer Mother         breast ca 47, lumpectomy, RT, no GT    Cancer Father         breast ca 52, mastectomy, chemo, GT?    Cancer Paternal Grandmother         breast ca, 60-70s    Cancer Maternal Aunt         poss colon ca, colostomy bag, passed away at age 62s    Other Paternal Aunt         cirrhosis      Medical Decision Making:   I have independently reviewed/ordered the following labs:    CBC with Differential:   Recent Labs     05/02/22  0501 05/03/22  1029   WBC 19.9* 22.2*   HGB 9.3* 9.8*   HCT 28.7* 30.8*   * 157     BMP:  Recent Labs     05/01/22  0437 05/01/22  0437 05/01/22  1026 05/01/22  1026 05/02/22  0501 05/03/22  1029   *   < > 134*   < > 136 132*   K 4.4   < > 4.1   < > 4.5 3.9      < > 97*   < > 103 101   CO2 29   < > 28   < > 25 23 BUN 19   < > 19   < > 15 24*   CREATININE 0.40*   < > 0.47*   < > 0.40* 0.46*   MG 2.1  --  2.1  --   --   --     < > = values in this interval not displayed. Hepatic Function Panel:   Recent Labs     05/01/22  0437   PROT 4.6*   LABALBU 2.9*   BILIDIR 0.19   IBILI 0.43   BILITOT 0.62   ALKPHOS 39   ALT 51*   AST 15     No results for input(s): RPR in the last 72 hours. No results for input(s): HIV in the last 72 hours. No results for input(s): BC in the last 72 hours. Lab Results   Component Value Date    CREATININE 0.46 05/03/2022    GLUCOSE 103 05/03/2022       Detailed results: Thank you for allowing us to participate in the care of this patient. Please call with questions. This note is created with the assistance of a speech recognition program.  While intending to generate adocument that actually reflects the content of the visit, the document can still have some errors including those of syntax and sound a like substitutions which may escape proof reading. It such instances, actual meaningcan be extrapolated by contextual diversion.     Nakia Farooq MD  Office: [phone]  Perfect serve / office [phone]

## 2022-05-04 NOTE — PROGRESS NOTES
PULMONARY & CRITICAL CARE MEDICINE PROGRESS NOTE     Patient:  Taye Loja  MRN: [id number]  Admit date: 2022  Primary Care Physician: Maria G Diaz MD  Consulting Physician: Deepthi Davidson MD  CODE Status: Full Code  LOS: 15     SUBJECTIVE     CHIEF COMPLAINT/REASON FOR INITIAL CONSULT: Acute hypercapnic respiratory failure    BRIEF HOSPITAL COURSE:   The patient is a 23 y.o. female with past medical history of asthma was initially admitted to [facility] with acute exacerbation. She was initially put on nonrebreather, subsequently required intubation and initiation of mechanical ventilation due to worsening respiratory status. Patient was also on bicarb drip which was discontinued on arrival to [place].  She is sedated, paralyzed and mechanically ventilated. ABG shows improvement in respiratory acidosis and oxygenation. INTERVAL HISTORY:  22  Patient extubated , decannulated   Remains hemodynamically stable  She is saturating well on nasal cannula  She was on BiPAP overnight  She remains on fluconazole and meropenem as per ID recommendations  White count 22.2, T-max 99.2    OBJECTIVE     VENTILATOR SETTINGS:  Vent Information  Ventilator ID: [id number]  Vent Mode: PS/CPAP  Ventilator Discontinue: Yes     PaO2/FiO2 RATIO:  No results for input(s): POCPO2 in the last 72 hours.    FiO2 : 40 %     VITAL SIGNS:   LAST:  /75   Pulse 101   Temp 98.5 °F (36.9 °C) (Oral)   Resp 18   Ht 5' 3\" (1.6 m)   Wt 297 lb 9.9 oz (135 kg)   SpO2 96%   BMI 52.72 kg/m²   8-24 HR RANGE:  TEMP Temp  Av.7 °F (37.1 °C)  Min: 98.4 °F (36.9 °C)  Max: 99.2 °F (07.5 °C)   BP Systolic (88XHD), DWQ:508 , Min:109 , SXE:535      Diastolic (86EZV), AAP:70, Min:48, Max:75     PULSE Pulse  Av.9  Min: 75  Max: 108   RR Resp  Av  Min: 18  Max: 18   O2 SAT SpO2  Av %  Min: 96 %  Max: 100 %   OXYGEN DELIVERY O2 Flow Rate (L/min)  Av L/min  Min: 2 L/min  Max: 2 L/min        Physical exam  General: Look comfortable currently not in distress arousable but lethargic. Morbidly obese  Mentation: Arousable slightly lethargic follows command able to talk  HENT: Oral mucosa moist small oropharynx.     Eyes: Pupils equally reactive nonicteric sclera  Neck: Short thick neck: No subcutaneous emphysema  Chest/lung: Bilateral air entry is distant no expiratory wheezing and no rhonchi no crackles  Cardiovascular: S1-S2 is regular slightly diminished heart sounds no murmur  Abdomen: Soft obese nontender nondistended nonrigid abdomen  Lower extremity: Positive bilateral mild edema present  Bilateral femoral cannulas present  CNS: Grossly no motor or cranial nerve deficit move extremities bilaterally  Skin: Mild bruising ecchymosis in the groin present otherwise no skin rash    DATA REVIEW     Medications:  Scheduled Meds:   apixaban  10 mg Oral BID    Followed by   Orlando Gipson ON 5/9/2022] apixaban  5 mg Oral BID    predniSONE  40 mg Oral Daily    pantoprazole  40 mg Oral QAM AC    furosemide  40 mg Oral Daily    hydrocortisone   Topical BID    insulin lispro  0-12 Units SubCUTAneous TID WC    insulin lispro  0-6 Units SubCUTAneous Nightly    meropenem  1,000 mg IntraVENous Q8H    budesonide  0.5 mg Nebulization BID    lidocaine PF  5 mL Tracheal Tube Once    docusate  100 mg Oral BID    polyethylene glycol  17 g Oral Daily    albumin human  25 g IntraVENous Once    dilTIAZem  30 mg Oral 4 times per day    polyvinyl alcohol  1 drop Both Eyes Q6H    ipratropium-albuterol  1 ampule Inhalation Q4H    sodium chloride flush  5-40 mL IntraVENous 2 times per day     Continuous Infusions:   dextrose      sodium chloride Stopped (05/02/22 1136)       INPUT/OUTPUT:  In: 100   Out: 2125 [Urine:2125]  Date 05/03/22 0000 - 05/03/22 2359   Shift 7817-6809 0264-7449 0055-3838 24 Hour Total   INTAKE   Shift Total(mL/kg)       OUTPUT   Urine(mL/kg/hr) 775(0.7) 850(0.8) 500 2125   Shift Total(mL/kg) 775(5.7) 850(6.3) 500(3.7) 2125(15.7)   Weight (kg) 135 135 135 135        LABS:  ABGs:   No results for input(s): POCPH, POCPCO2, POCPO2, POCHCO3, KMUO1BEH in the last 72 hours. CBC:   Recent Labs     05/01/22 0437 05/01/22  1026 05/02/22  0501 05/03/22  1029   WBC 16.9* 24.7* 19.9* 22.2*   HGB 8.7* 10.3* 9.3* 9.8*   HCT 27.0* 31.5* 28.7* 30.8*   MCV 87.1 85.8 87.2 88.3   PLT 91* 116* 117* 157   RBC 3.10* 3.67* 3.29* 3.49*   MCH 28.1 28.1 28.3 28.1   MCHC 32.2 32.7 32.4 31.8   RDW 14.8* 15.2* 15.6* 16.4*     CRP:   No results for input(s): CRP in the last 72 hours. LDH:   No results for input(s): LDH in the last 72 hours. BMP:   Recent Labs     05/01/22 0437 05/01/22  1026 05/02/22  0501 05/03/22  1029   * 134* 136 132*   K 4.4 4.1 4.5 3.9    97* 103 101   CO2 29 28 25 23   BUN 19 19 15 24*   CREATININE 0.40* 0.47* 0.40* 0.46*   GLUCOSE 142* 160* 158* 103*     Liver Function Test:   Recent Labs     05/01/22 0437   PROT 4.6*   LABALBU 2.9*   ALT 51*   AST 15   ALKPHOS 39   BILITOT 0.62     Coagulation Profile:   Recent Labs     05/01/22 0437 05/01/22  1026 05/01/22  1624 05/01/22  2347 05/02/22  0501   INR 1.3 1.2  --   --   --    PROTIME 13.3* 12.7*  --   --   --    APTT 23.2 21.2 >120.0* 49.2* >120.0*     D-Dimer:  No results for input(s): DDIMER in the last 72 hours. Lactic Acid:  No results for input(s): LACTA in the last 72 hours. Cardiac Enzymes:  No results for input(s): CKTOTAL, CKMB, CKMBINDEX, TROPONINI in the last 72 hours. Invalid input(s): TROPONIN, HSTROP  BNP/ProBNP:   No results for input(s): BNP, PROBNP in the last 72 hours. Triglycerides:  No results for input(s): TRIG in the last 72 hours.      Microbiology:  Urine Culture:  No components found for: CURINE  Blood Culture:  No components found for: CBLOOD, CFUNGUSBL  Sputum Culture:  No components found for: CSPUTUM  No results for input(s): SPECDESC, SPECIAL, CULTURE, STATUS, ORG, CDIFFTOXPCR, CAMPYLOBPCR, SALMONELLAPC, SHIGAPCR, SHIGELLAPCR, MPNEUG, MPNEUM, LACTOQL in the last 72 hours. No results for input(s): SPUTUM, SPECDESC, SPECIAL, CULTURE, STATUS, ORG, CDIFFTOXPCR, MPNEUM, MPNEUG in the last 72 hours. Invalid input(s): CURINE, CBLOOD, CFUNGUSBL     Pathology:    Radiology Reports:  XR CHEST PORTABLE   Final Result   Slight improved aeration of  the lung bases. Bibasilar airspace disease   remains         XR CHEST PORTABLE   Final Result   Small bibasilar effusions and adjacent bibasilar infiltrates. Stable right internal jugular central line. XR CHEST PORTABLE   Final Result   1. The endotracheal tube has been removed. 2.  Similar appearance of basilar atelectasis and probable trace effusions. XR CHEST PORTABLE   Final Result   No significant change from prior study. Continued bibasilar opacities and   small effusions. XR CHEST PORTABLE   Final Result   Stable exam.         VL DUP LOWER EXTREMITY VENOUS BILATERAL   Final Result      XR CHEST PORTABLE   Final Result   1. Stable appropriate positions of support apparatus. 2. Stable small bilateral pleural effusions and bibasilar airspace disease. XR CHEST PORTABLE   Final Result   Relatively stable cardiopulmonary status         XR CHEST PORTABLE   Final Result   Stable chest x-ray compared to 04/24/2022. XR CHEST PORTABLE   Final Result   1. Stable support tubes and line. 2. Low lung volumes. Crowding of vessels from low lung volumes results in   apparent opacification at the right cardiophrenic angle. XR CHEST PORTABLE   Final Result   Lines and tubes are unchanged. Low lung volumes. Cardiomegaly. XR CHEST PORTABLE   Final Result   1. Endotracheal tube tip is now 1 cm above the soto. Recommend retraction   by an additional 2.5 cm for optimal positioning. 2.  Improved aeration in the left lung with continued atelectatic/airspace   infiltrates.          XR CHEST PORTABLE   Final Result   Endotracheal tube tip is in the right main bronchus and should be retracted   approximately 4-5 cm. Tips of ECMO cannulas project in the expected location of the IVC. Findings were discussed with Alex Cunha RN (the patient's ICU nurse)   at 2:26 pm on 4/22/2022. XR CHEST (SINGLE VIEW FRONTAL)   Final Result   1. Support tubes and lines are unchanged. 2. Stable bibasilar lung infiltrates, right side greater than left. This may   be related to atelectasis and/or pleural effusion. Superimposed pneumonia   cannot be excluded. XR CHEST (SINGLE VIEW FRONTAL)   Final Result   Suboptimal position left IJ catheter. Consider removal and replacement or   repositioning. XR CHEST PORTABLE   Final Result   1. On today's exam the endotracheal tube tip is positioned 1.5 cm above the   soto. Recommend retraction by an additional 2 cm for optimal positioning. 2.  Interval development of right lower lobe airspace disease. This could   represent atelectasis         XR CHEST PORTABLE   Final Result   1. Endotracheal and enteric tubes as above. 2. Diffuse interstitial opacities throughout both lungs, right greater than   left. No new focal airspace consolidation. Follow-up is recommended to   document resolution. XR CHEST (SINGLE VIEW FRONTAL)   Final Result   Diffuse interstitial opacities with alveolar/consolidative opacities at the   bases favoring multifocal airspace disease. Echocardiogram:   Results for orders placed during the hospital encounter of 04/18/22    ECHO Complete 2D W Doppler W Color      Summary  Left ventricle is normal in size, global left ventricular systolic function  is preserved, estimated ejection fraction is 50%. There appears to be some apical hypokinesis, in the apical views. Evidence of diastolic dysfunction. Trivial mitral regurgitation. Trivial pulmonic insufficiency.   IVC dilated but unable to assess respiratory collapse due to patient on  ventilator. ASSESSMENT AND PLAN     Assessment:    // Acute hypoxic/hypercapnic respiratory failure due to status asthmaticus  S/p extubation 4/29, VV ECMO, decannulated 4/30  //Chronic respiratory acidosis  // Rhino/enterovirus infection  // Leukocytosis, improving  // Hyperglycemia  // Elevated troponin  // Morbid obesity  // Anemia, stable  // Pseudomonas in respiratory secretion    Plan:    I personally interviewed/examined the patient; reviewed interval history, interpreted all available radiographic and laboratory data at the time of service.  Patient was extubated on 04/29/2022, decannulated 4/30   Currently saturating well on nasal cannula   Continue nocturnal BiPAP   Continue supplemental oxygen to keep oxygen saturation greater than 90%   Remains hemodynamically stable   Continue aspiration precautions, bronchopulmonary hygiene and bronchodilators   Bronchoscopy done on 4/24/2022-respiratory culture growing Pseudomonas   Tolerating oral diet   Continue to monitor I/O with a goal of even/negative fluid balance   Stress ulcer prophylaxis-Protonix   Chemical DVT prophylaxis; on eliquis   Antimicrobials reviewed; on meropenem/fluconazole as per ID recommendations   Continue prednisone taper   Physical/occupational therapy    We will continue to follow. I would like to thank you for allowing me to participate in the care of this patient. Please feel free to call with any further questions or concerns. Jessika Stapleton MD  Pulmonary and Critical Care Medicine           5/3/2022     Please note that this chart was generated using voice recognition Dragon dictation software. Although every effort was made to ensure the accuracy of this automated transcription, some errors in transcription may have occurred.

## 2022-05-04 NOTE — PLAN OF CARE
Patient called out requested a new sheet. Sheet provided to patient. Patient declines assistance with turning at this time, education reinforced about turning. Pt states that she wants to lay flat on her back as she is most comfortable that way to sleep and would like to take a nap.

## 2022-05-04 NOTE — PLAN OF CARE
Patient called out stating that she needed help with getting repositioned. Writer helped patient turn to right side.

## 2022-05-04 NOTE — PROGRESS NOTES
Writer, NA, and transport assist patient to stretcher via skylift. Patient left the floor to xray on 2 L02.

## 2022-05-04 NOTE — PLAN OF CARE
Patient calls out and states that she would like get back on the bed pan and void/urinate that way instead. Patient assisted back to bed pan to try to void.

## 2022-05-04 NOTE — PROGRESS NOTES
PULMONARY & CRITICAL CARE MEDICINE PROGRESS NOTE     Patient:  Carine Curtis  MRN: [id number]  Admit date: 2022  Primary Care Physician: Malorie Laws MD  Consulting Physician: Cheryle Grijalva MD  CODE Status: Full Code  LOS: 16     SUBJECTIVE     CHIEF COMPLAINT/REASON FOR INITIAL CONSULT: Acute hypercapnic respiratory failure    BRIEF HOSPITAL COURSE:   The patient is a 23 y.o. female with past medical history of asthma was initially admitted to [facility] with acute exacerbation. She was initially put on nonrebreather, subsequently required intubation and initiation of mechanical ventilation due to worsening respiratory status. Patient was also on bicarb drip which was discontinued on arrival to [place].  She is sedated, paralyzed and mechanically ventilated. ABG shows improvement in respiratory acidosis and oxygenation. INTERVAL HISTORY:  22  Patient extubated , decannulated   Afebrile, hemodynamically stable  She is saturating well on nasal cannula  She was on BiPAP overnight  On meropenem, oral prednisone    OBJECTIVE     VENTILATOR SETTINGS:  Vent Information  Ventilator ID: [id number]  Vent Mode: PS/CPAP  Ventilator Discontinue: Yes     PaO2/FiO2 RATIO:  No results for input(s): POCPO2 in the last 72 hours.    FiO2 : 40 %     VITAL SIGNS:   LAST:  /78   Pulse 112   Temp 98.2 °F (36.8 °C) (Oral)   Resp 16   Ht 5' 3\" (1.6 m)   Wt 297 lb 9.9 oz (135 kg)   SpO2 98%   BMI 52.72 kg/m²   8-24 HR RANGE:  TEMP Temp  Av.4 °F (36.9 °C)  Min: 97.7 °F (36.5 °C)  Max: 98.9 °F (20.6 °C)   BP Systolic (93TAO), MJC:521 , Min:117 , HBJ:881      Diastolic (23RKX), GHC:57, Min:60, Max:78     PULSE Pulse  Av  Min: 80  Max: 116   RR Resp  Av  Min: 16  Max: 20   O2 SAT SpO2  Av %  Min: 95 %  Max: 99 %   OXYGEN DELIVERY O2 Flow Rate (L/min)  Av L/min  Min: 2 L/min  Max: 2 L/min        Physical exam  General: Look comfortable currently not in distress arousable but lethargic. Morbidly obese  Mentation: Arousable slightly lethargic follows command able to talk  HENT: Oral mucosa moist small oropharynx.     Eyes: Pupils equally reactive nonicteric sclera  Neck: Short thick neck: No subcutaneous emphysema  Chest/lung: Bilateral air entry is distant no expiratory wheezing and no rhonchi no crackles  Cardiovascular: S1-S2 is regular slightly diminished heart sounds no murmur  Abdomen: Soft obese nontender nondistended nonrigid abdomen  Lower extremity: Positive bilateral mild edema present  Bilateral femoral cannulas present  CNS: Grossly no motor or cranial nerve deficit move extremities bilaterally  Skin: Mild bruising ecchymosis in the groin present otherwise no skin rash    DATA REVIEW     Medications:  Scheduled Meds:   apixaban  10 mg Oral BID    Followed by   Pranay Pantoja ON 5/9/2022] apixaban  5 mg Oral BID    predniSONE  40 mg Oral Daily    pantoprazole  40 mg Oral QAM AC    furosemide  40 mg Oral Daily    hydrocortisone   Topical BID    insulin lispro  0-12 Units SubCUTAneous TID WC    insulin lispro  0-6 Units SubCUTAneous Nightly    meropenem  1,000 mg IntraVENous Q8H    budesonide  0.5 mg Nebulization BID    lidocaine PF  5 mL Tracheal Tube Once    docusate  100 mg Oral BID    polyethylene glycol  17 g Oral Daily    albumin human  25 g IntraVENous Once    dilTIAZem  30 mg Oral 4 times per day    ipratropium-albuterol  1 ampule Inhalation Q4H    sodium chloride flush  5-40 mL IntraVENous 2 times per day     Continuous Infusions:   dextrose      sodium chloride 100 mL/hr at 05/04/22 1403       INPUT/OUTPUT:  In: 850 [P.O.:550]  Out: 2300 [Urine:2300]  Date 05/04/22 0000 - 05/04/22 2359   Shift 6810-4916 7804-5521 5157-4476 24 Hour Total   INTAKE   P.O.(mL/kg/hr)  550  550   IV Piggyback(mL/kg) 300(2.2)   300(2.2)   Shift Total(mL/kg) 300(2.2) 550(4.1)  850(6.3)   OUTPUT   Urine(mL/kg/hr)  950  950   Shift Total(mL/kg)  950(7) 950(7)   Weight (kg) 135 135 135 135        LABS:  ABGs:   No results for input(s): POCPH, POCPCO2, POCPO2, POCHCO3, YMRK0UYX in the last 72 hours. CBC:   Recent Labs     05/02/22  0501 05/03/22  1029 05/04/22  0244   WBC 19.9* 22.2* 21.0*   HGB 9.3* 9.8* 9.7*   HCT 28.7* 30.8* 29.7*   MCV 87.2 88.3 86.8   * 157 162   RBC 3.29* 3.49* 3.42*   MCH 28.3 28.1 28.4   MCHC 32.4 31.8 32.7   RDW 15.6* 16.4* 16.6*     CRP:   No results for input(s): CRP in the last 72 hours. LDH:   No results for input(s): LDH in the last 72 hours. BMP:   Recent Labs     05/02/22  0501 05/03/22  1029 05/04/22  0244    132* 136   K 4.5 3.9 4.3    101 103   CO2 25 23 24   BUN 15 24* 18   CREATININE 0.40* 0.46* 0.37*   GLUCOSE 158* 103* 85     Liver Function Test:   No results for input(s): PROT, LABALBU, ALT, AST, GGT, ALKPHOS, BILITOT in the last 72 hours. Coagulation Profile:   Recent Labs     05/01/22  1624 05/01/22  2347 05/02/22  0501   APTT >120.0* 49.2* >120.0*     D-Dimer:  No results for input(s): DDIMER in the last 72 hours. Lactic Acid:  No results for input(s): LACTA in the last 72 hours. Cardiac Enzymes:  No results for input(s): CKTOTAL, CKMB, CKMBINDEX, TROPONINI in the last 72 hours. Invalid input(s): TROPONIN, HSTROP  BNP/ProBNP:   No results for input(s): BNP, PROBNP in the last 72 hours. Triglycerides:  No results for input(s): TRIG in the last 72 hours. Microbiology:  Urine Culture:  No components found for: CURINE  Blood Culture:  No components found for: CBLOOD, CFUNGUSBL  Sputum Culture:  No components found for: CSPUTUM  No results for input(s): SPECDESC, SPECIAL, CULTURE, STATUS, ORG, CDIFFTOXPCR, CAMPYLOBPCR, SALMONELLAPC, SHIGAPCR, SHIGELLAPCR, MPNEUG, MPNEUM, LACTOQL in the last 72 hours. No results for input(s): SPUTUM, SPECDESC, SPECIAL, CULTURE, STATUS, ORG, CDIFFTOXPCR, MPNEUM, MPNEUG in the last 72 hours.     Invalid input(s): Cale Bogdan, CFUNGUSBL Pathology:    Radiology Reports:  XR CHEST (2 VW)   Final Result   Subsegmental atelectasis lung bases, similar to the previous exam      Bibasilar hypoaeration         XR CHEST PORTABLE   Final Result   Slight improved aeration of  the lung bases. Bibasilar airspace disease   remains         XR CHEST PORTABLE   Final Result   Small bibasilar effusions and adjacent bibasilar infiltrates. Stable right internal jugular central line. XR CHEST PORTABLE   Final Result   1. The endotracheal tube has been removed. 2.  Similar appearance of basilar atelectasis and probable trace effusions. XR CHEST PORTABLE   Final Result   No significant change from prior study. Continued bibasilar opacities and   small effusions. XR CHEST PORTABLE   Final Result   Stable exam.         VL DUP LOWER EXTREMITY VENOUS BILATERAL   Final Result      XR CHEST PORTABLE   Final Result   1. Stable appropriate positions of support apparatus. 2. Stable small bilateral pleural effusions and bibasilar airspace disease. XR CHEST PORTABLE   Final Result   Relatively stable cardiopulmonary status         XR CHEST PORTABLE   Final Result   Stable chest x-ray compared to 04/24/2022. XR CHEST PORTABLE   Final Result   1. Stable support tubes and line. 2. Low lung volumes. Crowding of vessels from low lung volumes results in   apparent opacification at the right cardiophrenic angle. XR CHEST PORTABLE   Final Result   Lines and tubes are unchanged. Low lung volumes. Cardiomegaly. XR CHEST PORTABLE   Final Result   1. Endotracheal tube tip is now 1 cm above the soto. Recommend retraction   by an additional 2.5 cm for optimal positioning. 2.  Improved aeration in the left lung with continued atelectatic/airspace   infiltrates. XR CHEST PORTABLE   Final Result   Endotracheal tube tip is in the right main bronchus and should be retracted   approximately 4-5 cm. Tips of ECMO cannulas project in the expected location of the IVC. Findings were discussed with Marylou Madsen RN (the patient's ICU nurse)   at 2:26 pm on 4/22/2022. XR CHEST (SINGLE VIEW FRONTAL)   Final Result   1. Support tubes and lines are unchanged. 2. Stable bibasilar lung infiltrates, right side greater than left. This may   be related to atelectasis and/or pleural effusion. Superimposed pneumonia   cannot be excluded. XR CHEST (SINGLE VIEW FRONTAL)   Final Result   Suboptimal position left IJ catheter. Consider removal and replacement or   repositioning. XR CHEST PORTABLE   Final Result   1. On today's exam the endotracheal tube tip is positioned 1.5 cm above the   soto. Recommend retraction by an additional 2 cm for optimal positioning. 2.  Interval development of right lower lobe airspace disease. This could   represent atelectasis         XR CHEST PORTABLE   Final Result   1. Endotracheal and enteric tubes as above. 2. Diffuse interstitial opacities throughout both lungs, right greater than   left. No new focal airspace consolidation. Follow-up is recommended to   document resolution. XR CHEST (SINGLE VIEW FRONTAL)   Final Result   Diffuse interstitial opacities with alveolar/consolidative opacities at the   bases favoring multifocal airspace disease. Echocardiogram:   Results for orders placed during the hospital encounter of 04/18/22    ECHO Complete 2D W Doppler W Color      Summary  Left ventricle is normal in size, global left ventricular systolic function  is preserved, estimated ejection fraction is 50%. There appears to be some apical hypokinesis, in the apical views. Evidence of diastolic dysfunction. Trivial mitral regurgitation. Trivial pulmonic insufficiency. IVC dilated but unable to assess respiratory collapse due to patient on  ventilator.        ASSESSMENT AND PLAN     Assessment:    // Acute hypoxic/hypercapnic respiratory failure due to status asthmaticus  // S/p extubation 4/29, VV ECMO, decannulated 4/30  // Chronic respiratory acidosis  // Rhino/enterovirus infection  // Leukocytosis, improving  // Hyperglycemia  // Elevated troponin  // Morbid obesity  // Anemia, stable  // Pseudomonas in respiratory secretion    Plan:    I personally interviewed/examined the patient; reviewed interval history, interpreted all available radiographic and laboratory data at the time of service.  Patient was extubated on 04/29/2022, decannulated 4/30   Currently saturating well on nasal cannula   Recommended nocturnal BiPAP   Continue supplemental oxygen to keep oxygen saturation greater than 90%   Remains hemodynamically stable   Continue aspiration precautions, bronchopulmonary hygiene and bronchodilators   Bronchoscopy done on 4/24/2022-respiratory culture growing Pseudomonas   Tolerating oral diet   Continue to monitor I/O with a goal of even/negative fluid balance   Stress ulcer prophylaxis-Protonix   Chemical DVT prophylaxis; on eliquis   Antimicrobials reviewed; on meropenem as per ID recommendations   Continue prednisone taper   Physical/occupational therapy    We will continue to follow. I would like to thank you for allowing me to participate in the care of this patient. Please feel free to call with any further questions or concerns. Anthony Thompson MD  Pulmonary and Critical Care Medicine           5/4/2022     Please note that this chart was generated using voice recognition Dragon dictation software. Although every effort was made to ensure the accuracy of this automated transcription, some errors in transcription may have occurred.

## 2022-05-05 LAB
ANION GAP SERPL CALCULATED.3IONS-SCNC: 10 MMOL/L (ref 9–17)
BUN BLDV-MCNC: 18 MG/DL (ref 6–20)
CALCIUM SERPL-MCNC: 8.2 MG/DL (ref 8.6–10.4)
CHLORIDE BLD-SCNC: 102 MMOL/L (ref 98–107)
CO2: 24 MMOL/L (ref 20–31)
CREAT SERPL-MCNC: 0.37 MG/DL (ref 0.5–0.9)
GFR AFRICAN AMERICAN: >60 ML/MIN
GFR NON-AFRICAN AMERICAN: >60 ML/MIN
GFR SERPL CREATININE-BSD FRML MDRD: ABNORMAL ML/MIN/{1.73_M2}
GLUCOSE BLD-MCNC: 116 MG/DL (ref 65–105)
GLUCOSE BLD-MCNC: 140 MG/DL (ref 65–105)
GLUCOSE BLD-MCNC: 152 MG/DL (ref 65–105)
GLUCOSE BLD-MCNC: 79 MG/DL (ref 65–105)
GLUCOSE BLD-MCNC: 84 MG/DL (ref 65–105)
GLUCOSE BLD-MCNC: 90 MG/DL (ref 70–99)
HCT VFR BLD CALC: 29.5 % (ref 36.3–47.1)
HEMOGLOBIN: 9.6 G/DL (ref 11.9–15.1)
MCH RBC QN AUTO: 28.3 PG (ref 25.2–33.5)
MCHC RBC AUTO-ENTMCNC: 32.5 G/DL (ref 28.4–34.8)
MCV RBC AUTO: 87 FL (ref 82.6–102.9)
NRBC AUTOMATED: 0 PER 100 WBC
PDW BLD-RTO: 16.6 % (ref 11.8–14.4)
PLATELET # BLD: 198 K/UL (ref 138–453)
PMV BLD AUTO: 10.8 FL (ref 8.1–13.5)
POTASSIUM SERPL-SCNC: 4.2 MMOL/L (ref 3.7–5.3)
RBC # BLD: 3.39 M/UL (ref 3.95–5.11)
SODIUM BLD-SCNC: 136 MMOL/L (ref 135–144)
WBC # BLD: 16.2 K/UL (ref 3.5–11.3)

## 2022-05-05 PROCEDURE — 99232 SBSQ HOSP IP/OBS MODERATE 35: CPT | Performed by: INTERNAL MEDICINE

## 2022-05-05 PROCEDURE — 99233 SBSQ HOSP IP/OBS HIGH 50: CPT | Performed by: INTERNAL MEDICINE

## 2022-05-05 PROCEDURE — 97116 GAIT TRAINING THERAPY: CPT

## 2022-05-05 PROCEDURE — 2580000003 HC RX 258: Performed by: INTERNAL MEDICINE

## 2022-05-05 PROCEDURE — 2580000003 HC RX 258: Performed by: STUDENT IN AN ORGANIZED HEALTH CARE EDUCATION/TRAINING PROGRAM

## 2022-05-05 PROCEDURE — 85027 COMPLETE CBC AUTOMATED: CPT

## 2022-05-05 PROCEDURE — 97530 THERAPEUTIC ACTIVITIES: CPT

## 2022-05-05 PROCEDURE — 80048 BASIC METABOLIC PNL TOTAL CA: CPT

## 2022-05-05 PROCEDURE — 6370000000 HC RX 637 (ALT 250 FOR IP): Performed by: INTERNAL MEDICINE

## 2022-05-05 PROCEDURE — 6370000000 HC RX 637 (ALT 250 FOR IP): Performed by: STUDENT IN AN ORGANIZED HEALTH CARE EDUCATION/TRAINING PROGRAM

## 2022-05-05 PROCEDURE — 51701 INSERT BLADDER CATHETER: CPT

## 2022-05-05 PROCEDURE — 6360000002 HC RX W HCPCS: Performed by: INTERNAL MEDICINE

## 2022-05-05 PROCEDURE — 97110 THERAPEUTIC EXERCISES: CPT

## 2022-05-05 PROCEDURE — 6360000002 HC RX W HCPCS: Performed by: STUDENT IN AN ORGANIZED HEALTH CARE EDUCATION/TRAINING PROGRAM

## 2022-05-05 PROCEDURE — 82947 ASSAY GLUCOSE BLOOD QUANT: CPT

## 2022-05-05 PROCEDURE — 94640 AIRWAY INHALATION TREATMENT: CPT

## 2022-05-05 PROCEDURE — 36415 COLL VENOUS BLD VENIPUNCTURE: CPT

## 2022-05-05 PROCEDURE — 51798 US URINE CAPACITY MEASURE: CPT

## 2022-05-05 PROCEDURE — 94761 N-INVAS EAR/PLS OXIMETRY MLT: CPT

## 2022-05-05 PROCEDURE — 2060000000 HC ICU INTERMEDIATE R&B

## 2022-05-05 RX ORDER — PREDNISONE 1 MG/1
5 TABLET ORAL DAILY
Status: DISCONTINUED | OUTPATIENT
Start: 2022-05-14 | End: 2022-05-05

## 2022-05-05 RX ORDER — IPRATROPIUM BROMIDE AND ALBUTEROL SULFATE 2.5; .5 MG/3ML; MG/3ML
1 SOLUTION RESPIRATORY (INHALATION) 4 TIMES DAILY
Status: DISCONTINUED | OUTPATIENT
Start: 2022-05-06 | End: 2022-05-10 | Stop reason: HOSPADM

## 2022-05-05 RX ORDER — PREDNISONE 1 MG/1
5 TABLET ORAL DAILY
Status: DISCONTINUED | OUTPATIENT
Start: 2022-05-15 | End: 2022-05-10 | Stop reason: HOSPADM

## 2022-05-05 RX ORDER — PREDNISONE 20 MG/1
20 TABLET ORAL DAILY
Status: DISCONTINUED | OUTPATIENT
Start: 2022-05-09 | End: 2022-05-10 | Stop reason: HOSPADM

## 2022-05-05 RX ORDER — PREDNISONE 10 MG/1
10 TABLET ORAL DAILY
Status: DISCONTINUED | OUTPATIENT
Start: 2022-05-11 | End: 2022-05-05

## 2022-05-05 RX ORDER — PREDNISONE 10 MG/1
10 TABLET ORAL DAILY
Status: DISCONTINUED | OUTPATIENT
Start: 2022-05-12 | End: 2022-05-10 | Stop reason: HOSPADM

## 2022-05-05 RX ORDER — PREDNISONE 20 MG/1
20 TABLET ORAL DAILY
Status: DISCONTINUED | OUTPATIENT
Start: 2022-05-08 | End: 2022-05-05

## 2022-05-05 RX ADMIN — SODIUM CHLORIDE, PRESERVATIVE FREE 10 ML: 5 INJECTION INTRAVENOUS at 20:39

## 2022-05-05 RX ADMIN — IPRATROPIUM BROMIDE AND ALBUTEROL SULFATE 1 AMPULE: .5; 2.5 SOLUTION RESPIRATORY (INHALATION) at 20:45

## 2022-05-05 RX ADMIN — DILTIAZEM HYDROCHLORIDE 30 MG: 30 TABLET ORAL at 17:03

## 2022-05-05 RX ADMIN — BUDESONIDE 500 MCG: 0.5 INHALANT RESPIRATORY (INHALATION) at 20:46

## 2022-05-05 RX ADMIN — PANTOPRAZOLE SODIUM 40 MG: 40 TABLET, DELAYED RELEASE ORAL at 08:51

## 2022-05-05 RX ADMIN — APIXABAN 10 MG: 5 TABLET, FILM COATED ORAL at 08:52

## 2022-05-05 RX ADMIN — MEROPENEM 1000 MG: 1 INJECTION, POWDER, FOR SOLUTION INTRAVENOUS at 22:15

## 2022-05-05 RX ADMIN — SODIUM CHLORIDE, PRESERVATIVE FREE 10 ML: 5 INJECTION INTRAVENOUS at 08:51

## 2022-05-05 RX ADMIN — IPRATROPIUM BROMIDE AND ALBUTEROL SULFATE 1 AMPULE: .5; 2.5 SOLUTION RESPIRATORY (INHALATION) at 12:52

## 2022-05-05 RX ADMIN — IPRATROPIUM BROMIDE AND ALBUTEROL SULFATE 1 AMPULE: .5; 2.5 SOLUTION RESPIRATORY (INHALATION) at 07:51

## 2022-05-05 RX ADMIN — INSULIN LISPRO 2 UNITS: 100 INJECTION, SOLUTION INTRAVENOUS; SUBCUTANEOUS at 17:02

## 2022-05-05 RX ADMIN — DILTIAZEM HYDROCHLORIDE 30 MG: 30 TABLET ORAL at 23:24

## 2022-05-05 RX ADMIN — BUDESONIDE 500 MCG: 0.5 INHALANT RESPIRATORY (INHALATION) at 07:50

## 2022-05-05 RX ADMIN — IPRATROPIUM BROMIDE AND ALBUTEROL SULFATE 1 AMPULE: .5; 2.5 SOLUTION RESPIRATORY (INHALATION) at 16:28

## 2022-05-05 RX ADMIN — FUROSEMIDE 40 MG: 40 TABLET ORAL at 08:52

## 2022-05-05 RX ADMIN — DILTIAZEM HYDROCHLORIDE 30 MG: 30 TABLET ORAL at 05:41

## 2022-05-05 RX ADMIN — MEROPENEM 1000 MG: 1 INJECTION, POWDER, FOR SOLUTION INTRAVENOUS at 15:28

## 2022-05-05 RX ADMIN — DILTIAZEM HYDROCHLORIDE 30 MG: 30 TABLET ORAL at 00:31

## 2022-05-05 RX ADMIN — MEROPENEM 1000 MG: 1 INJECTION, POWDER, FOR SOLUTION INTRAVENOUS at 05:43

## 2022-05-05 RX ADMIN — HYDROCORTISONE: 10 CREAM TOPICAL at 08:55

## 2022-05-05 RX ADMIN — PREDNISONE 40 MG: 20 TABLET ORAL at 08:52

## 2022-05-05 RX ADMIN — DILTIAZEM HYDROCHLORIDE 30 MG: 30 TABLET ORAL at 11:50

## 2022-05-05 RX ADMIN — APIXABAN 10 MG: 5 TABLET, FILM COATED ORAL at 20:39

## 2022-05-05 ASSESSMENT — PAIN SCALES - WONG BAKER
WONGBAKER_NUMERICALRESPONSE: 0

## 2022-05-05 ASSESSMENT — ENCOUNTER SYMPTOMS
EYE DISCHARGE: 0
EYE PAIN: 0
COLOR CHANGE: 0
ABDOMINAL DISTENTION: 0
APNEA: 0
EYE REDNESS: 0

## 2022-05-05 ASSESSMENT — PAIN SCALES - GENERAL
PAINLEVEL_OUTOF10: 0

## 2022-05-05 NOTE — PLAN OF CARE
Problem: Gas Exchange - Impaired:  Goal: Levels of oxygenation will improve  Description: Levels of oxygenation will improve  Outcome: Progressing     Problem:  Activity:  Goal: Ability to tolerate increased activity will improve  Description: Ability to tolerate increased activity will improve  Outcome: Progressing     Problem: Cardiac:  Goal: Hemodynamic stability will improve  Description: Hemodynamic stability will improve  Outcome: Progressing     Problem: Respiratory:  Goal: Ability to maintain a clear airway will improve  Description: Ability to maintain a clear airway will improve  Outcome: Progressing  Goal: Ability to maintain adequate ventilation will improve  Description: Ability to maintain adequate ventilation will improve  Outcome: Progressing  Goal: Complications related to the disease process, condition or treatment will be avoided or minimized  Description: Complications related to the disease process, condition or treatment will be avoided or minimized  Outcome: Progressing     Problem: Skin Integrity:  Goal: Risk for impaired skin integrity will decrease  Description: Risk for impaired skin integrity will decrease  Outcome: Progressing     Problem: OXYGENATION/RESPIRATORY FUNCTION  Goal: Patient will maintain patent airway  Outcome: Progressing  Goal: Patient will achieve/maintain normal respiratory rate/effort  Description: Respiratory rate and effort will be within normal limits for the patient  Outcome: Progressing     Problem: Nutrition  Goal: Optimal nutrition therapy  Outcome: Progressing     Problem: Discharge Planning  Goal: Discharge to home or other facility with appropriate resources  Outcome: Progressing  Flowsheets (Taken 5/4/2022 2000)  Discharge to home or other facility with appropriate resources: Identify barriers to discharge with patient and caregiver     Problem: Genitourinary - Adult  Goal: Urinary catheter remains patent  Outcome: Progressing     Problem: Neurosensory - Adult  Goal: Achieves stable or improved neurological status  Outcome: Progressing  Flowsheets (Taken 5/4/2022 2000)  Achieves stable or improved neurological status: Assess for and report changes in neurological status  Goal: Achieves maximal functionality and self care  Outcome: Progressing  Flowsheets (Taken 5/4/2022 2000)  Achieves maximal functionality and self care: Monitor swallowing and airway patency with patient fatigue and changes in neurological status     Problem: Pain  Goal: Verbalizes/displays adequate comfort level or baseline comfort level  Outcome: Progressing  Flowsheets  Taken 5/5/2022 0445  Verbalizes/displays adequate comfort level or baseline comfort level: Encourage patient to monitor pain and request assistance  Taken 5/5/2022 0030  Verbalizes/displays adequate comfort level or baseline comfort level: Encourage patient to monitor pain and request assistance  Taken 5/4/2022 2100  Verbalizes/displays adequate comfort level or baseline comfort level: Encourage patient to monitor pain and request assistance     Problem: Safety - Adult  Goal: Free from fall injury  Outcome: Progressing  Flowsheets (Taken 5/4/2022 2137)  Free From Fall Injury: Instruct family/caregiver on patient safety     Problem: ABCDS Injury Assessment  Goal: Absence of physical injury  Outcome: Progressing     Problem: Skin/Tissue Integrity  Goal: Absence of new skin breakdown  Description: 1. Monitor for areas of redness and/or skin breakdown  2. Assess vascular access sites hourly  3. Every 4-6 hours minimum:  Change oxygen saturation probe site  4. Every 4-6 hours:  If on nasal continuous positive airway pressure, respiratory therapy assess nares and determine need for appliance change or resting period.   Outcome: Progressing

## 2022-05-05 NOTE — PROGRESS NOTES
Pt chair alarm going off this afternoon, pt was up in chair after working with PT/OT and doing well. She urgently had to use the lavatory and attempted to get to the toilet prior to waiting for assistance. This was her third episode of diarrhea this morning. This nurse in room and seen her standing, had her lap blanket wrapped around her left foot and did not make it one step before falling to the floor. Pt did not hit her head or sustain LOC, pt denies any pain and after examination determined that no injury was sustained. Used the lift to get her up off floor and back into bed. Charge nurse aware of situation, Attending team made aware and Suzanne Scott (mother) called and notified of the situation that occurred. Pt situated back into bed, alarm on and call light within reach.  Will continue to monitor and report changes

## 2022-05-05 NOTE — PLAN OF CARE
Problem: Respiratory:  Intervention: Respiratory management  Note: BRONCHOSPASM/BRONCHOCONSTRICTION     [x]         IMPROVE AERATION/BREATH SOUNDS  [x]   ADMINISTER BRONCHODILATOR THERAPY AS APPROPRIATE  [x]   ASSESS BREATH SOUNDS  [x]   IMPLEMENT AEROSOL/MDI PROTOCOL  [x]   PATIENT EDUCATION AS NEEDED

## 2022-05-05 NOTE — PROGRESS NOTES
PULMONARY & CRITICAL CARE MEDICINE PROGRESS NOTE     Patient:  Diana Costa  MRN: [id number]  6 [facility] date: 2022  Primary Care Physician: Kenya Byrne MD  Consulting Physician: Michele Munoz MD  CODE Status: Full Code  LOS: 17     SUBJECTIVE     CHIEF COMPLAINT/REASON FOR INITIAL CONSULT: Acute hypercapnic respiratory failure    BRIEF HOSPITAL COURSE:   The patient is a 23 y.o. female with past medical history of asthma was initially admitted to [facility] with acute exacerbation. She was initially put on nonrebreather, subsequently required intubation and initiation of mechanical ventilation due to worsening respiratory status. Patient was also on bicarb drip which was discontinued on arrival to [place].  She is sedated, paralyzed and mechanically ventilated. ABG shows improvement in respiratory acidosis and oxygenation. INTERVAL HISTORY:  22  Patient extubated , decannulated   Afebrile, hemodynamically stable  Currently on room air  On meropenem, oral prednisone    OBJECTIVE     VENTILATOR SETTINGS:  Vent Information  Ventilator ID: [id number]  Vent Mode: PS/CPAP  Ventilator Discontinue: Yes     PaO2/FiO2 RATIO:  No results for input(s): POCPO2 in the last 72 hours. FiO2 : 40 %     VITAL SIGNS:   LAST:  BP (!) 141/76   Pulse 120   Temp 98.2 °F (36.8 °C) (Oral)   Resp 24   Ht 5' 3\" (1.6 m)   Wt 277 lb 5.4 oz (125.8 kg)   SpO2 94%   BMI 49.13 kg/m²   8-24 HR RANGE:  TEMP Temp  Av.2 °F (36.8 °C)  Min: 97.9 °F (36.6 °C)  Max: 98.5 °F (87.6 °C)   BP Systolic (81OCX), NFV:971 , Min:119 , JVF:285      Diastolic (01PRK), JSZ:74, Min:58, Max:83     PULSE Pulse  Av  Min: 99  Max: 125   RR Resp  Av.2  Min: 20  Max: 25   O2 SAT SpO2  Av.8 %  Min: 94 %  Max: 98 %   OXYGEN DELIVERY No data recorded        Physical exam  General: Awake and alert, morbidly obese  HENT: Oral mucosa moist small oropharynx.     Eyes: Pupils equally reactive nonicteric sclera  Neck: Short thick neck: No subcutaneous emphysema  Chest/lung: Bilateral air entry is distant no expiratory wheezing and no rhonchi no crackles  Cardiovascular: S1-S2 is regular slightly diminished heart sounds no murmur  Abdomen: Soft obese nontender nondistended nonrigid abdomen  Lower extremity: 1+ edema  CNS: No focal deficits  Skin: Mild bruising ecchymosis in the groin present otherwise no skin rash    DATA REVIEW     Medications:  Scheduled Meds:   apixaban  10 mg Oral BID    Followed by   Carolyn Ramón ON 5/9/2022] apixaban  5 mg Oral BID    predniSONE  40 mg Oral Daily    pantoprazole  40 mg Oral QAM AC    furosemide  40 mg Oral Daily    hydrocortisone   Topical BID    insulin lispro  0-12 Units SubCUTAneous TID WC    insulin lispro  0-6 Units SubCUTAneous Nightly    meropenem  1,000 mg IntraVENous Q8H    budesonide  0.5 mg Nebulization BID    lidocaine PF  5 mL Tracheal Tube Once    docusate  100 mg Oral BID    polyethylene glycol  17 g Oral Daily    albumin human  25 g IntraVENous Once    dilTIAZem  30 mg Oral 4 times per day    ipratropium-albuterol  1 ampule Inhalation Q4H    sodium chloride flush  5-40 mL IntraVENous 2 times per day     Continuous Infusions:   dextrose      sodium chloride 100 mL/hr at 05/04/22 1403       INPUT/OUTPUT:  In: 910 [P.O.:910]  Out: 2100 [Urine:2100]  Date 05/05/22 0000 - 05/05/22 2359   Shift 3189-8226 6408-7622 0890-5913 24 Hour Total   INTAKE   P.O.(mL/kg/hr)  360  360   Shift Total(mL/kg)  360(2.9)  360(2.9)   OUTPUT   Urine(mL/kg/hr) 500(0.5) 650  1150   Shift Total(mL/kg) 500(4) 650(5.2)  1150(9.1)   Weight (kg) 125.8 125.8 125.8 125.8        LABS:  ABGs:   No results for input(s): POCPH, POCPCO2, POCPO2, POCHCO3, [id number] in the last 72 hours.   CBC:   Recent Labs     05/03/22  1029 05/04/22  0244 05/05/22  0808   WBC 22.2* 21.0* 16.2*   HGB 9.8* 9.7* 9.6*   HCT 30.8* 29.7* 29.5*   MCV 88.3 86.8 87.0    162 198   RBC 3.49* 3.42* 3.39*   MCH 28.1 28.4 28.3   MCHC 31.8 32.7 32.5   RDW 16.4* 16.6* 16.6*     CRP:   No results for input(s): CRP in the last 72 hours. LDH:   No results for input(s): LDH in the last 72 hours. BMP:   Recent Labs     05/03/22  1029 05/04/22  0244 05/05/22  0808   * 136 136   K 3.9 4.3 4.2    103 102   CO2 23 24 24   BUN 24* 18 18   CREATININE 0.46* 0.37* 0.37*   GLUCOSE 103* 85 90     Liver Function Test:   No results for input(s): PROT, LABALBU, ALT, AST, GGT, ALKPHOS, BILITOT in the last 72 hours. Coagulation Profile:   No results for input(s): INR, PROTIME, APTT in the last 72 hours. D-Dimer:  No results for input(s): DDIMER in the last 72 hours. Lactic Acid:  No results for input(s): LACTA in the last 72 hours. Cardiac Enzymes:  No results for input(s): CKTOTAL, CKMB, CKMBINDEX, TROPONINI in the last 72 hours. Invalid input(s): TROPONIN, HSTROP  BNP/ProBNP:   No results for input(s): BNP, PROBNP in the last 72 hours. Triglycerides:  No results for input(s): TRIG in the last 72 hours. Microbiology:  Urine Culture:  No components found for: CURINE  Blood Culture:  No components found for: CBLOOD, CFUNGUSBL  Sputum Culture:  No components found for: CSPUTUM  No results for input(s): SPECDESC, SPECIAL, CULTURE, STATUS, ORG, CDIFFTOXPCR, CAMPYLOBPCR, SALMONELLAPC, SHIGAPCR, SHIGELLAPCR, MPNEUG, MPNEUM, LACTOQL in the last 72 hours. No results for input(s): SPUTUM, SPECDESC, SPECIAL, CULTURE, STATUS, ORG, CDIFFTOXPCR, MPNEUM, MPNEUG in the last 72 hours. Invalid input(s): [place], CFUNGUSBL     Pathology:    Radiology Reports:  XR CHEST (2 VW)   Final Result   Subsegmental atelectasis lung bases, similar to the previous exam      Bibasilar hypoaeration         XR CHEST PORTABLE   Final Result   Slight improved aeration of  the lung bases. Bibasilar airspace disease   remains         XR CHEST PORTABLE   Final Result   Small bibasilar effusions and adjacent bibasilar infiltrates. Stable right internal jugular central line. XR CHEST PORTABLE   Final Result   1. The endotracheal tube has been removed. 2.  Similar appearance of basilar atelectasis and probable trace effusions. XR CHEST PORTABLE   Final Result   No significant change from prior study. Continued bibasilar opacities and   small effusions. XR CHEST PORTABLE   Final Result   Stable exam.         VL DUP LOWER EXTREMITY VENOUS BILATERAL   Final Result      XR CHEST PORTABLE   Final Result   1. Stable appropriate positions of support apparatus. 2. Stable small bilateral pleural effusions and bibasilar airspace disease. XR CHEST PORTABLE   Final Result   Relatively stable cardiopulmonary status         XR CHEST PORTABLE   Final Result   Stable chest x-ray compared to 04/24/2022. XR CHEST PORTABLE   Final Result   1. Stable support tubes and line. 2. Low lung volumes. Crowding of vessels from low lung volumes results in   apparent opacification at the right cardiophrenic angle. XR CHEST PORTABLE   Final Result   Lines and tubes are unchanged. Low lung volumes. Cardiomegaly. XR CHEST PORTABLE   Final Result   1. Endotracheal tube tip is now 1 cm above the soto. Recommend retraction   by an additional 2.5 cm for optimal positioning. 2.  Improved aeration in the left lung with continued atelectatic/airspace   infiltrates. XR CHEST PORTABLE   Final Result   Endotracheal tube tip is in the right main bronchus and should be retracted   approximately 4-5 cm. Tips of ECMO cannulas project in the expected location of the IVC. Findings were discussed with Alyse Augustin RN (the patient's ICU nurse)   at 2:26 pm on 4/22/2022. XR CHEST (SINGLE VIEW FRONTAL)   Final Result   1. Support tubes and lines are unchanged. 2. Stable bibasilar lung infiltrates, right side greater than left.   This may   be related to atelectasis and/or pleural effusion. Superimposed pneumonia   cannot be excluded. XR CHEST (SINGLE VIEW FRONTAL)   Final Result   Suboptimal position left IJ catheter. Consider removal and replacement or   repositioning. XR CHEST PORTABLE   Final Result   1. On today's exam the endotracheal tube tip is positioned 1.5 cm above the   soto. Recommend retraction by an additional 2 cm for optimal positioning. 2.  Interval development of right lower lobe airspace disease. This could   represent atelectasis         XR CHEST PORTABLE   Final Result   1. Endotracheal and enteric tubes as above. 2. Diffuse interstitial opacities throughout both lungs, right greater than   left. No new focal airspace consolidation. Follow-up is recommended to   document resolution. XR CHEST (SINGLE VIEW FRONTAL)   Final Result   Diffuse interstitial opacities with alveolar/consolidative opacities at the   bases favoring multifocal airspace disease. Echocardiogram:   Results for orders placed during the hospital encounter of 04/18/22    ECHO Complete 2D W Doppler W Color      Summary  Left ventricle is normal in size, global left ventricular systolic function  is preserved, estimated ejection fraction is 50%. There appears to be some apical hypokinesis, in the apical views. Evidence of diastolic dysfunction. Trivial mitral regurgitation. Trivial pulmonic insufficiency. IVC dilated but unable to assess respiratory collapse due to patient on  ventilator.        ASSESSMENT AND PLAN     Assessment:    // Acute hypoxic/hypercapnic respiratory failure due to status asthmaticus  // S/p extubation 4/29, VV ECMO, decannulated 4/30  // Chronic respiratory acidosis  // Rhino/enterovirus infection  // Leukocytosis, improving  // Hyperglycemia  // Elevated troponin  // Morbid obesity  // Anemia, stable  // Pseudomonas in respiratory secretion    Plan:    I personally interviewed/examined the patient; reviewed interval history, interpreted all available radiographic and laboratory data at the time of service.  Patient was extubated on 04/29/2022, decannulated 4/30   Currently on room air   Recommended nocturnal BiPAP   Use supplemental oxygen if needed, to keep oxygen saturation greater than 90%   Remains hemodynamically stable   Continue aspiration precautions, bronchopulmonary hygiene and bronchodilators   Tolerating oral diet   Continue to monitor I/O with a goal of even/negative fluid balance   Stress ulcer prophylaxis-Protonix   Chemical DVT prophylaxis; on eliquis   Antimicrobials reviewed; on meropenem as per ID recommendations   Continue prednisone taper   Physical/occupational therapy   Okay to be discharged from pulmonary standpoint    We will continue to follow. I would like to thank you for allowing me to participate in the care of this patient. Please feel free to call with any further questions or concerns. Julia Bland MD  Pulmonary and Critical Care Medicine           5/5/2022     Please note that this chart was generated using voice recognition Dragon dictation software. Although every effort was made to ensure the accuracy of this automated transcription, some errors in transcription may have occurred.

## 2022-05-05 NOTE — CARE COORDINATION
Spoke to Dashawn at [facility]. They are able to accept. She will start precert.  Pt and mother notified and agreeable with plan    [phone] updated pt's parents on plan

## 2022-05-05 NOTE — PROGRESS NOTES
[facility]  Internal Medicine Teaching Residency Program  Inpatient Daily Progress Note  ______________________________________________________________________________    Patient: Gricel Jo  YOB: 2000   MYD:[id number]    Acct: [de-identified]     Room: 2001/2001-01  Admit date: 4/18/2022  Today's date: 05/05/22  Number of days in the hospital: 17    SUBJECTIVE   Admitting Diagnosis: Acute asthma exacerbation     CC: Shortness of breath    Pt examined at bedside. Chart & results reviewed. Alert and oriented, Hemodynamically stable, On room air. On meropenem for pseudomonas pneumonia. Plan discharged to SNF. Pending placement      ROS:  Constitutional:  negative for chills, fevers, sweats  Respiratory:  negative for cough, dyspnea on exertion, hemoptysis, shortness of breath, wheezing  Cardiovascular:  negative for chest pain, chest pressure/discomfort, lower extremity edema, palpitations  Gastrointestinal:  negative for abdominal pain, constipation, diarrhea, nausea, vomiting  Neurological:  negative for dizziness, headache    BRIEF HISTORY       23 y.o. female with a past medical history of bronchial asthma who was admitted to [facility] with an acute exacerbation causing increasing shortness of breath. Patient was found to be hypoxemic with hypercarbic respiratory failure requiring intubation and mechanical ventilation. Patient was transferred to [facility] for further elevation with possibility of ECMO. On arrival patient was found to be on a bicarb drip due to presumed respiratory acidosis but this was discontinued due to to worsening of the hypercarbia. Additionally patient was started on Nimbex due to the necessity for paralyzation as she was breathing over the vent. Pulmonology and cardiothoracic surgery were consulted- plan is for ECMO.   In the MICU, patient remained stable, had an episode of dark emesis which turned out to be bilious output, Hb remained stable. She was hypertensive, started on lopressor IV 5 mg PRN. Had left IJ placed which was malpositioned, removed and then CVC in left femoral was placed overnight. She was hypoxic, requiring higher FiO2 of 80%, PEEP 16, RR 32, , blood gas showed pH 7.449, pCO2 41.7, pCO2 59. 1. treated with solumedrol, bronchodilators, CXR showed no pneumothorax or pleural effusion. On  overnight, patient became more hypoxic with increasing oxygen requirement, increasing wheezing on exam. It was determined to go with ECMO canulation on . OBJECTIVE     Vital Signs:  BP (!) 141/76   Pulse 120   Temp 98.2 °F (36.8 °C) (Oral)   Resp 24   Ht 5' 3\" (1.6 m)   Wt 277 lb 5.4 oz (125.8 kg)   SpO2 95%   BMI 49.13 kg/m²     Temp (24hrs), Av.2 °F (36.8 °C), Min:97.9 °F (36.6 °C), Max:98.5 °F (36.9 °C)    In: 910   Out: 1450 [Urine:1450]    Physical Exam:  Constitutional: This is a well developed, well nourished, Greater than 40 - Morbid Obesity / Extreme Obesity / Grade III 23y.o. year old female who is alert, oriented, cooperative and in no apparent distress on nasal canula and on ECMO. EENT:  PERRLA. No conjunctival injections. Septum was midline, mucosa was without erythema, exudates or cobblestoning. No thrush was noted. Neck: Supple without thyromegaly. No elevated JVP. Trachea was midline. Respiratory: Chest was symmetrical without dullness to percussion. Breath sounds bilaterally were clear to auscultation. There were no wheezes, rhonchi or rales. There is no intercostal retraction or use of accessory muscles. No egophony noted. Cardiovascular: Regular without murmur, clicks, gallops or rubs. Abdomen: Slightly rounded and soft without organomegaly. No rebound, rigidity or guarding was appreciated. Lymphatic: No lymphadenopathy. Musculoskeletal: Normal curvature of the spine. No gross muscle weakness.    Extremities:  No lower extremity edema, ulcerations, tenderness, varicosities or erythema. Muscle size, tone and strength are normal.  No involuntary movements are noted. Skin:  Warm and dry. Good color, turgor and pigmentation. No lesions or scars.   No cyanosis or clubbing  Neurological/Psychiatric: The patient's general behavior, level of consciousness, thought content and emotional status is normal.        Medications:  Scheduled Medications:    apixaban  10 mg Oral BID    Followed by   Jennie Ricketts ON 5/9/2022] apixaban  5 mg Oral BID    predniSONE  40 mg Oral Daily    pantoprazole  40 mg Oral QAM AC    furosemide  40 mg Oral Daily    hydrocortisone   Topical BID    insulin lispro  0-12 Units SubCUTAneous TID WC    insulin lispro  0-6 Units SubCUTAneous Nightly    meropenem  1,000 mg IntraVENous Q8H    budesonide  0.5 mg Nebulization BID    lidocaine PF  5 mL Tracheal Tube Once    docusate  100 mg Oral BID    polyethylene glycol  17 g Oral Daily    albumin human  25 g IntraVENous Once    dilTIAZem  30 mg Oral 4 times per day    ipratropium-albuterol  1 ampule Inhalation Q4H    sodium chloride flush  5-40 mL IntraVENous 2 times per day     Continuous Infusions:    dextrose      sodium chloride 100 mL/hr at 05/04/22 1403     PRN Medicationstetrahydrozoline, 1 drop, Q4H PRN  heparin (porcine), 10,000 Units, PRN  heparin (porcine), 5,000 Units, PRN  albuterol, 2.5 mg, Q4H PRN  heparin flush (PF), 3 mL, PRN  midazolam, 2 mg, Q2H PRN  metoprolol, 5 mg, Q6H PRN  glucose, 15 g, PRN  dextrose, 12.5 g, PRN  dextrose, 100 mL/hr, PRN  sodium chloride flush, 5-40 mL, PRN  sodium chloride, , PRN  ondansetron, 4 mg, Q8H PRN   Or  ondansetron, 4 mg, Q6H PRN  acetaminophen, 650 mg, Q6H PRN   Or  acetaminophen, 650 mg, Q6H PRN        Diagnostic Labs:  CBC:   Recent Labs     05/03/22  1029 05/04/22  0244 05/05/22  0808   WBC 22.2* 21.0* 16.2*   RBC 3.49* 3.42* 3.39*   HGB 9.8* 9.7* 9.6*   HCT 30.8* 29.7* 29.5*   MCV 88.3 86.8 87.0   RDW 16.4* 16.6* 16.6*    162 198     BMP:   Recent Labs     05/03/22  1029 05/04/22  0244 05/05/22  0808   * 136 136   K 3.9 4.3 4.2    103 102   CO2 23 24 24   BUN 24* 18 18   CREATININE 0.46* 0.37* 0.37*     BNP: No results for input(s): BNP in the last 72 hours. PT/INR:   No results for input(s): PROTIME, INR in the last 72 hours. APTT:   No results for input(s): APTT in the last 72 hours. CARDIAC ENZYMES: No results for input(s): CKMB, CKMBINDEX, TROPONINI in the last 72 hours. Invalid input(s): CKTOTAL;3  FASTING LIPID PANEL:  Lab Results   Component Value Date    TRIG 96 04/29/2022     LIVER PROFILE:   No results for input(s): AST, ALT, ALB, BILIDIR, BILITOT, ALKPHOS in the last 72 hours. MICROBIOLOGY:   Lab Results   Component Value Date/Time    CULTURE Laura albicans/dubliniensis >[id number] CFU/ML 04/29/2022 04:44 PM       Imaging:    XR CHEST PORTABLE    Result Date: 4/29/2022  1. Stable appropriate positions of support apparatus. 2. Stable small bilateral pleural effusions and bibasilar airspace disease. XR CHEST PORTABLE    Result Date: 4/29/2022  No significant change from prior study. Continued bibasilar opacities and small effusions. XR CHEST PORTABLE    Result Date: 4/28/2022  Stable exam.     XR CHEST PORTABLE    Result Date: 4/26/2022  Relatively stable cardiopulmonary status     XR CHEST PORTABLE    Result Date: 4/25/2022  Stable chest x-ray compared to 04/24/2022. XR CHEST PORTABLE    Result Date: 4/24/2022  1. Stable support tubes and line. 2. Low lung volumes. Crowding of vessels from low lung volumes results in apparent opacification at the right cardiophrenic angle.        ASSESSMENT & PLAN     ASSESSMENT / PLAN:     Principal Problem:    Acute asthma exacerbation  Active Problems:    Severe persistent asthma with status asthmaticus    CHIDI (obstructive sleep apnea)    Morbid obesity with BMI of 50.0-59.9, adult (HCC)    History of seizures    Hypercapnic respiratory failure ([address]    Endotracheally intubated    On mechanically assisted ventilation (HCC)    Diastolic dysfunction  Resolved Problems:    * No resolved hospital problems. *    1. Acute hypoxic hypercapnic respiratory failure on mechanical ventilation and s/p ECMO cannulation on 4/22/22 secondary asthma exacerbation: Continue prednisone 40mg po daily. Respiratory cultures positive for Pseudomonas - on Meropenem 1 gram daily per ID. Post Cefepime, patient developed a rash. Duoneb q4 hours and Albuterol as needed. Continue Lasix 40mg daily, diuresing well. Pulm. Critical following. Recommendations appreciated    2. Troponin elevation type II demand ischemia: Continue Cardizem 30 mg q6 hours per cardiology. 3. Sinus tachycardia:- Controlled Continue Cardizem 30 mg q6 hours per Cardiology. PRN Metoprolol 5 mg IV for HR >110; hold for SBP<110     4. Steroid induced hyperglycemia: Medium dose sliding scale q4 hours; Hypoglycemia protocol, monitor POCT glucose every 4 hours     5. Acute femoral vein DVT: On Eliquis. Diet: adult diet  DVT ppx : eliquis  GI ppx: Protonix      PT/OT: Consulted  Discharge Planning / SW:      Anna Morales MD  Internal Medicine Resident, PGY-1  [address], [place]  5/5/2022, 12:02 PM

## 2022-05-05 NOTE — DISCHARGE INSTR - COC
Continuity of Care Form    Patient Name: Zulay Bethea   :  2000  MRN:  [id number]    Admit date:  2022  Discharge date:  ***    Code Status Order: Full Code   Advance Directives:      Admitting Physician:  Joy Glasgow MD  PCP: Woodie Goodell, MD    Discharging Nurse: [facility] Unit/Room#:   Discharging Unit Phone Number: ***    Emergency Contact:   Extended Emergency Contact Information  Primary Emergency Contact: [address] Phone: [phone]  Relation: Other   needed? No  Secondary Emergency Contact: Hao Cochran  Mobile Phone: [phone]  Relation: Parent    Past Surgical History:  Past Surgical History:   Procedure Laterality Date    EXTRACORPOREAL CIRCULATION N/A 2022    EXTRA CORPOREAL MEMBRANE OXYGENATION 23 ON THE RIGHT 25 LEFT FEMORALS performed by Madelaine Thomson MD at [address]       Immunization History: There is no immunization history on file for this patient.     Active Problems:  Patient Active Problem List   Diagnosis Code    Acute asthma exacerbation J45.901    History of seizures Z87.898    Hypercapnic respiratory failure (HCC) J96.92    Endotracheally intubated Z97.8    On mechanically assisted ventilation (HCC) J01.35    Diastolic dysfunction W79.13    Severe persistent asthma with status asthmaticus J45.52    CHIDI (obstructive sleep apnea) G47.33    Morbid obesity with BMI of 50.0-59.9, adult ([facility] [address] E66.01, Z68.43       Isolation/Infection:   Isolation            No Isolation          Patient Infection Status       Infection Onset Added Last Indicated Last Indicated By Review Planned Expiration Resolved Resolved By    None active    Resolved    COVID-19 (Rule Out) 22 Respiratory Panel, Molecular, with COVID-19 (Restricted: peds pts or suitable admitted adults) (Ordered)   22 Rule-Out Test Resulted            Nurse Assessment:  Last Vital Signs: BP (!) 152/70   Pulse 112   Temp 98 °F (36.7 °C) (Oral)   Resp 26   Ht 5' 3\" (1.6 m)   Wt 277 lb 5.4 oz (125.8 kg)   SpO2 94%   BMI 49.13 kg/m²     Last documented pain score (0-10 scale): Pain Level: 0  Last Weight:   Wt Readings from Last 1 Encounters:   05/05/22 277 lb 5.4 oz (125.8 kg)     Mental Status:  {IP PT MENTAL STATUS:20030}    IV Access:  { JARROD IV ACCESS:895795111}    Nursing Mobility/ADLs:  Walking   {CHP DME GREO:165160447}  Transfer  {CHP DME PAOT:632223081}  Bathing  {CHP DME PKPI:476465293}  Dressing  {CHP DME USVN:833478305}  Toileting  {CHP DME ALSE:288799986}  Feeding  {CHP DME MVPR:477812216}  Med Admin  {CHP DME LXGV:802381036}  Med Delivery   { JARROD MED Delivery:125428636}    Wound Care Documentation and Therapy:  Wound 04/27/22 Buttocks Proximal 2mm skin tear noted at the top of gluteal fold/between buttocks (Active)   Wound Etiology Skin Tear 05/09/22 0800   Dressing Status Other (Comment) 05/01/22 0400   Wound Cleansed Soap and water 05/09/22 0800   Dressing/Treatment Barrier film 05/09/22 0800   Wound Length (cm) 0.2 cm 04/28/22 0800   Wound Assessment Dry 05/09/22 0800   Drainage Amount None 05/09/22 0800   Odor None 05/09/22 0800   Lyubov-wound Assessment Intact 05/09/22 0800   Number of days: 12       Wound 05/07/22 Groin Left (Active)   Wound Image   05/09/22 1050   Wound Etiology Other 05/09/22 1050   Dressing Status New dressing applied 05/09/22 1050   Wound Cleansed Cleansed with saline 05/09/22 1050   Dressing/Treatment Hydrofiber Ag;ABD 05/09/22 1050   Dressing Change Due 05/09/22 05/09/22 1050   Wound Length (cm) 2 cm 05/09/22 1050   Wound Width (cm) 5 cm 05/09/22 1050   Wound Depth (cm) 0.3 cm 05/09/22 1050   Wound Surface Area (cm^2) 10 cm^2 05/09/22 1050   Wound Volume (cm^3) 3 cm^3 05/09/22 1050   Wound Assessment Subcutaneous;Pink/red;Fibrinous 05/09/22 1050   Drainage Amount Small 05/09/22 1050   Drainage Description Serosanguinous 05/09/22 1050   Odor None 05/09/22 1050   Lyubov-wound Assessment Intact 05/09/22 1050   Number of days: 2       Wound 05/07/22 Groin Right (Active)   Wound Image   05/09/22 1050   Wound Etiology Other 05/09/22 1050   Dressing Status New dressing applied 05/09/22 1050   Wound Cleansed Irrigated with saline 05/09/22 1050   Dressing/Treatment Hydrofiber Ag;ABD 05/09/22 1050   Dressing Change Due 05/09/22 05/09/22 1050   Wound Length (cm) 3.5 cm 05/09/22 1050   Wound Width (cm) 6.2 cm 05/09/22 1050   Wound Depth (cm) 0.3 cm 05/09/22 1050   Wound Surface Area (cm^2) 21.7 cm^2 05/09/22 1050   Wound Volume (cm^3) 6.51 cm^3 05/09/22 1050   Wound Assessment Subcutaneous; Fibrinous;Pink/red 05/09/22 1050   Drainage Amount Small 05/09/22 1050   Drainage Description Serosanguinous 05/09/22 1050   Odor None 05/09/22 1050   Lyubov-wound Assessment Intact 05/09/22 1050   Number of days: 2       Plan of Care: Shower daily to cleanse the groin wounds vs use foam cleanser and warm water. Pat dry  Cover each wound with a strip of [employer] Ag or similar, cover this and separate the skin fold with dry dressing. Change daily. Elimination:  Continence: Bowel: {YES / RE:92963}  Bladder: {YES / WX:23867}  Urinary Catheter: {Urinary Catheter:104895382}   Colostomy/Ileostomy/Ileal Conduit: {YES / EY:75581}       Date of Last BM: ***  No intake or output data in the 24 hours ending 05/09/22 1348    No intake/output data recorded.     Safety Concerns:     [employer] Safety Concerns:649309701}    Impairments/Disabilities:      [employer] Impairments/Disabilities:499489672}    Nutrition Therapy:  Current Nutrition Therapy:   [employer] Diet List:312273295}    Routes of Feeding: {CHP DME Other Feedings:177409663}  Liquids: {Slp liquid thickness:68367}  Daily Fluid Restriction: {CHP DME Yes amt example:453162461}  Last Modified Barium Swallow with Video (Video Swallowing Test): {Done Not Done [facility]:357441846}    Treatments at the Time of Hospital Discharge:   Respiratory Treatments: ***  Oxygen Therapy:  {Therapy; copd oxygen:94381}  Ventilator:    { CC Vent TCER:741835208}    Rehab Therapies: {THERAPEUTIC INTERVENTION:5523441458}  Weight Bearing Status/Restrictions: [address] [employer] CC Weight Bearin}  Other Medical Equipment (for information only, NOT a DME order):  {EQUIPMENT:198230225}  Other Treatments: ***    Patient's personal belongings (please select all that are sent with patient):  {CHP DME Belongings:092573681}    RN SIGNATURE:  {Esignature:074694501}    CASE MANAGEMENT/SOCIAL WORK SECTION    Inpatient Status Date: ***    Readmission Risk Assessment Score:  Readmission Risk              Risk of Unplanned Readmission:  20           Discharging to Facility/ Agency   Name: [facility]  Address:   [place] [address] [address] [place], [place] [address]         Phone: [phone]        Phone:  Fax:    Dialysis Facility (if applicable)   Name:  Address:  Dialysis Schedule:  Phone:  Fax:    / signature: Electronically signed by Winston Salvador RN on 22 at 3:28 PM EDT    PHYSICIAN SECTION    Prognosis: Fair    Condition at Discharge: Stable    Rehab Potential (if transferring to Rehab): Fair    Recommended Labs or Other Treatments After Discharge: You were admitted and managed for severe asthma exacerbation requiring ECMO (external oxygenation of your blood). You were also found to have pneumonia from pseudomonas infection and you completed antibiotic meropenem  You also developed blood clots in your femoral vein and we have you on Eliquis  During your admission you had an allergic rash to cefepime, we recommend you avoid penicillins. Please follow-up with the heart doctors in 2 weeks about your fast heart rate, for which we have you on Cardizem tablet. Please follow-up with the lung doctors for optimization of your asthma management and prevention of exacerbations  Please follow-up with your PCP  Please take prescribed Eliquis daily as treatment for your blood clots and to prevent clots migrating to the lungs. Please take your medications as prescribed  Continue rehab to regain your strength. Physician Certification: I certify the above information and transfer of Irene Lebron  is necessary for the continuing treatment of the diagnosis listed and that she requires Home care for greater 30 days.      Update Admission H&P: No change in H&P    PHYSICIAN SIGNATURE:  Electronically signed by Soo Herrera MD on 5/5/22 at 8:32 AM EDT

## 2022-05-05 NOTE — PROGRESS NOTES
On call for  notified the following via perfect serve: (spoke with Mason Kaye)    FYI: Patient had witnessed fall onto floor. Patient was trying to get out of the chair by herself when staff walked in and saw her fall. No injuries. Patient did not hit her head.  Nikko Hill, HALIE notified of fall.      Electronically signed by Madhuri Crenshaw RN on 5/5/2022 at 12:25 PM

## 2022-05-05 NOTE — PROGRESS NOTES
Physical Therapy  Facility/Department: [facility]  Physical Therapy Daily Treatment Note    Name: Ashley Wasserman  : 2000  MRN: [id number]  Date of Service: 2022    Discharge Recommendations:  Further therapy recommended at discharge. The patient should be able to tolerate at least 3 hours of therapy per day over 5 days or 15 hours over 7 days. This patient may benefit from a Physical Medicine and Rehab consult. PT Equipment Recommendations  Equipment Needed: Yes  Mobility Devices: Rylee Parker: Rolling  Other: CTA, pt will likely need RW but currently requires significant physical assistance for all aspects of mobility      Patient Diagnosis(es): The encounter diagnosis was CHIDI (obstructive sleep apnea). Past Medical History:  has no past medical history on file. Past Surgical History:  has a past surgical history that includes extracorporeal circulation (N/A, 2022). Assessment   Body Structures, Functions, Activity Limitations Requiring Skilled Therapeutic Intervention: Decreased functional mobility ; Decreased strength;Decreased endurance;Decreased sensation  Assessment: Pt required Khadar to modA for bed mobility, able to stand with SS modA initially but progressed to Khadar for STS with repetition and able to progress to STS with RW from [address] [place] [address]. Unable to safely attempt amb this date secondary to pt's B LE fatigue and decreased endurance as pt was only able to maintain standing ~10-15 secs  with each trial. Pt would benefit from intensive PT to address generalized weakness and endurance to progress toward PLOF  Therapy Prognosis: Good  Requires PT Follow-Up: Yes  Activity Tolerance  Activity Tolerance: Patient limited by endurance; Patient limited by fatigue;Patient tolerated treatment well     Plan   Plan  Plan:  (5-6x/wk)  Current Treatment Recommendations: Strengthening,Balance training,Functional mobility training,Transfer training,Endurance training,Gait training,Stair training,Safety education & training,Patient/Caregiver education & training,Home exercise program,Equipment evaluation, education, & procurement  Safety Devices  Type of Devices: Call light within reach,Nurse notified,Gait belt,Patient at risk for falls,Chair alarm in place,Left in chair  Restraints  Restraints Initially in Place: No     Restrictions  Restrictions/Precautions  Restrictions/Precautions: Fall Risk,General Precautions  Required Braces or Orthoses?: No  Position Activity Restriction  Other position/activity restrictions: asthma exacerbation 4/18, ECMO started 4/22, bronchoscopy 4/24, RLE DVT 4/27, extubated 4/29, ECMO ended 4/30,     Subjective   Pain: pt c/o pain in L hip, does not rate. Pain does not limit tx  General  Chart Reviewed: Yes  Response To Previous Treatment: Patient with no complaints from previous session. Family / Caregiver Present: Yes (SO)  Follows Commands: Within Functional Limits  General Comment  Comments: Pt left in recliner with call light within reach, alarm activated and lift pad left under pt for dependent transfer back to bed if needed. RN aware  Subjective  Subjective: Pt and RN agreeable to PT. Pt alert in bed upon arrival, pleasant and cooperative with treatment. Pt very motivated to participate but expressed frustration re: current medical issues.  Easily redirected to therapy        Cognition   Orientation  Overall Orientation Status: Within Normal Limits  Cognition  Overall Cognitive Status: WFL     Objective   Pulse: 125  Heart Rate Source: Telemetry  BP: (!) 122/58  BP Location: Right upper arm  BP Method: Automatic  Patient Position: Up in chair  MAP (Calculated): 79.33  Resp: 25  SpO2: 95 %  O2 Device: None (Room air)     Observation/Palpation  Posture: Good     Bed mobility  Rolling to Right: Minimal assistance  Supine to Sit: Moderate assistance  Sit to Supine:  (pt left in recliner)  Scooting: Minimal assistance  Bed Mobility Comments: hand held assist; use of bed rail; HOB elevated; increased time/effort  Transfers  Sit to Stand: Moderate Assistance;Minimal Assistance  Stand to sit: Minimal Assistance  Bed to Chair: Dependent/Total (with use of abner stedy)  Comment: Pt initially required ModA fot STS from EOB with SS, progressed to Min A from seat of abner stedy. Pt then completed 3 STS transfers from recliner to RW requiring Darian and vc's for UE placement. Pt able to maintian standing ~10-15 secs each trial before sitting without warning secondary to B LE fatigue and decreased endurance     Balance  Posture: Good  Sitting - Static: Good;-  Sitting - Dynamic: Fair;+  Standing - Static: Fair  Standing - Dynamic: Fair;-  Comments: RW used while assessing standing balance  A/AROM Exercises:   Supine Exercises: Ankle Pumps, Gluteal sets, Quad Sets, SLR. Reps: 10x   Seated LE exercise program: Long Arc Quads, hip abduction/adduction, heel/toe raises, and marches.  Reps: 20x   IS to 1000 mL x5 reps     AM-PAC Score  AM-PAC Inpatient Mobility Raw Score : 10 (05/05/22 1020)  AM-PAC Inpatient T-Scale Score : 32.29 (05/05/22 1020)  Mobility Inpatient CMS 0-100% Score: 76.75 (05/05/22 1020)  Mobility Inpatient CMS G-Code Modifier : CL (05/05/22 1020)    Goals  Short Term Goals  Time Frame for Short term goals: 14 visits  Short term goal 1: Pt will be Darian bed mobility  Short term goal 2: Pt will be Darian transfers  Short term goal 3: Pt will amb 50' RW CGA  Short term goal 4: Pt will navigate 4 steps CGA       Therapy Time   Individual Concurrent Group Co-treatment   Time In 0681         Time Out 0936         Minutes 41         Timed Code Treatment Minutes: [place], [place]

## 2022-05-06 LAB
ANION GAP SERPL CALCULATED.3IONS-SCNC: 8 MMOL/L (ref 9–17)
BUN BLDV-MCNC: 16 MG/DL (ref 6–20)
CALCIUM SERPL-MCNC: 8 MG/DL (ref 8.6–10.4)
CHLORIDE BLD-SCNC: 101 MMOL/L (ref 98–107)
CO2: 25 MMOL/L (ref 20–31)
CREAT SERPL-MCNC: 0.33 MG/DL (ref 0.5–0.9)
GFR AFRICAN AMERICAN: >60 ML/MIN
GFR NON-AFRICAN AMERICAN: >60 ML/MIN
GFR SERPL CREATININE-BSD FRML MDRD: ABNORMAL ML/MIN/{1.73_M2}
GLUCOSE BLD-MCNC: 121 MG/DL (ref 65–105)
GLUCOSE BLD-MCNC: 139 MG/DL (ref 65–105)
GLUCOSE BLD-MCNC: 83 MG/DL (ref 65–105)
GLUCOSE BLD-MCNC: 93 MG/DL (ref 70–99)
HCT VFR BLD CALC: 26 % (ref 36.3–47.1)
HEMOGLOBIN: 9.1 G/DL (ref 11.9–15.1)
MCH RBC QN AUTO: 31.4 PG (ref 25.2–33.5)
MCHC RBC AUTO-ENTMCNC: 35 G/DL (ref 28.4–34.8)
MCV RBC AUTO: 89.7 FL (ref 82.6–102.9)
NRBC AUTOMATED: 0 PER 100 WBC
PDW BLD-RTO: 16.7 % (ref 11.8–14.4)
PLATELET # BLD: 459 K/UL (ref 138–453)
PMV BLD AUTO: 10.3 FL (ref 8.1–13.5)
POTASSIUM SERPL-SCNC: 3.5 MMOL/L (ref 3.7–5.3)
RBC # BLD: 2.9 M/UL (ref 3.95–5.11)
SODIUM BLD-SCNC: 134 MMOL/L (ref 135–144)
WBC # BLD: 13.7 K/UL (ref 3.5–11.3)

## 2022-05-06 PROCEDURE — 36415 COLL VENOUS BLD VENIPUNCTURE: CPT

## 2022-05-06 PROCEDURE — 85027 COMPLETE CBC AUTOMATED: CPT

## 2022-05-06 PROCEDURE — 6360000002 HC RX W HCPCS: Performed by: INTERNAL MEDICINE

## 2022-05-06 PROCEDURE — 97110 THERAPEUTIC EXERCISES: CPT

## 2022-05-06 PROCEDURE — 2060000000 HC ICU INTERMEDIATE R&B

## 2022-05-06 PROCEDURE — 97530 THERAPEUTIC ACTIVITIES: CPT

## 2022-05-06 PROCEDURE — 6370000000 HC RX 637 (ALT 250 FOR IP): Performed by: STUDENT IN AN ORGANIZED HEALTH CARE EDUCATION/TRAINING PROGRAM

## 2022-05-06 PROCEDURE — 2580000003 HC RX 258: Performed by: INTERNAL MEDICINE

## 2022-05-06 PROCEDURE — 99232 SBSQ HOSP IP/OBS MODERATE 35: CPT | Performed by: INTERNAL MEDICINE

## 2022-05-06 PROCEDURE — 6370000000 HC RX 637 (ALT 250 FOR IP): Performed by: NURSE PRACTITIONER

## 2022-05-06 PROCEDURE — 6370000000 HC RX 637 (ALT 250 FOR IP): Performed by: INTERNAL MEDICINE

## 2022-05-06 PROCEDURE — 97535 SELF CARE MNGMENT TRAINING: CPT

## 2022-05-06 PROCEDURE — 82947 ASSAY GLUCOSE BLOOD QUANT: CPT

## 2022-05-06 PROCEDURE — 94640 AIRWAY INHALATION TREATMENT: CPT

## 2022-05-06 PROCEDURE — 51798 US URINE CAPACITY MEASURE: CPT

## 2022-05-06 PROCEDURE — 80048 BASIC METABOLIC PNL TOTAL CA: CPT

## 2022-05-06 PROCEDURE — 99233 SBSQ HOSP IP/OBS HIGH 50: CPT | Performed by: INTERNAL MEDICINE

## 2022-05-06 RX ADMIN — PANTOPRAZOLE SODIUM 40 MG: 40 TABLET, DELAYED RELEASE ORAL at 06:04

## 2022-05-06 RX ADMIN — BUDESONIDE 500 MCG: 0.5 INHALANT RESPIRATORY (INHALATION) at 09:24

## 2022-05-06 RX ADMIN — DILTIAZEM HYDROCHLORIDE 30 MG: 30 TABLET ORAL at 18:16

## 2022-05-06 RX ADMIN — MEROPENEM 1000 MG: 1 INJECTION, POWDER, FOR SOLUTION INTRAVENOUS at 13:44

## 2022-05-06 RX ADMIN — APIXABAN 10 MG: 5 TABLET, FILM COATED ORAL at 09:24

## 2022-05-06 RX ADMIN — PREDNISONE 30 MG: 20 TABLET ORAL at 09:23

## 2022-05-06 RX ADMIN — MEROPENEM 1000 MG: 1 INJECTION, POWDER, FOR SOLUTION INTRAVENOUS at 05:55

## 2022-05-06 RX ADMIN — FUROSEMIDE 40 MG: 40 TABLET ORAL at 09:24

## 2022-05-06 RX ADMIN — HYDROCORTISONE: 10 CREAM TOPICAL at 09:27

## 2022-05-06 RX ADMIN — IPRATROPIUM BROMIDE AND ALBUTEROL SULFATE 1 AMPULE: .5; 3 SOLUTION RESPIRATORY (INHALATION) at 19:32

## 2022-05-06 RX ADMIN — HYDROCORTISONE: 10 CREAM TOPICAL at 23:14

## 2022-05-06 RX ADMIN — IPRATROPIUM BROMIDE AND ALBUTEROL SULFATE 1 AMPULE: .5; 3 SOLUTION RESPIRATORY (INHALATION) at 16:54

## 2022-05-06 RX ADMIN — SODIUM CHLORIDE, PRESERVATIVE FREE 10 ML: 5 INJECTION INTRAVENOUS at 23:13

## 2022-05-06 RX ADMIN — DILTIAZEM HYDROCHLORIDE 30 MG: 30 TABLET ORAL at 11:03

## 2022-05-06 RX ADMIN — SODIUM CHLORIDE, PRESERVATIVE FREE 10 ML: 5 INJECTION INTRAVENOUS at 09:30

## 2022-05-06 RX ADMIN — IPRATROPIUM BROMIDE AND ALBUTEROL SULFATE 1 AMPULE: .5; 3 SOLUTION RESPIRATORY (INHALATION) at 09:20

## 2022-05-06 RX ADMIN — DILTIAZEM HYDROCHLORIDE 30 MG: 30 TABLET ORAL at 06:04

## 2022-05-06 RX ADMIN — BUDESONIDE 500 MCG: 0.5 INHALANT RESPIRATORY (INHALATION) at 19:32

## 2022-05-06 ASSESSMENT — PAIN SCALES - GENERAL
PAINLEVEL_OUTOF10: 0
PAINLEVEL_OUTOF10: 0

## 2022-05-06 ASSESSMENT — PAIN SCALES - WONG BAKER
WONGBAKER_NUMERICALRESPONSE: 0
WONGBAKER_NUMERICALRESPONSE: 0

## 2022-05-06 NOTE — PROGRESS NOTES
Comprehensive Nutrition Assessment    Type and Reason for Visit:  Reassess    Nutrition Recommendations/Plan:   1. Continue current diet. Encourage/monitor PO intakes as tolerated. Monitor need for oral supplements. 2. Monitor labs, weights, and plan of care. Malnutrition Assessment:  Malnutrition Status: At risk for malnutrition (Comment) (05/06/22 1410)    Context:  Acute Illness     Findings of the 6 clinical characteristics of malnutrition:  Energy Intake:  Mild decrease in energy intake - Improving with start of oral diet. Weight Loss:  Weight fluctuations noted since admission - ? due to fluids. Body Fat Loss:  No significant body fat loss   Muscle Mass Loss:  No significant muscle mass loss  Fluid Accumulation:  Mild Extremities   Strength:  Not Performed    Nutrition Assessment:    Pt reports she has been eating at least 50% of meals. Noted per chart, pt has also been taking fluids well. Last BM 5/6. Weight fluctuations noted - ? due to fluids. Labs reviewed: Na 134 mmol/L, K 3.5 mmol/L. Meds include: Prednisone, Lasix, Antibiotics. Nutrition Related Findings:    Labs/Meds reviewed. +1 facial edema. Last BM 5/6. Wound Type: Skin Tears (to buttocks)       Current Nutrition Intake & Therapies:    Average Meal Intake: 51-75%  Average Supplements Intake: None Ordered  ADULT DIET; Regular    Anthropometric Measures:  Height: 5' 3\" (160 cm)  Ideal Body Weight (IBW): 115 lbs (52 kg)    Admission Body Weight: 293 lb 10.4 oz (133.2 kg)  Current Body Weight: 277 lb 5.4 oz (125.8 kg), 241.2 % IBW.  Weight Source: Bed Scale  Current BMI (kg/m2): 49.1                          BMI Categories: Obese Class 3 (BMI 40.0 or greater)    Estimated Daily Nutrient Needs:  Energy Requirements Based On: Formula  Weight Used for Energy Requirements: Current  Energy (kcal/day): 2000 kcals/day  Weight Used for Protein Requirements: Ideal  Protein (g/day): 100 g pro/day  Method Used for Fluid Requirements: ml/Kg  Fluid (ml/day): per MD    Nutrition Diagnosis:   · Inadequate oral intake related to  (recent extubation; current condition) as evidenced by intake 51-75%    Nutrition Interventions:   Food and/or Nutrient Delivery: Continue Current Diet  Nutrition Education/Counseling: No recommendation at this time  Coordination of Nutrition Care: Continue to monitor while inpatient       Goals:  Previous Goal Met: Progressing toward Goal(s)  Goals: Meet at least 75% of estimated needs,prior to discharge       Nutrition Monitoring and Evaluation:   Behavioral-Environmental Outcomes: None Identified  Food/Nutrient Intake Outcomes: Food and Nutrient Intake  Physical Signs/Symptoms Outcomes: Biochemical Data,GI Status,Nutrition Focused Physical Findings,Skin,Weight,Fluid Status or Edema    Discharge Planning:    Continue current diet     Erin Menendez, [address], [place]  Contact: [phone]

## 2022-05-06 NOTE — PLAN OF CARE
Problem: Gas Exchange - Impaired:  Goal: Levels of oxygenation will improve  Description: Levels of oxygenation will improve  5/6/2022 1159 by Tawny Bright RN  Outcome: Progressing  5/6/2022 0414 by Saul Barry RN  Outcome: Progressing     Problem:  Activity:  Goal: Ability to tolerate increased activity will improve  Description: Ability to tolerate increased activity will improve  5/6/2022 1159 by Tawny Bright RN  Outcome: Progressing  5/6/2022 0414 by Saul Barry RN  Outcome: Progressing     Problem: Cardiac:  Goal: Hemodynamic stability will improve  Description: Hemodynamic stability will improve  5/6/2022 1159 by Tawny Bright RN  Outcome: Progressing  5/6/2022 0414 by Saul Barry RN  Outcome: Progressing     Problem: Respiratory:  Goal: Ability to maintain a clear airway will improve  Description: Ability to maintain a clear airway will improve  5/6/2022 1159 by Tawny Bright RN  Outcome: Progressing  5/6/2022 0414 by Saul Barry RN  Outcome: Progressing  Goal: Ability to maintain adequate ventilation will improve  Description: Ability to maintain adequate ventilation will improve  5/6/2022 1159 by Tawny Bright RN  Outcome: Progressing  5/6/2022 0414 by Saul Barry RN  Outcome: Progressing  Goal: Complications related to the disease process, condition or treatment will be avoided or minimized  Description: Complications related to the disease process, condition or treatment will be avoided or minimized  5/6/2022 1159 by Tawny Bright RN  Outcome: Progressing  5/6/2022 0414 by Saul Barry RN  Outcome: Progressing  Intervention: Provide oral hygiene  Recent Flowsheet Documentation  Taken 5/6/2022 0800 by Emma Whitfield RN  Oral Care Performed: Teeth brushed     Problem: Skin Integrity:  Goal: Risk for impaired skin integrity will decrease  Description: Risk for impaired skin integrity will decrease  5/6/2022 1159 by Tawny Bright RN  Outcome: Progressing  5/6/2022 0414 by Martin Aguilar RN  Outcome: Progressing     Problem: OXYGENATION/RESPIRATORY FUNCTION  Goal: Patient will maintain patent airway  5/6/2022 1159 by Sarah Arauz, RN  Outcome: Progressing  5/6/2022 0414 by Martin Aguilar RN  Outcome: Progressing  Goal: Patient will achieve/maintain normal respiratory rate/effort  Description: Respiratory rate and effort will be within normal limits for the patient  5/6/2022 1159 by Sarah Arauz, RN  Outcome: Progressing  5/6/2022 0414 by Martin Aguilar RN  Outcome: Progressing     Problem: Nutrition  Goal: Optimal nutrition therapy  5/6/2022 1159 by Sarah Arauz, RN  Outcome: Progressing  5/6/2022 0414 by Martin Aguilar RN  Outcome: Progressing     Problem: Discharge Planning  Goal: Discharge to home or other facility with appropriate resources  5/6/2022 1159 by Sarah Arauz, RN  Outcome: Progressing  5/6/2022 0414 by Martin Aguilar RN  Outcome: Progressing     Problem: Respiratory - Adult  Goal: Achieves optimal ventilation and oxygenation  5/6/2022 1159 by Sarah Arauz, RN  Outcome: Progressing  5/6/2022 0414 by Martin Aguilar RN  Outcome: Progressing     Problem: Genitourinary - Adult  Goal: Urinary catheter remains patent  5/6/2022 1159 by Sarah Arauz, RN  Outcome: Progressing  5/6/2022 0414 by Martin Aguilar RN  Outcome: Progressing     Problem: Neurosensory - Adult  Goal: Achieves stable or improved neurological status  5/6/2022 1159 by Sarah Arauz, RN  Outcome: Progressing  5/6/2022 0414 by Martin Aguilar RN  Outcome: Progressing  Goal: Achieves maximal functionality and self care  5/6/2022 1159 by Sarah Arauz, RN  Outcome: Progressing  5/6/2022 0414 by Martin Aguilar RN  Outcome: Progressing     Problem: Pain  Goal: Verbalizes/displays adequate comfort level or baseline comfort level  5/6/2022 1159 by Sarah Arauz, RN  Outcome: Progressing  5/6/2022 0414 by Martin Aguilar RN  Outcome: Progressing     Problem: Safety - Adult  Goal: Free from fall injury  5/6/2022 1159 by Sukhdev Lennon RN  Outcome: Progressing  5/6/2022 0414 by Shannon Castillo RN  Outcome: Progressing  Flowsheets (Taken 5/5/2022 2132)  Free From Fall Injury: Instruct family/caregiver on patient safety     Problem: ABCDS Injury Assessment  Goal: Absence of physical injury  5/6/2022 1159 by Sukhdev Lennon RN  Outcome: Progressing  5/6/2022 0414 by Shannon Castillo RN  Outcome: Progressing     Problem: Skin/Tissue Integrity  Goal: Absence of new skin breakdown  Description: 1. Monitor for areas of redness and/or skin breakdown  2. Assess vascular access sites hourly  3. Every 4-6 hours minimum:  Change oxygen saturation probe site  4. Every 4-6 hours:  If on nasal continuous positive airway pressure, respiratory therapy assess nares and determine need for appliance change or resting period.   5/6/2022 1159 by Sukhdev Lennon RN  Outcome: Progressing  5/6/2022 0414 by Shannon Castillo RN  Outcome: Progressing

## 2022-05-06 NOTE — PROGRESS NOTES
PULMONARY & CRITICAL CARE MEDICINE PROGRESS NOTE     Patient:  Marbella Portillo  MRN: [id number]  [facility] date: 2022  Primary Care Physician: Juanjo Rios MD  Consulting Physician: Teresa Hyde MD  CODE Status: Full Code  LOS: 18     SUBJECTIVE     CHIEF COMPLAINT/REASON FOR INITIAL CONSULT: Acute hypercapnic respiratory failure    BRIEF HOSPITAL COURSE:   The patient is a 23 y.o. female with past medical history of asthma was initially admitted to [facility] with acute exacerbation. She was initially put on nonrebreather, subsequently required intubation and initiation of mechanical ventilation due to worsening respiratory status. Patient was also on bicarb drip which was discontinued on arrival to [facility].  She is sedated, paralyzed and mechanically ventilated. ABG shows improvement in respiratory acidosis and oxygenation. INTERVAL HISTORY:  22  Patient extubated , decannulated   Afebrile, hemodynamically stable  Currently on room air  On meropenem, oral prednisone  No overnight issues reported    OBJECTIVE     VITAL SIGNS:   LAST:  /63   Pulse 103   Temp 97.9 °F (36.6 °C) (Oral)   Resp 21   Ht 5' 3\" (1.6 m)   Wt 277 lb 5.4 oz (125.8 kg)   SpO2 98%   BMI 49.13 kg/m²   8-24 HR RANGE:  TEMP Temp  Av.1 °F (36.7 °C)  Min: 97.9 °F (36.6 °C)  Max: 98.2 °F (55.8 °C)   BP Systolic (78FME), IGH:196 , Min:118 , GRM:647      Diastolic (62HWM), QPR:03, Min:43, Max:83     PULSE Pulse  Av.4  Min: 88  Max: 108   RR Resp  Av  Min: 21  Max: 21   O2 SAT SpO2  Av %  Min: 98 %  Max: 98 %   OXYGEN DELIVERY No data recorded        Physical exam  General: Awake and alert, morbidly obese  HENT: Oral mucosa moist small oropharynx.     Eyes: Pupils equally reactive nonicteric sclera  Neck: Short thick neck: No subcutaneous emphysema  Chest/lung: Bilateral air entry is distant no expiratory wheezing and no rhonchi no crackles  Cardiovascular: S1-S2 is regular slightly diminished heart sounds no murmur  Abdomen: Soft obese nontender nondistended nonrigid abdomen  Lower extremity: 1+ edema  CNS: No focal deficits  Skin: Mild bruising ecchymosis in the groin present otherwise no skin rash    DATA REVIEW     Medications:  Scheduled Meds:   predniSONE  30 mg Oral Daily    Followed by   Cydne Rosin ON 5/9/2022] predniSONE  20 mg Oral Daily    Followed by   Cydne Rosin ON 5/12/2022] predniSONE  10 mg Oral Daily    Followed by   Cydne Rosin ON 5/15/2022] predniSONE  5 mg Oral Daily    ipratropium-albuterol  1 ampule Inhalation 4x daily    apixaban  10 mg Oral BID    Followed by   Cydne Rosin ON 5/9/2022] apixaban  5 mg Oral BID    pantoprazole  40 mg Oral QAM AC    furosemide  40 mg Oral Daily    hydrocortisone   Topical BID    insulin lispro  0-12 Units SubCUTAneous TID WC    insulin lispro  0-6 Units SubCUTAneous Nightly    meropenem  1,000 mg IntraVENous Q8H    budesonide  0.5 mg Nebulization BID    lidocaine PF  5 mL Tracheal Tube Once    docusate  100 mg Oral BID    polyethylene glycol  17 g Oral Daily    albumin human  25 g IntraVENous Once    dilTIAZem  30 mg Oral 4 times per day    sodium chloride flush  5-40 mL IntraVENous 2 times per day     Continuous Infusions:   dextrose      sodium chloride 100 mL/hr at 05/04/22 1403       INPUT/OUTPUT:  In: 1320 [P.O.:1320]  Out: 9776 [Urine:2735]  Date 05/06/22 0000 - 05/06/22 2359   Shift 3688-3897 3731-3715 7133-8959 24 Hour Total   INTAKE   Shift Total(mL/kg)       OUTPUT   Urine(mL/kg/hr) 560(0.6)   560   Shift Total(mL/kg) 560(4.5)   560(4.5)   Weight (kg) 125.8 125.8 125.8 125.8        LABS:  ABGs:   No results for input(s): POCPH, POCPCO2, POCPO2, POCHCO3, VFPZ8VMC in the last 72 hours.   CBC:   Recent Labs     05/04/22  0244 05/05/22  0808 05/06/22  0503   WBC 21.0* 16.2* 13.7*   HGB 9.7* 9.6* 9.1*   HCT 29.7* 29.5* 26.0*   MCV 86.8 87.0 89.7    198 459*   RBC 3.42* 3.39* 2.90*   MCH 28.4 28.3 31.4 MCHC 32.7 32.5 35.0*   RDW 16.6* 16.6* 16.7*     CRP:   No results for input(s): CRP in the last 72 hours. LDH:   No results for input(s): LDH in the last 72 hours. BMP:   Recent Labs     05/04/22  0244 05/05/22  0808 05/06/22  0503    136 134*   K 4.3 4.2 3.5*    102 101   CO2 24 24 25   BUN 18 18 16   CREATININE 0.37* 0.37* 0.33*   GLUCOSE 85 90 93     Liver Function Test:   No results for input(s): PROT, LABALBU, ALT, AST, GGT, ALKPHOS, BILITOT in the last 72 hours. Coagulation Profile:   No results for input(s): INR, PROTIME, APTT in the last 72 hours. D-Dimer:  No results for input(s): DDIMER in the last 72 hours. Lactic Acid:  No results for input(s): LACTA in the last 72 hours. Cardiac Enzymes:  No results for input(s): CKTOTAL, CKMB, CKMBINDEX, TROPONINI in the last 72 hours. Invalid input(s): TROPONIN, HSTROP  BNP/ProBNP:   No results for input(s): BNP, PROBNP in the last 72 hours. Triglycerides:  No results for input(s): TRIG in the last 72 hours. Microbiology:  Urine Culture:  No components found for: CURINE  Blood Culture:  No components found for: CBLOOD, CFUNGUSBL  Sputum Culture:  No components found for: CSPUTUM  No results for input(s): SPECDESC, SPECIAL, CULTURE, STATUS, ORG, CDIFFTOXPCR, CAMPYLOBPCR, SALMONELLAPC, SHIGAPCR, SHIGELLAPCR, MPNEUG, MPNEUM, LACTOQL in the last 72 hours. No results for input(s): SPUTUM, SPECDESC, SPECIAL, CULTURE, STATUS, ORG, CDIFFTOXPCR, MPNEUM, MPNEUG in the last 72 hours. Invalid input(s): Nelly Nolan, CFUNGUSBL     Pathology:    Radiology Reports:  XR CHEST (2 VW)   Final Result   Subsegmental atelectasis lung bases, similar to the previous exam      Bibasilar hypoaeration         XR CHEST PORTABLE   Final Result   Slight improved aeration of  the lung bases. Bibasilar airspace disease   remains         XR CHEST PORTABLE   Final Result   Small bibasilar effusions and adjacent bibasilar infiltrates.       Stable right internal jugular central line. XR CHEST PORTABLE   Final Result   1. The endotracheal tube has been removed. 2.  Similar appearance of basilar atelectasis and probable trace effusions. XR CHEST PORTABLE   Final Result   No significant change from prior study. Continued bibasilar opacities and   small effusions. XR CHEST PORTABLE   Final Result   Stable exam.         VL DUP LOWER EXTREMITY VENOUS BILATERAL   Final Result      XR CHEST PORTABLE   Final Result   1. Stable appropriate positions of support apparatus. 2. Stable small bilateral pleural effusions and bibasilar airspace disease. XR CHEST PORTABLE   Final Result   Relatively stable cardiopulmonary status         XR CHEST PORTABLE   Final Result   Stable chest x-ray compared to 04/24/2022. XR CHEST PORTABLE   Final Result   1. Stable support tubes and line. 2. Low lung volumes. Crowding of vessels from low lung volumes results in   apparent opacification at the right cardiophrenic angle. XR CHEST PORTABLE   Final Result   Lines and tubes are unchanged. Low lung volumes. Cardiomegaly. XR CHEST PORTABLE   Final Result   1. Endotracheal tube tip is now 1 cm above the soto. Recommend retraction   by an additional 2.5 cm for optimal positioning. 2.  Improved aeration in the left lung with continued atelectatic/airspace   infiltrates. XR CHEST PORTABLE   Final Result   Endotracheal tube tip is in the right main bronchus and should be retracted   approximately 4-5 cm. Tips of ECMO cannulas project in the expected location of the IVC. Findings were discussed with Karissa Scott RN (the patient's ICU nurse)   at 2:26 pm on 4/22/2022. XR CHEST (SINGLE VIEW FRONTAL)   Final Result   1. Support tubes and lines are unchanged. 2. Stable bibasilar lung infiltrates, right side greater than left. This may   be related to atelectasis and/or pleural effusion. Superimposed pneumonia   cannot be excluded. XR CHEST (SINGLE VIEW FRONTAL)   Final Result   Suboptimal position left IJ catheter. Consider removal and replacement or   repositioning. XR CHEST PORTABLE   Final Result   1. On today's exam the endotracheal tube tip is positioned 1.5 cm above the   soto. Recommend retraction by an additional 2 cm for optimal positioning. 2.  Interval development of right lower lobe airspace disease. This could   represent atelectasis         XR CHEST PORTABLE   Final Result   1. Endotracheal and enteric tubes as above. 2. Diffuse interstitial opacities throughout both lungs, right greater than   left. No new focal airspace consolidation. Follow-up is recommended to   document resolution. XR CHEST (SINGLE VIEW FRONTAL)   Final Result   Diffuse interstitial opacities with alveolar/consolidative opacities at the   bases favoring multifocal airspace disease. Echocardiogram:   Results for orders placed during the hospital encounter of 04/18/22    ECHO Complete 2D W Doppler W Color      Summary  Left ventricle is normal in size, global left ventricular systolic function  is preserved, estimated ejection fraction is 50%. There appears to be some apical hypokinesis, in the apical views. Evidence of diastolic dysfunction. Trivial mitral regurgitation. Trivial pulmonic insufficiency. IVC dilated but unable to assess respiratory collapse due to patient on  ventilator.        ASSESSMENT AND PLAN     Assessment:    // Acute hypoxic/hypercapnic respiratory failure due to status asthmaticus  // S/p extubation 4/29, VV ECMO, decannulated 4/30  // Chronic respiratory acidosis  // Rhino/enterovirus infection  // Leukocytosis, improving  // Hyperglycemia  // Elevated troponin  // Morbid obesity  // Anemia, stable  // Pseudomonas in respiratory secretion    Plan:    I personally interviewed/examined the patient; reviewed interval history, interpreted all available radiographic and laboratory data at the time of service.  Patient was extubated on 04/29/2022, decannulated 4/30   Currently on room air   Recommended nocturnal BiPAP   Use supplemental oxygen if needed, to keep oxygen saturation greater than 90%   Remains hemodynamically stable   Continue aspiration precautions, bronchopulmonary hygiene and bronchodilators   Tolerating oral diet   Continue to monitor I/O with a goal of even/negative fluid balance   Stress ulcer prophylaxis-Protonix   Chemical DVT prophylaxis; on eliquis   Antimicrobials reviewed; on meropenem as per ID recommendations   Continue prednisone taper   Physical/occupational therapy   Okay to be discharged from pulmonary standpoint   Awaits placement    We will continue to follow. I would like to thank you for allowing me to participate in the care of this patient. Please feel free to call with any further questions or concerns. Bartolo Templeton MD  Pulmonary and Critical Care Medicine           5/6/2022     Please note that this chart was generated using voice recognition Dragon dictation software. Although every effort was made to ensure the accuracy of this automated transcription, some errors in transcription may have occurred.

## 2022-05-06 NOTE — PROGRESS NOTES
Occupational Therapy  Facility/Department: [facility]  Occupational Therapy Daily Treatment Note    Name: Brie Lopez  : 2000  MRN: [id number]  Date of Service: 2022    Discharge Recommendations: Further therapy recommended at discharge. The patient should be able to tolerate at least 3 hours of therapy per day over 5 days or 15 hours over 7 days. This patient may benefit from a Physical Medicine and Rehab consult. Patient would benefit from continued therapy after discharge  OT Equipment Recommendations  ADL Assistive Devices: Transfer Tub Bench; Toileting - Drop Arm Commode, Heavy Duty Drop Arm Commode;Grab Bars - shower;Grab Bars - toilet; Reacher;Sock-Aid Hard;Dressing Stick;Long-handled Shoe Horn;Long-handled Sponge     Patient Diagnosis(es): The encounter diagnosis was CHIDI (obstructive sleep apnea). Past Medical History:  has no past medical history on file. Past Surgical History:  has a past surgical history that includes extracorporeal circulation (N/A, 2022). Assessment   Performance deficits / Impairments: Decreased functional mobility ; Decreased ADL status; Decreased endurance;Decreased high-level IADLs;Decreased strength;Decreased safe awareness;Decreased cognition;Decreased balance;Decreased fine motor control  Prognosis: Good  Activity Tolerance  Activity Tolerance: Patient Tolerated treatment well        Plan   Plan  Times per Week: 3-5x/wk  Current Treatment Recommendations: Patient/Caregiver education & training,Safety education & training,Balance training,Functional mobility training,Strengthening,Endurance training,Home management training     Restrictions  Restrictions/Precautions  Restrictions/Precautions: Fall Risk,General Precautions,Up as Tolerated  Required Braces or Orthoses?: No  Position Activity Restriction  Other position/activity restrictions: asthma exacerbation , ECMO started , bronchoscopy , RLE DVT , extubated , ECMO ended 4/30,    Subjective   General  Patient assessed for rehabilitation services?: Yes  Family / Caregiver Present: Yes  General Comment  Comments: RN ok'd for OT tx this PM. Pt agreeable to session, pleasent/cooperative throughout. Objective     Safety Devices  Type of Devices: All fall risk precautions in place;Call light within reach;Gait belt;Nurse notified; Patient at risk for falls; Left in chair;Chair alarm in place  Restraints  Restraints Initially in Place: No   Pain Level: Pt denies pain     Toilet Transfers  Toilet - Technique:  (Use of SS)  Equipment Used: Standard toilet  Toilet Transfer: Moderate assistance     ADL  Feeding: Minimal assistance;Setup; Increased time to complete (assist to open some containers, able to feed self)  Grooming: Setup; Increased time to complete;Minimal assistance (assist to open toothpaste, able to apply to toothbrush and brush teeth and wash face)  UE Bathing: Minimal assistance; Increased time to complete;Setup (demo assist needed to wash back)  LE Bathing: Moderate assistance;Setup; Increased time to complete (demo assist needed to wash lower legs and feet)  UE Dressing: Increased time to complete;Minimal assistance;Setup (demo assist needed to snap, thread arms and tie gown)  LE Dressing: Maximum assistance;Setup; Increased time to complete (demo assist needed to doff/don footies)  Toileting: Increased time to complete;Setup;Maximum assistance (assist needed to complete corey care standing in SS)    Pt in bed upon arrival. Transferred to EOB and into SS w/min x2. Transferred to bathroom to use standard toilet. Pt had difficulty pulling self up to SS d/t surface is lower than EOB. Mod assist needed w/2 attempts to complete standing. Pt retired to recliner and setup for grooming at tray table. Pt stated took a shower earlier this date and demo therapeutic reaching to complete self care while seated (see above for LOF).  Pt needs increased time and assist to complete all tasks d/t overall weakness, fatigue and frequent rest breaks. Pt on RA throughout tx. Educ on AE/DME, EC/WS tech, Fall Prev, Safety w/Func Mob and ADL's. Issued written info and pt verbalized understanding. Activity Tolerance  Activity Tolerance: Patient tolerated treatment well;Patient limited by fatigue;Patient limited by endurance  Bed mobility  Rolling to Right: Modified independent  Supine to Sit: Modified independent  Sit to Supine: Unable to assess (left up in chair)  Scooting: Stand by assistance  Bed Mobility Comments: HOB elevated, use of bed rail  Transfers  Sit to stand: Moderate assistance  Stand to sit: Minimal assistance  Transfer Comments: From EOB Min A x2 w/SS, From standard toilet Mod A w/SS to and from bathroom     Cognition  Overall Cognitive Status: [facility]    Education Given To: Patient  Education Provided: Role of Therapy;Plan of Care;Transfer Training;Energy Conservation; ADL Adaptive Strategies; Equipment  Education Method: Demonstration;Verbal  Barriers to Learning: None  Education Outcome: Demonstrated understanding;Verbalized understanding     AM-PAC Score  AM-[facility] Inpatient Daily Activity Raw Score: 15 (05/06/22 1659)  AM-PAC Inpatient ADL T-Scale Score : 34.69 (05/06/22 1659)  ADL Inpatient CMS 0-100% Score: 56.46 (05/06/22 1659)  ADL Inpatient CMS G-Code Modifier : CK (05/06/22 1659)    Goals  Short Term Goals  Time Frame for Short term goals: By discharge, pt will:  Short Term Goal 1: Demo bed mobility sit<>supine transfers with SBA  Short Term Goal 2: Demo functional sit<>stand transfers with Min A and LRD PRN  Short Term Goal 3: Demo 4 minutes of dynamic standing balance with CGA and unilateral hand release to promote increased engagement in ADLs  Short Term Goal 4: Demo functional mobility with Mod A and LRD PRN  Short Term Goal 5: Demo UB ADLs with SUP  Short Term Goal 6: Demo LB ADLs/toileitng with CGA, use of DME and adaptive tech PRN     Therapy Time   Individual Concurrent Group Co-treatment   Time In  1615         Time Out  1708         Minutes  53 total tx time                 CASIMIRO CARLTON/HEATHER

## 2022-05-06 NOTE — PROGRESS NOTES
[facility] -   Infectious diseases evaluation  admission date 4/18/2022    reason for consultation:   VAP with resp cultures showing Pseudomonas. Questionable allergy to cefepime. Switching to meropenem. Impression :   Current:  · Asthma exacerbation -ECMO bilat fem access  · Ventilator assoc RLL. Pneumonia  -Pseudomonas aeruginosa -  (S: cefepime, cipro, gentamicin, tobramycin, R: zosyn S meropenem)  · Resp panel pos for rhino/entero on 4/19. · Leukocytosis leukemoid reaction  · Rash cefepime - trunk    Other:  ·   Discussion / summary of stay / plan of care   · Severe Asthma exacerbation - on ECMO  · resp infection - pseudomonas RLL pneumonia   · Post ceftriaxone 4 days and tolerated  · Cefepime 4/28 and 4/29, developed rash torso - stopped - started meropenem  · ID called for AB -   Recommendations   · Stop cefepime -   · Meropenem 4/29 -5/6  · reconsiled for DC  · ID signing off    Infection Control Recommendations   · [place] Precautions    Antimicrobial Stewardship Recommendations   · Simplification of therapy  · Targeted therapy  Coordination ofOutpatient Care:   · Estimated Length of IV antimicrobials:  · Patient will need Midline / picc Catheter Insertion:   · Patient will need SNF:  · Patient will need outpatient wound care:     History of Present Illness:   Initial history:  Irene Lebron is a 23y.o.-year-old female with a past medical history of bronchial asthma who was admitted to [facility] with an acute exacerbation causing increasing shortness of breath. Patient was found to be hypoxemic with hypercarbic respiratory failure requiring intubation and mechanical ventilation. Patient was transferred to [facility] for further elevation with possibility of ECMO. On arrival patient was found to be on a bicarb drip due to presumed respiratory acidosis but this was discontinued due to to worsening of the hypercarbia.  Additionally patient was started on Nimbex due to the necessity for paralyzation as she was breathing over the vent. Pulmonology and cardiothoracic surgery were consulted, ECMO planned. In the MICU, patient had an episode of dark emesis which turned out to be bilious output, Hb remained stable. She was hypertensive, started on lopressor IV 5 mg PRN. She was hypoxic, requiring higher FiO2 of 80%, PEEP 16, RR 32, , blood gas showed pH 7.449, pCO2 41.7, pCO2 59. 1. treated with solumedrol, bronchodilators, CXR showed no pneumothorax or pleural effusion.     4/22 - overnight, patient became more hypoxic with increasing oxygen requirement, increasing wheezing on exam. It was determined to go with ECMO canulation. Patient previously treated with   Azithromycin from 4/19 to 4/24  Rocephin from 4/19 to 4/25  Off abx from 4/26 to 4/27  Cefepime from 4/28 to 4/29 4/29 nurse reports rash post treatment with cefepime, and patient switched to meropenem.     Interval changes  5/5/2022   Patient Vitals for the past 8 hrs:   BP Temp Temp src Pulse Resp SpO2   05/05/22 1631 -- -- -- -- 25 94 %   05/05/22 1530 (!) 118/55 98.2 °F (36.8 °C) Oral 100 28 92 %   05/05/22 1253 -- -- -- -- -- 94 %     Patient extubated about 4/29  RA and more energetic today  abd soft and not SOB    No Diarrhea  Cough mild little colored  Left hip sre from the bed    W 16 from steroids  CXR atelectasis  Rash over the chest is better    Post cefepime treatment and also points to a erythematous papular rash on chest.improving       Summary of relevant labs:  Labs:    WBC: 40.2 - 24 - 19 - 22 -21  Plt 185  Cr 0.42  4/19 CRP 79.7  4/18 sed rate 39      Micro:    4/29 UC: sent  4/29 BC x2 sent  4/29 Resp cx sent  4/24 Resp cx: Pseudomonas aeruginosa - light growth  S: cefepime, cipro, gentamicin, tobramycin, R: zosyn  4/21 BC x2 no growth  4/19 BC x2 no growth  4/19 Resp cx: normal nadine  4/19 Resp panel: pos rhino/entero    Imaging:    CXR, significant change from prior study. Continued bibasilar opacities and small effusions. I have personally reviewed the past medical history, past surgical history, medications, social history, and family history, and I haveupdated the database accordingly. Allergies:   Cefepime     Review of Systems:     Review of Systems   Constitutional: Positive for activity change. HENT: Negative for congestion. Eyes: Negative for pain, discharge and redness. Respiratory: Negative for apnea. Cardiovascular: Negative for chest pain. Gastrointestinal: Negative for abdominal distention. Endocrine: Negative for cold intolerance, polydipsia and polyphagia. Genitourinary: Negative for dysuria. Skin: Positive for rash. Negative for color change. Allergic/Immunologic: Negative for environmental allergies. Neurological: Negative for dizziness, light-headedness and headaches. Hematological: Negative for adenopathy. Psychiatric/Behavioral: Negative for agitation. Physical Examination :       Physical Exam  Constitutional:       General: She is not in acute distress. Appearance: Normal appearance. She is not ill-appearing or diaphoretic. HENT:      Head: Normocephalic and atraumatic. Nose: Nose normal. No congestion. Eyes:      General:         Right eye: No discharge. Left eye: No discharge. Cardiovascular:      Rate and Rhythm: Regular rhythm. Tachycardia present. Heart sounds: Normal heart sounds. No murmur heard. No gallop. Pulmonary:      Effort: No respiratory distress. Breath sounds: No stridor. Comments: Upper lobes clear. Lower lobes not auscultatable  Abdominal:      General: There is no distension. Palpations: Abdomen is soft. There is no mass. Tenderness: There is no abdominal tenderness. Hernia: No hernia is present.    Genitourinary:     Comments: Urine sukhwinder  Musculoskeletal:         General: No swelling, tenderness, deformity or swelling, tenderness, deformity or signs of injury. Cervical back: Neck supple. No tenderness. Lymphadenopathy:      Cervical: No cervical adenopathy. Skin:     General: Skin is warm and dry. Coloration: Skin is not jaundiced or pale. Findings: Rash present. Neurological:      General: No focal deficit present. Mental Status: She is alert. Past Medical History:   No past medical history on file.     Past Surgical  History:     Past Surgical History:   Procedure Laterality Date    EXTRACORPOREAL CIRCULATION N/A 4/22/2022    EXTRA CORPOREAL MEMBRANE OXYGENATION 23 ON THE RIGHT 25 LEFT FEMORALS performed by Rosalina Sy MD at [address] [facility]       Medications:      [START ON 5/6/2022] predniSONE  30 mg Oral Daily    Followed by   Equilla Booty ON 5/9/2022] predniSONE  20 mg Oral Daily    Followed by   Equilla Booty ON 5/12/2022] predniSONE  10 mg Oral Daily    Followed by   Equilla Booty ON 5/15/2022] predniSONE  5 mg Oral Daily    apixaban  10 mg Oral BID    Followed by   Equilla Booty ON 5/9/2022] apixaban  5 mg Oral BID    pantoprazole  40 mg Oral QAM AC    furosemide  40 mg Oral Daily    hydrocortisone   Topical BID    insulin lispro  0-12 Units SubCUTAneous TID WC    insulin lispro  0-6 Units SubCUTAneous Nightly    meropenem  1,000 mg IntraVENous Q8H    budesonide  0.5 mg Nebulization BID    lidocaine PF  5 mL Tracheal Tube Once    docusate  100 mg Oral BID    polyethylene glycol  17 g Oral Daily    albumin human  25 g IntraVENous Once    dilTIAZem  30 mg Oral 4 times per day    ipratropium-albuterol  1 ampule Inhalation Q4H    sodium chloride flush  5-40 mL IntraVENous 2 times per day       Social History:     Social History     Socioeconomic History    Marital status: Life Partner     Spouse name: Not on file    Number of children: Not on file    Years of education: Not on file    Highest education level: Not on file   Occupational History    Not on file   Tobacco Use    Smoking status: Not on file    Smokeless tobacco: Not on file   Substance and Sexual Activity    Alcohol use: Not on file    Drug use: Not on file    Sexual activity: Not on file   Other Topics Concern    Not on file   Social History Narrative    Not on file     Social Determinants of Health     Financial Resource Strain:     Difficulty of Paying Living Expenses: Not on file   Food Insecurity:     Worried About Running Out of Food in the Last Year: Not on file    Yfn of Food in the Last Year: Not on file   Transportation Needs:     Lack of Transportation (Medical): Not on file    Lack of Transportation (Non-Medical):  Not on file   Physical Activity:     Days of Exercise per Week: Not on file    Minutes of Exercise per Session: Not on file   Stress:     Feeling of Stress : Not on file   Social Connections:     Frequency of Communication with Friends and Family: Not on file    Frequency of Social Gatherings with Friends and Family: Not on file    Attends Church Services: Not on file    Active Member of [address] [place] [address] [employer] or Organizations: Not on file    Attends Club or Organization Meetings: Not on file    Marital Status: Not on file   Intimate Partner Violence:     Fear of Current or Ex-Partner: Not on file    Emotionally Abused: Not on file    Physically Abused: Not on file    Sexually Abused: Not on file   Housing Stability:     Unable to Pay for Housing in the Last Year: Not on file    Number of [place] in the Last Year: Not on file    Unstable Housing in the Last Year: Not on file       Family History:     Family History   Problem Relation Age of Onset    Cancer Mother         breast ca 47, lumpectomy, RT, no GT    Cancer Father         breast ca 52, mastectomy, chemo, GT?    Cancer Paternal Grandmother         breast ca, 60-70s    Cancer Maternal Aunt         poss colon ca, colostomy bag, passed away at age 62s    Other Paternal Aunt         cirrhosis      Medical Decision Making: I have independently reviewed/ordered the following labs:    CBC with Differential:   Recent Labs     05/04/22  0244 05/05/22  0808   WBC 21.0* 16.2*   HGB 9.7* 9.6*   HCT 29.7* 29.5*    198     BMP:  Recent Labs     05/04/22  0244 05/05/22  0808    136   K 4.3 4.2    102   CO2 24 24   BUN 18 18   CREATININE 0.37* 0.37*     Hepatic Function Panel:   No results for input(s): PROT, LABALBU, BILIDIR, IBILI, BILITOT, ALKPHOS, ALT, AST in the last 72 hours. No results for input(s): RPR in the last 72 hours. No results for input(s): HIV in the last 72 hours. No results for input(s): BC in the last 72 hours. Lab Results   Component Value Date    CREATININE 0.37 05/05/2022    GLUCOSE 90 05/05/2022       Detailed results: Thank you for allowing us to participate in the care of this patient. Please call with questions. This note is created with the assistance of a speech recognition program.  While intending to generate adocument that actually reflects the content of the visit, the document can still have some errors including those of syntax and sound a like substitutions which may escape proof reading. It such instances, actual meaningcan be extrapolated by contextual diversion.     Lwoell Hampton MD  Office: [phone]  Perfect serve / office [phone]

## 2022-05-06 NOTE — PROGRESS NOTES
[facility]  Internal Medicine Teaching Residency Program  Inpatient Daily Progress Note  ______________________________________________________________________________    Patient: Peace Monique  YOB: 2000   PBX:[id number]    Acct: [de-identified]     Room:   Admit date: 2022  Today's date: 22  Number of days in the hospital: 18    SUBJECTIVE   Admitting Diagnosis: Acute asthma exacerbation  CC: Shortness of breath     pt examined at bedside. Chart & results reviewed. No acute events overnight. Patient does not have any complaints. Pending placement      ROS:  Constitutional:  negative for chills, fevers, sweats  Respiratory:  negative for cough, dyspnea on exertion, hemoptysis, shortness of breath, wheezing  Cardiovascular:  negative for chest pain, chest pressure/discomfort, lower extremity edema, palpitations  Gastrointestinal:  negative for abdominal pain, constipation, diarrhea, nausea, vomiting  Neurological:  negative for dizziness, headache  BRIEF HISTORY     This is a 22-year-old female with past medical history of asthma presents initially to [facility] with acute exacerbation. She required nonrebreather subsequently intubated and initiated on mechanical ventilation due to worsening respiratory status. She was then transferred to [facility]. She also required bicarb drip initially. She was sedated paralyzed mechanically ventilated. ABG revealed respiratory acidosis. Patient was on venovenous ECMO while at [facility]. Decannulated . Extubated . Positive for rhino/enterovirus infection. Sputum culture positive for Pseudomonas. Treated for ventilator associated pneumonia with meropenem.     OBJECTIVE     Vital Signs:  /63   Pulse 103   Temp 97.9 °F (36.6 °C) (Oral)   Resp 21   Ht 5' 3\" (1.6 m)   Wt 277 lb 5.4 oz (125.8 kg)   SpO2 98%   BMI 49.13 kg/m²     Temp (24hrs), Av.1 °F (36.7 °C), Min:97.9 °F (36.6 °C), Max:98.2 °F (36.8 °C)    In: 1320   Out: 2735 [Urine:2735]    Physical Exam:  Constitutional: This is a well developed, well nourished, Greater than 40 - Morbid Obesity / Extreme Obesity / Grade III 23y.o. year old female who is alert, oriented, cooperative and in no apparent distress. Head:normocephalic and atraumatic. EENT:  PERRLA. No conjunctival injections. Neck: Supple without thyromegaly. No elevated JVP. Trachea was midline. Respiratory: Chest was symmetrical without dullness to percussion. Breath sounds bilaterally were clear to auscultation. Cardiovascular: Regular without murmur, clicks, gallops or rubs. Abdomen: Slightly rounded and soft without organomegaly. No rebound, rigidity or guarding was appreciated. Extremities:  No lower extremity edema, ulcerations, tenderness, varicosities or erythema. Muscle size, tone and strength are normal.  No involuntary movements are noted. Skin:  Warm and dry. Good color, turgor and pigmentation. No lesions or scars.   No cyanosis or clubbing  Neurological/Psychiatric: The patient's general behavior, level of consciousness, thought content and emotional status is normal.        Medications:  Scheduled Medications:    predniSONE  30 mg Oral Daily    Followed by   Carlin Everett ON 5/9/2022] predniSONE  20 mg Oral Daily    Followed by   Carlin Everett ON 5/12/2022] predniSONE  10 mg Oral Daily    Followed by   Carlin Everett ON 5/15/2022] predniSONE  5 mg Oral Daily    ipratropium-albuterol  1 ampule Inhalation 4x daily    apixaban  10 mg Oral BID    Followed by   Carlin Everett ON 5/9/2022] apixaban  5 mg Oral BID    pantoprazole  40 mg Oral QAM AC    furosemide  40 mg Oral Daily    hydrocortisone   Topical BID    insulin lispro  0-12 Units SubCUTAneous TID WC    insulin lispro  0-6 Units SubCUTAneous Nightly    meropenem  1,000 mg IntraVENous Q8H    budesonide  0.5 mg Nebulization BID    lidocaine PF  5 mL Tracheal Tube Once    docusate  100 mg Oral BID    polyethylene glycol  17 g Oral Daily    albumin human  25 g IntraVENous Once    dilTIAZem  30 mg Oral 4 times per day    sodium chloride flush  5-40 mL IntraVENous 2 times per day     Continuous Infusions:    dextrose      sodium chloride 100 mL/hr at 05/04/22 1403     PRN Medicationstetrahydrozoline, 1 drop, Q4H PRN  heparin (porcine), 10,000 Units, PRN  heparin (porcine), 5,000 Units, PRN  albuterol, 2.5 mg, Q4H PRN  heparin flush (PF), 3 mL, PRN  midazolam, 2 mg, Q2H PRN  metoprolol, 5 mg, Q6H PRN  glucose, 15 g, PRN  dextrose, 12.5 g, PRN  dextrose, 100 mL/hr, PRN  sodium chloride flush, 5-40 mL, PRN  sodium chloride, , PRN  ondansetron, 4 mg, Q8H PRN   Or  ondansetron, 4 mg, Q6H PRN  acetaminophen, 650 mg, Q6H PRN   Or  acetaminophen, 650 mg, Q6H PRN        Diagnostic Labs:  CBC:   Recent Labs     05/04/22  0244 05/05/22  0808 05/06/22  0503   WBC 21.0* 16.2* 13.7*   RBC 3.42* 3.39* 2.90*   HGB 9.7* 9.6* 9.1*   HCT 29.7* 29.5* 26.0*   MCV 86.8 87.0 89.7   RDW 16.6* 16.6* 16.7*    198 459*     BMP:   Recent Labs     05/04/22  0244 05/05/22  0808 05/06/22  0503    136 134*   K 4.3 4.2 3.5*    102 101   CO2 24 24 25   BUN 18 18 16   CREATININE 0.37* 0.37* 0.33*     BNP: No results for input(s): BNP in the last 72 hours. PT/INR: No results for input(s): PROTIME, INR in the last 72 hours. APTT: No results for input(s): APTT in the last 72 hours. CARDIAC ENZYMES: No results for input(s): CKMB, CKMBINDEX, TROPONINI in the last 72 hours. Invalid input(s): CKTOTAL;3  FASTING LIPID PANEL:  Lab Results   Component Value Date    TRIG 96 04/29/2022     LIVER PROFILE: No results for input(s): AST, ALT, ALB, BILIDIR, BILITOT, ALKPHOS in the last 72 hours.    MICROBIOLOGY:   Lab Results   Component Value Date/Time    CULTURE Laura albicans/dubliniensis >[id number] CFU/ML 04/29/2022 04:44 PM       Imaging:    XR CHEST (2 VW)    Result Date: 5/4/2022  Subsegmental atelectasis lung bases, similar to the previous exam Bibasilar hypoaeration     XR CHEST PORTABLE    Result Date: 5/2/2022  Slight improved aeration of  the lung bases. Bibasilar airspace disease remains     XR CHEST PORTABLE    Result Date: 5/1/2022  Small bibasilar effusions and adjacent bibasilar infiltrates. Stable right internal jugular central line. XR CHEST PORTABLE    Result Date: 4/30/2022  1. The endotracheal tube has been removed. 2.  Similar appearance of basilar atelectasis and probable trace effusions. ASSESSMENT & PLAN     ASSESSMENT / PLAN:     Principal Problem:    Acute asthma exacerbation: Extubated 4/29/2022, decannulated 4/30/2022. Saturating well on room air. Nocturnal BiPAP. On prednisone taper. On Pulmicort and DuoNeb. Active Problems:    Acute DVT: On Eliquis    Ventilator associated right lower lobe pneumonia: On meropenem 4/29-5/6. CHIDI (obstructive sleep apnea): BiPAP at night    Sinus tachycardia: On Cardizem 30 Mg every 6 hours per cardiology. As needed metoprolol for heart rate > 110. Hold for SBP<110      Morbid obesity with BMI of 50.0-59.9, adult ([address]      DVT ppx : On Eliquis for DVT  GI ppx: Not indicated    PT/OT: Ongoing  Discharge Planning / SW: Pending placement    Kiarra Kimble MD  Internal Medicine Resident, PGY-3  [facility];  [place], [place]  5/6/2022, 3:24 PM

## 2022-05-06 NOTE — PLAN OF CARE
BRONCHOSPASM/BRONCHOCONSTRICTION     [x]         IMPROVE AERATION/BREATH SOUNDS  [x]   ADMINISTER BRONCHODILATOR THERAPY AS APPROPRIATE  [x]   ASSESS BREATH SOUNDS  []   IMPLEMENT AEROSOL/MDI PROTOCOL  [x]   PATIENT EDUCATION AS NEEDED   PATIENT REFUSES TO WEAR BIPAP     [x] Risks and benefits explained to patient   [x] Patient refuses to wear Bipap stating heck no I haven't worn that thing in a couple days  [x] Patient verbalizes understanding of information presented.

## 2022-05-06 NOTE — CARE COORDINATION
Transitional planning:  Nurse rounds: pt needs midline. Pt fell last night, here. Precert started for [facility].

## 2022-05-06 NOTE — PROGRESS NOTES
Physical Therapy  Facility/Department: [facility] CAR 2  Physical Therapy treatment    Name: Maddie Dangelo  : 2000  MRN: [id number]  Date of Service: 2022    Discharge Recommendations:  Further therapy recommended at discharge. The patient should be able to tolerate at least 3 hours of therapy per day over 5 days or 15 hours over 7 days. This patient may benefit from a Physical Medicine and Rehab consult. PT Equipment Recommendations  Other: Pt unsafe to amb any distance without skilled assistance      Patient Diagnosis(es): The encounter diagnosis was CHIDI (obstructive sleep apnea). Past Medical History:  has no past medical history on file. Past Surgical History:  has a past surgical history that includes extracorporeal circulation (N/A, 2022). Assessment   Body Structures, Functions, Activity Limitations Requiring Skilled Therapeutic Intervention: Decreased functional mobility ; Decreased strength;Decreased endurance;Decreased balance;Decreased body mechanics  Assessment: Pt grossly CGA for mobility, with cues throughout amb to sequence for safety, Pt amb 30' SW very slow, close chair follow. Pt unsafe to amb any distance without skilled assistance  Therapy Prognosis: Good  Requires PT Follow-Up: Yes  Activity Tolerance  Activity Tolerance: Patient tolerated treatment well;Patient limited by fatigue;Patient limited by endurance     Plan   Plan  Plan:  (5-6x/wk)  Current Treatment Recommendations: Strengthening,Balance training,Functional mobility training,Transfer training,Endurance training,Gait training,Stair training,Safety education & training,Patient/Caregiver education & training,Home exercise program,Equipment evaluation, education, & procurement  Safety Devices  Type of Devices:  All fall risk precautions in place,Call light within reach,Gait belt,Nurse notified,Left in bed,Patient at risk for falls  Restraints  Restraints Initially in Place: No Restrictions  Restrictions/Precautions  Restrictions/Precautions: Fall Risk,General Precautions  Required Braces or Orthoses?: No  Position Activity Restriction  Other position/activity restrictions: asthma exacerbation 4/18, ECMO started 4/22, bronchoscopy 4/24, RLE DVT 4/27, extubated 4/29, ECMO ended 4/30,     Subjective   General  Chart Reviewed: Yes  Patient assessed for rehabilitation services?: Yes  Response To Previous Treatment: Patient with no complaints from previous session. Family / Caregiver Present: No  Follows Commands: Within Functional Limits  General Comment  Comments: RN and pt agreeable to PT. Pt alert in bed upon arrival.  Subjective  Subjective: Pt reports 3/10 pain in B calves, relieved with stretches given by [place] [facility]. Pt denies any numbness or tingling         Cognition   Orientation  Overall Orientation Status: Within Functional Limits  Cognition  Overall Cognitive Status: WFL     Objective     AROM RLE (degrees)  RLE AROM: WFL  AROM LLE (degrees)  LLE AROM : WFL  AROM RUE (degrees)  RUE AROM : WFL  AROM LUE (degrees)  LUE AROM : WFL              Bed mobility  Rolling to Left: Stand by assistance  Rolling to Right: Stand by assistance  Supine to Sit: Stand by assistance  Sit to Supine: Stand by assistance  Scooting: Stand by assistance  Bed Mobility Comments: increased time, apparent effort. increased time  Transfers  Sit to Stand: Contact guard assistance  Stand to sit: Contact guard assistance  Ambulation  Surface: level tile  Device: Standard Walker (bariatric)  Other Apparatus: Wheelchair follow  Assistance: Contact guard assistance  Quality of Gait: cues to sequence throughout (AD, step, step) to maintain upright. cues to decrease step length for safety. close chair follow, used multiple times.   Distance: 12', 10', 15', 5', 30',7' with seated rest breaks in between each,  Comments: Pt unsafe to ambulate any distance without skilled assistance, pt would intermittantly increased tri without cueing and would become unsteady, nearly buckle and require cueing to sequence for safety. More Ambulation?: No  Stairs/Curb  Stairs?: No     Balance  Posture: Good  Sitting - Static: Good;-  Sitting - Dynamic: Fair;+  Standing - Static: Fair  Standing - Dynamic: Fair;-  Comments: Bairatric SW used while assessing standing balance  Exercise Treatment: calf stretch 20sec 4x bilaterally. resisted PF in supine 3x bilat 15x each, attempted standing bilateral heel raises, pt unable.  increased tiem for all mobility,        AM-PAC Score  AM-PAC Inpatient Mobility Raw Score : 16 (05/06/22 1536)  AM-PAC Inpatient T-Scale Score : 40.78 (05/06/22 1536)  Mobility Inpatient CMS 0-100% Score: 54.16 (05/06/22 1536)  Mobility Inpatient CMS G-Code Modifier : CK (05/06/22 1536)          Goals  Short Term Goals  Time Frame for Short term goals: 14 visits  Short term goal 1: Pt will be Khadar bed mobility  Short term goal 2: Pt will be Khadar transfers  Short term goal 3: Pt will amb 50' RW CGA  Short term goal 4: Pt will navigate 4 steps CGA       Therapy Time   Individual Concurrent Group Co-treatment   Time In 1356         Time Out 1455         Minutes 59         Timed Code Treatment Minutes: 54 Minutes       Basim Levine PT

## 2022-05-06 NOTE — PLAN OF CARE
Problem: Gas Exchange - Impaired:  Goal: Levels of oxygenation will improve  Description: Levels of oxygenation will improve  Outcome: Progressing     Problem:  Activity:  Goal: Ability to tolerate increased activity will improve  Description: Ability to tolerate increased activity will improve  Outcome: Progressing     Problem: Cardiac:  Goal: Hemodynamic stability will improve  Description: Hemodynamic stability will improve  Outcome: Progressing     Problem: Respiratory:  Goal: Ability to maintain a clear airway will improve  Description: Ability to maintain a clear airway will improve  Outcome: Progressing  Goal: Ability to maintain adequate ventilation will improve  Description: Ability to maintain adequate ventilation will improve  Outcome: Progressing  Goal: Complications related to the disease process, condition or treatment will be avoided or minimized  Description: Complications related to the disease process, condition or treatment will be avoided or minimized  Outcome: Progressing     Problem: Skin Integrity:  Goal: Risk for impaired skin integrity will decrease  Description: Risk for impaired skin integrity will decrease  Outcome: Progressing     Problem: OXYGENATION/RESPIRATORY FUNCTION  Goal: Patient will maintain patent airway  Outcome: Progressing  Goal: Patient will achieve/maintain normal respiratory rate/effort  Description: Respiratory rate and effort will be within normal limits for the patient  Outcome: Progressing     Problem: Nutrition  Goal: Optimal nutrition therapy  Outcome: Progressing     Problem: Discharge Planning  Goal: Discharge to home or other facility with appropriate resources  Outcome: Progressing     Problem: Respiratory - Adult  Goal: Achieves optimal ventilation and oxygenation  Outcome: Progressing     Problem: Genitourinary - Adult  Goal: Urinary catheter remains patent  Outcome: Progressing     Problem: Neurosensory - Adult  Goal: Achieves stable or improved neurological status  Outcome: Progressing  Goal: Achieves maximal functionality and self care  Outcome: Progressing     Problem: Pain  Goal: Verbalizes/displays adequate comfort level or baseline comfort level  Outcome: Progressing     Problem: Safety - Adult  Goal: Free from fall injury  Outcome: Progressing  Flowsheets (Taken 5/5/2022 2132)  Free From Fall Injury: Instruct family/caregiver on patient safety     Problem: ABCDS Injury Assessment  Goal: Absence of physical injury  Outcome: Progressing     Problem: Skin/Tissue Integrity  Goal: Absence of new skin breakdown  Description: 1. Monitor for areas of redness and/or skin breakdown  2. Assess vascular access sites hourly  3. Every 4-6 hours minimum:  Change oxygen saturation probe site  4. Every 4-6 hours:  If on nasal continuous positive airway pressure, respiratory therapy assess nares and determine need for appliance change or resting period.   Outcome: Progressing

## 2022-05-07 LAB
ANION GAP SERPL CALCULATED.3IONS-SCNC: 9 MMOL/L (ref 9–17)
BUN BLDV-MCNC: 16 MG/DL (ref 6–20)
CALCIUM SERPL-MCNC: 8.2 MG/DL (ref 8.6–10.4)
CHLORIDE BLD-SCNC: 105 MMOL/L (ref 98–107)
CO2: 26 MMOL/L (ref 20–31)
CREAT SERPL-MCNC: 0.4 MG/DL (ref 0.5–0.9)
GFR AFRICAN AMERICAN: >60 ML/MIN
GFR NON-AFRICAN AMERICAN: >60 ML/MIN
GFR SERPL CREATININE-BSD FRML MDRD: ABNORMAL ML/MIN/{1.73_M2}
GLUCOSE BLD-MCNC: 120 MG/DL (ref 65–105)
GLUCOSE BLD-MCNC: 124 MG/DL (ref 65–105)
GLUCOSE BLD-MCNC: 150 MG/DL (ref 65–105)
GLUCOSE BLD-MCNC: 152 MG/DL (ref 65–105)
GLUCOSE BLD-MCNC: 85 MG/DL (ref 70–99)
HCT VFR BLD CALC: 29.6 % (ref 36.3–47.1)
HEMOGLOBIN: 9.3 G/DL (ref 11.9–15.1)
MCH RBC QN AUTO: 27.9 PG (ref 25.2–33.5)
MCHC RBC AUTO-ENTMCNC: 31.4 G/DL (ref 28.4–34.8)
MCV RBC AUTO: 88.9 FL (ref 82.6–102.9)
NRBC AUTOMATED: 0 PER 100 WBC
PDW BLD-RTO: 16.5 % (ref 11.8–14.4)
PLATELET # BLD: 237 K/UL (ref 138–453)
PMV BLD AUTO: 10.2 FL (ref 8.1–13.5)
POTASSIUM SERPL-SCNC: 4.1 MMOL/L (ref 3.7–5.3)
RBC # BLD: 3.33 M/UL (ref 3.95–5.11)
SODIUM BLD-SCNC: 140 MMOL/L (ref 135–144)
WBC # BLD: 13.2 K/UL (ref 3.5–11.3)

## 2022-05-07 PROCEDURE — 80048 BASIC METABOLIC PNL TOTAL CA: CPT

## 2022-05-07 PROCEDURE — 6370000000 HC RX 637 (ALT 250 FOR IP): Performed by: NURSE PRACTITIONER

## 2022-05-07 PROCEDURE — 2060000000 HC ICU INTERMEDIATE R&B

## 2022-05-07 PROCEDURE — 36415 COLL VENOUS BLD VENIPUNCTURE: CPT

## 2022-05-07 PROCEDURE — 6360000002 HC RX W HCPCS: Performed by: INTERNAL MEDICINE

## 2022-05-07 PROCEDURE — 6370000000 HC RX 637 (ALT 250 FOR IP): Performed by: INTERNAL MEDICINE

## 2022-05-07 PROCEDURE — 99232 SBSQ HOSP IP/OBS MODERATE 35: CPT | Performed by: INTERNAL MEDICINE

## 2022-05-07 PROCEDURE — 85027 COMPLETE CBC AUTOMATED: CPT

## 2022-05-07 PROCEDURE — 6370000000 HC RX 637 (ALT 250 FOR IP): Performed by: STUDENT IN AN ORGANIZED HEALTH CARE EDUCATION/TRAINING PROGRAM

## 2022-05-07 PROCEDURE — 94640 AIRWAY INHALATION TREATMENT: CPT

## 2022-05-07 PROCEDURE — 2580000003 HC RX 258: Performed by: INTERNAL MEDICINE

## 2022-05-07 PROCEDURE — 82947 ASSAY GLUCOSE BLOOD QUANT: CPT

## 2022-05-07 RX ORDER — BACITRACIN, NEOMYCIN, POLYMYXIN B 400; 3.5; 5 [USP'U]/G; MG/G; [USP'U]/G
OINTMENT TOPICAL 2 TIMES DAILY
Status: DISCONTINUED | OUTPATIENT
Start: 2022-05-07 | End: 2022-05-10 | Stop reason: HOSPADM

## 2022-05-07 RX ADMIN — DILTIAZEM HYDROCHLORIDE 30 MG: 30 TABLET ORAL at 15:59

## 2022-05-07 RX ADMIN — APIXABAN 10 MG: 5 TABLET, FILM COATED ORAL at 20:23

## 2022-05-07 RX ADMIN — IPRATROPIUM BROMIDE AND ALBUTEROL SULFATE 1 AMPULE: .5; 3 SOLUTION RESPIRATORY (INHALATION) at 19:31

## 2022-05-07 RX ADMIN — DILTIAZEM HYDROCHLORIDE 30 MG: 30 TABLET ORAL at 09:25

## 2022-05-07 RX ADMIN — FUROSEMIDE 40 MG: 40 TABLET ORAL at 09:25

## 2022-05-07 RX ADMIN — DOCUSATE SODIUM 100 MG: 50 LIQUID ORAL at 00:19

## 2022-05-07 RX ADMIN — POLYMYXIN B SULFATE, BACITRACIN ZINC, NEOMYCIN SULFATE: 5000; 3.5; 4 OINTMENT TOPICAL at 09:25

## 2022-05-07 RX ADMIN — BUDESONIDE 500 MCG: 0.5 INHALANT RESPIRATORY (INHALATION) at 08:04

## 2022-05-07 RX ADMIN — DOCUSATE SODIUM 100 MG: 50 LIQUID ORAL at 09:26

## 2022-05-07 RX ADMIN — DILTIAZEM HYDROCHLORIDE 30 MG: 30 TABLET ORAL at 02:39

## 2022-05-07 RX ADMIN — IPRATROPIUM BROMIDE AND ALBUTEROL SULFATE 1 AMPULE: .5; 3 SOLUTION RESPIRATORY (INHALATION) at 08:04

## 2022-05-07 RX ADMIN — DILTIAZEM HYDROCHLORIDE 30 MG: 30 TABLET ORAL at 20:24

## 2022-05-07 RX ADMIN — POLYMYXIN B SULFATE, BACITRACIN ZINC, NEOMYCIN SULFATE: 5000; 3.5; 4 OINTMENT TOPICAL at 20:24

## 2022-05-07 RX ADMIN — PANTOPRAZOLE SODIUM 40 MG: 40 TABLET, DELAYED RELEASE ORAL at 09:26

## 2022-05-07 RX ADMIN — APIXABAN 10 MG: 5 TABLET, FILM COATED ORAL at 00:19

## 2022-05-07 RX ADMIN — IPRATROPIUM BROMIDE AND ALBUTEROL SULFATE 1 AMPULE: .5; 3 SOLUTION RESPIRATORY (INHALATION) at 15:53

## 2022-05-07 RX ADMIN — SODIUM CHLORIDE, PRESERVATIVE FREE 10 ML: 5 INJECTION INTRAVENOUS at 20:25

## 2022-05-07 RX ADMIN — SODIUM CHLORIDE, PRESERVATIVE FREE 10 ML: 5 INJECTION INTRAVENOUS at 09:27

## 2022-05-07 RX ADMIN — PREDNISONE 30 MG: 20 TABLET ORAL at 09:26

## 2022-05-07 RX ADMIN — INSULIN LISPRO 2 UNITS: 100 INJECTION, SOLUTION INTRAVENOUS; SUBCUTANEOUS at 18:50

## 2022-05-07 RX ADMIN — APIXABAN 10 MG: 5 TABLET, FILM COATED ORAL at 09:26

## 2022-05-07 NOTE — PROGRESS NOTES
[facility]  Internal Medicine Teaching Residency Program  Inpatient Daily Progress Note  ______________________________________________________________________________    Patient: Bishnu Gilbert  YOB: 2000   JHD:[id number]    Acct: [de-identified]     Room: 2001/2001-01  Admit date: 4/18/2022  Today's date: 05/07/22  Number of days in the hospital: 19    SUBJECTIVE   CC: No chief complaint on file. Pt examined at bedside. Chart & results reviewed. - VSS, pt is saturating well on room air.  - patient is afebrile, hemodynamically stable with BP- 110/58, HR- 89  Relevant labs include Na-140, K-4.1, Cl-105, Cr-0.40, Glucose-150  WBC-13.2Hb-9.3, Platelet YQVNL-149.    - no acute events overnight.   - Patient denies headache,  nausea, vomiting, chest pain,  abdominal pain, changes in bowel or urinary habits.     ROS:  General ROS: Completed and except as mentioned above were negative   HEENT ROS: Completed and except as mentioned above were negative   Allergy and Immunology ROS:  Completed and except as mentioned above were negative  Hematological and Lymphatic ROS:  Completed and except as mentioned above were negative  Respiratory ROS:  Completed and except as mentioned above were negative  Cardiovascular ROS:  Completed and except as mentioned above were negative  Gastrointestinal ROS: Completed and except as mentioned above were negative  Genito-Urinary ROS:  Completed and except as mentioned above were negative  Musculoskeletal ROS:  Completed and except as mentioned above were negative  Neurological ROS:  Completed and except as mentioned above were negative  Skin & Dermatological ROS:  Completed and except as mentioned above were negative  Psychological ROS:  Completed and except as mentioned above were negative  BRIEF HISTORY   This is a 20-year-old female with past medical history of asthma presents initially to [facility] with acute exacerbation. She required nonrebreather subsequently intubated and initiated on mechanical ventilation due to worsening respiratory status. She was then transferred to [facility]. She also required bicarb drip initially. She was sedated paralyzed mechanically ventilated. ABG revealed respiratory acidosis. Patient was on venovenous ECMO while at [facility]. Decannulated . Extubated . Positive for rhino/enterovirus infection. Sputum culture positive for Pseudomonas. Treated for ventilator associated pneumonia with meropenem. OBJECTIVE     Vital Signs:  BP (!) 110/58   Pulse 89   Temp 98.1 °F (36.7 °C) (Oral)   Resp 19   Ht 5' 3\" (1.6 m)   Wt 277 lb 5.4 oz (125.8 kg)   SpO2 92%   BMI 49.13 kg/m²     Temp (24hrs), Av.2 °F (36.8 °C), Min:97.7 °F (36.5 °C), Max:98.8 °F (37.1 °C)    In: 450   Out: 1600 [Urine:1600]    Physical Exam:  Constitutional: This is a well developed, well nourished, Greater than 40 - Morbid Obesity / Extreme Obesity / Grade III 23y.o. year old female who is alert, oriented, cooperative and in no apparent distress. Head:normocephalic and atraumatic. Respiratory:Breath sounds bilaterally were clear to auscultation. There were no wheezes, rhonchi or rales. There is no intercostal retraction or use of accessory muscles. Cardiovascular: Regular without murmur, clicks, gallops or rubs. Abdomen: Slightly rounded and soft. No rebound, rigidity or guarding was appreciated. Extremities:  No lower extremity edema, ulcerations, tenderness, varicosities or erythema. Muscle size, tone and strength are normal.  No involuntary movements are noted. Skin:  Warm and dry. Good color, turgor and pigmentation. No lesions or scars.   No cyanosis or clubbing  Neurological/Psychiatric: The patient's general behavior, level of consciousness, thought content and emotional status is normal.        Medications:  Scheduled Medications:    predniSONE  30 mg Oral Daily    Followed by   Yelitza Weeks ON 5/9/2022] predniSONE  20 mg Oral Daily    Followed by   Yelitza Weeks ON 5/12/2022] predniSONE  10 mg Oral Daily    Followed by   Yelitza Weeks ON 5/15/2022] predniSONE  5 mg Oral Daily    ipratropium-albuterol  1 ampule Inhalation 4x daily    apixaban  10 mg Oral BID    Followed by   Yelitza Weeks ON 5/9/2022] apixaban  5 mg Oral BID    pantoprazole  40 mg Oral QAM AC    furosemide  40 mg Oral Daily    hydrocortisone   Topical BID    insulin lispro  0-12 Units SubCUTAneous TID WC    insulin lispro  0-6 Units SubCUTAneous Nightly    budesonide  0.5 mg Nebulization BID    lidocaine PF  5 mL Tracheal Tube Once    docusate  100 mg Oral BID    polyethylene glycol  17 g Oral Daily    albumin human  25 g IntraVENous Once    dilTIAZem  30 mg Oral 4 times per day    sodium chloride flush  5-40 mL IntraVENous 2 times per day     Continuous Infusions:    dextrose      sodium chloride 100 mL/hr at 05/04/22 1403     PRN Medicationstetrahydrozoline, 1 drop, Q4H PRN  heparin (porcine), 10,000 Units, PRN  heparin (porcine), 5,000 Units, PRN  albuterol, 2.5 mg, Q4H PRN  heparin flush (PF), 3 mL, PRN  midazolam, 2 mg, Q2H PRN  metoprolol, 5 mg, Q6H PRN  glucose, 15 g, PRN  dextrose, 12.5 g, PRN  dextrose, 100 mL/hr, PRN  sodium chloride flush, 5-40 mL, PRN  sodium chloride, , PRN  ondansetron, 4 mg, Q8H PRN   Or  ondansetron, 4 mg, Q6H PRN  acetaminophen, 650 mg, Q6H PRN   Or  acetaminophen, 650 mg, Q6H PRN        Diagnostic Labs:  CBC:   Recent Labs     05/05/22  0808 05/06/22  0503 05/07/22  0632   WBC 16.2* 13.7* 13.2*   RBC 3.39* 2.90* 3.33*   HGB 9.6* 9.1* 9.3*   HCT 29.5* 26.0* 29.6*   MCV 87.0 89.7 88.9   RDW 16.6* 16.7* 16.5*    459* 237     BMP:   Recent Labs     05/05/22  0808 05/06/22  0503    134*   K 4.2 3.5*    101   CO2 24 25   BUN 18 16   CREATININE 0.37* 0.33*     BNP: No results for input(s): BNP in the last 72 hours.   PT/INR: No results for input(s): PROTIME, INR in the last 72 hours.  APTT: No results for input(s): APTT in the last 72 hours. CARDIAC ENZYMES: No results for input(s): CKMB, CKMBINDEX, TROPONINI in the last 72 hours. Invalid input(s): CKTOTAL;3  FASTING LIPID PANEL:  Lab Results   Component Value Date    TRIG 96 04/29/2022     LIVER PROFILE: No results for input(s): AST, ALT, ALB, BILIDIR, BILITOT, ALKPHOS in the last 72 hours. MICROBIOLOGY:   Lab Results   Component Value Date/Time    CULTURE Laura albicans/dubliniensis >[id number] CFU/ML 04/29/2022 04:44 PM       Imaging:    XR CHEST (2 VW)    Result Date: 5/4/2022  Subsegmental atelectasis lung bases, similar to the previous exam Bibasilar hypoaeration     XR CHEST PORTABLE    Result Date: 5/2/2022  Slight improved aeration of  the lung bases. Bibasilar airspace disease remains     XR CHEST PORTABLE    Result Date: 5/1/2022  Small bibasilar effusions and adjacent bibasilar infiltrates. Stable right internal jugular central line. ASSESSMENT & PLAN     Principal Problem:    Acute asthma exacerbation  Active Problems:    Severe persistent asthma with status asthmaticus    CHIDI (obstructive sleep apnea)    Morbid obesity with BMI of 50.0-59.9, adult ([facility])    History of seizures    Hypercapnic respiratory failure ([facility])    Endotracheally intubated    On mechanically assisted ventilation ([facility])    Diastolic dysfunction  Resolved Problems:    * No resolved hospital problems. *    Principal Problem:    Acute asthma exacerbation: Extubated 4/29/2022, decannulated 4/30/2022. Saturating well on room air. Nocturnal BiPAP. On prednisone taper. On Pulmicort and DuoNeb.    Active Problems:     Acute DVT: On Eliquis     Ventilator associated right lower lobe pneumonia: Was On meropenem from 4/29-5/6.       CHIDI (obstructive sleep apnea): BiPAP at night     Sinus tachycardia: On Cardizem 30 Mg every 6 hours per cardiology. As needed metoprolol for heart rate > 110.   Hold for SBP<110       Morbid obesity with BMI of 50.0-59.9, adult [facility])        DVT ppx :  On Eliquis for DVT  GI ppx: Not indicated     PT/OT: Ongoing  Discharge Planning / SW: Pending placement       Abimbola Treviño MD  PGY-1, Internal Medicine Resident  [facility] [place]         5/7/2022, 7:18 AM

## 2022-05-07 NOTE — PROGRESS NOTES
Lobo Leiva with CT surgery notified of ecmo suture issues.  They will take a look at them today or tomorrow

## 2022-05-08 LAB
ANION GAP SERPL CALCULATED.3IONS-SCNC: 8 MMOL/L (ref 9–17)
BUN BLDV-MCNC: 15 MG/DL (ref 6–20)
CALCIUM SERPL-MCNC: 8.1 MG/DL (ref 8.6–10.4)
CHLORIDE BLD-SCNC: 105 MMOL/L (ref 98–107)
CO2: 24 MMOL/L (ref 20–31)
CREAT SERPL-MCNC: 0.34 MG/DL (ref 0.5–0.9)
GFR AFRICAN AMERICAN: >60 ML/MIN
GFR NON-AFRICAN AMERICAN: >60 ML/MIN
GFR SERPL CREATININE-BSD FRML MDRD: ABNORMAL ML/MIN/{1.73_M2}
GLUCOSE BLD-MCNC: 166 MG/DL (ref 65–105)
GLUCOSE BLD-MCNC: 85 MG/DL (ref 70–99)
GLUCOSE BLD-MCNC: 98 MG/DL (ref 65–105)
HCT VFR BLD CALC: 28 % (ref 36.3–47.1)
HEMOGLOBIN: 9 G/DL (ref 11.9–15.1)
MCH RBC QN AUTO: 28.4 PG (ref 25.2–33.5)
MCHC RBC AUTO-ENTMCNC: 32.1 G/DL (ref 28.4–34.8)
MCV RBC AUTO: 88.3 FL (ref 82.6–102.9)
NRBC AUTOMATED: 0 PER 100 WBC
PDW BLD-RTO: 16 % (ref 11.8–14.4)
PLATELET # BLD: ABNORMAL K/UL (ref 138–453)
PLATELET, FLUORESCENCE: NORMAL K/UL (ref 138–453)
POTASSIUM SERPL-SCNC: 3.5 MMOL/L (ref 3.7–5.3)
RBC # BLD: 3.17 M/UL (ref 3.95–5.11)
SODIUM BLD-SCNC: 137 MMOL/L (ref 135–144)
WBC # BLD: 10.5 K/UL (ref 3.5–11.3)

## 2022-05-08 PROCEDURE — 97116 GAIT TRAINING THERAPY: CPT

## 2022-05-08 PROCEDURE — 6370000000 HC RX 637 (ALT 250 FOR IP): Performed by: STUDENT IN AN ORGANIZED HEALTH CARE EDUCATION/TRAINING PROGRAM

## 2022-05-08 PROCEDURE — 6370000000 HC RX 637 (ALT 250 FOR IP): Performed by: INTERNAL MEDICINE

## 2022-05-08 PROCEDURE — 97530 THERAPEUTIC ACTIVITIES: CPT

## 2022-05-08 PROCEDURE — 85055 RETICULATED PLATELET ASSAY: CPT

## 2022-05-08 PROCEDURE — 99232 SBSQ HOSP IP/OBS MODERATE 35: CPT | Performed by: INTERNAL MEDICINE

## 2022-05-08 PROCEDURE — 82947 ASSAY GLUCOSE BLOOD QUANT: CPT

## 2022-05-08 PROCEDURE — 80048 BASIC METABOLIC PNL TOTAL CA: CPT

## 2022-05-08 PROCEDURE — 2580000003 HC RX 258: Performed by: INTERNAL MEDICINE

## 2022-05-08 PROCEDURE — 6360000002 HC RX W HCPCS: Performed by: INTERNAL MEDICINE

## 2022-05-08 PROCEDURE — 94640 AIRWAY INHALATION TREATMENT: CPT

## 2022-05-08 PROCEDURE — 97110 THERAPEUTIC EXERCISES: CPT

## 2022-05-08 PROCEDURE — 2060000000 HC ICU INTERMEDIATE R&B

## 2022-05-08 PROCEDURE — 94761 N-INVAS EAR/PLS OXIMETRY MLT: CPT

## 2022-05-08 PROCEDURE — 36415 COLL VENOUS BLD VENIPUNCTURE: CPT

## 2022-05-08 PROCEDURE — 6370000000 HC RX 637 (ALT 250 FOR IP): Performed by: NURSE PRACTITIONER

## 2022-05-08 PROCEDURE — 85027 COMPLETE CBC AUTOMATED: CPT

## 2022-05-08 RX ORDER — BUDESONIDE 0.5 MG/2ML
0.5 INHALANT ORAL 2 TIMES DAILY
Qty: 60 EACH | Refills: 3 | Status: SHIPPED | OUTPATIENT
Start: 2022-05-08

## 2022-05-08 RX ORDER — BACITRACIN, NEOMYCIN, POLYMYXIN B 400; 3.5; 5 [USP'U]/G; MG/G; [USP'U]/G
OINTMENT TOPICAL
Qty: 2 EACH | Refills: 0 | Status: SHIPPED | OUTPATIENT
Start: 2022-05-08 | End: 2022-05-18

## 2022-05-08 RX ORDER — PREDNISONE 1 MG/1
5 TABLET ORAL DAILY
Qty: 3 TABLET | Refills: 0 | Status: SHIPPED | OUTPATIENT
Start: 2022-05-15 | End: 2022-05-18

## 2022-05-08 RX ORDER — PANTOPRAZOLE SODIUM 40 MG/1
40 TABLET, DELAYED RELEASE ORAL
Qty: 30 TABLET | Refills: 3 | Status: SHIPPED | OUTPATIENT
Start: 2022-05-08

## 2022-05-08 RX ORDER — PREDNISONE 10 MG/1
10 TABLET ORAL DAILY
Qty: 3 TABLET | Refills: 0 | Status: SHIPPED | OUTPATIENT
Start: 2022-05-12 | End: 2022-05-15

## 2022-05-08 RX ORDER — PREDNISONE 20 MG/1
20 TABLET ORAL DAILY
Qty: 3 TABLET | Refills: 0 | Status: SHIPPED | OUTPATIENT
Start: 2022-05-09 | End: 2022-05-12

## 2022-05-08 RX ORDER — ALBUTEROL SULFATE 90 UG/1
2 AEROSOL, METERED RESPIRATORY (INHALATION) EVERY 6 HOURS PRN
Qty: 18 G | Refills: 3 | Status: SHIPPED | OUTPATIENT
Start: 2022-05-08

## 2022-05-08 RX ORDER — FUROSEMIDE 40 MG/1
40 TABLET ORAL DAILY
Qty: 60 TABLET | Refills: 3 | Status: SHIPPED | OUTPATIENT
Start: 2022-05-08

## 2022-05-08 RX ADMIN — POLYMYXIN B SULFATE, BACITRACIN ZINC, NEOMYCIN SULFATE: 5000; 3.5; 4 OINTMENT TOPICAL at 07:43

## 2022-05-08 RX ADMIN — PREDNISONE 30 MG: 20 TABLET ORAL at 07:42

## 2022-05-08 RX ADMIN — BUDESONIDE 500 MCG: 0.5 INHALANT RESPIRATORY (INHALATION) at 07:46

## 2022-05-08 RX ADMIN — PANTOPRAZOLE SODIUM 40 MG: 40 TABLET, DELAYED RELEASE ORAL at 07:42

## 2022-05-08 RX ADMIN — APIXABAN 10 MG: 5 TABLET, FILM COATED ORAL at 21:25

## 2022-05-08 RX ADMIN — POLYMYXIN B SULFATE, BACITRACIN ZINC, NEOMYCIN SULFATE: 5000; 3.5; 4 OINTMENT TOPICAL at 21:26

## 2022-05-08 RX ADMIN — SODIUM CHLORIDE, PRESERVATIVE FREE 10 ML: 5 INJECTION INTRAVENOUS at 21:26

## 2022-05-08 RX ADMIN — APIXABAN 10 MG: 5 TABLET, FILM COATED ORAL at 07:42

## 2022-05-08 RX ADMIN — BUDESONIDE 500 MCG: 0.5 INHALANT RESPIRATORY (INHALATION) at 19:43

## 2022-05-08 RX ADMIN — POTASSIUM BICARBONATE 40 MEQ: 782 TABLET, EFFERVESCENT ORAL at 10:43

## 2022-05-08 RX ADMIN — HYDROCORTISONE: 10 CREAM TOPICAL at 07:45

## 2022-05-08 RX ADMIN — DILTIAZEM HYDROCHLORIDE 30 MG: 30 TABLET ORAL at 03:22

## 2022-05-08 RX ADMIN — IPRATROPIUM BROMIDE AND ALBUTEROL SULFATE 1 AMPULE: .5; 3 SOLUTION RESPIRATORY (INHALATION) at 12:27

## 2022-05-08 RX ADMIN — SODIUM CHLORIDE, PRESERVATIVE FREE 10 ML: 5 INJECTION INTRAVENOUS at 07:45

## 2022-05-08 RX ADMIN — POLYETHYLENE GLYCOL 3350 17 G: 17 POWDER, FOR SOLUTION ORAL at 07:47

## 2022-05-08 RX ADMIN — IPRATROPIUM BROMIDE AND ALBUTEROL SULFATE 1 AMPULE: .5; 3 SOLUTION RESPIRATORY (INHALATION) at 07:45

## 2022-05-08 RX ADMIN — DILTIAZEM HYDROCHLORIDE 30 MG: 30 TABLET ORAL at 21:25

## 2022-05-08 RX ADMIN — FUROSEMIDE 40 MG: 40 TABLET ORAL at 07:42

## 2022-05-08 RX ADMIN — INSULIN LISPRO 2 UNITS: 100 INJECTION, SOLUTION INTRAVENOUS; SUBCUTANEOUS at 16:35

## 2022-05-08 RX ADMIN — DOCUSATE SODIUM 100 MG: 50 LIQUID ORAL at 07:42

## 2022-05-08 RX ADMIN — DILTIAZEM HYDROCHLORIDE 30 MG: 30 TABLET ORAL at 16:01

## 2022-05-08 RX ADMIN — IPRATROPIUM BROMIDE AND ALBUTEROL SULFATE 1 AMPULE: .5; 3 SOLUTION RESPIRATORY (INHALATION) at 19:43

## 2022-05-08 RX ADMIN — DILTIAZEM HYDROCHLORIDE 30 MG: 30 TABLET ORAL at 07:42

## 2022-05-08 NOTE — PLAN OF CARE
Problem: Gas Exchange - Impaired:  Goal: Levels of oxygenation will improve  Description: Levels of oxygenation will improve  Outcome: Progressing     Problem:  Activity:  Goal: Ability to tolerate increased activity will improve  Description: Ability to tolerate increased activity will improve  Outcome: Progressing     Problem: Cardiac:  Goal: Hemodynamic stability will improve  Description: Hemodynamic stability will improve  Outcome: Progressing     Problem: Respiratory:  Goal: Ability to maintain a clear airway will improve  Description: Ability to maintain a clear airway will improve  Outcome: Progressing  Goal: Ability to maintain adequate ventilation will improve  Description: Ability to maintain adequate ventilation will improve  Outcome: Progressing  Goal: Complications related to the disease process, condition or treatment will be avoided or minimized  Description: Complications related to the disease process, condition or treatment will be avoided or minimized  Outcome: Progressing     Problem: Skin Integrity:  Goal: Risk for impaired skin integrity will decrease  Description: Risk for impaired skin integrity will decrease  Outcome: Progressing     Problem: OXYGENATION/RESPIRATORY FUNCTION  Goal: Patient will maintain patent airway  Outcome: Progressing  Goal: Patient will achieve/maintain normal respiratory rate/effort  Description: Respiratory rate and effort will be within normal limits for the patient  Outcome: Progressing     Problem: Nutrition  Goal: Optimal nutrition therapy  Outcome: Progressing     Problem: Discharge Planning  Goal: Discharge to home or other facility with appropriate resources  Outcome: Progressing  Flowsheets  Taken 5/7/2022 2000 by Nakia Cardoso RN  Discharge to home or other facility with appropriate resources: Identify barriers to discharge with patient and caregiver  Taken 5/7/2022 1600 by Ginger Salazar RN  Discharge to home or other facility with appropriate resources:   Identify barriers to discharge with patient and caregiver   Arrange for needed discharge resources and transportation as appropriate   Identify discharge learning needs (meds, wound care, etc)   Arrange for interpreters to assist at discharge as needed   Refer to discharge planning if patient needs post-hospital services based on physician order or complex needs related to functional status, cognitive ability or social support system     Problem: Respiratory - Adult  Goal: Achieves optimal ventilation and oxygenation  Outcome: Progressing  Flowsheets (Taken 5/7/2022 2000)  Achieves optimal ventilation and oxygenation: Assess for changes in respiratory status     Problem: Genitourinary - Adult  Goal: Urinary catheter remains patent  Outcome: Progressing     Problem: Neurosensory - Adult  Goal: Achieves stable or improved neurological status  Outcome: Progressing  Flowsheets (Taken 5/7/2022 2000)  Achieves stable or improved neurological status: Assess for and report changes in neurological status  Goal: Achieves maximal functionality and self care  Outcome: Progressing  Flowsheets (Taken 5/7/2022 2000)  Achieves maximal functionality and self care:   Monitor swallowing and airway patency with patient fatigue and changes in neurological status   Encourage and assist patient to increase activity and self care with guidance from physical therapy/occupational therapy     Problem: Pain  Goal: Verbalizes/displays adequate comfort level or baseline comfort level  Outcome: Progressing  Flowsheets  Taken 5/8/2022 0322  Verbalizes/displays adequate comfort level or baseline comfort level: Encourage patient to monitor pain and request assistance  Taken 5/7/2022 2020  Verbalizes/displays adequate comfort level or baseline comfort level: Encourage patient to monitor pain and request assistance     Problem: Safety - Adult  Goal: Free from fall injury  Outcome: Progressing  Flowsheets (Taken 5/7/2022 2152)  Free From Fall Injury: Instruct family/caregiver on patient safety     Problem: ABCDS Injury Assessment  Goal: Absence of physical injury  Outcome: Progressing     Problem: Skin/Tissue Integrity  Goal: Absence of new skin breakdown  Description: 1. Monitor for areas of redness and/or skin breakdown  2. Assess vascular access sites hourly  3. Every 4-6 hours minimum:  Change oxygen saturation probe site  4. Every 4-6 hours:  If on nasal continuous positive airway pressure, respiratory therapy assess nares and determine need for appliance change or resting period.   Outcome: Progressing

## 2022-05-08 NOTE — PROGRESS NOTES
Physical Therapy  Facility/Department: [facility] CAR 2  Physical Therapy Daily treatment note  Name: Taye Loja  : 2000  MRN: [id number]  Date of Service: 2022    Discharge Recommendations: Further therapy recommended at discharge. The patient should be able to tolerate at least 3 hours of therapy per day over 5 days or 15 hours over 7 days. This patient may benefit from a Physical Medicine and Rehab consult. Patient would benefit from continued therapy after discharge   PT Equipment Recommendations  Equipment Needed: Yes  Mobility Devices: Cherelle Freshwater: Standard (pt requires bariatric standard walker)  Other: Pt unsafe to amb any distance without skilled assistance      Patient Diagnosis(es): The encounter diagnosis was CHIDI (obstructive sleep apnea). Past Medical History:  has no past medical history on file. Past Surgical History:  has a past surgical history that includes extracorporeal circulation (N/A, 2022). Assessment   Body Structures, Functions, Activity Limitations Requiring Skilled Therapeutic Intervention: Decreased functional mobility ; Decreased strength;Decreased endurance;Decreased balance;Decreased body mechanics  Assessment: Pt grossly CGA for mobility, MIN A x1 for SW advancment, pt very slow tri, mild waddle  with cues throughout amb to sequence for safety,  Pt unsafe to amb any distance without skilled assistance  Specific Instructions for Next Treatment: increase amb distance, standing balance/end  Therapy Prognosis: Good  Decision Making: High Complexity  Requires PT Follow-Up: Yes  Activity Tolerance  Activity Tolerance: Patient limited by fatigue;Patient limited by endurance  Activity Tolerance Comments: rest breaks x3 for 50 ft x2 amb     Plan   Plan  Plan:  (5-6 wk)  Specific Instructions for Next Treatment: increase amb distance, standing balance/end  Current Treatment Recommendations: Strengthening,Balance training,Functional mobility training,Transfer training,Endurance training,Gait training,Stair training,Safety education & training,Patient/Caregiver education & training,Home exercise program,Equipment evaluation, education, & procurement  Safety Devices  Type of Devices: All fall risk precautions in place,Call light within reach,Gait belt,Nurse notified,Patient at risk for falls,Bed alarm in place,Left in bed  Restraints  Restraints Initially in Place: No     Restrictions  Restrictions/Precautions  Restrictions/Precautions: Fall Risk,General Precautions,Up as Tolerated  Required Braces or Orthoses?: No  Position Activity Restriction  Other position/activity restrictions: asthma exacerbation 4/18, ECMO started 4/22, bronchoscopy 4/24, RLE DVT 4/27, extubated 4/29, ECMO ended 4/30,     Subjective   General  Chart Reviewed: Yes  Response To Previous Treatment: Patient with no complaints from previous session. Family / Caregiver Present: No  Follows Commands: Within Functional Limits  General Comment  Comments: RN and pt agreeable to PT. Pt alert in bed upon arrival.  Subjective  Subjective: Pt denies pain, state she is tired, legs felt shaky during amb      Cognition   Orientation  Overall Orientation Status: Within Functional Limits  Orientation Level: Oriented X4  Cognition  Overall Cognitive Status: WFL Pt slow processing     Bed mobility  Rolling to Right: Stand by assistance  Supine to Sit: Stand by assistance- set up assist  Sit to Supine: Stand by assistance  Scooting: Stand by assistance  Bed Mobility Comments: HOB elevated, use of bed rail, increased time  Transfers  Sit to Stand: Contact guard assistance  Stand to sit: Contact guard assistance  Bed to Chair: Contact guard assistance  Comment: cues for correct hand placement, performed bathroom tx with increased cues for safety awareness with correct hand placement, and standing balance.   Ambulation  Surface: level tile  Device: Standard Walker (prefers [facility])  Assistance: Contact guard assistance;Minimal assistance  Quality of Gait: cues to sequence throughout (AD, step, step) to maintain upright. cues to decrease step length for safety. C/o of shaky legs after 50 ft  Gait Deviations: Decreased step length;Decreased step height;Slow Yue; Increased RICHELLE  Distance: 20 ft x2, 50 ft x2  Comments: Pt very slow yue, cues for RW adavancment, pt moderately unsteady at tumes, c/o of \"shaky legs\", no LOB  More Ambulation?: No  Stairs/Curb  Stairs?: No     Balance  Posture: Good  Sitting - Static: Good;-  Standing - Static: Fair  Standing - Dynamic: Fair;-  Comments: Bairatric SW used while assessing standing balance  Exercise Treatment: BLE ther ex performed seated EOB  A/AROM Exercises: .sit  Breathing Techniques: IS x10     AM-PAC Score  AM-PAC Inpatient Mobility Raw Score : 17 (05/08/22 1550)  AM-PAC Inpatient T-Scale Score : 42.13 (05/08/22 1550)  Mobility Inpatient CMS 0-100% Score: 50.57 (05/08/22 1550)  Mobility Inpatient CMS G-Code Modifier : CK (05/08/22 1550)     Goals  Short Term Goals  Time Frame for Short term goals: 14 visits  Short term goal 1: Pt will be Khadar bed mobility  Short term goal 2: Pt will be Khadar transfers  Short term goal 3: Pt will amb 50' RW CGA  Short term goal 4: Pt will navigate 4 steps CGA       Therapy Time   Individual Concurrent Group Co-treatment   Time In  1459         Time Out  1553         Minutes  54          Timed Code minutes Kenneth Maxwell PTA

## 2022-05-08 NOTE — PROGRESS NOTES
[facility]  Internal Medicine Teaching Residency Program  Inpatient Daily Progress Note  ______________________________________________________________________________    Patient: Irene Lebron  YOB: 2000   OUU:[id number]    Acct: [de-identified]     Room: 2001/2001-01  Admit date: 4/18/2022  Today's date: 05/08/22  Number of days in the hospital: 20    SUBJECTIVE   CC: No chief complaint on file. Pt examined at bedside. Chart & results reviewed. - VSS, pt is saturating well on room air.  - patient is afebrile, hemodynamically stable   - sutures removed yesterday. - no acute events overnight.   - Patient denies headache,  nausea, vomiting, chest pain,  abdominal pain, changes in bowel or urinary habits.  - Pending placement    ROS:  General ROS: Completed and except as mentioned above were negative   HEENT ROS: Completed and except as mentioned above were negative   Allergy and Immunology ROS:  Completed and except as mentioned above were negative  Hematological and Lymphatic ROS:  Completed and except as mentioned above were negative  Respiratory ROS:  Completed and except as mentioned above were negative  Cardiovascular ROS:  Completed and except as mentioned above were negative  Gastrointestinal ROS: Completed and except as mentioned above were negative  Genito-Urinary ROS:  Completed and except as mentioned above were negative  Musculoskeletal ROS:  Completed and except as mentioned above were negative  Neurological ROS:  Completed and except as mentioned above were negative  Skin & Dermatological ROS:  Completed and except as mentioned above were negative  Psychological ROS:  Completed and except as mentioned above were negative  BRIEF HISTORY   This is a 18-year-old female with past medical history of asthma presents initially to [facility] with acute exacerbation.   She required nonrebreather subsequently intubated and initiated on mechanical ventilation due to worsening respiratory status. She was then transferred to [facility]. She also required bicarb drip initially. She was sedated paralyzed mechanically ventilated. ABG revealed respiratory acidosis. Patient was on venovenous ECMO while at [facility]. Decannulated . Extubated . Positive for rhino/enterovirus infection. Sputum culture positive for Pseudomonas. Treated for ventilator associated pneumonia with meropenem. OBJECTIVE     Vital Signs:  BP (!) 124/53   Pulse 98   Temp 98.1 °F (36.7 °C) (Axillary)   Resp 20   Ht 5' 3\" (1.6 m)   Wt 277 lb 5.4 oz (125.8 kg)   SpO2 95%   BMI 49.13 kg/m²     Temp (24hrs), Av.5 °F (36.9 °C), Min:98.1 °F (36.7 °C), Max:99.3 °F (37.4 °C)    No intake/output data recorded. Physical Exam:  Constitutional: This is a well developed, well nourished, Greater than 40 - Morbid Obesity / Extreme Obesity / Grade III 23y.o. year old female who is alert, oriented, cooperative and in no apparent distress. Head:normocephalic and atraumatic. Respiratory:Breath sounds bilaterally were clear to auscultation. There were no wheezes, rhonchi or rales. There is no intercostal retraction or use of accessory muscles. Cardiovascular: Regular without murmur, clicks, gallops or rubs. Abdomen: Slightly rounded and soft. No rebound, rigidity or guarding was appreciated. Extremities:  No lower extremity edema, ulcerations, tenderness, varicosities or erythema. Muscle size, tone and strength are normal.  No involuntary movements are noted. Skin:  Warm and dry. Good color, turgor and pigmentation. No lesions or scars.   No cyanosis or clubbing  Neurological/Psychiatric: The patient's general behavior, level of consciousness, thought content and emotional status is normal.        Medications:  Scheduled Medications:    neomycin-bacitracin-polymyxin   Topical BID    [START ON 2022] predniSONE  20 mg Oral Daily    Followed by   Morin [START ON 5/12/2022] predniSONE  10 mg Oral Daily    Followed by   Ziyad Nguyen ON 5/15/2022] predniSONE  5 mg Oral Daily    ipratropium-albuterol  1 ampule Inhalation 4x daily    apixaban  10 mg Oral BID    Followed by   Ziyad Nguyen ON 5/9/2022] apixaban  5 mg Oral BID    pantoprazole  40 mg Oral QAM AC    furosemide  40 mg Oral Daily    hydrocortisone   Topical BID    insulin lispro  0-12 Units SubCUTAneous TID WC    insulin lispro  0-6 Units SubCUTAneous Nightly    budesonide  0.5 mg Nebulization BID    lidocaine PF  5 mL Tracheal Tube Once    docusate  100 mg Oral BID    polyethylene glycol  17 g Oral Daily    albumin human  25 g IntraVENous Once    dilTIAZem  30 mg Oral 4 times per day    sodium chloride flush  5-40 mL IntraVENous 2 times per day     Continuous Infusions:    dextrose      sodium chloride 100 mL/hr at 05/04/22 1403     PRN Medicationstetrahydrozoline, 1 drop, Q4H PRN  albuterol, 2.5 mg, Q4H PRN  heparin flush (PF), 3 mL, PRN  midazolam, 2 mg, Q2H PRN  metoprolol, 5 mg, Q6H PRN  glucose, 15 g, PRN  dextrose, 12.5 g, PRN  dextrose, 100 mL/hr, PRN  sodium chloride flush, 5-40 mL, PRN  sodium chloride, , PRN  ondansetron, 4 mg, Q8H PRN   Or  ondansetron, 4 mg, Q6H PRN  acetaminophen, 650 mg, Q6H PRN   Or  acetaminophen, 650 mg, Q6H PRN        Diagnostic Labs:  CBC:   Recent Labs     05/06/22  0503 05/07/22  0632 05/08/22  0619   WBC 13.7* 13.2* 10.5   RBC 2.90* 3.33* 3.17*   HGB 9.1* 9.3* 9.0*   HCT 26.0* 29.6* 28.0*   MCV 89.7 88.9 88.3   RDW 16.7* 16.5* 16.0*   * 237 See Reflexed IPF Result     BMP:   Recent Labs     05/06/22  0503 05/07/22  0632 05/08/22  0619   * 140 137   K 3.5* 4.1 3.5*    105 105   CO2 25 26 24   BUN 16 16 15   CREATININE 0.33* 0.40* 0.34*     BNP: No results for input(s): BNP in the last 72 hours. PT/INR: No results for input(s): PROTIME, INR in the last 72 hours. APTT: No results for input(s): APTT in the last 72 hours.   CARDIAC ENZYMES: No results for input(s): CKMB, CKMBINDEX, TROPONINI in the last 72 hours. Invalid input(s): CKTOTAL;3  FASTING LIPID PANEL:  Lab Results   Component Value Date    TRIG 96 04/29/2022     LIVER PROFILE: No results for input(s): AST, ALT, ALB, BILIDIR, BILITOT, ALKPHOS in the last 72 hours. MICROBIOLOGY:   Lab Results   Component Value Date/Time    CULTURE Laura albicans/dubliniensis >[id number] CFU/ML 04/29/2022 04:44 PM       Imaging:    XR CHEST (2 VW)    Result Date: 5/4/2022  Subsegmental atelectasis lung bases, similar to the previous exam Bibasilar hypoaeration     XR CHEST PORTABLE    Result Date: 5/2/2022  Slight improved aeration of  the lung bases. Bibasilar airspace disease remains     XR CHEST PORTABLE    Result Date: 5/1/2022  Small bibasilar effusions and adjacent bibasilar infiltrates. Stable right internal jugular central line. ASSESSMENT & PLAN     Principal Problem:    Acute asthma exacerbation  Active Problems:    Severe persistent asthma with status asthmaticus    CHIDI (obstructive sleep apnea)    Morbid obesity with BMI of 50.0-59.9, adult ([facility])    History of seizures    Hypercapnic respiratory failure ([facility])    Endotracheally intubated    On mechanically assisted ventilation ([facility])    Diastolic dysfunction  Resolved Problems:    * No resolved hospital problems. *    Principal Problem:    Acute asthma exacerbation: with Acute hypoxic hypercapnic respiratory failure required mechanical ventilation and s/p ECMO cannulation on 4/22/22 Extubated 4/29/2022, decannulated 4/30/2022. Respiratory cultures positive for Pseudomonas -  patient developed rash on Cefepime. Completed 7 days course of Meropenem. On Prednisone taper. Duoneb q4 hours and Albuterol as needed. Continue Lasix 40mg daily, diuresing well. Pulm. Critical following.        Acute Left Femoral vein DVT: On Eliquis     Ventilator associated right lower lobe pneumonia: Was On meropenem from 4/29-5/6.     CHIDI (obstructive sleep apnea): BiPAP at night     Sinus tachycardia: On Cardizem 30 Mg every 6 hours per cardiology. As needed metoprolol for heart rate > 110. Hold for SBP<110      Morbid obesity with BMI of 50.0-59.9, adult (HCC)        DVT ppx :  On Eliquis for DVT  GI ppx: Not indicated     PT/OT: Ongoing  Discharge Planning / SW: Pending placement        Kb Garcias MD  PGY-1, Internal Medicine Resident  [address] [facility]         5/8/2022, 7:49 AM

## 2022-05-08 NOTE — PROGRESS NOTES
ECMO follow up note:     Patient cannulated for V-V ECMO on 4-22-22 w/ an on ECMO time of 1005. Originally transferred from [place] [id number] for ECMO evaluation on d/t Asthma Exacerbation. Patient was extubated on 4-29-22 at 1250 and placed on nasal cannula and is currently on room air. She was decannulated on 4-30-22 with an off ECMO time of 1249. Both cannulas (left femoral vein: 25 fr. Multi-stage Drainage cannula/right femoral vein: 23 fr. Return cannula) were removed bedside by CTS, manual pressure applied and both sites closed with 0-silk sutures in a purse string fashion, pt tolerated and no drainage, hematoma, or bleeding noted and pedal pulses have continued to be palpable. Patient was moved to stepdown unit on 5-2-22 and transition planning for discharge was initiated as well as continued PT/OT. Today Araceli Cardoza continues to progress however complains that the sutures at the bilat. Fem cannulation sites are causing a great deal of pain. She is 7 days post decannulation and there is no drainage noted however both groins are ulcerated and the sutures are pulling at these areas as well. Discussed with Ni Espinosa NP and he agreed as well that sutures should be removed. Bilatteral Femoral sutures removed, both sites cleansed generously with betadine and just the puncture sites sealed with derma bond for added protection in order to keep remaining areas clean and dry and open to air whenever possible. Reminded her that she needs to wait on assistance for ambulation and take it slow (after 2 falls in 2 days). She is currently waiting on precert to [facility] for discharge. Please call with any issues or needs, the ECMO team will continue to follow in the background until discharge.      Herbert Owusuman  ECMO Manager/Coordinator  5/7/2022

## 2022-05-08 NOTE — CARE COORDINATION
TRansitional Planning    Called Jorge A Tobin [phone] [facility], and left  requesting return phone call. [phone] spoke to patient and confirmed that plan is still for her to go to [facility]. Still waiting on pre cert. Per ID notes IV antibiotic was complete on 5/6.    [facility] Agustina [phone] no answer, no message left.

## 2022-05-08 NOTE — PROGRESS NOTES
Patient was walking back to bed from bathroom with the nurse. rn went ahead of patient to lower bed rail, rn looked back to see patient losing balance and stumbling backwards. rn ran to help but patient had already started to go back down, almost sat down on toilet but then fell onto floor. Patient did not hit her head. No LOC, staff assisted patient into wheelchair and back to bed. Patient was bearing weight on her legs ok. Legs did not give out. Patient just lost balance. Patient denies injury or pain. Vital signs obtained and physicians were at nurses station right outside the room.

## 2022-05-09 LAB
ANION GAP SERPL CALCULATED.3IONS-SCNC: 8 MMOL/L (ref 9–17)
BUN BLDV-MCNC: 17 MG/DL (ref 6–20)
CALCIUM SERPL-MCNC: 8.2 MG/DL (ref 8.6–10.4)
CHLORIDE BLD-SCNC: 106 MMOL/L (ref 98–107)
CO2: 24 MMOL/L (ref 20–31)
CREAT SERPL-MCNC: 0.32 MG/DL (ref 0.5–0.9)
GFR AFRICAN AMERICAN: >60 ML/MIN
GFR NON-AFRICAN AMERICAN: >60 ML/MIN
GFR SERPL CREATININE-BSD FRML MDRD: ABNORMAL ML/MIN/{1.73_M2}
GLUCOSE BLD-MCNC: 108 MG/DL (ref 65–105)
GLUCOSE BLD-MCNC: 118 MG/DL (ref 65–105)
GLUCOSE BLD-MCNC: 138 MG/DL (ref 65–105)
GLUCOSE BLD-MCNC: 89 MG/DL (ref 65–105)
GLUCOSE BLD-MCNC: 92 MG/DL (ref 70–99)
GLUCOSE BLD-MCNC: 99 MG/DL (ref 65–105)
HCT VFR BLD CALC: 28.3 % (ref 36.3–47.1)
HEMOGLOBIN: 9.2 G/DL (ref 11.9–15.1)
INTERVENTION: NORMAL
MCH RBC QN AUTO: 28.5 PG (ref 25.2–33.5)
MCHC RBC AUTO-ENTMCNC: 32.5 G/DL (ref 28.4–34.8)
MCV RBC AUTO: 87.6 FL (ref 82.6–102.9)
NRBC AUTOMATED: 0 PER 100 WBC
PDW BLD-RTO: 15.9 % (ref 11.8–14.4)
PLATELET # BLD: 254 K/UL (ref 138–453)
PMV BLD AUTO: 9.6 FL (ref 8.1–13.5)
POTASSIUM SERPL-SCNC: 4 MMOL/L (ref 3.7–5.3)
RBC # BLD: 3.23 M/UL (ref 3.95–5.11)
SODIUM BLD-SCNC: 138 MMOL/L (ref 135–144)
WBC # BLD: 12.1 K/UL (ref 3.5–11.3)

## 2022-05-09 PROCEDURE — 6370000000 HC RX 637 (ALT 250 FOR IP): Performed by: INTERNAL MEDICINE

## 2022-05-09 PROCEDURE — 6370000000 HC RX 637 (ALT 250 FOR IP): Performed by: STUDENT IN AN ORGANIZED HEALTH CARE EDUCATION/TRAINING PROGRAM

## 2022-05-09 PROCEDURE — 6370000000 HC RX 637 (ALT 250 FOR IP): Performed by: NURSE PRACTITIONER

## 2022-05-09 PROCEDURE — 85027 COMPLETE CBC AUTOMATED: CPT

## 2022-05-09 PROCEDURE — 99232 SBSQ HOSP IP/OBS MODERATE 35: CPT | Performed by: INTERNAL MEDICINE

## 2022-05-09 PROCEDURE — 82947 ASSAY GLUCOSE BLOOD QUANT: CPT

## 2022-05-09 PROCEDURE — 99213 OFFICE O/P EST LOW 20 MIN: CPT

## 2022-05-09 PROCEDURE — 36415 COLL VENOUS BLD VENIPUNCTURE: CPT

## 2022-05-09 PROCEDURE — 97530 THERAPEUTIC ACTIVITIES: CPT

## 2022-05-09 PROCEDURE — 97110 THERAPEUTIC EXERCISES: CPT

## 2022-05-09 PROCEDURE — 80048 BASIC METABOLIC PNL TOTAL CA: CPT

## 2022-05-09 PROCEDURE — 94640 AIRWAY INHALATION TREATMENT: CPT

## 2022-05-09 PROCEDURE — 2580000003 HC RX 258: Performed by: INTERNAL MEDICINE

## 2022-05-09 PROCEDURE — 97116 GAIT TRAINING THERAPY: CPT

## 2022-05-09 PROCEDURE — 6360000002 HC RX W HCPCS: Performed by: INTERNAL MEDICINE

## 2022-05-09 PROCEDURE — 2060000000 HC ICU INTERMEDIATE R&B

## 2022-05-09 RX ADMIN — IPRATROPIUM BROMIDE AND ALBUTEROL SULFATE 1 AMPULE: .5; 3 SOLUTION RESPIRATORY (INHALATION) at 08:07

## 2022-05-09 RX ADMIN — IPRATROPIUM BROMIDE AND ALBUTEROL SULFATE 1 AMPULE: .5; 3 SOLUTION RESPIRATORY (INHALATION) at 19:52

## 2022-05-09 RX ADMIN — PANTOPRAZOLE SODIUM 40 MG: 40 TABLET, DELAYED RELEASE ORAL at 08:41

## 2022-05-09 RX ADMIN — POLYMYXIN B SULFATE, BACITRACIN ZINC, NEOMYCIN SULFATE: 5000; 3.5; 4 OINTMENT TOPICAL at 08:41

## 2022-05-09 RX ADMIN — SODIUM CHLORIDE, PRESERVATIVE FREE 40 ML: 5 INJECTION INTRAVENOUS at 08:56

## 2022-05-09 RX ADMIN — IPRATROPIUM BROMIDE AND ALBUTEROL SULFATE 1 AMPULE: .5; 3 SOLUTION RESPIRATORY (INHALATION) at 12:24

## 2022-05-09 RX ADMIN — SODIUM CHLORIDE, PRESERVATIVE FREE 10 ML: 5 INJECTION INTRAVENOUS at 22:15

## 2022-05-09 RX ADMIN — BUDESONIDE 500 MCG: 0.5 INHALANT RESPIRATORY (INHALATION) at 19:52

## 2022-05-09 RX ADMIN — DILTIAZEM HYDROCHLORIDE 30 MG: 30 TABLET ORAL at 08:41

## 2022-05-09 RX ADMIN — APIXABAN 5 MG: 5 TABLET, FILM COATED ORAL at 20:38

## 2022-05-09 RX ADMIN — PREDNISONE 20 MG: 20 TABLET ORAL at 08:41

## 2022-05-09 RX ADMIN — FUROSEMIDE 40 MG: 40 TABLET ORAL at 08:41

## 2022-05-09 RX ADMIN — DILTIAZEM HYDROCHLORIDE 30 MG: 30 TABLET ORAL at 20:38

## 2022-05-09 RX ADMIN — DILTIAZEM HYDROCHLORIDE 30 MG: 30 TABLET ORAL at 15:23

## 2022-05-09 RX ADMIN — IPRATROPIUM BROMIDE AND ALBUTEROL SULFATE 1 AMPULE: .5; 3 SOLUTION RESPIRATORY (INHALATION) at 16:44

## 2022-05-09 RX ADMIN — APIXABAN 5 MG: 5 TABLET, FILM COATED ORAL at 08:41

## 2022-05-09 RX ADMIN — DILTIAZEM HYDROCHLORIDE 30 MG: 30 TABLET ORAL at 04:30

## 2022-05-09 ASSESSMENT — PAIN SCALES - GENERAL: PAINLEVEL_OUTOF10: 0

## 2022-05-09 NOTE — PLAN OF CARE
Problem: Respiratory - Adult  Goal: Achieves optimal ventilation and oxygenation  Outcome: Progressing     Problem: Pain  Goal: Verbalizes/displays adequate comfort level or baseline comfort level  Outcome: Progressing     Problem: Safety - Adult  Goal: Free from fall injury  Outcome: Progressing  Flowsheets  Taken 5/8/2022 2000  Free From Fall Injury: Instruct family/caregiver on patient safety  Taken 5/8/2022 1900  Free From Fall Injury: Instruct family/caregiver on patient safety     Problem: ABCDS Injury Assessment  Goal: Absence of physical injury  Outcome: Progressing     Problem: Skin/Tissue Integrity  Goal: Absence of new skin breakdown  Description: 1. Monitor for areas of redness and/or skin breakdown  2. Assess vascular access sites hourly  3. Every 4-6 hours minimum:  Change oxygen saturation probe site  4. Every 4-6 hours:  If on nasal continuous positive airway pressure, respiratory therapy assess nares and determine need for appliance change or resting period.   Outcome: Progressing

## 2022-05-09 NOTE — PROGRESS NOTES
Physical Therapy  Facility/Department: [facility] 2  Physical Therapy Initial Assessment    Name: Shelli Fulton  : 2000  MRN: [id number]  Date of Service: 2022    Discharge Recommendations:  Patient would benefit from continued therapy after discharge   PT Equipment Recommendations  Equipment Needed: Yes  Mobility Devices: Margaret Zoë: Standard  Other: Pt unsafe to amb any distance without skilled assistance      Patient Diagnosis(es): The encounter diagnosis was CHIDI (obstructive sleep apnea). Past Medical History:  has no past medical history on file. Past Surgical History:  has a past surgical history that includes extracorporeal circulation (N/A, 2022). Assessment   Body Structures, Functions, Activity Limitations Requiring Skilled Therapeutic Intervention: Decreased functional mobility ; Decreased strength;Decreased endurance;Decreased balance;Decreased body mechanics  Assessment: Pt grossly CGA for mobility, pt very slow tri when ambulating 60ft x4 with extended seated rest breaks required. Pt unsafe to amb any distance without skilled assistance, will require continued PT at discharge  Therapy Prognosis: Good  Requires PT Follow-Up: Yes     Plan   Plan  Plan:  (5-6x/wk)  Specific Instructions for Next Treatment: increase amb distance, standing balance/end  Current Treatment Recommendations: Strengthening,Balance training,Functional mobility training,Transfer training,Endurance training,Gait training,Stair training,Safety education & training,Patient/Caregiver education & training,Home exercise program,Equipment evaluation, education, & procurement  Safety Devices  Type of Devices:  All fall risk precautions in place,Call light within reach,Gait belt,Nurse notified,Patient at risk for falls,Bed alarm in place,Left in bed  Restraints  Restraints Initially in Place: No     Restrictions  Restrictions/Precautions  Restrictions/Precautions: Fall Risk,General Precautions,Up as Tolerated  Required Braces or Orthoses?: No  Position Activity Restriction  Other position/activity restrictions: asthma exacerbation 4/18, ECMO started 4/22, bronchoscopy 4/24, RLE DVT 4/27, extubated 4/29, ECMO ended 4/30,     Subjective   Pain: denies  General  Chart Reviewed: Yes  Patient assessed for rehabilitation services?: Yes  Response To Previous Treatment: Patient with no complaints from previous session. Family / Caregiver Present: No  Follows Commands: Within Functional Limits  General Comment  Comments: RN and pt agreeable to PT. Pt alert in bed upon arrival.  Subjective  Subjective: Pt denies pain, state she is tired, legs felt shaky during amb       Cognition   Orientation  Overall Orientation Status: Within Functional Limits  Cognition  Overall Cognitive Status: WFL     Objective           Bed mobility  Bridging: Stand by assistance  Rolling to Left: Stand by assistance  Rolling to Right: Stand by assistance  Supine to Sit: Stand by assistance  Sit to Supine: Stand by assistance  Scooting: Stand by assistance  Bed Mobility Comments: HOB elevated, use of bed rail  Transfers  Sit to Stand: Contact guard assistance  Stand to sit: Contact guard assistance  Comment: cues for correct hand placement  Ambulation  Surface: level tile  Device: Standard Walker  Other Apparatus: Wheelchair follow  Assistance: Contact guard assistance  Quality of Gait: cues to sequence throughout (AD, step, step) to maintain upright. cues to decrease step length for safety. C/o of shaky legs after 50 ft  Gait Deviations: Decreased step length;Decreased step height;Slow Tri; Increased RICHELLE  Distance: 60ft x4  Comments: Pt very slow tri, cues for RW adavancment, pt moderately unsteady at tumes, c/o of \"shaky legs\", no LOB  Stairs/Curb  Stairs?: No     Balance  Posture: Good  Sitting - Static: Good  Sitting - Dynamic: Good;-  Standing - Static: Fair  Standing - Dynamic: Fair  Comments: SW used while assessing standing balance  A/AROM Exercises:   Seated LE exercise program: Long Arc Quads, hip abduction/adduction, heel/toe raises, and marches.  Reps: 20x            AM-PAC Score  AM-PAC Inpatient Mobility Raw Score : 15    Goals  Short Term Goals  Time Frame for Short term goals: 14 visits  Short term goal 1: Pt will be Khadar bed mobility  Short term goal 2: Pt will be Khadar transfers  Short term goal 3: Pt will amb 48' RW CGA  Short term goal 4: Pt will navigate 4 steps CGA       Therapy Time   Individual Concurrent Group Co-treatment   Time In 1422         Time Out 1503         Minutes 41         Timed Code Treatment Minutes: 1421 [place], [place]

## 2022-05-09 NOTE — PLAN OF CARE
Problem: Gas Exchange - Impaired:  Goal: Levels of oxygenation will improve  Description: Levels of oxygenation will improve  Outcome: Progressing     Problem:  Activity:  Goal: Ability to tolerate increased activity will improve  Description: Ability to tolerate increased activity will improve  Outcome: Progressing     Problem: Cardiac:  Goal: Hemodynamic stability will improve  Description: Hemodynamic stability will improve  Outcome: Progressing     Problem: Respiratory:  Goal: Ability to maintain a clear airway will improve  Description: Ability to maintain a clear airway will improve  Outcome: Progressing  Goal: Ability to maintain adequate ventilation will improve  Description: Ability to maintain adequate ventilation will improve  Outcome: Progressing  Goal: Complications related to the disease process, condition or treatment will be avoided or minimized  Description: Complications related to the disease process, condition or treatment will be avoided or minimized  Outcome: Progressing     Problem: Skin Integrity:  Goal: Risk for impaired skin integrity will decrease  Description: Risk for impaired skin integrity will decrease  Outcome: Progressing     Problem: OXYGENATION/RESPIRATORY FUNCTION  Goal: Patient will maintain patent airway  Outcome: Progressing  Goal: Patient will achieve/maintain normal respiratory rate/effort  Description: Respiratory rate and effort will be within normal limits for the patient  Outcome: Progressing     Problem: Nutrition  Goal: Optimal nutrition therapy  Outcome: Progressing     Problem: Discharge Planning  Goal: Discharge to home or other facility with appropriate resources  Outcome: Progressing     Problem: Respiratory - Adult  Goal: Achieves optimal ventilation and oxygenation  5/9/2022 0742 by Amanda Garcia RN  Outcome: Progressing  5/9/2022 0320 by Ramiro Robles RN  Outcome: Progressing     Problem: Genitourinary - Adult  Goal: Urinary catheter remains patent  Outcome: Progressing     Problem: Neurosensory - Adult  Goal: Achieves stable or improved neurological status  Outcome: Progressing  Goal: Achieves maximal functionality and self care  Outcome: Progressing     Problem: Pain  Goal: Verbalizes/displays adequate comfort level or baseline comfort level  5/9/2022 0742 by Olya Aguilar RN  Outcome: Progressing  5/9/2022 0320 by Murali Lima RN  Outcome: Progressing     Problem: Safety - Adult  Goal: Free from fall injury  5/9/2022 0742 by Olya Aguilar RN  Outcome: Progressing  5/9/2022 0320 by Murali Lima RN  Outcome: Progressing  Flowsheets  Taken 5/8/2022 2000  Free From Fall Injury: Instruct family/caregiver on patient safety  Taken 5/8/2022 1900  Free From Fall Injury: Instruct family/caregiver on patient safety     Problem: ABCDS Injury Assessment  Goal: Absence of physical injury  5/9/2022 0742 by Olya Aguilar RN  Outcome: Progressing  5/9/2022 0320 by Murali Lima RN  Outcome: Progressing     Problem: Skin/Tissue Integrity  Goal: Absence of new skin breakdown  Description: 1. Monitor for areas of redness and/or skin breakdown  2. Assess vascular access sites hourly  3. Every 4-6 hours minimum:  Change oxygen saturation probe site  4. Every 4-6 hours:  If on nasal continuous positive airway pressure, respiratory therapy assess nares and determine need for appliance change or resting period.   5/9/2022 0742 by Olya Aguilar RN  Outcome: Progressing  5/9/2022 0320 by Murali Lima RN  Outcome: Progressing

## 2022-05-09 NOTE — PROGRESS NOTES
[facility] Wound Ostomy Continence Nurse  Consult Note       NAME:  Jeanie De León  MEDICAL RECORD NUMBER:  [id number]  AGE: 23 y.o. GENDER: female  : 2000  TODAY'S DATE:  2022    Subjective:     Reason for WOCN Evaluation and Assessment: \"groin wounds\"      Jeanie De León is a 23 y.o. female referred by:   [x] Physician  [] Nursing  [] Other:     Wound Identification:  Wound Type: former line sites, intertrigo  Contributing Factors: diabetes, chronic pressure, obesity and friction, excessive moisture        Bilateral groin line sites which were appropriately dressed with occlusive dressing while in place. The occlusive dressing in the skin fold allowed moisture and friction to erode the skin at the base of the fold (Intertriginous dermatitis). The line site wounds themselves are also present though appear to be healing. Patient plans for discharge today. Antibiotic and steroid creams both ordered. Would avoid use of creams in the skin folds.      Objective:      BP (!) 152/70   Pulse 112   Temp 98 °F (36.7 °C) (Oral)   Resp 26   Ht 5' 3\" (1.6 m)   Wt 277 lb 5.4 oz (125.8 kg)   SpO2 94%   BMI 49.13 kg/m²   José Miguel Risk Score: José Miguel Scale Score: 17    LABS    CBC:   Lab Results   Component Value Date    WBC 12.1 2022    RBC 3.23 2022    HGB 9.2 2022     CMP:  Albumin:    Lab Results   Component Value Date    LABALBU 2.9 2022     PT/INR:    Lab Results   Component Value Date    PROTIME 12.7 2022    INR 1.2 2022     HgBA1c:    Lab Results   Component Value Date    LABA1C 5.1 2022     PTT: No components found for: LABPTT      Assessment:       Measurements:     22 1050   Wound 22 Groin Left   Date First Assessed/Time First Assessed: 22 1000   Present on Hospital Admission: No  Primary Wound Type: Skin Tear  Location: Groin  Wound Location Orientation: Left   Wound Image    Wound Etiology Other  (former line site, intertrigo)   Dressing Status New dressing applied   Wound Cleansed Cleansed with saline   Dressing/Treatment Hydrofiber Ag;ABD   Dressing Change Due 05/09/22   Wound Length (cm) 2 cm   Wound Width (cm) 5 cm   Wound Depth (cm) 0.3 cm   Wound Surface Area (cm^2) 10 cm^2   Wound Volume (cm^3) 3 cm^3   Wound Assessment Subcutaneous;Pink/red;Fibrinous   Drainage Amount Small   Drainage Description Serosanguinous   Odor None   Lyubov-wound Assessment Intact   Wound 05/07/22 Groin Right   Date First Assessed: 05/07/22   Present on Hospital Admission: No  Location: Groin  Wound Location Orientation: Right   Wound Image    Wound Etiology Other  (former line site, intertrigo)   Dressing Status New dressing applied   Wound Cleansed Irrigated with saline   Dressing/Treatment Hydrofiber Ag;ABD   Dressing Change Due 05/09/22   Wound Length (cm) 3.5 cm   Wound Width (cm) 6.2 cm   Wound Depth (cm) 0.3 cm   Wound Surface Area (cm^2) 21.7 cm^2   Wound Volume (cm^3) 6.51 cm^3   Wound Assessment Subcutaneous; Fibrinous;Pink/red   Drainage Amount Small   Drainage Description Serosanguinous   Odor None   Lyubov-wound Assessment Intact            Response to treatment:  Well tolerated by patient. Plan:     Plan of Care: Shower daily to cleanse the groin wounds vs use foam cleanser and warm water. Pat dry  Cover each wound with a strip of Opticell Ag or similar, cover this and separate the skin fold with dry dressing. Change daily. Specialty Bed Required : Yes   [] Low Air Loss   [x] Pressure Redistribution  [] Fluid Immersion  [] Bariatric  [] Total Pressure Relief  [] Other:     Discharge Plan:  Placement for patient upon discharge: skilled nursing   Hospice Care: No   Patient appropriate for Outpatient [address] [place] [address] Road: No    Patient/Caregiver Teaching:    Patient plans to shower today, dressing at bedside reviewed with her at length for application after shower. Use undergarments or small pieces of paper tape to adhere the dressings.      [] Indicates understanding       [] Needs reinforcement  [] Unsuccessful      [] Verbal Understanding  [] Demonstrated understanding       [] No evidence of learning  [] Refused teaching         [] N/A       Electronically signed by Octavio Clay RN, CWON on 5/9/2022 at 1:45 PM

## 2022-05-09 NOTE — PROGRESS NOTES
[facility]  Internal Medicine Teaching Residency Program  Inpatient Daily Progress Note  ______________________________________________________________________________    Patient: Alice Mauricio  YOB: 2000   QBQ:[id number]    Acct: [de-identified]     Room: 2001/2001-01  Admit date: 4/18/2022  Today's date: 05/09/22  Number of days in the hospital: 21    SUBJECTIVE   CC: No chief complaint on file. Pt examined at bedside. Chart & results reviewed. - VSS, pt is saturating well on room air.  - patient is afebrile, hemodynamically stable   - no acute events overnight.   - Patient denies headache,  nausea, vomiting, chest pain,  abdominal pain, changes in bowel or urinary habits.  - Pending placement    ROS:  General ROS: Completed and except as mentioned above were negative   HEENT ROS: Completed and except as mentioned above were negative   Allergy and Immunology ROS:  Completed and except as mentioned above were negative  Hematological and Lymphatic ROS:  Completed and except as mentioned above were negative  Respiratory ROS:  Completed and except as mentioned above were negative  Cardiovascular ROS:  Completed and except as mentioned above were negative  Gastrointestinal ROS: Completed and except as mentioned above were negative  Genito-Urinary ROS:  Completed and except as mentioned above were negative  Musculoskeletal ROS:  Completed and except as mentioned above were negative  Neurological ROS:  Completed and except as mentioned above were negative  Skin & Dermatological ROS:  Completed and except as mentioned above were negative  Psychological ROS:  Completed and except as mentioned above were negative  BRIEF HISTORY   This is a 78-year-old female with past medical history of asthma presents initially to [facility] with acute exacerbation.   She required nonrebreather subsequently intubated and initiated on mechanical ventilation due to worsening respiratory status. She was then transferred to [facility]. She also required bicarb drip initially. She was sedated paralyzed mechanically ventilated. ABG revealed respiratory acidosis. Patient was on venovenous ECMO while at [facility]. Decannulated . Extubated . Positive for rhino/enterovirus infection. Sputum culture positive for Pseudomonas. Treated for ventilator associated pneumonia with meropenem. OBJECTIVE     Vital Signs:  /67   Pulse 103   Temp 98.2 °F (36.8 °C) (Oral)   Resp 23   Ht 5' 3\" (1.6 m)   Wt 277 lb 5.4 oz (125.8 kg)   SpO2 94%   BMI 49.13 kg/m²     Temp (24hrs), Av.5 °F (36.9 °C), Min:98.1 °F (36.7 °C), Max:98.8 °F (37.1 °C)    No intake/output data recorded. Physical Exam:  Constitutional: This is a well developed, well nourished, Greater than 40 - Morbid Obesity / Extreme Obesity / Grade III 23y.o. year old female who is alert, oriented, cooperative and in no apparent distress. Head:normocephalic and atraumatic. Respiratory:Breath sounds bilaterally were clear to auscultation. There were no wheezes, rhonchi or rales. There is no intercostal retraction or use of accessory muscles. Cardiovascular: Regular without murmur, clicks, gallops or rubs. Abdomen: Slightly rounded and soft. No rebound, rigidity or guarding was appreciated. Extremities:  No lower extremity edema, ulcerations, tenderness, varicosities or erythema. Muscle size, tone and strength are normal.  No involuntary movements are noted. Skin:  Warm and dry. Good color, turgor and pigmentation. No lesions or scars.   No cyanosis or clubbing  Neurological/Psychiatric: The patient's general behavior, level of consciousness, thought content and emotional status is normal.        Medications:  Scheduled Medications:    neomycin-bacitracin-polymyxin   Topical BID    predniSONE  20 mg Oral Daily    Followed by   [place] Ralphs ON 2022] predniSONE  10 mg Oral Daily    Followed by   Olive Law ON 5/15/2022] predniSONE  5 mg Oral Daily    ipratropium-albuterol  1 ampule Inhalation 4x daily    apixaban  5 mg Oral BID    pantoprazole  40 mg Oral QAM AC    furosemide  40 mg Oral Daily    hydrocortisone   Topical BID    insulin lispro  0-12 Units SubCUTAneous TID WC    insulin lispro  0-6 Units SubCUTAneous Nightly    budesonide  0.5 mg Nebulization BID    lidocaine PF  5 mL Tracheal Tube Once    docusate  100 mg Oral BID    polyethylene glycol  17 g Oral Daily    albumin human  25 g IntraVENous Once    dilTIAZem  30 mg Oral 4 times per day    sodium chloride flush  5-40 mL IntraVENous 2 times per day     Continuous Infusions:    dextrose      sodium chloride 100 mL/hr at 05/04/22 1403     PRN Medicationstetrahydrozoline, 1 drop, Q4H PRN  albuterol, 2.5 mg, Q4H PRN  midazolam, 2 mg, Q2H PRN  metoprolol, 5 mg, Q6H PRN  glucose, 15 g, PRN  dextrose, 12.5 g, PRN  dextrose, 100 mL/hr, PRN  sodium chloride flush, 5-40 mL, PRN  sodium chloride, , PRN  ondansetron, 4 mg, Q8H PRN   Or  ondansetron, 4 mg, Q6H PRN  acetaminophen, 650 mg, Q6H PRN   Or  acetaminophen, 650 mg, Q6H PRN        Diagnostic Labs:  CBC:   Recent Labs     05/07/22  0632 05/08/22 0619 05/09/22 0224   WBC 13.2* 10.5 12.1*   RBC 3.33* 3.17* 3.23*   HGB 9.3* 9.0* 9.2*   HCT 29.6* 28.0* 28.3*   MCV 88.9 88.3 87.6   RDW 16.5* 16.0* 15.9*    See Reflexed IPF Result 254     BMP:   Recent Labs     05/07/22  0632 05/08/22  0619 05/09/22 0224    137 138   K 4.1 3.5* 4.0    105 106   CO2 26 24 24   BUN 16 15 17   CREATININE 0.40* 0.34* 0.32*     BNP: No results for input(s): BNP in the last 72 hours. PT/INR: No results for input(s): PROTIME, INR in the last 72 hours. APTT: No results for input(s): APTT in the last 72 hours. CARDIAC ENZYMES: No results for input(s): CKMB, CKMBINDEX, TROPONINI in the last 72 hours.     Invalid input(s): CKTOTAL;3  FASTING LIPID PANEL:  Lab Results   Component Value Date TRIG 96 04/29/2022     LIVER PROFILE: No results for input(s): AST, ALT, ALB, BILIDIR, BILITOT, ALKPHOS in the last 72 hours. MICROBIOLOGY:   Lab Results   Component Value Date/Time    CULTURE Laura albicans/dubliniensis >[id number] CFU/ML 04/29/2022 04:44 PM       Imaging:    XR CHEST (2 VW)    Result Date: 5/4/2022  Subsegmental atelectasis lung bases, similar to the previous exam Bibasilar hypoaeration     XR CHEST PORTABLE    Result Date: 5/2/2022  Slight improved aeration of  the lung bases. Bibasilar airspace disease remains     XR CHEST PORTABLE    Result Date: 5/1/2022  Small bibasilar effusions and adjacent bibasilar infiltrates. Stable right internal jugular central line. ASSESSMENT & PLAN     Principal Problem:    Acute asthma exacerbation  Active Problems:    Severe persistent asthma with status asthmaticus    CHIDI (obstructive sleep apnea)    Morbid obesity with BMI of 50.0-59.9, adult ([facility])    History of seizures    Hypercapnic respiratory failure ([facility])    Endotracheally intubated    On mechanically assisted ventilation ([facility])    Diastolic dysfunction  Resolved Problems:    * No resolved hospital problems. *    Principal Problem:    Acute asthma exacerbation: with Acute hypoxic hypercapnic respiratory failure required mechanical ventilation and s/p ECMO cannulation on 4/22/22 Extubated 4/29/2022, decannulated 4/30/2022. Respiratory cultures positive for Pseudomonas -  patient developed rash on Cefepime. Completed 7 days course of Meropenem. On Prednisone taper. Duoneb q4 hours and Albuterol as needed. Continue Lasix 40mg daily, diuresing well.        Acute Left Femoral vein DVT: On Eliquis     Ventilator associated right lower lobe pneumonia: Was On meropenem from 4/29-5/6.     CHIDI (obstructive sleep apnea): BiPAP at night     Sinus tachycardia: On Cardizem 30 Mg every 6 hours per cardiology. As needed metoprolol for heart rate > 110.   Hold for SBP<110      Morbid obesity with BMI of 50.0-59.9, adult [facility])        DVT ppx :  On Eliquis for DVT  GI ppx: Not indicated     PT/OT: Ongoing  Discharge Planning / SW: Pending placement        Mark Lin MD  PGY-1, Internal Medicine Resident  [facility]         5/9/2022, 10:29 AM

## 2022-05-10 VITALS
RESPIRATION RATE: 21 BRPM | OXYGEN SATURATION: 93 % | BODY MASS INDEX: 49.14 KG/M2 | SYSTOLIC BLOOD PRESSURE: 139 MMHG | HEIGHT: 63 IN | WEIGHT: 277.34 LBS | DIASTOLIC BLOOD PRESSURE: 78 MMHG | TEMPERATURE: 98.2 F | HEART RATE: 106 BPM

## 2022-05-10 LAB
ANION GAP SERPL CALCULATED.3IONS-SCNC: 10 MMOL/L (ref 9–17)
BUN BLDV-MCNC: 12 MG/DL (ref 6–20)
CALCIUM SERPL-MCNC: 8.9 MG/DL (ref 8.6–10.4)
CHLORIDE BLD-SCNC: 104 MMOL/L (ref 98–107)
CO2: 24 MMOL/L (ref 20–31)
CREAT SERPL-MCNC: 0.4 MG/DL (ref 0.5–0.9)
GFR AFRICAN AMERICAN: >60 ML/MIN
GFR NON-AFRICAN AMERICAN: >60 ML/MIN
GFR SERPL CREATININE-BSD FRML MDRD: ABNORMAL ML/MIN/{1.73_M2}
GLUCOSE BLD-MCNC: 130 MG/DL (ref 65–105)
GLUCOSE BLD-MCNC: 77 MG/DL (ref 65–105)
GLUCOSE BLD-MCNC: 87 MG/DL (ref 70–99)
GLUCOSE BLD-MCNC: 95 MG/DL (ref 65–105)
HCT VFR BLD CALC: 31.4 % (ref 36.3–47.1)
HEMOGLOBIN: 10 G/DL (ref 11.9–15.1)
MCH RBC QN AUTO: 27.6 PG (ref 25.2–33.5)
MCHC RBC AUTO-ENTMCNC: 31.8 G/DL (ref 28.4–34.8)
MCV RBC AUTO: 86.7 FL (ref 82.6–102.9)
NRBC AUTOMATED: 0 PER 100 WBC
PDW BLD-RTO: 15.9 % (ref 11.8–14.4)
PLATELET # BLD: 300 K/UL (ref 138–453)
PMV BLD AUTO: 9.5 FL (ref 8.1–13.5)
POTASSIUM SERPL-SCNC: 3.6 MMOL/L (ref 3.7–5.3)
RBC # BLD: 3.62 M/UL (ref 3.95–5.11)
SODIUM BLD-SCNC: 138 MMOL/L (ref 135–144)
WBC # BLD: 10.9 K/UL (ref 3.5–11.3)

## 2022-05-10 PROCEDURE — 2580000003 HC RX 258: Performed by: INTERNAL MEDICINE

## 2022-05-10 PROCEDURE — 97535 SELF CARE MNGMENT TRAINING: CPT

## 2022-05-10 PROCEDURE — 36415 COLL VENOUS BLD VENIPUNCTURE: CPT

## 2022-05-10 PROCEDURE — 6370000000 HC RX 637 (ALT 250 FOR IP)

## 2022-05-10 PROCEDURE — 6370000000 HC RX 637 (ALT 250 FOR IP): Performed by: NURSE PRACTITIONER

## 2022-05-10 PROCEDURE — 82947 ASSAY GLUCOSE BLOOD QUANT: CPT

## 2022-05-10 PROCEDURE — 6370000000 HC RX 637 (ALT 250 FOR IP): Performed by: STUDENT IN AN ORGANIZED HEALTH CARE EDUCATION/TRAINING PROGRAM

## 2022-05-10 PROCEDURE — 6370000000 HC RX 637 (ALT 250 FOR IP): Performed by: INTERNAL MEDICINE

## 2022-05-10 PROCEDURE — 99232 SBSQ HOSP IP/OBS MODERATE 35: CPT | Performed by: INTERNAL MEDICINE

## 2022-05-10 PROCEDURE — 85027 COMPLETE CBC AUTOMATED: CPT

## 2022-05-10 PROCEDURE — 80048 BASIC METABOLIC PNL TOTAL CA: CPT

## 2022-05-10 PROCEDURE — 97116 GAIT TRAINING THERAPY: CPT

## 2022-05-10 PROCEDURE — 97110 THERAPEUTIC EXERCISES: CPT

## 2022-05-10 PROCEDURE — 94640 AIRWAY INHALATION TREATMENT: CPT

## 2022-05-10 RX ORDER — POTASSIUM CHLORIDE 20 MEQ/1
40 TABLET, EXTENDED RELEASE ORAL ONCE
Status: COMPLETED | OUTPATIENT
Start: 2022-05-10 | End: 2022-05-10

## 2022-05-10 RX ADMIN — PREDNISONE 20 MG: 20 TABLET ORAL at 08:23

## 2022-05-10 RX ADMIN — SODIUM CHLORIDE, PRESERVATIVE FREE 10 ML: 5 INJECTION INTRAVENOUS at 08:23

## 2022-05-10 RX ADMIN — IPRATROPIUM BROMIDE AND ALBUTEROL SULFATE 1 AMPULE: .5; 3 SOLUTION RESPIRATORY (INHALATION) at 11:44

## 2022-05-10 RX ADMIN — DILTIAZEM HYDROCHLORIDE 30 MG: 30 TABLET ORAL at 02:46

## 2022-05-10 RX ADMIN — IPRATROPIUM BROMIDE AND ALBUTEROL SULFATE 1 AMPULE: .5; 3 SOLUTION RESPIRATORY (INHALATION) at 16:01

## 2022-05-10 RX ADMIN — POTASSIUM CHLORIDE 40 MEQ: 1500 TABLET, EXTENDED RELEASE ORAL at 15:32

## 2022-05-10 RX ADMIN — APIXABAN 5 MG: 5 TABLET, FILM COATED ORAL at 08:23

## 2022-05-10 RX ADMIN — IPRATROPIUM BROMIDE AND ALBUTEROL SULFATE 1 AMPULE: .5; 3 SOLUTION RESPIRATORY (INHALATION) at 08:53

## 2022-05-10 RX ADMIN — FUROSEMIDE 40 MG: 40 TABLET ORAL at 08:23

## 2022-05-10 RX ADMIN — DILTIAZEM HYDROCHLORIDE 30 MG: 30 TABLET ORAL at 15:32

## 2022-05-10 RX ADMIN — PANTOPRAZOLE SODIUM 40 MG: 40 TABLET, DELAYED RELEASE ORAL at 08:23

## 2022-05-10 RX ADMIN — DILTIAZEM HYDROCHLORIDE 30 MG: 30 TABLET ORAL at 08:23

## 2022-05-10 NOTE — PROGRESS NOTES
Spoke to patient and her nurse, patient could ambulate independently with wheeled walker today and feels that she can go home today. Gricel Jo was evaluated today and a DME order was entered for a wheeled walker because she requires this to successfully complete daily living tasks of ambulating. A wheeled walker is necessary due to the patient's unsteady gait, upper body weakness, and inability to  an ambulation device; and she can ambulate only by pushing a walker instead of a lesser assistive device such as a cane, crutch, or standard walker. The need for this equipment was discussed with the patient and she understands and is in agreement.     Tabby Gustafson MD  PGY-3, Internal Medicine Resident  [facility]  5/10/2022 4:43 PM The following findings require follow up:  Radiographic finding   Finding: This is a limited study due to marked degradation due to extensive motion with the limited evaluation of the vasculature and Limited evaluation of the space-occupying lesion and previously noted liver lesion     However there is no new diffusion restricting area which would suggest presence of a new worrisome lesion      There is development of increasing dilation of the intrahepatic biliary ducts in the region of the lateral segment of the left hepatic lobe in the region of the segment 2 and 3      There are no findings to suggest extrahepatic biliary obstruction with normal caliber of the common bile duct and the central aspect of the right and left hepatic duct      Evaluation for the portal vein is limited due to suboptimal contrast images    However the appearance of the portal vein and its branches on the noncontrast images is similar to the previous study of September 11, 2020   Consider repeat study when the acute illness subsides   Follow up required: yes   Follow up should be done within 2-4 week(s)    Please notify the following clinician to assist with the follow up:   Dr Mejia Ramsey

## 2022-05-10 NOTE — PROGRESS NOTES
[facility]  Internal Medicine Teaching Residency Program  Inpatient Daily Progress Note  ______________________________________________________________________________    Patient: Carine Curtis  YOB: 2000   UXZ:[id number]    Acct: [de-identified]     Room: 2001/2001-01  Admit date: 4/18/2022  Today's date: 05/10/22  Number of days in the hospital: 22    SUBJECTIVE   CC: No chief complaint on file. Pt examined at bedside. Chart & results reviewed. - VSS, pt is saturating well on room air.  - patient is afebrile, hemodynamically stable   - no acute events overnight. - evaluated by wound care yesterday. NO cream in folds.   - Patient denies headache,  nausea, vomiting, chest pain,  abdominal pain, changes in bowel or urinary habits.  - Pending placement     ROS:  General ROS: Completed and except as mentioned above were negative   HEENT ROS: Completed and except as mentioned above were negative   Allergy and Immunology ROS:  Completed and except as mentioned above were negative  Hematological and Lymphatic ROS:  Completed and except as mentioned above were negative  Respiratory ROS:  Completed and except as mentioned above were negative  Cardiovascular ROS:  Completed and except as mentioned above were negative  Gastrointestinal ROS: Completed and except as mentioned above were negative  Genito-Urinary ROS:  Completed and except as mentioned above were negative  Musculoskeletal ROS:  Completed and except as mentioned above were negative  Neurological ROS:  Completed and except as mentioned above were negative  Skin & Dermatological ROS:  Completed and except as mentioned above were negative  Psychological ROS:  Completed and except as mentioned above were negative  BRIEF HISTORY   This is a 17-year-old female with past medical history of asthma presents initially to [facility] with acute exacerbation.   She required nonrebreather subsequently intubated and initiated on mechanical ventilation due to worsening respiratory status. She was then transferred to [facility]. She also required bicarb drip initially. She was sedated paralyzed mechanically ventilated. ABG revealed respiratory acidosis. Patient was on venovenous ECMO while at [facility]. Decannulated . Extubated . Positive for rhino/enterovirus infection. Sputum culture positive for Pseudomonas. Treated for ventilator associated pneumonia with meropenem. OBJECTIVE     Vital Signs:  /78   Pulse 111   Temp 98 °F (36.7 °C) (Oral)   Resp 21   Ht 5' 3\" (1.6 m)   Wt 277 lb 5.4 oz (125.8 kg)   SpO2 95%   BMI 49.13 kg/m²     Temp (24hrs), Av °F (36.7 °C), Min:97.8 °F (36.6 °C), Max:98.2 °F (36.8 °C)    In: 420   Out: -     Physical Exam:  Constitutional: This is a well developed, well nourished, Greater than 40 - Morbid Obesity / Extreme Obesity / Grade III 23y.o. year old female who is alert, oriented, cooperative and in no apparent distress. Head:normocephalic and atraumatic. Respiratory:Breath sounds bilaterally were clear to auscultation. There were no wheezes, rhonchi or rales. There is no intercostal retraction or use of accessory muscles. Cardiovascular: Regular without murmur, clicks, gallops or rubs. Abdomen: Slightly rounded and soft. No rebound, rigidity or guarding was appreciated. Extremities:  No lower extremity edema, ulcerations, tenderness, varicosities or erythema. Muscle size, tone and strength are normal.  No involuntary movements are noted. Skin:  Warm and dry. Good color, turgor and pigmentation. No lesions or scars.   No cyanosis or clubbing  Neurological/Psychiatric: The patient's general behavior, level of consciousness, thought content and emotional status is normal.        Medications:  Scheduled Medications:    neomycin-bacitracin-polymyxin   Topical BID    predniSONE  20 mg Oral Daily    Followed by   Andria Cartagena ON 2022] predniSONE  10 mg Oral Daily    Followed by   Milady Berkowitz ON 5/15/2022] predniSONE  5 mg Oral Daily    ipratropium-albuterol  1 ampule Inhalation 4x daily    apixaban  5 mg Oral BID    pantoprazole  40 mg Oral QAM AC    furosemide  40 mg Oral Daily    hydrocortisone   Topical BID    insulin lispro  0-12 Units SubCUTAneous TID WC    insulin lispro  0-6 Units SubCUTAneous Nightly    budesonide  0.5 mg Nebulization BID    lidocaine PF  5 mL Tracheal Tube Once    docusate  100 mg Oral BID    polyethylene glycol  17 g Oral Daily    albumin human  25 g IntraVENous Once    dilTIAZem  30 mg Oral 4 times per day    sodium chloride flush  5-40 mL IntraVENous 2 times per day     Continuous Infusions:    dextrose      sodium chloride 100 mL/hr at 05/04/22 1403     PRN Medicationstetrahydrozoline, 1 drop, Q4H PRN  albuterol, 2.5 mg, Q4H PRN  midazolam, 2 mg, Q2H PRN  metoprolol, 5 mg, Q6H PRN  glucose, 15 g, PRN  dextrose, 12.5 g, PRN  dextrose, 100 mL/hr, PRN  sodium chloride flush, 5-40 mL, PRN  sodium chloride, , PRN  ondansetron, 4 mg, Q8H PRN   Or  ondansetron, 4 mg, Q6H PRN  acetaminophen, 650 mg, Q6H PRN   Or  acetaminophen, 650 mg, Q6H PRN        Diagnostic Labs:  CBC:   Recent Labs     05/08/22  0619 05/09/22  0224 05/10/22  0611   WBC 10.5 12.1* 10.9   RBC 3.17* 3.23* 3.62*   HGB 9.0* 9.2* 10.0*   HCT 28.0* 28.3* 31.4*   MCV 88.3 87.6 86.7   RDW 16.0* 15.9* 15.9*   PLT See Reflexed IPF Result 254 300     BMP:   Recent Labs     05/08/22  0619 05/09/22  0224 05/10/22  0611    138 138   K 3.5* 4.0 3.6*    106 104   CO2 24 24 24   BUN 15 17 12   CREATININE 0.34* 0.32* 0.40*     BNP: No results for input(s): BNP in the last 72 hours. PT/INR: No results for input(s): PROTIME, INR in the last 72 hours. APTT: No results for input(s): APTT in the last 72 hours. CARDIAC ENZYMES: No results for input(s): CKMB, CKMBINDEX, TROPONINI in the last 72 hours.     Invalid input(s): CKTOTAL;3  FASTING LIPID PANEL:  Lab Results   Component Value Date    TRIG 96 04/29/2022     LIVER PROFILE: No results for input(s): AST, ALT, ALB, BILIDIR, BILITOT, ALKPHOS in the last 72 hours. MICROBIOLOGY:   Lab Results   Component Value Date/Time    CULTURE Laura albicans/dubliniensis >[id number] CFU/ML 04/29/2022 04:44 PM       Imaging:    XR CHEST (2 VW)    Result Date: 5/4/2022  Subsegmental atelectasis lung bases, similar to the previous exam Bibasilar hypoaeration     XR CHEST PORTABLE    Result Date: 5/2/2022  Slight improved aeration of  the lung bases. Bibasilar airspace disease remains     XR CHEST PORTABLE    Result Date: 5/1/2022  Small bibasilar effusions and adjacent bibasilar infiltrates. Stable right internal jugular central line. ASSESSMENT & PLAN     Principal Problem:    Acute asthma exacerbation  Active Problems:    Severe persistent asthma with status asthmaticus    CHIDI (obstructive sleep apnea)    Morbid obesity with BMI of 50.0-59.9, adult ([facility])    History of seizures    Hypercapnic respiratory failure ([facility])    Endotracheally intubated    On mechanically assisted ventilation ([facility])    Diastolic dysfunction  Resolved Problems:    * No resolved hospital problems. *    Principal Problem:    Acute asthma exacerbation: with Acute hypoxic hypercapnic respiratory failure required mechanical ventilation and s/p ECMO cannulation on 4/22/22 Extubated 4/29/2022, decannulated 4/30/2022. Respiratory cultures positive for Pseudomonas -  patient developed rash on Cefepime. Completed 7 days course of Meropenem. On Prednisone taper. Duoneb q4 hours and Albuterol as needed. Continue Lasix 40mg daily, diuresing well.        Acute Left Femoral vein DVT: On Eliquis     Ventilator associated right lower lobe pneumonia: Was On meropenem from 4/29-5/6.     CHIDI (obstructive sleep apnea): BiPAP at night     Sinus tachycardia: On Cardizem 30 Mg every 6 hours per cardiology. As needed metoprolol for heart rate > 110.   Hold for SBP<110      Morbid obesity with BMI of 50.0-59.9, adult ([facility])        DVT ppx :  On Eliquis for DVT  GI ppx: Not indicated     PT/OT: Ongoing  Discharge Planning / SW: Pending placement        Abelino Luis MD  PGY-1, Internal Medicine Resident  [facility]         5/10/2022, 7:10 AM

## 2022-05-10 NOTE — PLAN OF CARE
Problem: Gas Exchange - Impaired:  Goal: Levels of oxygenation will improve  Description: Levels of oxygenation will improve  Outcome: Progressing     Problem:  Activity:  Goal: Ability to tolerate increased activity will improve  Description: Ability to tolerate increased activity will improve  Outcome: Progressing     Problem: Cardiac:  Goal: Hemodynamic stability will improve  Description: Hemodynamic stability will improve  Outcome: Progressing     Problem: Respiratory:  Goal: Ability to maintain a clear airway will improve  Description: Ability to maintain a clear airway will improve  Outcome: Progressing  Goal: Ability to maintain adequate ventilation will improve  Description: Ability to maintain adequate ventilation will improve  Outcome: Progressing  Goal: Complications related to the disease process, condition or treatment will be avoided or minimized  Description: Complications related to the disease process, condition or treatment will be avoided or minimized  Outcome: Progressing     Problem: Skin Integrity:  Goal: Risk for impaired skin integrity will decrease  Description: Risk for impaired skin integrity will decrease  Outcome: Progressing     Problem: OXYGENATION/RESPIRATORY FUNCTION  Goal: Patient will maintain patent airway  Outcome: Progressing  Goal: Patient will achieve/maintain normal respiratory rate/effort  Description: Respiratory rate and effort will be within normal limits for the patient  Outcome: Progressing     Problem: Nutrition  Goal: Optimal nutrition therapy  Outcome: Progressing     Problem: Discharge Planning  Goal: Discharge to home or other facility with appropriate resources  Outcome: Progressing     Problem: Respiratory - Adult  Goal: Achieves optimal ventilation and oxygenation  Outcome: Progressing     Problem: Genitourinary - Adult  Goal: Urinary catheter remains patent  Outcome: Progressing     Problem: Neurosensory - Adult  Goal: Achieves stable or improved neurological status  Outcome: Progressing  Goal: Achieves maximal functionality and self care  Outcome: Progressing     Problem: Pain  Goal: Verbalizes/displays adequate comfort level or baseline comfort level  Outcome: Progressing     Problem: Safety - Adult  Goal: Free from fall injury  Outcome: Progressing     Problem: ABCDS Injury Assessment  Goal: Absence of physical injury  Outcome: Progressing     Problem: Skin/Tissue Integrity  Goal: Absence of new skin breakdown  Description: 1. Monitor for areas of redness and/or skin breakdown  2. Assess vascular access sites hourly  3. Every 4-6 hours minimum:  Change oxygen saturation probe site  4. Every 4-6 hours:  If on nasal continuous positive airway pressure, respiratory therapy assess nares and determine need for appliance change or resting period.   Outcome: Progressing

## 2022-05-10 NOTE — PROGRESS NOTES
Physical Therapy  Facility/Department: [facility]  Physical Therapy Daily Treatment Note      Name: Taye Loja  : 2000  MRN: [id number]  Date of Service: 5/10/2022    Discharge Recommendations:  Patient would benefit from continued therapy after discharge   PT Equipment Recommendations  Equipment Needed: Yes  Mobility Devices: Cherelle Fresh[place]: Rolling      Patient Diagnosis(es): The encounter diagnosis was CHIDI (obstructive sleep apnea). Past Medical History:  has no past medical history on file. Past Surgical History:  has a past surgical history that includes extracorporeal circulation (N/A, 2022). Assessment   Body Structures, Functions, Activity Limitations Requiring Skilled Therapeutic Intervention: Decreased functional mobility ; Decreased strength;Decreased endurance;Decreased balance;Decreased body mechanics  Assessment: Pt amb 175 ft with RW and CGA. Limited by endurance and weakness. Likely ok to d/c to prior living arrangments with family assist. Would benefit from contiuned PT after d/c on OP basis  Therapy Prognosis: Good  Activity Tolerance  Activity Tolerance: Patient limited by endurance     Plan   Plan  Plan:  (5-6x)  Specific Instructions for Next Treatment: increase amb distance, standing balance/end  Current Treatment Recommendations: Strengthening,Balance training,Functional mobility training,Transfer training,Endurance training,Gait training,Stair training,Safety education & training,Patient/Caregiver education & training,Home exercise program,Equipment evaluation, education, & procurement  Safety Devices  Type of Devices:  All fall risk precautions in place,Call light within reach,Gait belt,Nurse notified,Patient at risk for falls,Chair alarm in place,Left in chair  Restraints  Restraints Initially in Place: No     Restrictions  Restrictions/Precautions  Restrictions/Precautions: Fall Risk,General Precautions,Up as Tolerated  Required Braces or Orthoses?: No  Position Activity Restriction  Other position/activity restrictions: asthma exacerbation 4/18, ECMO started 4/22, bronchoscopy 4/24, RLE DVT 4/27, extubated 4/29, ECMO ended 4/30,     Subjective   General  Patient assessed for rehabilitation services?: Yes  Response To Previous Treatment: Patient with no complaints from previous session. Family / Caregiver Present: No  Follows Commands: Within Functional Limits  Subjective  Subjective: Pt resting in recliner upon arrival, agreeable to PT, hoping to go home   Cognition   Orientation  Overall Orientation Status: Within Functional Limits     Objective      Transfers  Sit to Stand: Stand by assistance  Stand to sit: Stand by assistance  Ambulation  Surface: level tile  Device: Rolling Walker  Assistance: Contact guard assistance  Quality of Gait: decreased tri, otherwise normalized gait pattern. Gait Deviations: Decreased step length;Decreased step height;Slow Tri; Increased RICHELLE  Distance: 175 ft  More Ambulation?: No     Balance  Posture: Good  Sitting - Static: Good  Sitting - Dynamic: Good;-  Standing - Static: Fair;+  Standing - Dynamic: Fair;+ (with RW for  support)   Exercise  Seated LE exercise program: Long Arc Quads, hip abduction/adduction, heel/toe raises, and marches.  Reps: 10x  Standing heel/toe raise, marching in place x 10, limited by faituge      AM-PAC Score  AM-PAC Inpatient Mobility Raw Score : 20 (05/10/22 1418)  AM-PAC Inpatient T-Scale Score : 47.67 (05/10/22 1418)  Mobility Inpatient CMS 0-100% Score: 35.83 (05/10/22 1418)  Mobility Inpatient CMS G-Code Modifier : CJ (05/10/22 1418)          Goals  Short Term Goals  Time Frame for Short term goals: 14 visits  Short term goal 1: Pt will be Khadar bed mobility  Short term goal 2: Pt will be Khadar transfers  Short term goal 3: Pt will amb 50' RW CGA  Short term goal 4: Pt will navigate 4 steps CGA       Therapy Time   Individual Concurrent Group Co-treatment   Time In 1200         Time Out 1224         Minutes 24         Timed Code Treatment Minutes: [address], CORINE

## 2022-05-10 NOTE — PLAN OF CARE
Problem: Gas Exchange - Impaired:  Goal: Levels of oxygenation will improve  Description: Levels of oxygenation will improve  5/10/2022 0718 by Sterling Brewster RN  Outcome: Progressing  5/10/2022 0647 by Dolores Mena RN  Outcome: Progressing     Problem:  Activity:  Goal: Ability to tolerate increased activity will improve  Description: Ability to tolerate increased activity will improve  5/10/2022 0718 by Sterling Brewster RN  Outcome: Progressing  5/10/2022 0647 by Dolores Mena RN  Outcome: Progressing     Problem: Cardiac:  Goal: Hemodynamic stability will improve  Description: Hemodynamic stability will improve  5/10/2022 0718 by Sterling Brewster RN  Outcome: Progressing  5/10/2022 0647 by Dolores Mena RN  Outcome: Progressing     Problem: Respiratory:  Goal: Ability to maintain a clear airway will improve  Description: Ability to maintain a clear airway will improve  5/10/2022 0718 by Sterling Brewster RN  Outcome: Progressing  5/10/2022 0647 by Dolores Mena RN  Outcome: Progressing  Goal: Ability to maintain adequate ventilation will improve  Description: Ability to maintain adequate ventilation will improve  5/10/2022 0718 by Sterling Brewster RN  Outcome: Progressing  5/10/2022 0647 by Dolores Mena RN  Outcome: Progressing  Goal: Complications related to the disease process, condition or treatment will be avoided or minimized  Description: Complications related to the disease process, condition or treatment will be avoided or minimized  5/10/2022 0718 by Sterling Brewster RN  Outcome: Progressing  5/10/2022 0647 by Dolores Mena RN  Outcome: Progressing     Problem: Skin Integrity:  Goal: Risk for impaired skin integrity will decrease  Description: Risk for impaired skin integrity will decrease  5/10/2022 0718 by Sterling Brewster RN  Outcome: Progressing  5/10/2022 0647 by Dolores Mena RN  Outcome: Progressing     Problem: OXYGENATION/RESPIRATORY FUNCTION  Goal: Patient will maintain patent airway  5/10/2022 0718 by Mark Patel RN  Outcome: Progressing  5/10/2022 0647 by Avery Chavira RN  Outcome: Progressing  Goal: Patient will achieve/maintain normal respiratory rate/effort  Description: Respiratory rate and effort will be within normal limits for the patient  5/10/2022 0718 by Mark Patel RN  Outcome: Progressing  5/10/2022 0647 by Avery Chavira RN  Outcome: Progressing     Problem: Nutrition  Goal: Optimal nutrition therapy  5/10/2022 0718 by Mark Patel RN  Outcome: Progressing  5/10/2022 0647 by Avery Chavira RN  Outcome: Progressing     Problem: Discharge Planning  Goal: Discharge to home or other facility with appropriate resources  5/10/2022 0718 by Mark Patel RN  Outcome: Progressing  5/10/2022 0647 by Avery Chavira RN  Outcome: Progressing     Problem: Respiratory - Adult  Goal: Achieves optimal ventilation and oxygenation  5/10/2022 0718 by Mark Patel RN  Outcome: Progressing  5/10/2022 0647 by Avery Chavira RN  Outcome: Progressing     Problem: Genitourinary - Adult  Goal: Urinary catheter remains patent  5/10/2022 0718 by Mark Patel RN  Outcome: Progressing  5/10/2022 0647 by Avery Chavira RN  Outcome: Progressing     Problem: Neurosensory - Adult  Goal: Achieves stable or improved neurological status  5/10/2022 0718 by Mark Patel RN  Outcome: Progressing  5/10/2022 0647 by Avery Chavira RN  Outcome: Progressing  Goal: Achieves maximal functionality and self care  5/10/2022 0718 by Mark Patel RN  Outcome: Progressing  5/10/2022 0647 by Avery Chavira RN  Outcome: Progressing     Problem: Pain  Goal: Verbalizes/displays adequate comfort level or baseline comfort level  5/10/2022 0718 by Mark Patel RN  Outcome: Progressing  5/10/2022 0647 by Avery Chavira RN  Outcome: Progressing     Problem: Safety - Adult  Goal: Free from fall injury  5/10/2022 0718 by Amanda Garcia RN  Outcome: Progressing  5/10/2022 0647 by Xavier Azul RN  Outcome: Progressing     Problem: ABCDS Injury Assessment  Goal: Absence of physical injury  5/10/2022 0718 by Amanda Garcia RN  Outcome: Progressing  5/10/2022 0647 by Xavier Azul RN  Outcome: Progressing     Problem: Skin/Tissue Integrity  Goal: Absence of new skin breakdown  Description: 1. Monitor for areas of redness and/or skin breakdown  2. Assess vascular access sites hourly  3. Every 4-6 hours minimum:  Change oxygen saturation probe site  4. Every 4-6 hours:  If on nasal continuous positive airway pressure, respiratory therapy assess nares and determine need for appliance change or resting period.   5/10/2022 0718 by Amanda Garcia RN  Outcome: Progressing  5/10/2022 0647 by Xavier Azul RN  Outcome: Progressing

## 2022-05-10 NOTE — CARE COORDINATION
Spoke to Dashawn at [facility]. Precert is still pending    [phone] met with pt to discuss transitional planning. She would like to go home with outpatient therapy. She is requesting a 2 wheeled walker. Walker order faxed to [facility]. [facility] Gusman notified. Rubie Mcardle will be delivered tonight to room. Notified outpatient pharmacy that pt will need scripts filled.  Pt denies other needs and has transportation    Discharge Report    [facility] Case Management Department  Written by: Jennifer Oates RN    Patient Name: Natali Parnell  Attending Provider: Tierra Elias MD  Admit Date: 2022  2:00 PM  MRN: [id number]  Account: [de-identified]                     : 2000  Discharge Date: 5/10/2022      Disposition: home    Jennifer Oates RN

## 2022-05-10 NOTE — PROGRESS NOTES
Occupational Therapy  Facility/Department: [facility] 2  Occupational Therapy Daily Progress Note    Name: Minnie Carrasco  : 2000  MRN: [id number]  Date of Service: 5/10/2022    Discharge Recommendations:Pt. Would benefit from 3 hours of therapy per day or 15 hours over a 7 day period. Patient would benefit from continued therapy after discharge  OT Equipment Recommendations  Mobility Devices: Pearletha Judaism: Rolling  ADL Assistive Devices: Transfer Tub Bench;Grab Bars - shower; Reacher;Long-handled Shoe Horn;Long-handled Sponge; Toileting - Drop Arm Commode, Heavy Duty Drop Arm Commode;Grab Bars - toilet; Toilet Hygiene Aid       Patient Diagnosis(es): The encounter diagnosis was CHIDI (obstructive sleep apnea). Past Medical History:  has no past medical history on file. Past Surgical History:  has a past surgical history that includes extracorporeal circulation (N/A, 2022). Assessment   Performance deficits / Impairments: Decreased functional mobility ; Decreased ADL status; Decreased endurance;Decreased high-level IADLs;Decreased strength;Decreased safe awareness;Decreased cognition;Decreased balance;Decreased fine motor control  Prognosis: Good  Decision Making: Medium Complexity  REQUIRES OT FOLLOW-UP: Yes  Activity Tolerance  Activity Tolerance: Patient Tolerated treatment well        Pain Assessment  Pain Assessment: 0-10  Pain Level: 0-10  Pain Type: no c/o pain    Plan   Plan  Times per Week: 3-5x/wk  Current Treatment Recommendations: Patient/Caregiver education & training,Safety education & training,Balance training,Functional mobility training,Strengthening,Endurance training,Home management training     Restrictions  Restrictions/Precautions  Restrictions/Precautions: Fall Risk,General Precautions,Up as Tolerated  Required Braces or Orthoses?: No  Position Activity Restriction  Other position/activity restrictions: asthma exacerbation , ECMO started , bronchoscopy , RLE DVT 4/27, extubated 4/29, ECMO ended 4/30,    Subjective   General  Patient assessed for rehabilitation services?: Yes  Family / Caregiver Present: No  General Comment  Comments: RN ok'd for OT tx this AM. Pt agreeable to session, pleasent/cooperative throughout. Pt. tearful at times, pt. is eager to be discharged from hospital to rehab, so she can get back to her home. Objective              Safety Devices  Type of Devices: All fall risk precautions in place;Call light within reach;Gait belt;Nurse notified; Patient at risk for falls; Chair alarm in place; Left in chair  Restraints  Restraints Initially in Place: No  Bed Mobility Training  Bed Mobility Training: Yes  Rolling: Modified independent (bed rail)  Supine to Sit: Stand-by assistance (HOB elevated)  Scooting: Stand-by assistance  Balance  Sitting - Static:  (SBA-S to ensure stability, static/dynamic, sitting EOB)  Standing: Impaired  Standing - Static:  (Static/dynamic, CGA, ~15 minutes with 1 sitting rest break)  Transfer Training  Transfer Training: Yes  Sit to Stand: Contact-guard assistance  Stand to Sit: Contact-guard assistance  Stand Pivot Transfers: Contact-guard assistance  Toilet Transfer: Contact-guard assistance  Gait  Overall Level of Assistance: Contact-guard assistance; Additional time (CGA, room distances. RW)        ADL  Grooming: Setup; Increased time to complete;Contact guard assistance  Grooming Skilled Clinical Factors: Standing at sink, pt. tolerated brushing teeth/washing face at CGA level for stability + CGA for action and extended time d/t decreased B hand strength. LE Dressing: Setup; Increased time to complete;Contact guard assistance;Verbal cueing  LE Dressing Skilled Clinical Factors: Don B socks in long sitting position, min cues to use 4 figure tech, pt. needing increased time and effort. Toileting: Increased time to complete;Setup;Contact guard assistance  Toileting Skilled Clinical Factors:  Toilet transfers- CGA, hyg- CGA + verbal cues + increased time and effort (frontal hyg), pt. limited reach and B hand strength has made it very difficult for toilet hyg, pt. educated on toilet aid options, pt. stated she understands, however hopes to get stronger, not to need AE/DME. Cognition  Overall Cognitive Status: WFL                  Education Given To: Patient  Education Provided: Role of Therapy;Plan of Care;Transfer Training;Energy Conservation; ADL Adaptive Strategies; Equipment  Education Provided Comments: walker management, activity promotion, benefits of OOB activity, DME /AE options-good return  Education Method: Demonstration;Verbal  Barriers to Learning: None  Education Outcome: Demonstrated understanding;Verbalized understanding                                                    AM-PAC Score        AM-PAC Inpatient Daily Activity Raw Score: 18 (05/10/22 1228)  AM-PAC Inpatient ADL T-Scale Score : 38.66 (05/10/22 1228)  ADL Inpatient CMS 0-100% Score: 46.65 (05/10/22 1228)  ADL Inpatient CMS G-Code Modifier : CK (05/10/22 1228)    Goals  Short Term Goals  Time Frame for Short term goals: By discharge, pt will:  Short Term Goal 1: Demo bed mobility sit<>supine transfers with SBA  Short Term Goal 2: Demo functional sit<>stand transfers with Min A and LRD PRN  Short Term Goal 3: Demo 4 minutes of dynamic standing balance with CGA and unilateral hand release to promote increased engagement in ADLs  Short Term Goal 4: Demo functional mobility with Mod A and LRD PRN  Short Term Goal 5: Demo UB ADLs with SUP  Short Term Goal 6: Demo LB ADLs/toileitng with CGA, use of DME and adaptive tech PRN       Therapy Time   Individual Concurrent Group Co-treatment   Time In 1100         Time Out 1139         Minutes 39         Timed Code Treatment Minutes: [address] CASIMIRO Savage/HEATHER

## 2022-05-12 NOTE — DISCHARGE SUMMARY
[address]     Department of Internal Medicine - Staff Internal Medicine Teaching Service    INPATIENT DISCHARGE SUMMARY      Patient Identification:  Betsey Guthrie is a 23 y.o. female. :  2000  MRN: [id number]     Acct: [de-identified]   PCP: Reena Garcia MD  Admit Date:  2022  Discharge date and time: 5/10/2022  6:48 PM   Attending Provider: No att. providers found                                     [facility] Problem Lists:  Principal Problem:    Acute asthma exacerbation  Active Problems:    Severe persistent asthma with status asthmaticus    CHIDI (obstructive sleep apnea)    Morbid obesity with BMI of 50.0-59.9, adult ([facility])    History of seizures    Hypercapnic respiratory failure ([facility])    Endotracheally intubated    On mechanically assisted ventilation ([facility])    Diastolic dysfunction  Resolved Problems:    * No resolved hospital problems. *      HOSPITAL STAY     Brief Inpatient course:   Betsey Guthrie is a 23 y.o. female who was admitted for the management of Acute asthma exacerbation, presented to the emergency department of [facility] with acute asthma exacerbation.  She required nonrebreather subsequently intubated and initiated on mechanical ventilation due to worsening respiratory status.    She was then transferred to [facility] also required bicarb drip initially. Brisa Ji was sedated, paralyzed and mechanically ventilated. Patient underwent bronchoscopy on  with no mucus plug found.  ABG revealed respiratory acidosis. Patient with severe ARDS, status asthmaticus due to rhinovirus.   Decision made to place patient on venovenous ECMO on 2022. Patient improved and was  Decannulated .  Extubated .  Sputum culture positive for Pseudomonas treated with meropenem.  Patient developed left femoral DVT, started on high dose heparin and discharged on Eliquis.  Patient discharged home with home care.   Patient managed for the following during hospital course:      Acute asthma exacerbation: with Acute hypoxic hypercapnic respiratory failure required mechanical ventilation and s/p ECMO cannulation on 4/22/22 Extubated 4/29/2022, decannulated 4/30/2022. Respiratory cultures positive for Pseudomonas -  patient developed rash on Cefepime. Completed 7 days course of Meropenem. On Prednisone taper. Duoneb q4 hours and Albuterol as needed. Continue Lasix 40mg daily, diuresing well.       Acute Left Femoral vein DVT: Heparin then Eliquis     Ventilator associated right lower lobe pneumonia: Was On meropenem from 4/29-5/6.     CHIDI (obstructive sleep apnea):  BiPAP at night     Sinus tachycardia: On Cardizem 30 Mg every 6 hours per cardiology.  As needed metoprolol for heart rate > 110.  Hold for SBP<110      Morbid obesity with BMI of 50.0-59.9, adult ([facility] [address]      Procedures/ Significant Interventions:    4/22/2022: ECMO cannuation  ECMO decannulation 4/30/2022  Bronchoscopy:     Consults:     Consults:     Final Specialist Recommendations/Findings:   IP CONSULT TO CRITICAL CARE  IP CONSULT TO CARDIOLOGY  IP CONSULT TO DIETITIAN  IP CONSULT TO CARDIOLOGY  IP CONSULT TO INFECTIOUS DISEASES  IP CONSULT TO IV TEAM  IP CONSULT TO HOME CARE NEEDS      Any Hospital Acquired Infections: none    Discharge Functional Status:  stable    DISCHARGE PLAN     Disposition: home    Patient Instructions:   Discharge Medication List as of 5/10/2022  5:42 PM      START taking these medications    Details   budesonide (PULMICORT) 0.5 MG/2ML nebulizer suspension Take 2 mLs by nebulization 2 times daily, Disp-60 each, R-3Normal      dilTIAZem (CARDIZEM) 30 MG tablet Take 1 tablet by mouth 3 times daily, Disp-90 tablet, R-0Normal      furosemide (LASIX) 40 MG tablet Take 1 tablet by mouth daily, Disp-60 tablet, R-3Normal      pantoprazole (PROTONIX) 40 MG tablet Take 1 tablet by mouth every morning (before breakfast), Disp-30 tablet, R-3Normal      !! predniSONE (DELTASONE) 20 MG tablet Take 1 tablet by mouth daily for 3 doses, Disp-3 tablet, R-0Normal      !! predniSONE (DELTASONE) 10 MG tablet Take 1 tablet by mouth daily for 3 doses, Disp-3 tablet, R-0Normal      !! predniSONE (DELTASONE) 5 MG tablet Take 1 tablet by mouth daily for 3 doses, Disp-3 tablet, R-0Normal      neomycin-bacitracin-polymyxin (NEOSPORIN) 400-5-5000 ointment Apply topically 2 times daily. , Disp-2 each, R-0, Normal      apixaban starter pack (ELIQUIS) 5 MG TBPK tablet Take 1 tablet by mouth 2 times daily, Disp-74 tablet, R-0Normal       !! - Potential duplicate medications found. Please discuss with provider. CONTINUE these medications which have CHANGED    Details   albuterol sulfate HFA (VENTOLIN HFA) 108 (90 Base) MCG/ACT inhaler Inhale 2 puffs into the lungs every 6 hours as needed for Wheezing, Disp-18 g, R-3Normal         STOP taking these medications       meropenem (MERREM) infusion Comments:   Reason for Stopping:               Activity: activity as tolerated    Diet: regular diet and low fat, low cholesterol diet    Follow-up:    [facility]  [address] [place] [address]  [phone]  Schedule an appointment as soon as possible for a visit in 2 weeks      Unruly Marshall MD  [address]  [phone]    Schedule an appointment as soon as possible for a visit in 3 weeks  As needed      Patient Instructions: You were admitted and managed for severe asthma exacerbation requiring ECMO (external oxygenation of your blood). You were also found to have pneumonia from pseudomonas infection and placed on meropenem antibiotic. You also developed blood clots in your femoral vein and we have you on Eliquis  During your admission you had an allergic rash to cefepime, we recommend you avoid penicillins.    Please follow-up with the heart doctors in 2 weeks about your fast heart rate, for which we have you on Cardizem tablet. Please follow-up with the lung doctors for optimization of your asthma management and prevention of exacerbations  Please follow-up with your PCP  Please take prescribed Eliquis daily as treatment for your blood clots and to prevent clots migrating to the lungs. Please take your medications as prescribed  Continue rehab to regain your strength. Follow up labs: none  Follow up imaging: none    Note that over 30 minutes was spent in preparing discharge papers, discussing discharge with patient, medication review, etc.      Kirstie Saint, MD,   Internal Medicine Resident, PGY-1  [facility];  [place], [place]  5/12/2022, 9:19 AM

## 2025-08-16 ENCOUNTER — OFFICE VISIT (OUTPATIENT)
Age: 25
End: 2025-08-16

## 2025-08-16 VITALS
WEIGHT: 293 LBS | HEART RATE: 105 BPM | BODY MASS INDEX: 50.02 KG/M2 | SYSTOLIC BLOOD PRESSURE: 144 MMHG | OXYGEN SATURATION: 95 % | RESPIRATION RATE: 22 BRPM | HEIGHT: 64 IN | DIASTOLIC BLOOD PRESSURE: 80 MMHG | TEMPERATURE: 97.9 F

## 2025-08-16 DIAGNOSIS — R03.0 ELEVATED BLOOD PRESSURE READING: ICD-10-CM

## 2025-08-16 DIAGNOSIS — R06.02 SHORTNESS OF BREATH: ICD-10-CM

## 2025-08-16 DIAGNOSIS — J45.41 MODERATE PERSISTENT ASTHMA WITH EXACERBATION: Primary | ICD-10-CM

## 2025-08-16 RX ORDER — IPRATROPIUM BROMIDE AND ALBUTEROL SULFATE 2.5; .5 MG/3ML; MG/3ML
1 SOLUTION RESPIRATORY (INHALATION) ONCE
Status: COMPLETED | OUTPATIENT
Start: 2025-08-16 | End: 2025-08-16

## 2025-08-16 RX ORDER — ALBUTEROL SULFATE 90 UG/1
2 INHALANT RESPIRATORY (INHALATION) 4 TIMES DAILY PRN
Qty: 18 G | Refills: 0 | Status: SHIPPED | OUTPATIENT
Start: 2025-08-16

## 2025-08-16 RX ORDER — PREDNISONE 20 MG/1
40 TABLET ORAL DAILY
Qty: 10 TABLET | Refills: 0 | Status: SHIPPED | OUTPATIENT
Start: 2025-08-16 | End: 2025-08-21

## 2025-08-16 RX ORDER — ALBUTEROL SULFATE 0.83 MG/ML
2.5 SOLUTION RESPIRATORY (INHALATION) 4 TIMES DAILY PRN
Qty: 120 EACH | Refills: 3 | Status: SHIPPED | OUTPATIENT
Start: 2025-08-16

## 2025-08-16 RX ORDER — DILTIAZEM HYDROCHLORIDE 30 MG/1
TABLET, FILM COATED ORAL
COMMUNITY

## 2025-08-16 RX ORDER — METFORMIN HYDROCHLORIDE 750 MG/1
TABLET, EXTENDED RELEASE ORAL
COMMUNITY
Start: 2025-07-05

## 2025-08-16 RX ORDER — ALBUTEROL SULFATE 0.83 MG/ML
2.5 SOLUTION RESPIRATORY (INHALATION) ONCE
Status: COMPLETED | OUTPATIENT
Start: 2025-08-16 | End: 2025-08-16

## 2025-08-16 RX ORDER — DEXAMETHASONE SODIUM PHOSPHATE 10 MG/ML
10 INJECTION, SOLUTION INTRA-ARTICULAR; INTRALESIONAL; INTRAMUSCULAR; INTRAVENOUS; SOFT TISSUE ONCE
Status: COMPLETED | OUTPATIENT
Start: 2025-08-16 | End: 2025-08-16

## 2025-08-16 RX ORDER — FLUTICASONE FUROATE AND VILANTEROL TRIFENATATE 200; 25 UG/1; UG/1
POWDER RESPIRATORY (INHALATION)
COMMUNITY

## 2025-08-16 RX ORDER — LEVETIRACETAM 750 MG/1
TABLET ORAL
COMMUNITY
Start: 2025-08-13

## 2025-08-16 RX ADMIN — ALBUTEROL SULFATE 2.5 MG: 0.83 SOLUTION RESPIRATORY (INHALATION) at 18:47

## 2025-08-16 RX ADMIN — DEXAMETHASONE SODIUM PHOSPHATE 10 MG: 10 INJECTION, SOLUTION INTRA-ARTICULAR; INTRALESIONAL; INTRAMUSCULAR; INTRAVENOUS; SOFT TISSUE at 17:53

## 2025-08-16 RX ADMIN — IPRATROPIUM BROMIDE AND ALBUTEROL SULFATE 1 DOSE: 2.5; .5 SOLUTION RESPIRATORY (INHALATION) at 17:55

## 2025-08-16 ASSESSMENT — ENCOUNTER SYMPTOMS
EYE PAIN: 0
STRIDOR: 0
WHEEZING: 1
EYE ITCHING: 0
VOMITING: 0
EYE REDNESS: 0
SORE THROAT: 0
SHORTNESS OF BREATH: 1
NAUSEA: 0
CONSTIPATION: 0
ABDOMINAL PAIN: 0
DIARRHEA: 0
COUGH: 1
RHINORRHEA: 1
EYE DISCHARGE: 0
CHEST TIGHTNESS: 1

## (undated) DEVICE — Device

## (undated) DEVICE — PROVE COVER: Brand: UNBRANDED

## (undated) DEVICE — GLOVE SURG SZ 8 L12IN FNGR THK79MIL GRN LTX FREE

## (undated) DEVICE — CANNULA PERF 25FR L30IN MULTISTAGE FEM VEN W/ INSRT KT

## (undated) DEVICE — GUIDEWIRE VASC L260CM DIA0.035IN L7CM DIA3MM J TIP PTFE S

## (undated) DEVICE — GLOVE SURG SZ 7 CRM LTX FREE POLYISOPRENE POLYMER BEAD ANTI

## (undated) DEVICE — INTRODUCER SHTH 7FR CANN L11CM 0.038IN STD ORNG W/ MINI

## (undated) DEVICE — GLOVE SURG SZ 7.5 L11.73IN FNGR THK9.8MIL STRW LTX POLYMER

## (undated) DEVICE — GOWN,SIRUS,NONRNF,SETINSLV,XL,20/CS: Brand: MEDLINE